# Patient Record
Sex: MALE | Race: BLACK OR AFRICAN AMERICAN | NOT HISPANIC OR LATINO | Employment: FULL TIME | URBAN - METROPOLITAN AREA
[De-identification: names, ages, dates, MRNs, and addresses within clinical notes are randomized per-mention and may not be internally consistent; named-entity substitution may affect disease eponyms.]

---

## 2017-01-16 ENCOUNTER — OFFICE VISIT (OUTPATIENT)
Dept: DIABETES | Facility: CLINIC | Age: 52
End: 2017-01-16
Payer: COMMERCIAL

## 2017-01-16 VITALS
BODY MASS INDEX: 55.39 KG/M2 | SYSTOLIC BLOOD PRESSURE: 154 MMHG | WEIGHT: 312.63 LBS | HEIGHT: 63 IN | DIASTOLIC BLOOD PRESSURE: 88 MMHG

## 2017-01-16 DIAGNOSIS — E11.9 DIABETES MELLITUS WITHOUT COMPLICATION: Primary | ICD-10-CM

## 2017-01-16 DIAGNOSIS — M83.9 OSTEOMALACIA: ICD-10-CM

## 2017-01-16 LAB — GLUCOSE SERPL-MCNC: 83 MG/DL (ref 70–110)

## 2017-01-16 PROCEDURE — 99214 OFFICE O/P EST MOD 30 MIN: CPT | Mod: S$GLB,,, | Performed by: PHYSICIAN ASSISTANT

## 2017-01-16 PROCEDURE — 99999 PR PBB SHADOW E&M-EST. PATIENT-LVL III: CPT | Mod: PBBFAC,,, | Performed by: PHYSICIAN ASSISTANT

## 2017-01-16 PROCEDURE — 1159F MED LIST DOCD IN RCRD: CPT | Mod: S$GLB,,, | Performed by: PHYSICIAN ASSISTANT

## 2017-01-16 PROCEDURE — 82948 REAGENT STRIP/BLOOD GLUCOSE: CPT | Mod: S$GLB,,, | Performed by: PHYSICIAN ASSISTANT

## 2017-01-16 PROCEDURE — 3079F DIAST BP 80-89 MM HG: CPT | Mod: S$GLB,,, | Performed by: PHYSICIAN ASSISTANT

## 2017-01-16 PROCEDURE — 3060F POS MICROALBUMINURIA REV: CPT | Mod: S$GLB,,, | Performed by: PHYSICIAN ASSISTANT

## 2017-01-16 PROCEDURE — 3077F SYST BP >= 140 MM HG: CPT | Mod: S$GLB,,, | Performed by: PHYSICIAN ASSISTANT

## 2017-01-16 PROCEDURE — 3044F HG A1C LEVEL LT 7.0%: CPT | Mod: S$GLB,,, | Performed by: PHYSICIAN ASSISTANT

## 2017-01-16 PROCEDURE — 2022F DILAT RTA XM EVC RTNOPTHY: CPT | Mod: S$GLB,,, | Performed by: PHYSICIAN ASSISTANT

## 2017-01-16 NOTE — PROGRESS NOTES
Subjective:      Patient ID: Juan C Rodriguez is a 51 y.o. male.    PCP: Primary Doctor No      Juan C Rodriguez is a pleasant 51 y.o. male presenting to follow up on diabetes mellitus. He has had diabetes for 4 or more years. His last visit in Diabetes Management was 9/26/2016. Since that time he has had mild improvement in his symptoms. His blood sugar range fasting has been 130 and 2 hour post meal has been 150's, and he has been monitoring 1 times per day as directed. His current concerns are glycemic control.    He denies any hospital admissions, emergency room visits, hypoglycemia, syncope, diaphoresis, chest pain, or dyspnea.    He has lost 3 pounds since last visit. His BMI is 55.38    His blood sugar in the clinic today was:   Lab Results   Component Value Date    POCGLU 83 01/16/2017     We discussed the American diabetes Association recommendations:  hemoglobin A1c below 7.0%; all diabetics should be on statins unless contraindicated; one aspirin daily unless contraindicated; fasting blood sugar between 80 and 130 mg/dL; postprandial blood sugar below 180 mg/dl; prevention of hypoglycemia, may adjust goals to higher levels if persistent; ACE or ARB therapy if not contraindicated; and maintain in an ideal body weight with BMI below 25.    STANDARDS OF CARE:  Eye doctor: Dr. Ruiz, last exam 2015.  Dental exam: Recommend regular exams; denies gums bleeding.  Podiatry doctor:    ACTIVITY LEVEL: He exercises rarely.  MEAL PLANNING: Number of meals per day - 3. Number of snacks per day - 2.  Per dietary recall, patient is not limiting carbohydrates, saturated fats and sodium.   BLOOD GLUCOSE TESTING: Self-monitoring with   SOCIAL HISTORY: single. Lives alone. 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    Lab Results   Component Value Date    WBC 7.72 09/23/2016    HGB 11.9 (L) 09/23/2016    HCT 38.1 (L) 12/08/2016     09/23/2016    CHOL 211 (H)  07/08/2016    TRIG 53 07/08/2016    HDL 51 07/08/2016    ALT 15 12/08/2016    AST 19 12/08/2016     09/26/2016    K 3.7 09/26/2016     09/26/2016    CREATININE 1.3 09/26/2016    ESTGFRAFRICA >60.0 09/26/2016    EGFRNONAA >60.0 09/26/2016    BUN 25 (H) 09/26/2016    CO2 31 (H) 09/26/2016    TSH 2.18 07/08/2016    PSA 0.44 12/08/2016    GLU 96 09/26/2016       Lab Results   Component Value Date    HGBA1C 6.6 (H) 02/18/2016    HGBA1C 6.6 (H) 02/18/2016    HGBA1C 6.2 09/17/2015       Lab Results   Component Value Date    CPEPTIDE 2.49 07/08/2016     Lab Results   Component Value Date    T3FREE 2.7 07/08/2016     Lab Results   Component Value Date    FREET4 0.80 09/17/2015     Lab Results   Component Value Date    TSH 2.18 07/08/2016     Lab Results   Component Value Date    TOTALTESTOST 69 (L) 12/10/2015     Lab Results   Component Value Date    TESTOSTERONE 198 (L) 05/31/2016    TESTOSTERONE 43.6 (L) 05/31/2016     Lab Results   Component Value Date    IRON 43 (L) 09/17/2015    TIBC 296 09/17/2015    FERRITIN 102 01/18/2011     Lab Results   Component Value Date    .0 (H) 09/26/2016    CALCIUM 9.2 09/26/2016    CAION 5.0 07/08/2016    PHOS 2.7 09/17/2015           Review of patient's allergies indicates:   Allergen Reactions    Naproxen Swelling       Past Medical History   Diagnosis Date    Diabetes mellitus, type 2     DM (diabetes mellitus)      2011  02/25/2014    HTN (hypertension)     Hypogonadism, testicular     Obesities, morbid        Review of Systems   Constitutional: Negative.  Negative for activity change, appetite change, chills, diaphoresis, fatigue, fever and unexpected weight change.   HENT: Negative.  Negative for dental problem, facial swelling, hearing loss, nosebleeds, trouble swallowing and voice change.    Eyes: Negative.  Negative for photophobia, pain, discharge, redness, itching and visual disturbance.   Respiratory: Negative.  Negative for cough, choking, chest  "tightness, shortness of breath and wheezing.    Cardiovascular: Negative.  Negative for chest pain, palpitations and leg swelling.   Gastrointestinal: Negative.  Negative for abdominal distention, abdominal pain, blood in stool, constipation, diarrhea, nausea and vomiting.   Endocrine: Negative.  Negative for cold intolerance, heat intolerance, polydipsia, polyphagia and polyuria.   Genitourinary: Negative.  Negative for decreased urine volume, difficulty urinating, dysuria, frequency, scrotal swelling, testicular pain and urgency.   Musculoskeletal: Negative.  Negative for arthralgias, back pain, gait problem, joint swelling, myalgias, neck pain and neck stiffness.   Skin: Negative.  Negative for color change, pallor, rash and wound.   Allergic/Immunologic: Negative.  Negative for environmental allergies, food allergies and immunocompromised state.   Neurological: Negative.  Negative for dizziness, tremors, seizures, syncope, facial asymmetry, speech difficulty, weakness, light-headedness, numbness and headaches.   Hematological: Negative.  Negative for adenopathy. Does not bruise/bleed easily.   Psychiatric/Behavioral: Negative.  Negative for agitation, behavioral problems, confusion, decreased concentration, dysphoric mood, hallucinations, self-injury, sleep disturbance and suicidal ideas. The patient is not nervous/anxious and is not hyperactive.       Objective:     Vitals - 1 value per visit 12/12/2016 12/15/2016 1/16/2017   SYSTOLIC 170 - 154   DIASTOLIC 76 - 88   PULSE 60 - -   Weight (lb) 313.94 313 312.61   HEIGHT 5' 2.5" - 5' 3"   BODY MASS INDEX 56.5 56.34 55.38   VISIT REPORT - - -   Pain Score  - 0 -       Physical Exam   Constitutional: He is oriented to person, place, and time. He appears well-developed and well-nourished. He is cooperative.  Non-toxic appearance. He does not have a sickly appearance. He does not appear ill. No distress. He is not intubated.   HENT:   Head: Normocephalic and " atraumatic. Not macrocephalic and not microcephalic. Head is without raccoon's eyes, without Malone's sign, without abrasion, without contusion, without laceration, without right periorbital erythema and without left periorbital erythema. Hair is normal.   Right Ear: External ear normal. No lacerations. No drainage, swelling or tenderness. No foreign bodies. No mastoid tenderness. Tympanic membrane is not injected, not scarred, not perforated, not erythematous, not retracted and not bulging. Tympanic membrane mobility is normal. No middle ear effusion. No hemotympanum. No decreased hearing is noted.   Left Ear: External ear normal. No lacerations. No drainage, swelling or tenderness. No foreign bodies. No mastoid tenderness. Tympanic membrane is not injected, not scarred, not perforated, not erythematous, not retracted and not bulging. Tympanic membrane mobility is normal.  No middle ear effusion. No hemotympanum. No decreased hearing is noted.   Nose: Nose normal.   Mouth/Throat: Oropharynx is clear and moist.   Eyes: EOM and lids are normal. Pupils are equal, round, and reactive to light. Right eye exhibits no chemosis, no discharge, no exudate and no hordeolum. No foreign body present in the right eye. Left eye exhibits no chemosis, no discharge, no exudate and no hordeolum. No foreign body present in the left eye. Right conjunctiva is not injected. Right conjunctiva has no hemorrhage. Left conjunctiva is not injected. Left conjunctiva has no hemorrhage. No scleral icterus. Right eye exhibits normal extraocular motion and no nystagmus. Left eye exhibits normal extraocular motion and no nystagmus. Right pupil is round and reactive. Left pupil is round and reactive. Pupils are equal.   Fundoscopic exam:       The right eye shows no arteriolar narrowing, no AV nicking, no exudate, no hemorrhage and no papilledema. The right eye shows red reflex and venous pulsations.        The left eye shows no arteriolar  narrowing, no AV nicking, no exudate, no hemorrhage and no papilledema. The left eye shows red reflex and venous pulsations.   Neck: Normal range of motion, full passive range of motion without pain and phonation normal. Neck supple. Normal carotid pulses, no hepatojugular reflux and no JVD present. No tracheal tenderness, no spinous process tenderness and no muscular tenderness present. Carotid bruit is not present. No rigidity. No tracheal deviation, no edema, no erythema and normal range of motion present. No thyroid mass and no thyromegaly present.   Cardiovascular: Normal rate, regular rhythm, normal heart sounds and intact distal pulses.   No extrasystoles are present. PMI is not displaced.  Exam reveals no gallop, no friction rub and no decreased pulses.    No murmur heard.  Pulses:       Carotid pulses are 2+ on the right side, and 2+ on the left side.       Radial pulses are 2+ on the right side, and 2+ on the left side.        Dorsalis pedis pulses are 2+ on the right side, and 2+ on the left side.        Posterior tibial pulses are 2+ on the right side, and 2+ on the left side.   Pulmonary/Chest: Effort normal and breath sounds normal. No accessory muscle usage or stridor. No apnea, no tachypnea and no bradypnea. He is not intubated. No respiratory distress. He has no decreased breath sounds. He has no wheezes. He has no rhonchi. He has no rales. He exhibits no tenderness.   Abdominal: Soft. Bowel sounds are normal. He exhibits no shifting dullness, no distension, no pulsatile liver, no fluid wave, no abdominal bruit, no ascites, no pulsatile midline mass and no mass. There is no hepatosplenomegaly, splenomegaly or hepatomegaly. There is no tenderness. There is no rigidity, no rebound, no guarding, no CVA tenderness and no tenderness at McBurney's point. No hernia. Hernia confirmed negative in the ventral area.   Musculoskeletal: Normal range of motion. He exhibits no edema or tenderness.        Right  foot: There is normal range of motion and no deformity.        Left foot: There is normal range of motion and no deformity.   Feet:   Right Foot:   Protective Sensation: 6 sites tested. 6 sites sensed.   Skin Integrity: Negative for ulcer, blister, skin breakdown, erythema, warmth, callus or dry skin.   Left Foot:   Protective Sensation: 6 sites tested. 6 sites sensed.   Skin Integrity: Negative for ulcer, blister, skin breakdown, erythema, warmth, callus or dry skin.   Lymphadenopathy:        Head (right side): No submental, no submandibular, no tonsillar, no preauricular, no posterior auricular and no occipital adenopathy present.        Head (left side): No submental, no submandibular, no tonsillar, no preauricular, no posterior auricular and no occipital adenopathy present.     He has no cervical adenopathy.        Right cervical: No superficial cervical, no deep cervical and no posterior cervical adenopathy present.       Left cervical: No superficial cervical, no deep cervical and no posterior cervical adenopathy present.   Neurological: He is alert and oriented to person, place, and time. He has normal reflexes. He displays no atrophy and no tremor. No cranial nerve deficit or sensory deficit. He exhibits normal muscle tone. He displays no seizure activity. Coordination and gait normal.   Reflex Scores:       Bicep reflexes are 2+ on the right side and 2+ on the left side.       Brachioradialis reflexes are 2+ on the right side and 2+ on the left side.       Patellar reflexes are 2+ on the right side and 2+ on the left side.  Skin: Skin is warm and dry. No rash noted. No erythema. No pallor.   Psychiatric: He has a normal mood and affect. His mood appears not anxious. His affect is not angry, not blunt, not labile and not inappropriate. His speech is not rapid and/or pressured, not delayed, not tangential and not slurred. He is not agitated, not aggressive, not hyperactive, not slowed, not withdrawn, not  actively hallucinating and not combative. Thought content is not paranoid and not delusional. Cognition and memory are not impaired. He does not express impulsivity or inappropriate judgment. He does not exhibit a depressed mood. He expresses no homicidal and no suicidal ideation. He expresses no suicidal plans and no homicidal plans. He is communicative. He exhibits normal recent memory and normal remote memory. He is attentive.     Assessment:     1. Diabetes mellitus without complication       Plan:     Juan C Rodriguez is seen today for   1. Diabetes mellitus without complication    2. Osteomalacia      We have discussed the etiology and treatment options associated with the diagnosis as well as alternatives. He has elected the following treatments.     Diabetes mellitus without complication  -     POCT glucose  -     Hemoglobin A1c; Future; Expected date: 1/16/17  -     Renal function panel; Future; Expected date: 1/16/17  -     ALT (SGPT); Future; Expected date: 1/16/17  -     AST (SGOT); Future; Expected date: 1/16/17  -     Lipid panel; Future; Expected date: 1/16/17  -     TSH; Future; Expected date: 1/16/17  -     Microalbumin/creatinine urine ratio; Future; Expected date: 1/16/17  -     CBC auto differential; Future; Expected date: 1/16/17  -     Vitamin D; Future; Expected date: 1/16/17  -     PTH, intact; Future; Expected date: 1/16/17    Osteomalacia  -     Renal function panel; Future; Expected date: 1/16/17  -     Vitamin D; Future; Expected date: 1/16/17  -     PTH, intact; Future; Expected date: 1/16/17    1.) Patient was instructed to monitor blood glucose twice daily, fasting, and 2 hour post meal; if on Multiple Daily Injections (MDI) he will need to have pre-meal blood glucose as well. Reminded to bring BG meter or record to each visit for review.  2.) Reviewed pathophysiology of type 2 diabetes, complications related to the disease, importance of annual dilated eye exam and self daily foot  examination.  3.) Continue medications as prescribed Lifestyle changes. [facility] MyChart or Phone review in 1 week with BG records for adjustment of medication.  4.) Reviewed carb counting, portion control, importance of spacing meals throughout the day to prevent post prandial elevations. Recommended low saturated fat, low sodium diet to aid in control of hypertension and cholesterol.  5.) Discussed activity, benefits, methods, and precautions. Recommended patient start/continue some form of exercise and increase as tolerated to 60 minutes per day to facilitate weight loss and aid in control of BGs. Also reminded patient of WHO recommendation of 10,000 steps daily as a goal.   6.) A1C, TSH, Lipid Panel, CMP with eGFR and Micro/Creatinine per ADA protocol.  7.) Return to clinic in 4 months for follow up. Advised patient to call clinic with any questions or concerns.    A total of 30 minutes was spent in face to face time, of which 50 % was spent in counseling patient on disease process, complications, treatment, and side effects of medications.    The patient was explained the above plan and given opportunity to ask questions.  He understands, chooses and consents to this plan and accepts all the risks, which include but are not limited to the risks mentioned above.   He understands the alternative of having no testing, interventions or treatments at this time. He left content and without further questions.

## 2017-01-23 RX ORDER — TESTOSTERONE GEL, 1% 10 MG/G
5 GEL TRANSDERMAL DAILY
Qty: 30 PACKET | Refills: 3 | Status: SHIPPED | OUTPATIENT
Start: 2017-01-23 | End: 2017-06-21 | Stop reason: SDUPTHER

## 2017-01-23 NOTE — TELEPHONE ENCOUNTER
Patient states insurance company no longer covers Testim. However, they will cover Androgel. Please advise.

## 2017-01-23 NOTE — TELEPHONE ENCOUNTER
----- Message from Cassidy Gonzalez sent at 1/23/2017  9:35 AM CST -----  Pt is requesting a call from nurse to f/u on his medications his insurance will cover.              Please call pt back at [phone]

## 2017-03-17 DIAGNOSIS — M83.9 OSTEOMALACIA: ICD-10-CM

## 2017-03-17 RX ORDER — ERGOCALCIFEROL 1.25 MG/1
CAPSULE ORAL
Qty: 4 CAPSULE | Refills: 0 | Status: SHIPPED | OUTPATIENT
Start: 2017-03-17 | End: 2017-05-26 | Stop reason: SDUPTHER

## 2017-03-18 LAB
25(OH)D3 SERPL-MCNC: 33 NG/ML (ref 30–100)
ALBUMIN SERPL-MCNC: 4 G/DL (ref 3.6–5.1)
ALT SERPL-CCNC: 11 U/L (ref 9–46)
AST SERPL-CCNC: 15 U/L (ref 10–35)
BASOPHILS # BLD AUTO: 38 CELLS/UL (ref 0–200)
BASOPHILS NFR BLD AUTO: 0.5 %
BUN SERPL-MCNC: 29 MG/DL (ref 7–25)
BUN/CREAT SERPL: 19 (CALC) (ref 6–22)
CALCIUM SERPL-MCNC: 9.3 MG/DL (ref 8.6–10.3)
CALCIUM SERPL-MCNC: 9.4 MG/DL (ref 8.6–10.3)
CHLORIDE SERPL-SCNC: 100 MMOL/L (ref 98–110)
CHOLEST SERPL-MCNC: 235 MG/DL (ref 125–200)
CHOLEST/HDLC SERPL: 4.3 (CALC)
CO2 SERPL-SCNC: 31 MMOL/L (ref 20–31)
CREAT SERPL-MCNC: 1.53 MG/DL (ref 0.7–1.33)
EOSINOPHIL # BLD AUTO: 122 CELLS/UL (ref 15–500)
EOSINOPHIL NFR BLD AUTO: 1.6 %
ERYTHROCYTE [DISTWIDTH] IN BLOOD BY AUTOMATED COUNT: 17.1 % (ref 11–15)
GFR SERPL CREATININE-BSD FRML MDRD: 51 ML/MIN/1.73M2
GLUCOSE SERPL-MCNC: 93 MG/DL (ref 65–99)
HBA1C MFR BLD: 6.1 % OF TOTAL HGB
HCT VFR BLD AUTO: 37.8 % (ref 38.5–50)
HDLC SERPL-MCNC: 55 MG/DL
HGB BLD-MCNC: 12 G/DL (ref 13.2–17.1)
LDLC SERPL CALC-MCNC: 166 MG/DL (CALC)
LYMPHOCYTES # BLD AUTO: 1801 CELLS/UL (ref 850–3900)
LYMPHOCYTES NFR BLD AUTO: 23.7 %
MCH RBC QN AUTO: 24.1 PG (ref 27–33)
MCHC RBC AUTO-ENTMCNC: 31.9 G/DL (ref 32–36)
MCV RBC AUTO: 75.8 FL (ref 80–100)
MONOCYTES # BLD AUTO: 509 CELLS/UL (ref 200–950)
MONOCYTES NFR BLD AUTO: 6.7 %
NEUTROPHILS # BLD AUTO: 5130 CELLS/UL (ref 1500–7800)
NEUTROPHILS NFR BLD AUTO: 67.5 %
NONHDLC SERPL-MCNC: 180 MG/DL (CALC)
PHOSPHATE SERPL-MCNC: 3.6 MG/DL (ref 2.5–4.5)
PLATELET # BLD AUTO: 269 THOUSAND/UL (ref 140–400)
PMV BLD REES-ECKER: 8.4 FL (ref 7.5–12.5)
POTASSIUM SERPL-SCNC: 3.9 MMOL/L (ref 3.5–5.3)
PTH-INTACT SERPL-MCNC: 137 PG/ML (ref 14–64)
RBC # BLD AUTO: 4.98 MILLION/UL (ref 4.2–5.8)
SODIUM SERPL-SCNC: 140 MMOL/L (ref 135–146)
TRIGL SERPL-MCNC: 71 MG/DL
TSH SERPL-ACNC: 2.72 MIU/L (ref 0.4–4.5)
WBC # BLD AUTO: 7.6 THOUSAND/UL (ref 3.8–10.8)

## 2017-05-22 ENCOUNTER — OFFICE VISIT (OUTPATIENT)
Dept: DIABETES | Facility: CLINIC | Age: 52
End: 2017-05-22
Payer: COMMERCIAL

## 2017-05-22 VITALS
SYSTOLIC BLOOD PRESSURE: 152 MMHG | HEIGHT: 63 IN | DIASTOLIC BLOOD PRESSURE: 96 MMHG | WEIGHT: 311.75 LBS | BODY MASS INDEX: 55.24 KG/M2

## 2017-05-22 DIAGNOSIS — E11.9 DIABETES MELLITUS WITHOUT COMPLICATION: Primary | ICD-10-CM

## 2017-05-22 DIAGNOSIS — E21.3 HYPERPARATHYROIDISM: ICD-10-CM

## 2017-05-22 DIAGNOSIS — M25.572 CHRONIC PAIN OF LEFT ANKLE: ICD-10-CM

## 2017-05-22 DIAGNOSIS — G89.29 CHRONIC PAIN OF LEFT ANKLE: ICD-10-CM

## 2017-05-22 LAB — GLUCOSE SERPL-MCNC: 87 MG/DL (ref 70–110)

## 2017-05-22 PROCEDURE — 3044F HG A1C LEVEL LT 7.0%: CPT | Mod: S$GLB,,, | Performed by: PHYSICIAN ASSISTANT

## 2017-05-22 PROCEDURE — 82948 REAGENT STRIP/BLOOD GLUCOSE: CPT | Mod: S$GLB,,, | Performed by: PHYSICIAN ASSISTANT

## 2017-05-22 PROCEDURE — 99999 PR PBB SHADOW E&M-EST. PATIENT-LVL III: CPT | Mod: PBBFAC,,, | Performed by: PHYSICIAN ASSISTANT

## 2017-05-22 PROCEDURE — 99214 OFFICE O/P EST MOD 30 MIN: CPT | Mod: S$GLB,,, | Performed by: PHYSICIAN ASSISTANT

## 2017-05-22 RX ORDER — KETOROLAC TROMETHAMINE 10 MG/1
10 TABLET, FILM COATED ORAL EVERY 6 HOURS
Qty: 20 TABLET | Refills: 0 | Status: SHIPPED | OUTPATIENT
Start: 2017-05-22 | End: 2017-08-08

## 2017-05-22 RX ORDER — DEXTROMETHORPHAN HYDROBROMIDE, GUAIFENESIN 5; 100 MG/5ML; MG/5ML
650 LIQUID ORAL EVERY 8 HOURS
Refills: 0 | COMMUNITY
Start: 2017-05-22 | End: 2023-01-30

## 2017-05-22 NOTE — PROGRESS NOTES
Subjective:      Patient ID: Juan C Rodriguez is a 51 y.o. male.    PCP: Primary Doctor No      Juan C Rodriguez is a pleasant 51 y.o. male presenting to follow up on diabetes mellitus. He has had diabetes for 4 or more years. His last visit in Diabetes Management was 1/16/2017 Since that time he has had significant improvement in his glycemia. His blood sugar range fasting has been  and 2 hour post meal has been , and he has been monitoring 1-2 times per day as directed. His current concerns are left ankle pain..    He denies any hospital admissions, emergency room visits, hypoglycemia, syncope, diaphoresis, chest pain, or dyspnea.    He has lost 1 pounds since last visit. His BMI is 55.22    His blood sugar in the clinic today was:   Lab Results   Component Value Date    POCGLU 87 05/22/2017       We discussed the American diabetes Association recommendations:  hemoglobin A1c below 7.0%; all diabetics should be on statins unless contraindicated; one aspirin daily unless contraindicated; fasting blood sugar between 80 and 130 mg/dL; postprandial blood sugar below 180 mg/dl; prevention of hypoglycemia, may adjust goals to higher levels if persistent; ACE or ARB therapy if not contraindicated; and maintain in an ideal body weight with BMI below 25.    Juan C is compliant most of the time with DM medications.     Juan C is compliant most of the time with lifestyle modifications to include activity and meal planning.       STANDARDS OF CARE:  Eye doctor: Dr. Ruiz, last exam 2015.  Dental exam: Recommend regular exams; denies gums bleeding.  Podiatry doctor:     ACTIVITY LEVEL: He exercises rarely.  MEAL PLANNING: Number of meals per day - 3. Number of snacks per day - 2.  Per dietary recall, patient is not limiting carbohydrates, saturated fats and sodium.   BLOOD GLUCOSE TESTING: Self-monitoring with   SOCIAL HISTORY: single. Lives alone. 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    Lab Results   Component Value Date    WBC 7.6 03/16/2017    HGB 12.0 (L) 03/16/2017    HCT 37.8 (L) 03/16/2017     03/16/2017    CHOL 235 (H) 03/16/2017    TRIG 71 03/16/2017    HDL 55 03/16/2017    ALT 11 03/16/2017    AST 15 03/16/2017     03/16/2017    K 3.9 03/16/2017     03/16/2017    CREATININE 1.53 (H) 03/16/2017    ESTGFRAFRICA 59 (L) 03/16/2017    EGFRNONAA 51 (L) 03/16/2017    BUN 29 (H) 03/16/2017    CO2 31 03/16/2017    TSH 2.72 03/16/2017    PSA 0.44 12/08/2016    GLU 93 03/16/2017       Lab Results   Component Value Date    HGBA1C 6.6 (H) 02/18/2016    HGBA1C 6.6 (H) 02/18/2016    HGBA1C 6.2 09/17/2015       Lab Results   Component Value Date    CPEPTIDE 2.49 07/08/2016       Lab Results   Component Value Date    T3FREE 2.7 07/08/2016     Lab Results   Component Value Date    FREET4 0.80 09/17/2015     Lab Results   Component Value Date    TSH 2.72 03/16/2017     Lab Results   Component Value Date    TOTALTESTOST 69 (L) 12/10/2015     Lab Results   Component Value Date    TESTOSTERONE 198 (L) 05/31/2016    TESTOSTERONE 43.6 (L) 05/31/2016       Lab Results   Component Value Date    IRON 43 (L) 09/17/2015    TIBC 296 09/17/2015    FERRITIN 102 01/18/2011     Lab Results   Component Value Date     (H) 03/16/2017    CALCIUM 9.4 03/16/2017    CALCIUM 9.3 03/16/2017    CAION 5.0 07/08/2016    PHOS 2.7 09/17/2015           Review of patient's allergies indicates:   Allergen Reactions    Naproxen Swelling       Past Medical History:   Diagnosis Date    Diabetes mellitus, type 2 diagnosed 2013    DM (diabetes mellitus)     2011  02/25/2014    HTN (hypertension)     Hypogonadism, testicular     Obesities, morbid        Review of Systems   Constitutional: Negative.  Negative for activity change, appetite change, chills, diaphoresis, fatigue, fever and unexpected weight change.   HENT: Negative.  Negative for dental problem, facial swelling, hearing  "loss, nosebleeds, trouble swallowing and voice change.    Eyes: Negative.  Negative for photophobia, pain, discharge, redness, itching and visual disturbance.   Respiratory: Negative.  Negative for cough, choking, chest tightness, shortness of breath and wheezing.    Cardiovascular: Negative.  Negative for chest pain, palpitations and leg swelling.   Gastrointestinal: Negative.  Negative for abdominal distention, abdominal pain, blood in stool, constipation, diarrhea, nausea and vomiting.   Endocrine: Negative.  Negative for cold intolerance, heat intolerance, polydipsia, polyphagia and polyuria.   Genitourinary: Negative.  Negative for decreased urine volume, difficulty urinating, dysuria, frequency, scrotal swelling, testicular pain and urgency.   Musculoskeletal: Positive for arthralgias (  Pain and swelling in the left ankle), gait problem (secondary to pain in the left ankle) and joint swelling (eft ankle swelling). Negative for back pain, myalgias, neck pain and neck stiffness.   Skin: Negative.  Negative for color change, pallor, rash and wound.   Allergic/Immunologic: Negative.  Negative for environmental allergies, food allergies and immunocompromised state.   Neurological: Negative for dizziness, tremors, seizures, syncope, facial asymmetry, speech difficulty, weakness, light-headedness, numbness and headaches.   Hematological: Negative.  Negative for adenopathy. Does not bruise/bleed easily.   Psychiatric/Behavioral: Negative.  Negative for agitation, behavioral problems, confusion, decreased concentration, dysphoric mood, hallucinations, self-injury, sleep disturbance and suicidal ideas. The patient is not nervous/anxious and is not hyperactive.       Objective:     Vitals - 1 value per visit 12/12/2016 12/15/2016 1/16/2017   SYSTOLIC 170 - 154   DIASTOLIC 76 - 88   PULSE 60 - -   Weight (lb) 313.94 313 312.61   Weight (kg) 142.4 141.976 141.8   HEIGHT 5' 2.5" - 5' 3"   BODY MASS INDEX 56.5 56.34 55.38 "   VISIT REPORT - - -   Pain Score  - 0 -       Physical Exam   Constitutional: He is oriented to person, place, and time. He appears well-developed and well-nourished. He is cooperative.  Non-toxic appearance. He does not have a sickly appearance. He does not appear ill. No distress. He is not intubated.   HENT:   Head: Normocephalic and atraumatic. Not macrocephalic and not microcephalic. Head is without raccoon's eyes, without Malone's sign, without abrasion, without contusion, without laceration, without right periorbital erythema and without left periorbital erythema. Hair is normal.   Right Ear: External ear normal. No lacerations. No drainage, swelling or tenderness. No foreign bodies. No mastoid tenderness. Tympanic membrane is not injected, not scarred, not perforated, not erythematous, not retracted and not bulging. Tympanic membrane mobility is normal. No middle ear effusion. No hemotympanum. No decreased hearing is noted.   Left Ear: External ear normal. No lacerations. No drainage, swelling or tenderness. No foreign bodies. No mastoid tenderness. Tympanic membrane is not injected, not scarred, not perforated, not erythematous, not retracted and not bulging. Tympanic membrane mobility is normal.  No middle ear effusion. No hemotympanum. No decreased hearing is noted.   Nose: Nose normal.   Mouth/Throat: Oropharynx is clear and moist.   Eyes: EOM and lids are normal. Pupils are equal, round, and reactive to light. Right eye exhibits no chemosis, no discharge, no exudate and no hordeolum. No foreign body present in the right eye. Left eye exhibits no chemosis, no discharge, no exudate and no hordeolum. No foreign body present in the left eye. Right conjunctiva is not injected. Right conjunctiva has no hemorrhage. Left conjunctiva is not injected. Left conjunctiva has no hemorrhage. No scleral icterus. Right eye exhibits normal extraocular motion and no nystagmus. Left eye exhibits normal extraocular motion  and no nystagmus. Right pupil is round and reactive. Left pupil is round and reactive. Pupils are equal.   Fundoscopic exam:       The right eye shows no arteriolar narrowing, no AV nicking, no exudate, no hemorrhage and no papilledema. The right eye shows red reflex and venous pulsations.        The left eye shows no arteriolar narrowing, no AV nicking, no exudate, no hemorrhage and no papilledema. The left eye shows red reflex and venous pulsations.   Neck: Normal range of motion, full passive range of motion without pain and phonation normal. Neck supple. Normal carotid pulses, no hepatojugular reflux and no JVD present. No tracheal tenderness, no spinous process tenderness and no muscular tenderness present. Carotid bruit is not present. No no neck rigidity. No tracheal deviation, no edema, no erythema and normal range of motion present. No thyroid mass and no thyromegaly present.   Cardiovascular: Normal rate, regular rhythm, normal heart sounds and intact distal pulses.   No extrasystoles are present. PMI is not displaced.  Exam reveals no gallop, no friction rub and no decreased pulses.    No murmur heard.  Pulses:       Carotid pulses are 2+ on the right side, and 2+ on the left side.       Radial pulses are 2+ on the right side, and 2+ on the left side.        Dorsalis pedis pulses are 2+ on the right side, and 2+ on the left side.        Posterior tibial pulses are 2+ on the right side, and 2+ on the left side.   Pulmonary/Chest: Effort normal and breath sounds normal. No accessory muscle usage or stridor. No apnea, no tachypnea and no bradypnea. He is not intubated. No respiratory distress. He has no decreased breath sounds. He has no wheezes. He has no rhonchi. He has no rales. He exhibits no tenderness.   Abdominal: Soft. Bowel sounds are normal. He exhibits no shifting dullness, no distension, no pulsatile liver, no fluid wave, no abdominal bruit, no ascites, no pulsatile midline mass and no mass.  There is no hepatosplenomegaly, splenomegaly or hepatomegaly. There is no tenderness. There is no rigidity, no rebound, no guarding, no CVA tenderness and no tenderness at McBurney's point. No hernia. Hernia confirmed negative in the ventral area.   Musculoskeletal: Normal range of motion. He exhibits edema. He exhibits no tenderness.        Right foot: There is normal range of motion and no deformity.        Left foot: There is normal range of motion and no deformity.   With attention to the lower extremities patient has left ankle edema, with full active range of motion 5 out of 5 muscle strength, without any neuro logical deficits, and no obvious deformity.   Feet:   Right Foot:   Protective Sensation: 6 sites tested. 6 sites sensed.   Skin Integrity: Negative for ulcer, blister, skin breakdown, erythema, warmth, callus or dry skin.   Left Foot:   Protective Sensation: 6 sites tested. 6 sites sensed.   Skin Integrity: Negative for ulcer, blister, skin breakdown, erythema, warmth, callus or dry skin.   Lymphadenopathy:        Head (right side): No submental, no submandibular, no tonsillar, no preauricular, no posterior auricular and no occipital adenopathy present.        Head (left side): No submental, no submandibular, no tonsillar, no preauricular, no posterior auricular and no occipital adenopathy present.     He has no cervical adenopathy.        Right cervical: No superficial cervical, no deep cervical and no posterior cervical adenopathy present.       Left cervical: No superficial cervical, no deep cervical and no posterior cervical adenopathy present.   Neurological: He is alert and oriented to person, place, and time. He has normal reflexes. He displays no atrophy and no tremor. No cranial nerve deficit or sensory deficit. He exhibits normal muscle tone. He displays no seizure activity. Coordination and gait normal.   Reflex Scores:       Bicep reflexes are 2+ on the right side and 2+ on the left side.        Brachioradialis reflexes are 2+ on the right side and 2+ on the left side.       Patellar reflexes are 2+ on the right side and 2+ on the left side.  Skin: Skin is warm and dry. No rash noted. No erythema. No pallor.   Psychiatric: He has a normal mood and affect. His mood appears not anxious. His affect is not angry, not blunt, not labile and not inappropriate. His speech is not rapid and/or pressured, not delayed, not tangential and not slurred. He is not agitated, not aggressive, not hyperactive, not slowed, not withdrawn, not actively hallucinating and not combative. Thought content is not paranoid and not delusional. Cognition and memory are not impaired. He does not express impulsivity or inappropriate judgment. He does not exhibit a depressed mood. He expresses no homicidal and no suicidal ideation. He expresses no suicidal plans and no homicidal plans. He is communicative. He exhibits normal recent memory and normal remote memory. He is attentive.     Assessment:     1. Diabetes mellitus without complication    2. Hyperparathyroidism    3. Chronic pain of left ankle      Plan:     Juan C Rodriguez is seen today for   1. Diabetes mellitus without complication    2. Hyperparathyroidism    3. Chronic pain of left ankle      We have discussed the etiology and treatment options associated with the diagnosis as well as alternatives. He has elected the following treatments.     Diabetes mellitus without complication  -     POCT glucose  -     Ambulatory referral to Rheumatology  -     ketorolac (TORADOL) 10 mg tablet; Take 1 tablet (10 mg total) by mouth every 6 (six) hours.  Dispense: 20 tablet; Refill: 0  -     acetaminophen (TYLENOL) 650 MG TbSR; Take 1 tablet (650 mg total) by mouth every 8 (eight) hours.; Refill: 0    Hyperparathyroidism  -     Ambulatory referral to Rheumatology  -     ketorolac (TORADOL) 10 mg tablet; Take 1 tablet (10 mg total) by mouth every 6 (six) hours.  Dispense: 20 tablet; Refill: 0  -      acetaminophen (TYLENOL) 650 MG TbSR; Take 1 tablet (650 mg total) by mouth every 8 (eight) hours.; Refill: 0    Chronic pain of left ankle  -     Ambulatory referral to Rheumatology  -     ketorolac (TORADOL) 10 mg tablet; Take 1 tablet (10 mg total) by mouth every 6 (six) hours.  Dispense: 20 tablet; Refill: 0  -     acetaminophen (TYLENOL) 650 MG TbSR; Take 1 tablet (650 mg total) by mouth every 8 (eight) hours.; Refill: 0    1.) Patient was instructed to monitor blood glucose twice daily, fasting, post meal and ac dinner or at bedtime. Reminded to bring BG records or meter to each visit for review.  2.) Reviewed pathophysiology of type 2 diabetes, complications related to the disease, importance of annual dilated eye exam and self daily foot examination.  3.) Continue medications as prescribed None. [facility] MyChart or Phone review in 1 week with BG records for adjustment of medication.  4.) Reviewed carb counting, portion control, importance of spacing meals throughout the day to prevent post prandial elevations. Recommended low saturated fat, low sodium diet to aid in control of hypertension and cholesterol.  5.) Discussed activity, benefits, methods, and precautions. Recommended patient start/continue some form of exercise and increase as tolerated to 60 minutes per day to facilitate weight loss and aid in control of BGs. Also reminded patient of WHO recommendation of 10,000 steps daily as a goal.   6.) A1C, TSH, Lipid Panel, CMP with eGFR and Micro/Creatinine per ADA protocol.  7.) Return to clinic in 6 months months for follow up. Advised patient to call clinic with any questions or concerns.     A total of 50 minutes was spent in face to face time, of which 50 % was spent in counseling patient on disease process, complications, treatment, and side effects of medications.    The patient was explained the above plan and given opportunity to ask questions. He understands, chooses and consents to this plan  and accepts all the risks, which include but are not limited to the risks mentioned above. He understands the alternative of having no testing, interventions or treatments at this time. He left content and without further questions.

## 2017-05-26 DIAGNOSIS — I10 ESSENTIAL HYPERTENSION: ICD-10-CM

## 2017-05-26 DIAGNOSIS — E29.1 TESTICULAR HYPOFUNCTION: ICD-10-CM

## 2017-05-26 DIAGNOSIS — I10 ESSENTIAL HYPERTENSION, BENIGN: ICD-10-CM

## 2017-05-26 DIAGNOSIS — M83.9 OSTEOMALACIA: ICD-10-CM

## 2017-05-26 DIAGNOSIS — M10.9 GOUT WITHOUT TOPHUS: ICD-10-CM

## 2017-05-26 DIAGNOSIS — R79.89 ABNORMAL CBC MEASUREMENT: ICD-10-CM

## 2017-05-28 RX ORDER — ERGOCALCIFEROL 1.25 MG/1
CAPSULE ORAL
Qty: 4 CAPSULE | Refills: 0 | Status: SHIPPED | OUTPATIENT
Start: 2017-05-28 | End: 2017-08-21 | Stop reason: SDUPTHER

## 2017-05-28 RX ORDER — ALLOPURINOL 300 MG/1
TABLET ORAL
Qty: 30 TABLET | Refills: 0 | Status: SHIPPED | OUTPATIENT
Start: 2017-05-28 | End: 2017-07-09 | Stop reason: SDUPTHER

## 2017-06-13 ENCOUNTER — TELEPHONE (OUTPATIENT)
Dept: UROLOGY | Facility: CLINIC | Age: 52
End: 2017-06-13

## 2017-06-13 NOTE — TELEPHONE ENCOUNTER
----- Message from Jenna Zaragoza sent at 6/13/2017 12:41 PM CDT -----  Contact: PT  States he has lab work on 6/15 and he thought he was going to have his lab work done at [facility]. Please call pt at [phone]. Thank you

## 2017-06-13 NOTE — TELEPHONE ENCOUNTER
Patient would like to have his blood work for Dr. Mahoney done at [facility] on [address]. Faxing orders now.

## 2017-06-14 ENCOUNTER — TELEPHONE (OUTPATIENT)
Dept: UROLOGY | Facility: CLINIC | Age: 52
End: 2017-06-14

## 2017-06-14 NOTE — TELEPHONE ENCOUNTER
----- Message from Elizabeth Hoyt sent at 6/14/2017 11:55 AM CDT -----  Contact: [phone]  Patient would like to consult with nurse to see if orders were sent to [email or web address]. Please call back at [phone].    Thanks,  Elizabeth Hoyt

## 2017-06-21 ENCOUNTER — OFFICE VISIT (OUTPATIENT)
Dept: UROLOGY | Facility: CLINIC | Age: 52
End: 2017-06-21
Payer: COMMERCIAL

## 2017-06-21 VITALS — SYSTOLIC BLOOD PRESSURE: 142 MMHG | BODY MASS INDEX: 55.09 KG/M2 | DIASTOLIC BLOOD PRESSURE: 86 MMHG | WEIGHT: 311 LBS

## 2017-06-21 DIAGNOSIS — E29.1 HYPOGONADISM IN MALE: Primary | ICD-10-CM

## 2017-06-21 DIAGNOSIS — Z12.5 PROSTATE CANCER SCREENING: ICD-10-CM

## 2017-06-21 LAB
BILIRUB SERPL-MCNC: NORMAL MG/DL
BLOOD URINE, POC: NORMAL
COLOR, POC UA: NORMAL
GLUCOSE UR QL STRIP: 50
KETONES UR QL STRIP: NORMAL
LEUKOCYTE ESTERASE URINE, POC: NORMAL
NITRITE, POC UA: NORMAL
PH, POC UA: 6
PROTEIN, POC: NORMAL
SPECIFIC GRAVITY, POC UA: 1.02
UROBILINOGEN, POC UA: NORMAL

## 2017-06-21 PROCEDURE — 99999 PR PBB SHADOW E&M-EST. PATIENT-LVL II: CPT | Mod: PBBFAC,,, | Performed by: UROLOGY

## 2017-06-21 PROCEDURE — 99214 OFFICE O/P EST MOD 30 MIN: CPT | Mod: 25,S$GLB,, | Performed by: UROLOGY

## 2017-06-21 PROCEDURE — 81002 URINALYSIS NONAUTO W/O SCOPE: CPT | Mod: S$GLB,,, | Performed by: UROLOGY

## 2017-06-21 RX ORDER — TESTOSTERONE GEL, 1% 10 MG/G
5 GEL TRANSDERMAL DAILY
Qty: 30 PACKET | Refills: 5 | Status: SHIPPED | OUTPATIENT
Start: 2017-06-21 | End: 2017-09-11

## 2017-06-21 NOTE — PROGRESS NOTES
Chief Complaint: Hypogonadism    HPI:   6/21/17: No problems from testim, reviewed history in detail.  12/15/16: No interval changes doing well.  6/16/16: 49 yo pt of Dr. Olivo treated with Testim.  No abd/pelvic pain and no exac/rel factors.  No hematuria.  No urolithiasis.  No urinary bother.  No other  history. No family prostate cancer history. Was sluggish and weak and now on the T things are better with a good energy level.    Allergies:  Naproxen    Medications:  has a current medication list which includes the following prescription(s): acetaminophen, allopurinol, azilsartan med-chlorthalidone, blood sugar diagnostic, cetirizine, ketorolac, testosterone, and vitamin d2.    Review of Systems:  General: No fever, chills, fatigability, or weight loss.  Skin: No rashes, itching, or changes in color or texture of skin.  Chest: Denies AVILES, cyanosis, wheezing, cough, and sputum production.  Abdomen: Appetite fine. No weight loss. Denies diarrhea, abdominal pain, hematemesis, or blood in stool.  Musculoskeletal: No joint stiffness or swelling. Denies back pain.  : As above.  All other review of systems negative.    PMH:   has a past medical history of Diabetes mellitus, type 2 (diagnosed 2013); DM (diabetes mellitus); HTN (hypertension); Hypogonadism, testicular; and Obesities, morbid.    PSH:   has no past surgical history on file.    FamHx: family history includes Cataracts in his mother; Diabetes in his mother and sister; Hypertension in his mother.    SocHx:  reports that he has never smoked. He does not have any smokeless tobacco history on file. He reports that he drinks alcohol. His drug history is not on file.      Physical Exam:  Vitals:    06/21/17 1117   BP: (!) 142/86     General: A&Ox3, no apparent distress, no deformities  Neck: No masses, normal thyroid  Lungs: normal inspiration, no use of accessory muscles  Heart: normal pulse, no arrhythmias  Abdomen: Soft, NT, ND  Skin: The skin is warm and  dry. No jaundice.  Ext: No c/c/e.  : deferred    Labs/Studies:   Urinalysis performed in clinic, summary: UA normal  PSA    9/15: 1.2    12/16: 0.4    Impression/Plan:   1. Continue current regimen.  Hct/LFT/RTC 6 months.  2. PSA in 6 mo on RTC.

## 2017-07-05 ENCOUNTER — TELEPHONE (OUTPATIENT)
Dept: UROLOGY | Facility: CLINIC | Age: 52
End: 2017-07-05

## 2017-07-05 NOTE — TELEPHONE ENCOUNTER
----- Message from Janiya Zavaleta sent at 7/5/2017  8:35 AM CDT -----  Contact: pt  Pt recd a letter from his insurance that they are not going to be covering the med prescribed an for him to call and discuss the situation further with his provider.  Please call pt to assist/advise at [phone]

## 2017-07-05 NOTE — TELEPHONE ENCOUNTER
Patient states his insurance will no longer cover Androgel, but they will cover testosterone cypionate. Scheduled appointment with Dr. Mahoney to discuss.

## 2017-07-09 DIAGNOSIS — M10.9 GOUT WITHOUT TOPHUS: ICD-10-CM

## 2017-07-09 DIAGNOSIS — E29.1 TESTICULAR HYPOFUNCTION: ICD-10-CM

## 2017-07-09 DIAGNOSIS — I10 ESSENTIAL HYPERTENSION: ICD-10-CM

## 2017-07-09 DIAGNOSIS — R79.89 ABNORMAL CBC MEASUREMENT: ICD-10-CM

## 2017-07-09 DIAGNOSIS — I10 ESSENTIAL HYPERTENSION, BENIGN: ICD-10-CM

## 2017-07-09 RX ORDER — AZILSARTAN KAMEDOXOMIL AND CHLORTHALIDONE 40; 12.5 MG/1; MG/1
TABLET ORAL
Qty: 60 TABLET | Refills: 5 | Status: SHIPPED | OUTPATIENT
Start: 2017-07-09 | End: 2017-09-11 | Stop reason: SDUPTHER

## 2017-07-10 RX ORDER — ALLOPURINOL 300 MG/1
TABLET ORAL
Qty: 30 TABLET | Refills: 0 | Status: SHIPPED | OUTPATIENT
Start: 2017-07-10 | End: 2017-08-21 | Stop reason: SDUPTHER

## 2017-07-26 ENCOUNTER — TELEPHONE (OUTPATIENT)
Dept: UROLOGY | Facility: CLINIC | Age: 52
End: 2017-07-26

## 2017-07-26 NOTE — TELEPHONE ENCOUNTER
----- Message from Divina Jay sent at 7/26/2017 10:29 AM CDT -----  Patient has an appointment tomorrow and he needs to reschedule.  The next available appointment is September 19, but he needs to come in sooner.  He said he needs to get a new medication because he insurance will not be covering the one he is on now.   Call him at [phone].                                                             stevens

## 2017-08-08 ENCOUNTER — OFFICE VISIT (OUTPATIENT)
Dept: UROLOGY | Facility: CLINIC | Age: 52
End: 2017-08-08
Payer: COMMERCIAL

## 2017-08-08 VITALS — BODY MASS INDEX: 55.71 KG/M2 | WEIGHT: 314.5 LBS

## 2017-08-08 DIAGNOSIS — E29.1 HYPOGONADISM IN MALE: Primary | ICD-10-CM

## 2017-08-08 PROCEDURE — 3008F BODY MASS INDEX DOCD: CPT | Mod: S$GLB,,, | Performed by: UROLOGY

## 2017-08-08 PROCEDURE — 99214 OFFICE O/P EST MOD 30 MIN: CPT | Mod: S$GLB,,, | Performed by: UROLOGY

## 2017-08-08 PROCEDURE — 99999 PR PBB SHADOW E&M-EST. PATIENT-LVL II: CPT | Mod: PBBFAC,,, | Performed by: UROLOGY

## 2017-08-08 RX ORDER — TESTOSTERONE CYPIONATE 200 MG/ML
200 INJECTION, SOLUTION INTRAMUSCULAR
Qty: 10 ML | Refills: 1 | Status: SHIPPED | OUTPATIENT
Start: 2017-08-08 | End: 2018-01-03 | Stop reason: SDUPTHER

## 2017-08-08 RX ORDER — TESTOSTERONE CYPIONATE 200 MG/ML
200 INJECTION, SOLUTION INTRAMUSCULAR ONCE
Status: COMPLETED | OUTPATIENT
Start: 2017-08-21 | End: 2017-08-21

## 2017-08-08 NOTE — PROGRESS NOTES
Chief Complaint: Hypogonadism    HPI:   8/8/17: Testim no longer approved by his insurance just injections.  Otherwise doing fine.  Labs in hand Hct 37.5.  Has a week of T gel left.  T is low without therapy but he feels the same.  Will strive to give adequate amount for general health but not so much he is in risk from it.  6/21/17: No problems from testim, reviewed history in detail.  12/15/16: No interval changes doing well.  6/16/16: 51 yo pt of Dr. Olivo treated with Testim.  No abd/pelvic pain and no exac/rel factors.  No hematuria.  No urolithiasis.  No urinary bother.  No other  history. No family prostate cancer history. Was sluggish and weak and now on the T things are better with a good energy level.    Allergies:  Naproxen    Medications:  has a current medication list which includes the following prescription(s): acetaminophen, allopurinol, blood sugar diagnostic, edarbyclor, testosterone, cetirizine, and vitamin d2.    Review of Systems:  General: No fever, chills, fatigability, or weight loss.  Skin: No rashes, itching, or changes in color or texture of skin.  Chest: Denies AVILES, cyanosis, wheezing, cough, and sputum production.  Abdomen: Appetite fine. No weight loss. Denies diarrhea, abdominal pain, hematemesis, or blood in stool.  Musculoskeletal: No joint stiffness or swelling. Denies back pain.  : As above.  All other review of systems negative.    PMH:   has a past medical history of Diabetes mellitus, type 2 (diagnosed 2013); DM (diabetes mellitus); HTN (hypertension); Hypogonadism, testicular; and Obesities, morbid.    PSH:   has no past surgical history on file.    FamHx: family history includes Cataracts in his mother; Diabetes in his mother and sister; Hypertension in his mother.    SocHx:  reports that he has never smoked. He does not have any smokeless tobacco history on file. He reports that he drinks alcohol. His drug history is not on file.      Physical Exam:  There were no vitals  filed for this visit.  General: A&Ox3, no apparent distress, no deformities  Neck: No masses, normal thyroid  Lungs: normal inspiration, no use of accessory muscles  Heart: normal pulse, no arrhythmias  Abdomen: Soft, NT, ND  Skin: The skin is warm and dry. No jaundice.  Ext: No c/c/e.  : deferred    Labs/Studies:   Urinalysis performed in clinic, summary: UA normal  PSA    9/15: 1.2    12/16: 0.4    7/17: 0.6    Impression/Plan:   1. Will start with T injection q3wks and RTC 3 mo to reassess with estradiol.

## 2017-08-21 ENCOUNTER — CLINICAL SUPPORT (OUTPATIENT)
Dept: UROLOGY | Facility: CLINIC | Age: 52
End: 2017-08-21
Payer: COMMERCIAL

## 2017-08-21 VITALS — WEIGHT: 314 LBS | BODY MASS INDEX: 55.62 KG/M2

## 2017-08-21 DIAGNOSIS — R79.89 ABNORMAL CBC MEASUREMENT: ICD-10-CM

## 2017-08-21 DIAGNOSIS — M10.9 GOUT WITHOUT TOPHUS: ICD-10-CM

## 2017-08-21 DIAGNOSIS — I10 ESSENTIAL HYPERTENSION, BENIGN: ICD-10-CM

## 2017-08-21 DIAGNOSIS — E29.1 HYPOGONADISM IN MALE: Primary | ICD-10-CM

## 2017-08-21 DIAGNOSIS — E29.1 TESTICULAR HYPOFUNCTION: ICD-10-CM

## 2017-08-21 DIAGNOSIS — I10 ESSENTIAL HYPERTENSION: ICD-10-CM

## 2017-08-21 DIAGNOSIS — M83.9 OSTEOMALACIA: ICD-10-CM

## 2017-08-21 PROCEDURE — 99999 PR PBB SHADOW E&M-EST. PATIENT-LVL II: CPT | Mod: PBBFAC,,,

## 2017-08-21 PROCEDURE — 96372 THER/PROPH/DIAG INJ SC/IM: CPT | Mod: S$GLB,,, | Performed by: UROLOGY

## 2017-08-21 RX ADMIN — TESTOSTERONE CYPIONATE 200 MG: 200 INJECTION, SOLUTION INTRAMUSCULAR at 10:08

## 2017-08-21 NOTE — PROGRESS NOTES
..Patient in today for Depo injection.  He was taught how to self administer the injection. Hands were washed and supplies were given. Explained to pt supplies needed, including medication, needle, alcohol wipe, bandaid and sharps container.  Pt successfully jack up 200mg of Depo and injected it into his right outer thigh. No bleeding noted; pt waited in room for 20 minutes; no adverse reaction noted.

## 2017-08-22 RX ORDER — ERGOCALCIFEROL 1.25 MG/1
CAPSULE ORAL
Qty: 4 CAPSULE | Refills: 0 | Status: SHIPPED | OUTPATIENT
Start: 2017-08-22 | End: 2017-09-11 | Stop reason: SDUPTHER

## 2017-08-22 RX ORDER — ALLOPURINOL 300 MG/1
TABLET ORAL
Qty: 30 TABLET | Refills: 0 | Status: SHIPPED | OUTPATIENT
Start: 2017-08-22 | End: 2017-09-11 | Stop reason: SDUPTHER

## 2017-09-05 ENCOUNTER — LAB VISIT (OUTPATIENT)
Dept: LAB | Facility: HOSPITAL | Age: 52
End: 2017-09-05
Attending: PEDIATRICS
Payer: COMMERCIAL

## 2017-09-05 DIAGNOSIS — E11.9 DIABETES MELLITUS WITHOUT COMPLICATION: ICD-10-CM

## 2017-09-05 DIAGNOSIS — M25.572 CHRONIC PAIN OF LEFT ANKLE: ICD-10-CM

## 2017-09-05 DIAGNOSIS — E21.3 HYPERPARATHYROIDISM: ICD-10-CM

## 2017-09-05 DIAGNOSIS — G89.29 CHRONIC PAIN OF LEFT ANKLE: ICD-10-CM

## 2017-09-05 LAB
25(OH)D3+25(OH)D2 SERPL-MCNC: 16 NG/ML
ALBUMIN SERPL BCP-MCNC: 3.4 G/DL
ALT SERPL W/O P-5'-P-CCNC: 15 U/L
ANION GAP SERPL CALC-SCNC: 7 MMOL/L
AST SERPL-CCNC: 16 U/L
BASOPHILS # BLD AUTO: 0.04 K/UL
BASOPHILS NFR BLD: 0.6 %
BUN SERPL-MCNC: 15 MG/DL
CALCIUM SERPL-MCNC: 9.4 MG/DL
CHLORIDE SERPL-SCNC: 103 MMOL/L
CHOLEST SERPL-MCNC: 211 MG/DL
CHOLEST/HDLC SERPL: 4.4 {RATIO}
CO2 SERPL-SCNC: 31 MMOL/L
CREAT SERPL-MCNC: 1.4 MG/DL
DIFFERENTIAL METHOD: ABNORMAL
EOSINOPHIL # BLD AUTO: 0.1 K/UL
EOSINOPHIL NFR BLD: 1.4 %
ERYTHROCYTE [DISTWIDTH] IN BLOOD BY AUTOMATED COUNT: 16.3 %
EST. GFR  (AFRICAN AMERICAN): >60 ML/MIN/1.73 M^2
EST. GFR  (NON AFRICAN AMERICAN): 57 ML/MIN/1.73 M^2
ESTIMATED AVG GLUCOSE: 117 MG/DL
GLUCOSE SERPL-MCNC: 98 MG/DL
HBA1C MFR BLD HPLC: 5.7 %
HCT VFR BLD AUTO: 40.5 %
HDLC SERPL-MCNC: 48 MG/DL
HDLC SERPL: 22.7 %
HGB BLD-MCNC: 12.4 G/DL
LDLC SERPL CALC-MCNC: 149.8 MG/DL
LYMPHOCYTES # BLD AUTO: 2 K/UL
LYMPHOCYTES NFR BLD: 31.6 %
MCH RBC QN AUTO: 23.8 PG
MCHC RBC AUTO-ENTMCNC: 30.6 G/DL
MCV RBC AUTO: 78 FL
MONOCYTES # BLD AUTO: 0.5 K/UL
MONOCYTES NFR BLD: 7.5 %
NEUTROPHILS # BLD AUTO: 3.7 K/UL
NEUTROPHILS NFR BLD: 58.4 %
NONHDLC SERPL-MCNC: 163 MG/DL
PHOSPHATE SERPL-MCNC: 2.8 MG/DL
PLATELET # BLD AUTO: 252 K/UL
PMV BLD AUTO: 10 FL
POTASSIUM SERPL-SCNC: 3.9 MMOL/L
PTH-INTACT SERPL-MCNC: 140 PG/ML
RBC # BLD AUTO: 5.22 M/UL
SODIUM SERPL-SCNC: 141 MMOL/L
TRIGL SERPL-MCNC: 66 MG/DL
TSH SERPL DL<=0.005 MIU/L-ACNC: 2.61 UIU/ML
WBC # BLD AUTO: 6.3 K/UL

## 2017-09-05 PROCEDURE — 83970 ASSAY OF PARATHORMONE: CPT

## 2017-09-05 PROCEDURE — 80061 LIPID PANEL: CPT

## 2017-09-05 PROCEDURE — 84443 ASSAY THYROID STIM HORMONE: CPT

## 2017-09-05 PROCEDURE — 85025 COMPLETE CBC W/AUTO DIFF WBC: CPT

## 2017-09-05 PROCEDURE — 82306 VITAMIN D 25 HYDROXY: CPT

## 2017-09-05 PROCEDURE — 83036 HEMOGLOBIN GLYCOSYLATED A1C: CPT

## 2017-09-05 PROCEDURE — 84460 ALANINE AMINO (ALT) (SGPT): CPT

## 2017-09-05 PROCEDURE — 36415 COLL VENOUS BLD VENIPUNCTURE: CPT | Mod: PO

## 2017-09-05 PROCEDURE — 84450 TRANSFERASE (AST) (SGOT): CPT

## 2017-09-05 PROCEDURE — 80069 RENAL FUNCTION PANEL: CPT

## 2017-09-11 ENCOUNTER — OFFICE VISIT (OUTPATIENT)
Dept: DIABETES | Facility: CLINIC | Age: 52
End: 2017-09-11
Payer: COMMERCIAL

## 2017-09-11 VITALS
WEIGHT: 306.44 LBS | DIASTOLIC BLOOD PRESSURE: 90 MMHG | HEIGHT: 63 IN | SYSTOLIC BLOOD PRESSURE: 154 MMHG | BODY MASS INDEX: 54.3 KG/M2

## 2017-09-11 DIAGNOSIS — E21.3 HYPERPARATHYROIDISM: ICD-10-CM

## 2017-09-11 DIAGNOSIS — M10.9 GOUTY ARTHROPATHY: ICD-10-CM

## 2017-09-11 DIAGNOSIS — I10 ESSENTIAL HYPERTENSION: ICD-10-CM

## 2017-09-11 DIAGNOSIS — E11.9 DIABETES MELLITUS WITHOUT COMPLICATION: Primary | ICD-10-CM

## 2017-09-11 DIAGNOSIS — M83.9 OSTEOMALACIA: ICD-10-CM

## 2017-09-11 LAB — GLUCOSE SERPL-MCNC: 70 MG/DL (ref 70–110)

## 2017-09-11 PROCEDURE — 3080F DIAST BP >= 90 MM HG: CPT | Mod: S$GLB,,, | Performed by: PHYSICIAN ASSISTANT

## 2017-09-11 PROCEDURE — 82948 REAGENT STRIP/BLOOD GLUCOSE: CPT | Mod: S$GLB,,, | Performed by: PHYSICIAN ASSISTANT

## 2017-09-11 PROCEDURE — 99999 PR PBB SHADOW E&M-EST. PATIENT-LVL II: CPT | Mod: PBBFAC,,, | Performed by: PHYSICIAN ASSISTANT

## 2017-09-11 PROCEDURE — 99214 OFFICE O/P EST MOD 30 MIN: CPT | Mod: S$GLB,,, | Performed by: PHYSICIAN ASSISTANT

## 2017-09-11 PROCEDURE — 3044F HG A1C LEVEL LT 7.0%: CPT | Mod: S$GLB,,, | Performed by: PHYSICIAN ASSISTANT

## 2017-09-11 PROCEDURE — 3077F SYST BP >= 140 MM HG: CPT | Mod: S$GLB,,, | Performed by: PHYSICIAN ASSISTANT

## 2017-09-11 PROCEDURE — 3008F BODY MASS INDEX DOCD: CPT | Mod: S$GLB,,, | Performed by: PHYSICIAN ASSISTANT

## 2017-09-11 RX ORDER — ALLOPURINOL 300 MG/1
300 TABLET ORAL DAILY
Qty: 30 TABLET | Refills: 0 | Status: SHIPPED | OUTPATIENT
Start: 2017-09-11 | End: 2017-11-26 | Stop reason: SDUPTHER

## 2017-09-11 RX ORDER — ERGOCALCIFEROL 1.25 MG/1
50000 CAPSULE ORAL
Qty: 12 CAPSULE | Refills: 1 | Status: SHIPPED | OUTPATIENT
Start: 2017-09-11 | End: 2018-03-10

## 2017-09-11 NOTE — PROGRESS NOTES
Subjective:      Patient ID: Juan C Rodriguez is a 51 y.o. male.    PCP: Primary Doctor Latonya      Juan C Rodriguez is a pleasant 51 y.o. male presenting to follow up on diabetes mellitus. He has had diabetes for 4 or more years. His last visit in Diabetes Management was 5/22/2017 Since that time he has had significant improvement in his glycemia.  Doing very well with his diabetes control currently his A1c is at 5.7% and his blood sugar has been ranging between 70 and 120.    However, his blood pressure has not been well controlled he continues to have elevation of his blood pressure but he informs me that he is out of his medication and will need to get that refilled.  He has seen cardiology in the past and he is on Endarbyclor 40/25 mg twice daily.  Will have him check his blood pressure away from the clinic at least twice weekly and report back to.    His weight has decreased by 8 pounds since his last visit and he is not on any specific caloric restriction or carbohydrate restriction.  He is morbidly obese with a BMI of 54.28    His parathyroid hormone is now down to 140 from a high of 238, 11 months ago.  He was placed on vitamin D 2 50,000 units once weekly and this did seem to have the response.  However he informs me that he has not been on vitamin D for several months now and his more recent vitamin D level is back down to 16 from a normal level of 33.  Will restart his vitamin D2 at 50,000 units once weekly.    Since starting his allopurinol for his gouty arthropathy he has noted a decrease in his left ankle pain.  However he informs me that he is out of his allopurinol as well.  He has an upcoming appointment in rheumatology for September 29 I advised him to keep his appointment.      He denies any hospital admissions, emergency room visits, hypoglycemia, syncope, diaphoresis, chest pain, or dyspnea.    He has lost 8 pounds since last visit. His BMI is 54.28    His blood sugar in the clinic today  was:   Lab Results   Component Value Date    POCGLU 70 09/11/2017       We discussed the American diabetes Association recommendations:  hemoglobin A1c below 7.0%; all diabetics should be on statins unless contraindicated; one aspirin daily unless contraindicated; fasting blood sugar between 80 and 130 mg/dL; postprandial blood sugar below 180 mg/dl; prevention of hypoglycemia, may adjust goals to higher levels if persistent; ACE or ARB therapy if not contraindicated; and maintain in an ideal body weight with BMI below 25.    Juan C is compliant most of the time with DM medications.     Juan C is compliant most of the time with lifestyle modifications to include activity and meal planning.     STANDARDS OF CARE:  Eye doctor: Dr. Ruiz, last exam 2015.  Dental exam: Recommend regular exams; denies gums bleeding.  Podiatry doctor:     ACTIVITY LEVEL: He exercises rarely.  MEAL PLANNING: Number of meals per day - 3. Number of snacks per day - 2.  Per dietary recall, patient is not limiting carbohydrates, saturated fats and sodium.   BLOOD GLUCOSE TESTING: Self-monitoring with   SOCIAL HISTORY: single. Lives alone. 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    Lab Results   Component Value Date    WBC 6.30 09/05/2017    HGB 12.4 (L) 09/05/2017    HCT 40.5 09/05/2017     09/05/2017    CHOL 211 (H) 09/05/2017    TRIG 66 09/05/2017    HDL 48 09/05/2017    ALT 15 09/05/2017    AST 16 09/05/2017     09/05/2017    K 3.9 09/05/2017     09/05/2017    CREATININE 1.4 09/05/2017    ESTGFRAFRICA >60.0 09/05/2017    EGFRNONAA 57.0 (A) 09/05/2017    BUN 15 09/05/2017    CO2 31 (H) 09/05/2017    TSH 2.609 09/05/2017    PSA 0.44 12/08/2016    GLU 98 09/05/2017       Lab Results   Component Value Date    HGBA1C 5.7 (H) 09/05/2017    HGBA1C 6.6 (H) 02/18/2016    HGBA1C 6.6 (H) 02/18/2016       Lab Results   Component Value Date    CPEPTIDE 2.49 07/08/2016       Lab Results    Component Value Date    T3FREE 2.7 07/08/2016     Lab Results   Component Value Date    FREET4 0.80 09/17/2015     Lab Results   Component Value Date    TSH 2.609 09/05/2017     Lab Results   Component Value Date    TOTALTESTOST 69 (L) 12/10/2015     Lab Results   Component Value Date    TESTOSTERONE 198 (L) 05/31/2016    TESTOSTERONE 43.6 (L) 05/31/2016     Lab Results   Component Value Date    IRON 43 (L) 09/17/2015    TIBC 296 09/17/2015    FERRITIN 102 01/18/2011     Lab Results   Component Value Date    .0 (H) 09/05/2017    CALCIUM 9.4 09/05/2017    CAION 5.0 07/08/2016    PHOS 2.8 09/05/2017       Review of patient's allergies indicates:   Allergen Reactions    Naproxen Swelling       Past Medical History:   Diagnosis Date    Diabetes mellitus, type 2 diagnosed 2013    DM (diabetes mellitus)     2011  02/25/2014    HTN (hypertension)     Hypogonadism, testicular     Obesities, morbid        Review of Systems   Constitutional: Negative.  Negative for activity change, appetite change, chills, diaphoresis, fatigue, fever and unexpected weight change.   HENT: Negative.  Negative for dental problem, facial swelling, hearing loss, nosebleeds, trouble swallowing and voice change.    Eyes: Negative.  Negative for photophobia, pain, discharge, redness, itching and visual disturbance.   Respiratory: Negative.  Negative for cough, choking, chest tightness, shortness of breath and wheezing.    Cardiovascular: Negative.  Negative for chest pain, palpitations and leg swelling.   Gastrointestinal: Negative.  Negative for abdominal distention, abdominal pain, blood in stool, constipation, diarrhea, nausea and vomiting.   Endocrine: Negative.  Negative for cold intolerance, heat intolerance, polydipsia, polyphagia and polyuria.   Genitourinary: Negative.  Negative for decreased urine volume, difficulty urinating, dysuria, frequency, scrotal swelling, testicular pain and urgency.   Musculoskeletal:  "Negative.  Negative for arthralgias, back pain, gait problem, joint swelling, myalgias, neck pain and neck stiffness.   Skin: Negative.  Negative for color change, pallor, rash and wound.   Allergic/Immunologic: Negative.  Negative for environmental allergies, food allergies and immunocompromised state.   Neurological: Negative.  Negative for dizziness, tremors, seizures, syncope, facial asymmetry, speech difficulty, weakness, light-headedness, numbness and headaches.   Hematological: Negative.  Negative for adenopathy. Does not bruise/bleed easily.   Psychiatric/Behavioral: Negative.  Negative for agitation, behavioral problems, confusion, decreased concentration, dysphoric mood, hallucinations, self-injury, sleep disturbance and suicidal ideas. The patient is not nervous/anxious and is not hyperactive.       Objective:     Vitals - 1 value per visit 8/8/2017 8/21/2017 9/11/2017   SYSTOLIC - - 154   DIASTOLIC - - 90   PULSE - - -   TEMPERATURE - - -   RESPIRATIONS - - -   Weight (lb) 314.49 314 306.44   Weight (kg) 142.65 142.429 139   HEIGHT - - 5' 3"   BODY MASS INDEX 55.71 55.62 54.28   VISIT REPORT - - -   Pain Score  8 0 7   Some recent data might be hidden       Physical Exam   Constitutional: He is oriented to person, place, and time. He appears well-developed and well-nourished. He is cooperative.  Non-toxic appearance. He does not have a sickly appearance. He does not appear ill. No distress. He is not intubated.   HENT:   Head: Normocephalic and atraumatic. Not macrocephalic and not microcephalic. Head is without raccoon's eyes, without Malone's sign, without abrasion, without contusion, without laceration, without right periorbital erythema and without left periorbital erythema. Hair is normal.   Right Ear: External ear normal. No lacerations. No drainage, swelling or tenderness. No foreign bodies. No mastoid tenderness. Tympanic membrane is not injected, not scarred, not perforated, not erythematous, " not retracted and not bulging. Tympanic membrane mobility is normal. No middle ear effusion. No hemotympanum. No decreased hearing is noted.   Left Ear: External ear normal. No lacerations. No drainage, swelling or tenderness. No foreign bodies. No mastoid tenderness. Tympanic membrane is not injected, not scarred, not perforated, not erythematous, not retracted and not bulging. Tympanic membrane mobility is normal.  No middle ear effusion. No hemotympanum. No decreased hearing is noted.   Nose: Nose normal.   Mouth/Throat: Oropharynx is clear and moist.   Eyes: EOM and lids are normal. Pupils are equal, round, and reactive to light. Right eye exhibits no chemosis, no discharge, no exudate and no hordeolum. No foreign body present in the right eye. Left eye exhibits no chemosis, no discharge, no exudate and no hordeolum. No foreign body present in the left eye. Right conjunctiva is not injected. Right conjunctiva has no hemorrhage. Left conjunctiva is not injected. Left conjunctiva has no hemorrhage. No scleral icterus. Right eye exhibits normal extraocular motion and no nystagmus. Left eye exhibits normal extraocular motion and no nystagmus. Right pupil is round and reactive. Left pupil is round and reactive. Pupils are equal.   Neck: Normal range of motion, full passive range of motion without pain and phonation normal. Neck supple. Normal carotid pulses, no hepatojugular reflux and no JVD present. No tracheal tenderness, no spinous process tenderness and no muscular tenderness present. Carotid bruit is not present. No neck rigidity. No tracheal deviation, no edema, no erythema and normal range of motion present. No thyroid mass and no thyromegaly present.   Cardiovascular: Normal rate, regular rhythm, normal heart sounds and intact distal pulses.   No extrasystoles are present. PMI is not displaced.  Exam reveals no gallop, no friction rub and no decreased pulses.    No murmur heard.  Pulses:       Carotid pulses  are 2+ on the right side, and 2+ on the left side.       Radial pulses are 2+ on the right side, and 2+ on the left side.        Dorsalis pedis pulses are 2+ on the right side, and 2+ on the left side.        Posterior tibial pulses are 2+ on the right side, and 2+ on the left side.   Pulmonary/Chest: Effort normal and breath sounds normal. No accessory muscle usage or stridor. No apnea, no tachypnea and no bradypnea. He is not intubated. No respiratory distress. He has no decreased breath sounds. He has no wheezes. He has no rhonchi. He has no rales. He exhibits no tenderness.   Abdominal: Soft. Bowel sounds are normal. He exhibits no shifting dullness, no distension, no pulsatile liver, no fluid wave, no abdominal bruit, no ascites, no pulsatile midline mass and no mass. There is no hepatosplenomegaly, splenomegaly or hepatomegaly. There is no tenderness. There is no rigidity, no rebound, no guarding, no CVA tenderness and no tenderness at McBurney's point. No hernia. Hernia confirmed negative in the ventral area.   Musculoskeletal: Normal range of motion. He exhibits no edema or tenderness.        Right foot: There is normal range of motion and no deformity.        Left foot: There is normal range of motion and no deformity.   Feet:   Right Foot:   Protective Sensation: 6 sites tested. 6 sites sensed.   Skin Integrity: Negative for ulcer, blister, skin breakdown, erythema, warmth, callus or dry skin.   Left Foot:   Protective Sensation: 6 sites tested. 6 sites sensed.   Skin Integrity: Negative for ulcer, blister, skin breakdown, erythema, warmth, callus or dry skin.   Lymphadenopathy:        Head (right side): No submental, no submandibular, no tonsillar, no preauricular, no posterior auricular and no occipital adenopathy present.        Head (left side): No submental, no submandibular, no tonsillar, no preauricular, no posterior auricular and no occipital adenopathy present.     He has no cervical adenopathy.         Right cervical: No superficial cervical, no deep cervical and no posterior cervical adenopathy present.       Left cervical: No superficial cervical, no deep cervical and no posterior cervical adenopathy present.   Neurological: He is alert and oriented to person, place, and time. He has normal reflexes. He displays no atrophy and no tremor. No cranial nerve deficit or sensory deficit. He exhibits normal muscle tone. He displays no seizure activity. Coordination and gait normal.   Reflex Scores:       Bicep reflexes are 2+ on the right side and 2+ on the left side.       Brachioradialis reflexes are 2+ on the right side and 2+ on the left side.       Patellar reflexes are 2+ on the right side and 2+ on the left side.  Skin: Skin is warm and dry. No rash noted. No erythema. No pallor.   Psychiatric: He has a normal mood and affect. His mood appears not anxious. His affect is not angry, not blunt, not labile and not inappropriate. His speech is not rapid and/or pressured, not delayed, not tangential and not slurred. He is not agitated, not aggressive, not hyperactive, not slowed, not withdrawn, not actively hallucinating and not combative. Thought content is not paranoid and not delusional. Cognition and memory are not impaired. He does not express impulsivity or inappropriate judgment. He does not exhibit a depressed mood. He expresses no homicidal and no suicidal ideation. He expresses no suicidal plans and no homicidal plans. He is communicative. He exhibits normal recent memory and normal remote memory. He is attentive.     Assessment:     1. Diabetes mellitus without complication       Plan:     Juan C Rodriguez is seen today for   1. Diabetes mellitus without complication    2. Hyperparathyroidism    3. Gouty arthropathy    4. Osteomalacia    5. Essential hypertension    6. Testicular hypofunction    7. Abnormal CBC measurement    8. Gout without tophus    9. Essential hypertension, benign      We have  discussed the etiology and treatment options associated with the diagnosis as well as alternatives. He has elected the following treatments.     Diabetes mellitus without complication  -     POCT glucose  - Stable and well controlled    Hyperparathyroidism  -     ergocalciferol (VITAMIN D2) 50,000 unit Cap; Take 1 capsule (50,000 Units total) by mouth every 7 days.  Dispense: 12 capsule; Refill: 1  - Improving with vitamin D 2.  Will have him add multivitamin to his treatment regimen.    Gouty arthropathy  -     allopurinol (ZYLOPRIM) 300 MG tablet; Take 1 tablet (300 mg total) by mouth once daily.  Dispense: 30 tablet; Refill: 0  - Stable but needs refill of his allopurinol.    Osteomalacia  -     ergocalciferol (VITAMIN D2) 50,000 unit Cap; Take 1 capsule (50,000 Units total) by mouth every 7 days.  Dispense: 12 capsule; Refill: 1  - Parathyroid level is decreasing slightly, will continue on vitamin D and will add multivitamin for its calcium content.       Essential hypertension  -     azilsartan med-chlorthalidone (EDARBYCLOR) 40-12.5 mg Tab; Take 1 tablet by mouth 2 (two) times daily.  Dispense: 60 tablet; Refill: 5  - Has seen cardiology in the past for his hypertension which is still uncontrolled.  However he has not been on his medication will restart that today.      1.) Patient was instructed to monitor blood glucose twice daily, fasting, and 2 hour post meal; if on Multiple Daily Injections (MDI) he will need to have pre-meal blood glucose as well. Reminded to bring BG meter or record to each visit for review.  2.) Reviewed pathophysiology of diabetes, complications related to the disease, importance of annual dilated eye exam and self daily foot examination.  3.) Continue medications as prescribed none. [facility] MyChart or Phone review in 1 week with [facility] records for adjustment of medication.  4.) Advised patient to continue to follow up with Dietician/CDE as directed.  5.) Discussed activity, benefits,  methods, and precautions. Recommended patient start/continue some form of exercise and increase as tolerated to 60 minutes per day to facilitate weight loss and aid in control of BGs. Also reminded patient of WHO recommendation of 10,000 steps daily as a goal.   6.) A1C, TSH, Lipid Panel, CMP/renal panel with eGFR and Micro/Creatinine.  7.) Return to clinic in 6 months for follow up. Advised patient to call clinic with any questions or concerns.    A total of 30 minutes was spent in face to face time, of which 50 % was spent in counseling patient on disease process, complications, treatment, and side effects of medications.    The patient was explained the above plan and given opportunity to ask questions.  He understands, chooses and consents to this plan and accepts all the risks, which include but are not limited to the risks mentioned above.   He understands the alternative of having no testing, interventions or treatments at this time. He left content and without further questions.

## 2017-11-26 DIAGNOSIS — I10 ESSENTIAL HYPERTENSION, BENIGN: ICD-10-CM

## 2017-11-26 DIAGNOSIS — M10.9 GOUT WITHOUT TOPHUS: ICD-10-CM

## 2017-11-26 DIAGNOSIS — E29.1 TESTICULAR HYPOFUNCTION: ICD-10-CM

## 2017-11-26 DIAGNOSIS — I10 ESSENTIAL HYPERTENSION: ICD-10-CM

## 2017-11-26 DIAGNOSIS — R79.89 ABNORMAL CBC MEASUREMENT: ICD-10-CM

## 2017-11-26 RX ORDER — ALLOPURINOL 300 MG/1
TABLET ORAL
Qty: 30 TABLET | Refills: 0 | Status: SHIPPED | OUTPATIENT
Start: 2017-11-26 | End: 2017-12-27 | Stop reason: SDUPTHER

## 2017-12-22 ENCOUNTER — PATIENT OUTREACH (OUTPATIENT)
Dept: ADMINISTRATIVE | Facility: HOSPITAL | Age: 52
End: 2017-12-22

## 2017-12-22 NOTE — PROGRESS NOTES
Contacted pt on scheduling [facility] , pt was state he never saw Dr Michelle as his PCP , Got pt scheduled with Dr Zuniga on 12/27/2017, pt verbalized understanding .Munira Esteban

## 2017-12-27 ENCOUNTER — OFFICE VISIT (OUTPATIENT)
Dept: FAMILY MEDICINE | Facility: CLINIC | Age: 52
End: 2017-12-27
Payer: COMMERCIAL

## 2017-12-27 ENCOUNTER — LAB VISIT (OUTPATIENT)
Dept: LAB | Facility: HOSPITAL | Age: 52
End: 2017-12-27
Attending: FAMILY MEDICINE
Payer: COMMERCIAL

## 2017-12-27 VITALS
OXYGEN SATURATION: 99 % | WEIGHT: 315 LBS | RESPIRATION RATE: 16 BRPM | TEMPERATURE: 97 F | DIASTOLIC BLOOD PRESSURE: 100 MMHG | HEART RATE: 70 BPM | SYSTOLIC BLOOD PRESSURE: 160 MMHG | BODY MASS INDEX: 55.81 KG/M2 | HEIGHT: 63 IN

## 2017-12-27 DIAGNOSIS — I10 ESSENTIAL HYPERTENSION, BENIGN: ICD-10-CM

## 2017-12-27 DIAGNOSIS — M10.9 GOUT WITHOUT TOPHUS: ICD-10-CM

## 2017-12-27 DIAGNOSIS — E55.9 VITAMIN D DEFICIENCY: ICD-10-CM

## 2017-12-27 DIAGNOSIS — I10 ESSENTIAL HYPERTENSION: Primary | ICD-10-CM

## 2017-12-27 DIAGNOSIS — I10 ESSENTIAL HYPERTENSION: ICD-10-CM

## 2017-12-27 LAB
25(OH)D3+25(OH)D2 SERPL-MCNC: 29 NG/ML
ESTIMATED AVG GLUCOSE: 117 MG/DL
HBA1C MFR BLD HPLC: 5.7 %

## 2017-12-27 PROCEDURE — 36415 COLL VENOUS BLD VENIPUNCTURE: CPT | Mod: PO

## 2017-12-27 PROCEDURE — 83036 HEMOGLOBIN GLYCOSYLATED A1C: CPT

## 2017-12-27 PROCEDURE — 99999 PR PBB SHADOW E&M-EST. PATIENT-LVL IV: CPT | Mod: PBBFAC,,, | Performed by: FAMILY MEDICINE

## 2017-12-27 PROCEDURE — 82306 VITAMIN D 25 HYDROXY: CPT

## 2017-12-27 PROCEDURE — 99214 OFFICE O/P EST MOD 30 MIN: CPT | Mod: S$GLB,,, | Performed by: FAMILY MEDICINE

## 2017-12-27 RX ORDER — AMLODIPINE BESYLATE 5 MG/1
5 TABLET ORAL DAILY
Qty: 30 TABLET | Refills: 3 | Status: SHIPPED | OUTPATIENT
Start: 2017-12-27 | End: 2018-05-07 | Stop reason: ALTCHOICE

## 2017-12-27 RX ORDER — ALLOPURINOL 300 MG/1
300 TABLET ORAL DAILY
Qty: 30 TABLET | Refills: 3 | Status: SHIPPED | OUTPATIENT
Start: 2017-12-27 | End: 2018-10-21 | Stop reason: SDUPTHER

## 2017-12-27 NOTE — PROGRESS NOTES
Subjective:       Patient ID: Juan C Rodriguez is a 52 y.o. male.    Chief Complaint: Follow-up      HPI  Mr. Rodriguez presents to clinic today for follow up.   He states he needs refill on his medication.   He does not have a PCP.   He has been seeing [facility].   He states he is compliant with his medications.   He denies any side effects from his medications.   He states his blood pressures at home are in the 170s/ 160s.     He drinks socially.   He does not smoke.   He does not exercise.   He works by doing maintenance work.  He states his diet is not too good and he eats a lot of fried foods.   He has not had a colonoscopy yet and wants to hold off.   He refuses flu shot.     Review of Systems   Constitutional: Negative for fever.   Respiratory: Negative for cough and shortness of breath.    Cardiovascular: Negative for chest pain.   Gastrointestinal: Negative for abdominal pain, blood in stool, constipation, diarrhea and vomiting.   Genitourinary: Negative for dysuria and hematuria.       Medication List with Changes/Refills   New Medications    AMLODIPINE (NORVASC) 5 MG TABLET    Take 1 tablet (5 mg total) by mouth once daily.   Current Medications    ACETAMINOPHEN (TYLENOL) 650 MG TBSR    Take 1 tablet (650 mg total) by mouth every 8 (eight) hours.    BLOOD SUGAR DIAGNOSTIC (CONTOUR TEST STRIPS) STRP    USE TO CHECK BLOOD SUGAR 2 TO 3 TIMES DAILY    CETIRIZINE (ZYRTEC) 10 MG TABLET    Take 1 tablet (10 mg total) by mouth once daily.    ERGOCALCIFEROL (VITAMIN D2) 50,000 UNIT CAP    Take 1 capsule (50,000 Units total) by mouth every 7 days.    TESTOSTERONE CYPIONATE (DEPOTESTOTERONE CYPIONATE) 200 MG/ML INJECTION    Inject 1 mL (200 mg total) into the muscle every 21 days.   Changed and/or Refilled Medications    Modified Medication Previous Medication    ALLOPURINOL (ZYLOPRIM) 300 MG TABLET allopurinol (ZYLOPRIM) 300 MG tablet       Take 1 tablet (300 mg total) by mouth once daily.    TAKE ONE TABLET  BY MOUTH ONCE DAILY    AZILSARTAN MED-CHLORTHALIDONE (EDARBYCLOR) 40-12.5 MG TAB azilsartan med-chlorthalidone (EDARBYCLOR) 40-12.5 mg Tab       Take 1 tablet by mouth 2 (two) times daily.    Take 1 tablet by mouth 2 (two) times daily.       Patient Active Problem List   Diagnosis    Gout    Mixed hyperlipidemia    Essential hypertension, benign    Diabetes mellitus without complication    Sprain of thoracic region    Testicular hypofunction    Abnormal CBC measurement    Hypogonadism male    Essential hypertension    Hyperparathyroidism    Alpha thalassemia silent carrier    Left ventricular diastolic dysfunction with preserved systolic function    Hypertension, essential    Hyperuricemia    Osteomalacia    Morbid obesity due to excess calories    Primary central sleep apnea    Left tibialis posterior tendinitis    Gouty arthropathy         Objective:     Physical Exam   Constitutional: He is oriented to person, place, and time. He appears well-developed and well-nourished. No distress.   HENT:   Head: Normocephalic and atraumatic.   Right Ear: External ear normal.   Left Ear: External ear normal.   Eyes: EOM are normal. Right eye exhibits no discharge. Left eye exhibits no discharge.   Cardiovascular: Normal rate and regular rhythm.    Pulmonary/Chest: Effort normal and breath sounds normal. No respiratory distress. He has no wheezes.   Musculoskeletal: He exhibits no edema.   Neurological: He is alert and oriented to person, place, and time.   Skin: Skin is warm and dry. He is not diaphoretic. No erythema.   Psychiatric: He has a normal mood and affect.   Vitals reviewed.    Vitals:    12/27/17 0959   BP: (!) 160/100   Pulse: 70   Resp: 16   Temp: 97.3 °F (36.3 °C)       Assessment/  PLAN     Essential hypertension  -     azilsartan med-chlorthalidone (EDARBYCLOR) 40-12.5 mg Tab; Take 1 tablet by mouth 2 (two) times daily.  Dispense: 60 tablet; Refill: 2  -     Hemoglobin A1c; Future; Expected  date: 12/27/2017  -     amLODIPine (NORVASC) 5 MG tablet; Take 1 tablet (5 mg total) by mouth once daily.  Dispense: 30 tablet; Refill: 3  - start taking norvasc, continue sartan - chlor   - counseled on diet and exercise   - bmp reviewed from 9/2017     Gout without tophus  -     allopurinol (ZYLOPRIM) 300 MG tablet; Take 1 tablet (300 mg total) by mouth once daily.  Dispense: 30 tablet; Refill: 3    Vitamin D deficiency  -     Vitamin D; Future; Expected date: 12/27/2017    Plan as above   rtc 1 month for fu on htn       Nicci Zuniga MD  [facility]

## 2017-12-27 NOTE — PATIENT INSTRUCTIONS

## 2018-01-03 ENCOUNTER — OFFICE VISIT (OUTPATIENT)
Dept: UROLOGY | Facility: CLINIC | Age: 53
End: 2018-01-03
Payer: COMMERCIAL

## 2018-01-03 VITALS
WEIGHT: 306.25 LBS | DIASTOLIC BLOOD PRESSURE: 91 MMHG | HEIGHT: 63 IN | BODY MASS INDEX: 54.26 KG/M2 | HEART RATE: 72 BPM | SYSTOLIC BLOOD PRESSURE: 148 MMHG

## 2018-01-03 DIAGNOSIS — I10 HYPERTENSION, UNSPECIFIED TYPE: ICD-10-CM

## 2018-01-03 DIAGNOSIS — Z12.5 PROSTATE CANCER SCREENING: ICD-10-CM

## 2018-01-03 DIAGNOSIS — E29.1 HYPOGONADISM IN MALE: Primary | ICD-10-CM

## 2018-01-03 LAB
BILIRUB SERPL-MCNC: NORMAL MG/DL
BLOOD URINE, POC: NORMAL
COLOR, POC UA: YELLOW
GLUCOSE UR QL STRIP: NORMAL
KETONES UR QL STRIP: NORMAL
LEUKOCYTE ESTERASE URINE, POC: NORMAL
NITRITE, POC UA: NORMAL
PH, POC UA: 6
PROTEIN, POC: NORMAL
SPECIFIC GRAVITY, POC UA: 1.01
UROBILINOGEN, POC UA: NORMAL

## 2018-01-03 PROCEDURE — 99214 OFFICE O/P EST MOD 30 MIN: CPT | Mod: 25,S$GLB,, | Performed by: UROLOGY

## 2018-01-03 PROCEDURE — 99999 PR PBB SHADOW E&M-EST. PATIENT-LVL II: CPT | Mod: PBBFAC,,, | Performed by: UROLOGY

## 2018-01-03 PROCEDURE — 81002 URINALYSIS NONAUTO W/O SCOPE: CPT | Mod: S$GLB,,, | Performed by: UROLOGY

## 2018-01-03 RX ORDER — TESTOSTERONE CYPIONATE 200 MG/ML
200 INJECTION, SOLUTION INTRAMUSCULAR
Qty: 10 ML | Refills: 1 | Status: SHIPPED | OUTPATIENT
Start: 2018-01-03 | End: 2018-07-05 | Stop reason: SDUPTHER

## 2018-01-03 NOTE — PROGRESS NOTES
Chief Complaint: Hypogonadism    HPI:   1/3/18:  Labs were fine 9/17. Injections going fine doing it monthly.  Satisfied.  8/8/17: Testim no longer approved by his insurance just injections.  Otherwise doing fine.  Labs in hand Hct 37.5.  Has a week of T gel left.  T is low without therapy but he feels the same.  Will strive to give adequate amount for general health but not so much he is in risk from it.  6/21/17: No problems from testim, reviewed history in detail.  12/15/16: No interval changes doing well.  6/16/16: 51 yo pt of Dr. Olivo treated with Testim.  No abd/pelvic pain and no exac/rel factors.  No hematuria.  No urolithiasis.  No urinary bother.  No other  history. No family prostate cancer history. Was sluggish and weak and now on the T things are better with a good energy level.    Allergies:  Naproxen    Medications:  has a current medication list which includes the following prescription(s): acetaminophen, allopurinol, amlodipine, azilsartan med-chlorthalidone, blood sugar diagnostic, ergocalciferol, cetirizine, and testosterone cypionate.    Review of Systems:  General: No fever, chills, fatigability, or weight loss.  Skin: No rashes, itching, or changes in color or texture of skin.  Chest: Denies AVILES, cyanosis, wheezing, cough, and sputum production.  Abdomen: Appetite fine. No weight loss. Denies diarrhea, abdominal pain, hematemesis, or blood in stool.  Musculoskeletal: No joint stiffness or swelling. Denies back pain.  : As above.  All other review of systems negative.    PMH:   has a past medical history of Diabetes mellitus, type 2 (diagnosed 2013); DM (diabetes mellitus); HTN (hypertension); Hypogonadism, testicular; and Obesities, morbid.    PSH:   has no past surgical history on file.    FamHx: family history includes Cataracts in his mother; Diabetes in his mother and sister; Hypertension in his mother.    SocHx:  reports that he has never smoked. He does not have any smokeless tobacco  history on file. He reports that he drinks alcohol. His drug history is not on file.      Physical Exam:  Vitals:    01/03/18 0951   BP: (!) 148/91   Pulse: 72     General: A&Ox3, no apparent distress, no deformities  Neck: No masses, normal thyroid  Lungs: normal inspiration, no use of accessory muscles  Heart: normal pulse, no arrhythmias  Abdomen: Soft, NT, ND  Skin: The skin is warm and dry. No jaundice.  Ext: No c/c/e.  : deferred    Labs/Studies:   Urinalysis performed in clinic, summary: UA normal  PSA    9/15: 1.2    12/16: 0.4    7/17: 0.6    Impression/Plan:   1. Continue T injections monthly.  Refilled.  RTC with labs 6 mo.  2. PSA for screening next visit.  3. HTN: discussed and referred to PCP for further management.

## 2018-01-23 ENCOUNTER — PATIENT OUTREACH (OUTPATIENT)
Dept: ADMINISTRATIVE | Facility: HOSPITAL | Age: 53
End: 2018-01-23

## 2018-02-06 ENCOUNTER — OFFICE VISIT (OUTPATIENT)
Dept: FAMILY MEDICINE | Facility: CLINIC | Age: 53
End: 2018-02-06
Payer: COMMERCIAL

## 2018-02-06 VITALS
HEART RATE: 86 BPM | RESPIRATION RATE: 18 BRPM | DIASTOLIC BLOOD PRESSURE: 72 MMHG | OXYGEN SATURATION: 97 % | HEIGHT: 63 IN | WEIGHT: 300.5 LBS | TEMPERATURE: 98 F | SYSTOLIC BLOOD PRESSURE: 128 MMHG | BODY MASS INDEX: 53.24 KG/M2

## 2018-02-06 DIAGNOSIS — J20.8 ACUTE BACTERIAL BRONCHITIS: Primary | ICD-10-CM

## 2018-02-06 DIAGNOSIS — B96.89 ACUTE BACTERIAL BRONCHITIS: Primary | ICD-10-CM

## 2018-02-06 DIAGNOSIS — I10 ESSENTIAL HYPERTENSION: ICD-10-CM

## 2018-02-06 PROCEDURE — 99999 PR PBB SHADOW E&M-EST. PATIENT-LVL IV: CPT | Mod: PBBFAC,,, | Performed by: FAMILY MEDICINE

## 2018-02-06 PROCEDURE — 99214 OFFICE O/P EST MOD 30 MIN: CPT | Mod: S$GLB,,, | Performed by: FAMILY MEDICINE

## 2018-02-06 PROCEDURE — 3008F BODY MASS INDEX DOCD: CPT | Mod: S$GLB,,, | Performed by: FAMILY MEDICINE

## 2018-02-06 RX ORDER — AZITHROMYCIN 250 MG/1
TABLET, FILM COATED ORAL
Qty: 6 TABLET | Refills: 0 | Status: SHIPPED | OUTPATIENT
Start: 2018-02-06 | End: 2018-02-11

## 2018-02-06 RX ORDER — ALBUTEROL SULFATE 90 UG/1
2 AEROSOL, METERED RESPIRATORY (INHALATION) EVERY 6 HOURS PRN
Qty: 18 G | Refills: 0 | Status: SHIPPED | OUTPATIENT
Start: 2018-02-06 | End: 2019-03-15 | Stop reason: SDUPTHER

## 2018-02-06 NOTE — PATIENT INSTRUCTIONS
What Is Acute Bronchitis?  Acute bronchitis is when the airways in your lungs (bronchial tubes) become red and swollen (inflamed). It is usually caused by a viral infection. But it can also occur because of a bacteria or allergen. Symptoms include a cough that produces yellow or greenish mucus and can last for days or sometimes weeks.  Inside healthy lungs    Air travels in and out of the lungs through the airways. The linings of these airways produce sticky mucus. This mucus traps particles that enter the lungs. Tiny structures called cilia then sweep the particles out of the airways.     Healthy airway: Airways are normally open. Air moves in and out easily.      Healthy cilia: Tiny, hairlike cilia sweep mucus and particles up and out of the airways.   Lungs with bronchitis  Bronchitis often occurs with a cold or the flu virus. The airways become inflamed (red and swollen). There is a deep hacking cough from the extra mucus. Other symptoms may include:  · Wheezing  · Chest discomfort  · Shortness of breath  · Mild fever  A second infection, this time due to bacteria, may then occur. And airways irritated by allergens or smoke are more likely to get infected.        Inflamed airway: Inflammation and extra mucus narrow the airway, causing shortness of breath.      Impaired cilia: Extra mucus impairs cilia, causing congestion and wheezing. Smoking makes the problem worse.   Making a diagnosis  A physical exam, health history, and certain tests help your healthcare provider make the diagnosis.  Health history  Your healthcare provider will ask you about your symptoms.  The exam  Your provider listens to your chest for signs of congestion. He or she may also check your ears, nose, and throat.  Possible tests  · A sputum test for bacteria. This requires a sample of mucus from your lungs.  · A nasal or throat swab. This tests to see if you have a bacterial infection.  · A chest X-ray. This is done if your healthcare  provider thinks you have pneumonia.  · Tests to check for an underlying condition. Other tests may be done to check for things such as allergies, asthma, or COPD (chronic obstructive pulmonary disease). You may need to see a specialist for more lung function testing.  Treating a cough  The main treatment for bronchitis is easing symptoms. Avoiding smoke, allergens, and other things that trigger coughing can often help. If the infection is bacterial, you may be given antibiotics. During the illness, it's important to get plenty of sleep. To ease symptoms:  · Dont smoke. Also avoid secondhand smoke.  · Use a humidifier. Or try breathing in steam from a hot shower. This may help loosen mucus.  · Drink a lot of water and juice. They can soothe the throat and may help thin mucus.  · Sit up or use extra pillows when in bed. This helps to lessen coughing and congestion.  · Ask your provider about using medicine. Ask about using cough medicine, pain and fever medicine, or a decongestant.  Antibiotics  Most cases of bronchitis are caused by cold or flu viruses. They dont need antibiotics to treat them, even if your mucus is thick and green or yellow. Antibiotics dont treat viral illness and antibiotics have not been shown to have any benefit in cases of acute bronchitis. Taking antibiotics when they are not needed increases your risk of getting an infection later that is antibiotic-resistant. Antibiotics can also cause severe cases of diarrhea that require other antibiotics to treat.  It is important that you accept your healthcare provider's opinion to not use antibiotics. Your provider will prescribe antibiotics if the infection is caused by bacteria. If they are prescribed:  · Take all of the medicine. Take the medicine until it is used up, even if symptoms have improved. If you dont, the bronchitis may come back.  · Take the medicines as directed. For instance, some medicines should be taken with food.  · Ask about  side effects. Ask your provider or pharmacist what side effects are common, and what to do about them.  Follow-up care  You should see your provider again in 2 to 3 weeks. By this time, symptoms should have improved. An infection that lasts longer may mean you have a more serious problem.  Prevention  · Avoid tobacco smoke. If you smoke, quit. Stay away from smoky places. Ask friends and family not to smoke around you, or in your home or car.  · Get checked for allergies.  · Ask your provider about getting a yearly flu shot. Also ask about pneumococcal or pneumonia shots.  · Wash your hands often. This helps reduce the chance of picking up viruses that cause colds and flu.  Call your healthcare provider if:  · Symptoms worsen, or you have new symptoms  · Breathing problems worsen or  become severe  · Symptoms dont get better within a week, or within 3 days of taking antibiotics   Date Last Reviewed: 2/1/2017  © 9077-9100 [employer]. [address] [place] [address], [place] [address]. All rights reserved. This information is not intended as a substitute for professional medical care. Always follow your healthcare professional's instructions.

## 2018-02-06 NOTE — PROGRESS NOTES
Subjective:       Patient ID: Juan C Rodriguez is a 52 y.o. male.    Chief Complaint: Follow-up and URI      HPI  Mr. Rodriguez presents to clinic today for follow up.   He states his blood pressure is better.   He has been keep tracking of his blood pressure and getting 140/90 and less.       Review of Systems   Constitutional: Positive for chills, diaphoresis and fever.   HENT: Positive for congestion, rhinorrhea, sneezing and sore throat. Negative for sinus pressure.    Respiratory: Positive for cough. Negative for shortness of breath.    Cardiovascular: Negative for chest pain.   Gastrointestinal: Positive for nausea. Negative for abdominal pain and vomiting.       Medication List with Changes/Refills   New Medications    ALBUTEROL 90 MCG/ACTUATION INHALER    Inhale 2 puffs into the lungs every 6 (six) hours as needed for Wheezing. Rescue   Current Medications    ACETAMINOPHEN (TYLENOL) 650 MG TBSR    Take 1 tablet (650 mg total) by mouth every 8 (eight) hours.    ALLOPURINOL (ZYLOPRIM) 300 MG TABLET    Take 1 tablet (300 mg total) by mouth once daily.    AMLODIPINE (NORVASC) 5 MG TABLET    Take 1 tablet (5 mg total) by mouth once daily.    AZILSARTAN MED-CHLORTHALIDONE (EDARBYCLOR) 40-12.5 MG TAB    Take 1 tablet by mouth 2 (two) times daily.    BLOOD SUGAR DIAGNOSTIC (CONTOUR TEST STRIPS) STRP    USE TO CHECK BLOOD SUGAR 2 TO 3 TIMES DAILY    CETIRIZINE (ZYRTEC) 10 MG TABLET    Take 1 tablet (10 mg total) by mouth once daily.    ERGOCALCIFEROL (VITAMIN D2) 50,000 UNIT CAP    Take 1 capsule (50,000 Units total) by mouth every 7 days.    TESTOSTERONE CYPIONATE (DEPOTESTOTERONE CYPIONATE) 200 MG/ML INJECTION    Inject 1 mL (200 mg total) into the muscle every 21 days.       Patient Active Problem List   Diagnosis    Gout    Mixed hyperlipidemia    Essential hypertension, benign    Diabetes mellitus without complication    Sprain of thoracic region    Testicular hypofunction    Abnormal CBC  measurement    Hypogonadism male    Essential hypertension    Hyperparathyroidism    Alpha thalassemia silent carrier    Left ventricular diastolic dysfunction with preserved systolic function    Hypertension, essential    Hyperuricemia    Osteomalacia    Morbid obesity due to excess calories    Primary central sleep apnea    Left tibialis posterior tendinitis    Gouty arthropathy         Objective:     Physical Exam   Constitutional: He is oriented to person, place, and time. He appears well-developed and well-nourished. No distress.   HENT:   Head: Normocephalic and atraumatic.   Right Ear: External ear normal.   Left Ear: External ear normal.   Mouth/Throat: No oropharyngeal exudate.   Eyes: EOM are normal. Right eye exhibits no discharge. Left eye exhibits no discharge.   Cardiovascular: Normal rate and regular rhythm.    Pulmonary/Chest: Effort normal. No respiratory distress. He has wheezes.   Coarse breath sounds   Rhonchi    Musculoskeletal: He exhibits no edema.   Neurological: He is alert and oriented to person, place, and time.   Skin: Skin is warm and dry. He is not diaphoretic. No erythema.   Psychiatric: He has a normal mood and affect.   Vitals reviewed.    Vitals:    02/06/18 0846   BP: 128/72   Pulse: 86   Resp: 18   Temp: 98.4 °F (36.9 °C)       Assessment/  PLAN     Acute bacterial bronchitis  -     albuterol 90 mcg/actuation inhaler; Inhale 2 puffs into the lungs every 6 (six) hours as needed for Wheezing. Rescue  Dispense: 18 g; Refill: 0  -     azithromycin (Z-PATTI) 250 MG tablet; Take 2 tablets by mouth on day 1; Take 1 tablet by mouth on days 2-5  Dispense: 6 tablet; Refill: 0    Essential hypertension  - bp improved   - continue current med     Plan as above   rtc prn           Nicci Zuniga MD  [facility]

## 2018-03-09 ENCOUNTER — OFFICE VISIT (OUTPATIENT)
Dept: FAMILY MEDICINE | Facility: CLINIC | Age: 53
End: 2018-03-09
Payer: COMMERCIAL

## 2018-03-09 VITALS
HEIGHT: 63 IN | RESPIRATION RATE: 17 BRPM | DIASTOLIC BLOOD PRESSURE: 69 MMHG | HEART RATE: 64 BPM | BODY MASS INDEX: 53.36 KG/M2 | SYSTOLIC BLOOD PRESSURE: 131 MMHG | OXYGEN SATURATION: 98 % | WEIGHT: 301.13 LBS | TEMPERATURE: 99 F

## 2018-03-09 DIAGNOSIS — R04.0 EPISTAXIS: Primary | ICD-10-CM

## 2018-03-09 PROCEDURE — 3075F SYST BP GE 130 - 139MM HG: CPT | Mod: CPTII,S$GLB,, | Performed by: FAMILY MEDICINE

## 2018-03-09 PROCEDURE — 99213 OFFICE O/P EST LOW 20 MIN: CPT | Mod: S$GLB,,, | Performed by: FAMILY MEDICINE

## 2018-03-09 PROCEDURE — 3078F DIAST BP <80 MM HG: CPT | Mod: CPTII,S$GLB,, | Performed by: FAMILY MEDICINE

## 2018-03-09 PROCEDURE — 99999 PR PBB SHADOW E&M-EST. PATIENT-LVL IV: CPT | Mod: PBBFAC,,, | Performed by: FAMILY MEDICINE

## 2018-03-09 NOTE — PROGRESS NOTES
Subjective:       Patient ID: Juan C Rodriguez is a 52 y.o. male.    Chief Complaint: Hospital Follow Up and Epistaxis      HPI  Mr. Rodriguez presents to clinic today for follow up.   He was seen in at [facility] for epistaxis.   He states he had a nose bleed yesterday and this morning.   He was given Afrin.   He denies any other complaints.     Review of Systems   Constitutional: Negative for fever.   HENT: Positive for nosebleeds.    Respiratory: Negative for cough and shortness of breath.    Cardiovascular: Negative for chest pain.   Skin: Negative for rash.   Neurological: Negative for dizziness and headaches.       Medication List with Changes/Refills   Current Medications    ACETAMINOPHEN (TYLENOL) 650 MG TBSR    Take 1 tablet (650 mg total) by mouth every 8 (eight) hours.    ALBUTEROL 90 MCG/ACTUATION INHALER    Inhale 2 puffs into the lungs every 6 (six) hours as needed for Wheezing. Rescue    ALLOPURINOL (ZYLOPRIM) 300 MG TABLET    Take 1 tablet (300 mg total) by mouth once daily.    AMLODIPINE (NORVASC) 5 MG TABLET    Take 1 tablet (5 mg total) by mouth once daily.    AZILSARTAN MED-CHLORTHALIDONE (EDARBYCLOR) 40-12.5 MG TAB    Take 1 tablet by mouth 2 (two) times daily.    BLOOD SUGAR DIAGNOSTIC (CONTOUR TEST STRIPS) STRP    USE TO CHECK BLOOD SUGAR 2 TO 3 TIMES DAILY    CETIRIZINE (ZYRTEC) 10 MG TABLET    Take 1 tablet (10 mg total) by mouth once daily.    ERGOCALCIFEROL (VITAMIN D2) 50,000 UNIT CAP    Take 1 capsule (50,000 Units total) by mouth every 7 days.    TESTOSTERONE CYPIONATE (DEPOTESTOTERONE CYPIONATE) 200 MG/ML INJECTION    Inject 1 mL (200 mg total) into the muscle every 21 days.       Patient Active Problem List   Diagnosis    Gout    Mixed hyperlipidemia    Essential hypertension, benign    Diabetes mellitus without complication    Sprain of thoracic region    Testicular hypofunction    Abnormal CBC measurement    Hypogonadism male    Essential hypertension     Hyperparathyroidism    Alpha thalassemia silent carrier    Left ventricular diastolic dysfunction with preserved systolic function    Hypertension, essential    Hyperuricemia    Osteomalacia    Morbid obesity due to excess calories    Primary central sleep apnea    Left tibialis posterior tendinitis    Gouty arthropathy         Objective:     Physical Exam   Constitutional: He is oriented to person, place, and time. He appears well-developed and well-nourished. No distress.   HENT:   Head: Normocephalic and atraumatic.   Right Ear: External ear normal.   Left Ear: External ear normal.   Inferior turbinate on right nare with some bleeding, cauterized with silver nitrate    Eyes: EOM are normal. Right eye exhibits no discharge. Left eye exhibits no discharge.   Cardiovascular: Normal rate and regular rhythm.    Pulmonary/Chest: Effort normal and breath sounds normal. No respiratory distress. He has no wheezes.   Musculoskeletal: He exhibits no edema.   Neurological: He is alert and oriented to person, place, and time.   Skin: Skin is warm and dry. He is not diaphoretic. No erythema.   Psychiatric: He has a normal mood and affect.   Vitals reviewed.    Vitals:    03/09/18 1333   BP: 131/69   Pulse: 64   Resp: 17   Temp: 98.8 °F (37.1 °C)       Assessment/  PLAN     Epistaxis  - bleeding has resolved   - advised pt to use humidifier in room, use AFRIN when bleeding occurs, avoid picking nose       Nicci Zuniga MD  [facility]

## 2018-03-09 NOTE — PATIENT INSTRUCTIONS
Nosebleed (Adult)    Bleeding from the nose most commonly occurs because of injury or drying and cracking of the inner lining of the nose. Most nosebleeds are because of dry air or nose-picking. They can occur during a common cold or an allergy attack. They can also occur on a very hot day, or from dry air in the winter.  If the bleeding site is found, it may be cauterized. This means it is treated to cause a blood clot to form. This may be done with a chemical, heat, or electricity. If the bleeding continues after the site is cauterized, or if the site cannot be found, packing may be put in your nose. This is to apply pressure and stop the bleeding. The packing may be made of gauze or sponge. A small balloon catheter is sometimes used. These must be removed by your doctor. Some types of packing dissolve on their own.  Home care  · If packing was put in your nose, unless told otherwise, do not pull on it or try to remove it yourself. You will be given an appointment to have it removed. You may also have been given antibiotics to prevent a sinus infection. If so, finish all of the medicine.  · Do not blow your nose for 12 hours after the bleeding stops. This will allow a strong blood clot to form. Do not pick your nose. This may restart bleeding.  · Avoid drinking alcohol and hot liquids for the next 2 days. Alcohol or hot liquids in your mouth can dilate blood vessels in your nose. This can cause bleeding to start again.  · Do not take ibuprofen, naproxen, or medicines that contain aspirin. These thin the blood and may cause your nose to bleed. You may take acetaminophen for pain, unless another pain medicine was prescribed.  · If the bleeding starts again, sit up and lean forward to prevent swallowing blood. Pinch your nose tightly on both sides, as shown above, for 10 to 15 minutes. Time yourself. Dont release the pressure on your nose until 10 minutes is up. If bleeding does not stop, continue to pinch your  nose and call your healthcare provider or return to this facility.  · If you have a cold, allergies, or dry nasal membranes, lubricate the nasal passages. Apply a small amount of petroleum jelly inside the nose with a cotton swab twice a day (morning and night).  · Avoid overheating your home. This can dry the air and make your condition worse.  · Put a humidifier in the room where you sleep. This will add moisture to the air.  · Use a saline nasal spray to keep nasal passages moist.  · Do not pick your nose. Keep fingernails trimmed to decrease risk of bleeds.  · Do not smoke.  Follow-up care  Follow up with your healthcare provider, or as advised. Nasal packing should be rechecked or removed within 2 to 3 days.  When to seek medical advice  Call your healthcare provider right away if any of these occur.  · You have another nosebleed that you cannot control  · Dizziness, weakness, or fainting  · You become tired or confused  · Fever of 100.4ºF (38ºC) or higher, or as directed by your healthcare provider  · Headache  · Sinus or facial pain  · Shortness of breath or trouble breathing  Date Last Reviewed: 3/22/2015  © 5959-7356 [employer]. [address] [place] [address], [place] [address]. All rights reserved. This information is not intended as a substitute for professional medical care. Always follow your healthcare professional's instructions.

## 2018-04-09 ENCOUNTER — TELEPHONE (OUTPATIENT)
Dept: UROLOGY | Facility: CLINIC | Age: 53
End: 2018-04-09

## 2018-04-09 NOTE — TELEPHONE ENCOUNTER
Patient requesting size of needles used to inject his testosterone medication. Informed patient he will need an 18G 1in needle and a 25G 1.5in needle. Patient states understanding.

## 2018-04-09 NOTE — TELEPHONE ENCOUNTER
----- Message from Cassidy Gonzalez sent at 4/9/2018 10:32 AM CDT -----  Pt is requesting a call from nurse to discuss a prescription for his needles.        Please call pt back at [phone]      .  [address] [address]  Phone: [phone] Fax: [phone]

## 2018-04-20 DIAGNOSIS — M83.9 OSTEOMALACIA: ICD-10-CM

## 2018-04-22 RX ORDER — ERGOCALCIFEROL 1.25 MG/1
CAPSULE ORAL
Qty: 4 CAPSULE | Refills: 1 | Status: SHIPPED | OUTPATIENT
Start: 2018-04-22 | End: 2018-06-09 | Stop reason: SDUPTHER

## 2018-05-07 ENCOUNTER — TELEPHONE (OUTPATIENT)
Dept: FAMILY MEDICINE | Facility: CLINIC | Age: 53
End: 2018-05-07

## 2018-05-07 ENCOUNTER — DOCUMENTATION ONLY (OUTPATIENT)
Dept: ENDOSCOPY | Facility: HOSPITAL | Age: 53
End: 2018-05-07

## 2018-05-07 ENCOUNTER — OFFICE VISIT (OUTPATIENT)
Dept: FAMILY MEDICINE | Facility: CLINIC | Age: 53
End: 2018-05-07
Payer: COMMERCIAL

## 2018-05-07 VITALS
HEIGHT: 63 IN | RESPIRATION RATE: 18 BRPM | HEART RATE: 72 BPM | BODY MASS INDEX: 55.81 KG/M2 | TEMPERATURE: 97 F | DIASTOLIC BLOOD PRESSURE: 76 MMHG | SYSTOLIC BLOOD PRESSURE: 140 MMHG | OXYGEN SATURATION: 96 % | WEIGHT: 315 LBS

## 2018-05-07 DIAGNOSIS — Z00.00 HEALTH CARE MAINTENANCE: ICD-10-CM

## 2018-05-07 DIAGNOSIS — I10 ESSENTIAL HYPERTENSION: Primary | ICD-10-CM

## 2018-05-07 PROCEDURE — 3008F BODY MASS INDEX DOCD: CPT | Mod: CPTII,S$GLB,, | Performed by: FAMILY MEDICINE

## 2018-05-07 PROCEDURE — 3078F DIAST BP <80 MM HG: CPT | Mod: CPTII,S$GLB,, | Performed by: FAMILY MEDICINE

## 2018-05-07 PROCEDURE — 3077F SYST BP >= 140 MM HG: CPT | Mod: CPTII,S$GLB,, | Performed by: FAMILY MEDICINE

## 2018-05-07 PROCEDURE — 99214 OFFICE O/P EST MOD 30 MIN: CPT | Mod: S$GLB,,, | Performed by: FAMILY MEDICINE

## 2018-05-07 PROCEDURE — 99999 PR PBB SHADOW E&M-EST. PATIENT-LVL IV: CPT | Mod: PBBFAC,,, | Performed by: FAMILY MEDICINE

## 2018-05-07 RX ORDER — AMLODIPINE BESYLATE 10 MG/1
10 TABLET ORAL DAILY
Qty: 30 TABLET | Refills: 0 | Status: SHIPPED | OUTPATIENT
Start: 2018-05-07 | End: 2018-06-09 | Stop reason: SDUPTHER

## 2018-05-07 NOTE — PROGRESS NOTES
05/09/18 Pt called back and has been scheduled for 06/29/18. Oral instructions given and mailed. Suprep ordered.  05/07/18 Called pt to schedule for colonoscopy, no answer and unable to leave voice mail on either contact number.

## 2018-05-07 NOTE — PATIENT INSTRUCTIONS
Exercise for a Healthier Heart  You may wonder how you can improve the health of your heart. If youre thinking about exercise, youre on the right track. You dont need to become an athlete, but you do need a certain amount of brisk exercise to help strengthen your heart. If you have been diagnosed with a heart condition, your doctor may recommend exercise to help stabilize your condition. To help make exercise a habit, choose safe, fun activities.     Exercise with a friend. When activity is fun, you're more likely to stick with it.     Be sure to check with your healthcare provider before starting an exercise program.   Why exercise?  Exercising regularly offers many healthy rewards. It can help you do all of the following:  · Improve your blood cholesterol level to help prevent further heart trouble  · Lower your blood pressure to help prevent a stroke or heart attack  · Control diabetes, or reduce your risk of getting this disease  · Improve your heart and lung function  · Reach and maintain a healthy weight  · Make your muscles stronger and more limber so you can stay active  · Prevent falls and fractures by slowing the loss of bone mass (osteoporosis)  · Manage stress better  · Reduce your blood pressure  · Improve your sense of self and your body image  Exercise tips  Ease into your routine. Set small goals. Then build on them.  Exercise on most days. Aim for a total of 150 or more minutes of moderate to  vigorous intensity activity each week. Consider 40 minutes, 3 to 4 times a week. For best results, activity should last for 40 minutes on average. It is OK to work up to the 40 minute period over time. Examples of moderate-intensity activity is walking 1 mile in 15 minutes or 30 to 45 minutes of yard work.  Step up your daily activity level. Along with your exercise program, try being more active throughout the day. Walk instead of drive. Do more household tasks or yard work.  Choose one or more  activities you enjoy. Walking is one of the easiest things you can do. You can also try swimming, riding a bike, dancing, or taking an exercise class.  Stop exercising and call your doctor if you:  · Have chest pain or feel dizzy or lightheaded  · Feel burning, tightness, pressure, or heaviness in your chest, neck, shoulders, back, or arms  · Have unusual shortness of breath  · Have increased joint or muscle pain  · Have palpitations or an irregular heartbeat   Date Last Reviewed: 5/1/2016  © 7274-9263 [employer]. [address] [place] [address]. All rights reserved. This information is not intended as a substitute for professional medical care. Always follow your healthcare professional's instructions.

## 2018-05-07 NOTE — TELEPHONE ENCOUNTER
----- Message from Jenna Zaragoza sent at 5/7/2018  3:40 PM CDT -----  Contact: pt  States he's returning a call. Please call pt at [phone]. Thank you

## 2018-05-07 NOTE — PROGRESS NOTES
Subjective:       Patient ID: Juan C Rodriguez is a 52 y.o. male.    Chief Complaint: Follow-up (3 mo)      HPI   Mr. Rodriguez presents to clinic today for follow up.   He states the nose bleeding stopped after his last visit with me.   He denies any complaints.   He states he ate spam last night and thus his blood pressure may be elevated due to that.   He works as a  so walks a lot, but does not do any exercise outside of that.       Review of Systems   Constitutional: Negative for fever.   Respiratory: Negative for cough and shortness of breath.    Cardiovascular: Negative for chest pain.   Gastrointestinal: Negative for abdominal pain, diarrhea and vomiting.       Medication List with Changes/Refills   Current Medications    ACETAMINOPHEN (TYLENOL) 650 MG TBSR    Take 1 tablet (650 mg total) by mouth every 8 (eight) hours.    ALBUTEROL 90 MCG/ACTUATION INHALER    Inhale 2 puffs into the lungs every 6 (six) hours as needed for Wheezing. Rescue    ALLOPURINOL (ZYLOPRIM) 300 MG TABLET    Take 1 tablet (300 mg total) by mouth once daily.    AMLODIPINE (NORVASC) 5 MG TABLET    Take 1 tablet (5 mg total) by mouth once daily.    AZILSARTAN MED-CHLORTHALIDONE (EDARBYCLOR) 40-12.5 MG TAB    Take 1 tablet by mouth 2 (two) times daily.    BLOOD SUGAR DIAGNOSTIC (CONTOUR TEST STRIPS) STRP    USE TO CHECK BLOOD SUGAR 2 TO 3 TIMES DAILY    CETIRIZINE (ZYRTEC) 10 MG TABLET    Take 1 tablet (10 mg total) by mouth once daily.    TESTOSTERONE CYPIONATE (DEPOTESTOTERONE CYPIONATE) 200 MG/ML INJECTION    Inject 1 mL (200 mg total) into the muscle every 21 days.    VITAMIN D2 50,000 UNIT CAPSULE    TAKE 1 CAPSULE BY MOUTH EVERY 7 DAYS       Patient Active Problem List   Diagnosis    Gout    Mixed hyperlipidemia    Essential hypertension, benign    Diabetes mellitus without complication    Sprain of thoracic region    Testicular hypofunction    Abnormal CBC measurement    Hypogonadism male    Essential  hypertension    Hyperparathyroidism    Alpha thalassemia silent carrier    Left ventricular diastolic dysfunction with preserved systolic function    Hypertension, essential    Hyperuricemia    Osteomalacia    Morbid obesity due to excess calories    Primary central sleep apnea    Left tibialis posterior tendinitis    Gouty arthropathy         Objective:     Physical Exam   Constitutional: He is oriented to person, place, and time. He appears well-developed and well-nourished. No distress.   HENT:   Head: Normocephalic and atraumatic.   Right Ear: External ear normal.   Left Ear: External ear normal.   Eyes: EOM are normal. Right eye exhibits no discharge. Left eye exhibits no discharge.   Cardiovascular: Normal rate and regular rhythm.    Pulmonary/Chest: Effort normal and breath sounds normal. No respiratory distress. He has no wheezes.   Abdominal: Soft. Bowel sounds are normal. He exhibits no distension. There is no tenderness.   Musculoskeletal: He exhibits edema.   +1 edema in lower ext     Neurological: He is alert and oriented to person, place, and time.   Skin: Skin is warm and dry. He is not diaphoretic. No erythema.   Psychiatric: He has a normal mood and affect.   Vitals reviewed.    Vitals:    05/07/18 0908   BP: (!) 140/76   Pulse: 72   Resp: 18   Temp: 97 °F (36.1 °C)       Assessment/  PLAN     Essential hypertension  -     amLODIPine (NORVASC) 10 MG tablet; Take 1 tablet (10 mg total) by mouth once daily.  Dispense: 30 tablet; Refill: 0  - increase dose of norvasc from 5 mg to 10 mg     Health care maintenance  -     Case request GI: COLONOSCOPY    Other orders  -     Cancel: Hepatitis C antibody; Future; Expected date: 04/23/2018    Plan as above  rtc 2 months for follow up on htn and will get labs       Nicci Zuniga MD  [facility]

## 2018-05-09 RX ORDER — SODIUM, POTASSIUM,MAG SULFATES 17.5-3.13G
SOLUTION, RECONSTITUTED, ORAL ORAL
Qty: 354 ML | Refills: 0 | Status: ON HOLD | OUTPATIENT
Start: 2018-05-09 | End: 2018-06-29 | Stop reason: HOSPADM

## 2018-06-09 DIAGNOSIS — I10 ESSENTIAL HYPERTENSION: ICD-10-CM

## 2018-06-09 DIAGNOSIS — M83.9 OSTEOMALACIA: ICD-10-CM

## 2018-06-09 RX ORDER — AMLODIPINE BESYLATE 10 MG/1
TABLET ORAL
Qty: 30 TABLET | Refills: 0 | Status: SHIPPED | OUTPATIENT
Start: 2018-06-09 | End: 2018-07-13 | Stop reason: SDUPTHER

## 2018-06-10 RX ORDER — ERGOCALCIFEROL 1.25 MG/1
CAPSULE ORAL
Qty: 4 CAPSULE | Refills: 1 | Status: SHIPPED | OUTPATIENT
Start: 2018-06-10 | End: 2018-07-09 | Stop reason: SDUPTHER

## 2018-06-14 ENCOUNTER — PATIENT OUTREACH (OUTPATIENT)
Dept: ADMINISTRATIVE | Facility: HOSPITAL | Age: 53
End: 2018-06-14

## 2018-06-29 ENCOUNTER — ANESTHESIA (OUTPATIENT)
Dept: ENDOSCOPY | Facility: HOSPITAL | Age: 53
End: 2018-06-29
Payer: COMMERCIAL

## 2018-06-29 ENCOUNTER — SURGERY (OUTPATIENT)
Age: 53
End: 2018-06-29

## 2018-06-29 ENCOUNTER — ANESTHESIA EVENT (OUTPATIENT)
Dept: ENDOSCOPY | Facility: HOSPITAL | Age: 53
End: 2018-06-29
Payer: COMMERCIAL

## 2018-06-29 ENCOUNTER — HOSPITAL ENCOUNTER (OUTPATIENT)
Facility: HOSPITAL | Age: 53
Discharge: HOME | End: 2018-06-29
Attending: INTERNAL MEDICINE | Admitting: INTERNAL MEDICINE
Payer: COMMERCIAL

## 2018-06-29 VITALS
RESPIRATION RATE: 18 BRPM | TEMPERATURE: 98 F | DIASTOLIC BLOOD PRESSURE: 75 MMHG | HEART RATE: 64 BPM | BODY MASS INDEX: 53.16 KG/M2 | OXYGEN SATURATION: 97 % | WEIGHT: 300 LBS | SYSTOLIC BLOOD PRESSURE: 108 MMHG | HEIGHT: 63 IN

## 2018-06-29 DIAGNOSIS — Z12.11 COLON CANCER SCREENING: ICD-10-CM

## 2018-06-29 LAB — POCT GLUCOSE: 83 MG/DL (ref 70–110)

## 2018-06-29 PROCEDURE — 82962 GLUCOSE BLOOD TEST: CPT | Performed by: INTERNAL MEDICINE

## 2018-06-29 PROCEDURE — 25000003 PHARM REV CODE 250: Performed by: INTERNAL MEDICINE

## 2018-06-29 PROCEDURE — 37000008 HC ANESTHESIA 1ST 15 MINUTES: Performed by: INTERNAL MEDICINE

## 2018-06-29 PROCEDURE — G0121 COLON CA SCRN NOT HI RSK IND: HCPCS | Performed by: INTERNAL MEDICINE

## 2018-06-29 PROCEDURE — 37000009 HC ANESTHESIA EA ADD 15 MINS: Performed by: INTERNAL MEDICINE

## 2018-06-29 PROCEDURE — G0121 COLON CA SCRN NOT HI RSK IND: HCPCS | Mod: ,,, | Performed by: INTERNAL MEDICINE

## 2018-06-29 PROCEDURE — 63600175 PHARM REV CODE 636 W HCPCS: Performed by: NURSE ANESTHETIST, CERTIFIED REGISTERED

## 2018-06-29 RX ORDER — SODIUM CHLORIDE, SODIUM LACTATE, POTASSIUM CHLORIDE, CALCIUM CHLORIDE 600; 310; 30; 20 MG/100ML; MG/100ML; MG/100ML; MG/100ML
INJECTION, SOLUTION INTRAVENOUS CONTINUOUS
Status: DISCONTINUED | OUTPATIENT
Start: 2018-06-29 | End: 2018-06-29 | Stop reason: HOSPADM

## 2018-06-29 RX ORDER — PROPOFOL 10 MG/ML
INJECTION, EMULSION INTRAVENOUS
Status: DISCONTINUED | OUTPATIENT
Start: 2018-06-29 | End: 2018-06-29

## 2018-06-29 RX ORDER — LIDOCAINE HCL/PF 100 MG/5ML
SYRINGE (ML) INTRAVENOUS
Status: DISCONTINUED | OUTPATIENT
Start: 2018-06-29 | End: 2018-06-29

## 2018-06-29 RX ADMIN — PROPOFOL 50 MG: 10 INJECTION, EMULSION INTRAVENOUS at 09:06

## 2018-06-29 RX ADMIN — PROPOFOL 60 MG: 10 INJECTION, EMULSION INTRAVENOUS at 09:06

## 2018-06-29 RX ADMIN — SODIUM CHLORIDE, SODIUM LACTATE, POTASSIUM CHLORIDE, AND CALCIUM CHLORIDE: 600; 310; 30; 20 INJECTION, SOLUTION INTRAVENOUS at 09:06

## 2018-06-29 RX ADMIN — PROPOFOL 140 MG: 10 INJECTION, EMULSION INTRAVENOUS at 09:06

## 2018-06-29 RX ADMIN — LIDOCAINE HYDROCHLORIDE 100 MG: 20 INJECTION, SOLUTION INTRAVENOUS at 09:06

## 2018-06-29 NOTE — ANESTHESIA RELEASE NOTE
"Anesthesia Release from PACU Note    Patient: Juan C Rodriguez    Procedure(s) Performed: Procedure(s) (LRB):  COLONOSCOPY (N/A)    Anesthesia type: MAC    Post pain: Adequate analgesia    Post assessment: no apparent anesthetic complications, tolerated procedure well and no evidence of recall    Last Vitals:   Visit Vitals  /76 (BP Location: Left arm, Patient Position: Lying)   Pulse 77   Temp 36.5 °C (97.7 °F) (Oral)   Resp 18   Ht 5' 3" (1.6 m)   Wt 136.1 kg (300 lb)   SpO2 95%   BMI 53.14 kg/m²       Post vital signs: stable    Level of consciousness: awake and alert     Nausea/Vomiting: no nausea/no vomiting    Complications: none    Airway Patency: patent    Respiratory: unassisted, spontaneous ventilation, room air    Cardiovascular: stable    Hydration: euvolemic  "

## 2018-06-29 NOTE — DISCHARGE INSTRUCTIONS
Colonoscopy     A camera attached to a flexible tube with a viewing lens is used to take video pictures.     Colonoscopy is a test to view the inside of your lower digestive tract (colon and rectum). Sometimes it can show the last part of the small intestine (ileum). During the test, small pieces of tissue may be removed for testing. This is called a biopsy. Small growths, such as polyps, may also be removed.   Why is colonoscopy done?  The test is done to help look for colon cancer. And it can help find the source of abdominal pain, bleeding, and changes in bowel habits. It may be needed once a year, depending on factors such as your:  · Age  · Health history  · Family health history  · Symptoms  · Results from any prior colonoscopy  Risks and possible complications  These include:  · Bleeding               · A puncture or tear in the colon   · Risks of anesthesia  · A cancer lesion not being seen  Getting ready   To prepare for the test:  · Talk with your healthcare provider about the risks of the test (see below). Also ask your healthcare provider about alternatives to the test.  · Tell your healthcare provider about any medicines you take. Also tell him or her about any health conditions you may have.  · Make sure your rectum and colon are empty for the test. Follow the diet and bowel prep instructions exactly. If you dont, the test may need to be rescheduled.  · Plan for a friend or family member to drive you home after the test.     Colonoscopy provides an inside view of the entire colon.     You may discuss the results with your doctor right away or at a future visit.  During the test   The test is usually done in the hospital on an outpatient basis. This means you go home the same day. The procedure takes about 30 minutes. During that time:  · You are given relaxing (sedating) medicine through an IV line. You may be drowsy, or fully asleep.  · The healthcare provider will first give you a physical exam to  check for anal and rectal problems.  · Then the anus is lubricated and the scope inserted.  · If you are awake, you may have a feeling similar to needing to have a bowel movement. You may also feel pressure as air is pumped into the colon. Its OK to pass gas during the procedure.  · Biopsy, polyp removal, or other treatments may be done during the test.  After the test   You may have gas right after the test. It can help to try to pass it to help prevent later bloating. Your healthcare provider may discuss the results with you right away. Or you may need to schedule a follow-up visit to talk about the results. After the test, you can go back to your normal eating and other activities. You may be tired from the sedation and need to rest for a few hours.  Date Last Reviewed: 11/1/2016 © 2000-2017 [employer]. [address] [place] [address], [place] [address]. All rights reserved. This information is not intended as a substitute for professional medical care. Always follow your healthcare professional's instructions.

## 2018-06-29 NOTE — ANESTHESIA PREPROCEDURE EVALUATION
06/29/2018  Juan C Rodriguez is a 52 y.o., male.    Anesthesia Evaluation         Review of Systems  Anesthesia Hx:  No problems with previous Anesthesia   Social:  Smoker, Alcohol Use Cigar smoker     Hematology/Oncology:     Oncology Normal   Hematology Comments: Alpha thalassemia silent carrier   EENT/Dental:EENT/Dental Normal   Cardiovascular:   Exercise tolerance: poor Hypertension, well controlled ECG has been reviewed. CONCLUSIONS     1 - Normal left ventricular systolic function (EF 60-65%).     2 - Left ventricular diastolic dysfunction.     3 - Normal right ventricular systolic function .   Left ventricular diastolic dysfunction with preserved systolic function   Pulmonary:   Shortness of breath Bronchitis  Snore     Renal/:  Renal/ Normal     Hepatic/GI:   Bowel Prep. 0500 last drink of fluid.   Musculoskeletal:   Arthritis     Neurological:  Neurology Normal    Endocrine:   Diabetes, well controlled, type 2    Dermatological:  Skin Normal    Psych:  Psychiatric Normal           Physical Exam  General:  Well nourished, Morbid Obesity    Airway/Jaw/Neck:  Airway Findings: Mallampati: IV                Anesthesia Plan  Type of Anesthesia, risks & benefits discussed:  Anesthesia Type:  MAC  Patient's Preference:   Intra-op Monitoring Plan:   Intra-op Monitoring Plan Comments:   Post Op Pain Control Plan:   Post Op Pain Control Plan Comments:   Induction:   IV  Beta Blocker:  Patient is on a Beta-Blocker and has received one dose within the past 24 hours (No further documentation required).       Informed Consent: Patient understands risks and agrees with Anesthesia plan.  Questions answered. Anesthesia consent signed with patient.  ASA Score: 3     Day of Surgery Review of History & Physical: I have interviewed and examined the patient. I have reviewed the patient's H&P dated:  06/29/18. There are no significant changes.  H&P update referred to the provider.         Ready For Surgery From Anesthesia Perspective.

## 2018-06-29 NOTE — H&P
Short Stay Endoscopy History and Physical    PCP - Nicci Zuniga MD    Procedure - Colonoscopy  ASA - 2  Mallampati - per anesthesia  History of Anesthesia problems - no  Family history Anesthesia problems -  no     HPI:  This is a 52 y.o.male here for evaluation of : Colon Cancer Screen    Reflux - no  Dysphagia - no  Abdominal pain - no  Diarrhea - no  Anemia - no  GI bleeding - no  Nausea and vomiting-no  Early satiety-no  aversion to sight or smell of food-no    ROS:  Constitutional: No fevers, chills, No weight loss  ENT: No allergies  CV: No chest pain  Pulm: No cough, No shortness of breath  Ophtho: No vision changes  GI: see HPI  Derm: No rash  Heme: No lymphadenopathy, No bruising  MSK: No arthritis  : No dysuria, No hematuria  Endo: No hot or cold intolerance  Neuro: No syncope, No seizure  Psych: No anxiety, No depression    Medical History:  Past Medical History:   Diagnosis Date    Diabetes mellitus, type 2 diagnosed 2013    DM (diabetes mellitus)     2011  02/25/2014    HTN (hypertension)     Hypogonadism, testicular     Obesities, morbid        Surgical History:History reviewed. No pertinent surgical history.    Family History:  Family History   Problem Relation Age of Onset    Cataracts Mother     Diabetes Mother     Hypertension Mother     Diabetes Sister        Social History:  Social History     Social History    Marital status: Single     Spouse name: N/A    Number of children: N/A    Years of education: N/A     Occupational History    Not on file.     Social History Main Topics    Smoking status: Never Smoker    Smokeless tobacco: Not on file    Alcohol use Yes    Drug use: Unknown    Sexual activity: Not on file     Other Topics Concern    Not on file     Social History Narrative    No narrative on file       Allergies:   Review of patient's allergies indicates:   Allergen Reactions    Naproxen Swelling       Medications:   No current facility-administered  medications on file prior to encounter.      Current Outpatient Prescriptions on File Prior to Encounter   Medication Sig Dispense Refill    acetaminophen (TYLENOL) 650 MG TbSR Take 1 tablet (650 mg total) by mouth every 8 (eight) hours. (Patient taking differently: Take 650 mg by mouth every 8 (eight) hours as needed. )  0    albuterol 90 mcg/actuation inhaler Inhale 2 puffs into the lungs every 6 (six) hours as needed for Wheezing. Rescue 18 g 0    allopurinol (ZYLOPRIM) 300 MG tablet Take 1 tablet (300 mg total) by mouth once daily. 30 tablet 3    azilsartan med-chlorthalidone (EDARBYCLOR) 40-12.5 mg Tab Take 1 tablet by mouth 2 (two) times daily. 60 tablet 2    blood sugar diagnostic (CONTOUR TEST STRIPS) Strp USE TO CHECK BLOOD SUGAR 2 TO 3 TIMES DAILY 100 each 0    cetirizine (ZYRTEC) 10 MG tablet Take 1 tablet (10 mg total) by mouth once daily. (Patient taking differently: Take 10 mg by mouth as needed. ) 30 tablet 6    testosterone cypionate (DEPOTESTOTERONE CYPIONATE) 200 mg/mL injection Inject 1 mL (200 mg total) into the muscle every 21 days. 10 mL 1       Objective Findings:    Vital Signs:There were no vitals filed for this visit.        Physical Exam:  General Appearance: Well appearing in no acute distress  Eyes:    No scleral icterus  ENT: Neck supple, Lips, mucosa, and tongue normal; teeth and gums normal  Lungs: CTA bilaterally in anterior and posterior fields, no wheezes, no crackles.  Heart:  Regular rate, S1, S2 normal, no murmurs heard.  Abdomen: Soft, non tender, non distended with normal bowel sounds. No hepatosplenomegaly, ascites, or mass.  Extremities: No clubbing, cyanosis or edema  Skin: No rash    Labs:  Reviewed    Plan:Colonoscopy  I have explained the risks and benefits of endoscopy procedures to the patient including but not limited to bleeding, perforation, infection, and death. The patient wishes to proceed.

## 2018-06-29 NOTE — ANESTHESIA POSTPROCEDURE EVALUATION
"Anesthesia Post Evaluation    Patient: Juan C Rodriguez    Procedure(s) Performed: Procedure(s) (LRB):  COLONOSCOPY (N/A)    Final Anesthesia Type: MAC  Patient location during evaluation: PACU  Patient participation: Yes- Able to Participate  Level of consciousness: awake and alert and oriented  Post-procedure vital signs: reviewed and stable  Pain management: adequate  Airway patency: patent  PONV status at discharge: No PONV  Anesthetic complications: no      Cardiovascular status: blood pressure returned to baseline  Respiratory status: unassisted, spontaneous ventilation and room air  Hydration status: euvolemic  Follow-up not needed.        Visit Vitals  /76 (BP Location: Left arm, Patient Position: Lying)   Pulse 77   Temp 36.5 °C (97.7 °F) (Oral)   Resp 18   Ht 5' 3" (1.6 m)   Wt 136.1 kg (300 lb)   SpO2 95%   BMI 53.14 kg/m²       Pain/Bao Score: Pain Assessment Performed: Yes (6/29/2018  9:55 AM)  Presence of Pain: denies (6/29/2018  9:55 AM)  Bao Score: 10 (6/29/2018  9:55 AM)      "

## 2018-06-29 NOTE — TRANSFER OF CARE
"Anesthesia Transfer of Care Note    Patient: Juan C Rodriguez    Procedure(s) Performed: Procedure(s) (LRB):  COLONOSCOPY (N/A)    Patient location: PACU    Anesthesia Type: MAC    Transport from OR: Transported from OR on room air with adequate spontaneous ventilation    Post pain: adequate analgesia    Post assessment: no apparent anesthetic complications    Post vital signs: stable    Level of consciousness: awake    Nausea/Vomiting: no nausea/vomiting    Complications: none    Transfer of care protocol was followed      Last vitals:   Visit Vitals  /76 (BP Location: Left arm, Patient Position: Lying)   Pulse 77   Temp 36.5 °C (97.7 °F) (Oral)   Resp 18   Ht 5' 3" (1.6 m)   Wt 136.1 kg (300 lb)   SpO2 95%   BMI 53.14 kg/m²     "

## 2018-06-29 NOTE — [employer] PATIENT INSTRUCTIONS
Discharge Summary/Instructions after an Endoscopic Procedure  Patient Name: Juan C Rodriguez  Patient MRN: [id number]  Patient YOB: 1965 Friday, June 29, 2018 Jeremiah Anaya III, MD  RESTRICTIONS:  During your procedure today, you received medications for sedation.  These   medications may affect your judgment, balance and coordination.  Therefore,   for 24 hours, you have the following restrictions:   - DO NOT drive a car, operate machinery, make legal/financial decisions,   sign important papers or drink alcohol.    ACTIVITY:  Today: no heavy lifting, straining or running due to procedural   sedation/anesthesia.  The following day: return to full activity including work.  DIET:  Eat and drink normally unless instructed otherwise.     TREATMENT FOR COMMON SIDE EFFECTS:  - Mild abdominal pain, nausea, belching, bloating or excessive gas:  rest,   eat lightly and use a heating pad.  - Sore Throat: treat with throat lozenges and/or gargle with warm salt   water.  - Because air was used during the procedure, expelling large amounts of air   from your rectum or belching is normal.  - If a bowel prep was taken, you may not have a bowel movement for 1-3 days.    This is normal.  SYMPTOMS TO WATCH FOR AND REPORT TO YOUR PHYSICIAN:  1. Abdominal pain or bloating, other than gas cramps.  2. Chest pain.  3. Back pain.  4. Signs of infection such as: chills or fever occurring within 24 hours   after the procedure.  5. Rectal bleeding, which would show as bright red, maroon, or black stools.   (A tablespoon of blood from the rectum is not serious, especially if   hemorrhoids are present.)  6. Vomiting.  7. Weakness or dizziness.  GO DIRECTLY TO THE NEAREST EMERGENCY ROOM IF YOU HAVE ANY OF THE FOLLOWING:      Difficulty breathing              Chills and/or fever over 101 F   Persistent vomiting and/or vomiting blood   Severe abdominal pain   Severe chest pain   Black, tarry stools   Bleeding- more than one  tablespoon   Any other symptom or condition that you feel may need urgent attention  Your doctor recommends these additional instructions:  If any biopsies were taken, your doctors clinic will contact you in 1 to 2   weeks with any results.  - Discharge patient to home (via wheelchair).   - High fiber diet.   - Continue present medications.   - Repeat colonoscopy in 10 years for screening purposes.   - Return to primary care physician as previously scheduled.   - Discharge patient to home (via wheelchair).   - High fiber diet.   - Continue present medications.   - Repeat colonoscopy in 10 years for screening purposes.   - Return to primary care physician as previously scheduled.  For questions, problems or results please call your physician Jeremiah Anaya III, MD at Work:  [phone]  If you have any questions about the above instructions, call the GI   department at [phone] or call the endoscopy unit at [phone]   from 7am until 3 pm.  [facility], EMERGENCY ROOM PHONE NUMBER:   [phone]  IF A COMPLICATION OR EMERGENCY SITUATION ARISES AND YOU ARE UNABLE TO REACH   YOUR PHYSICIAN - GO DIRECTLY TO THE EMERGENCY ROOM.  I have read or have had read to me these discharge instructions for my   procedure and have received a written copy.  I understand these   instructions and will follow-up with my physician if I have any questions.     __________________________________       _____________________________________  Nurse Signature                                          Patient/Designated   Responsible Party Signature  Jeremiah Anaya III, MD  6/29/2018 10:17:52 AM  This report has been verified and signed electronically.  [employer]

## 2018-06-29 NOTE — PLAN OF CARE
Kg RN assessed IV access, did not stick pt. X1 attempt per PHILL Lopez, accessed but blew. Anesthesia notified.

## 2018-07-02 ENCOUNTER — LAB VISIT (OUTPATIENT)
Dept: LAB | Facility: HOSPITAL | Age: 53
End: 2018-07-02
Attending: UROLOGY
Payer: COMMERCIAL

## 2018-07-02 DIAGNOSIS — Z12.5 PROSTATE CANCER SCREENING: ICD-10-CM

## 2018-07-02 DIAGNOSIS — E29.1 HYPOGONADISM IN MALE: ICD-10-CM

## 2018-07-02 LAB
ALBUMIN SERPL BCP-MCNC: 3.5 G/DL
ALP SERPL-CCNC: 92 U/L
ALT SERPL W/O P-5'-P-CCNC: 21 U/L
AST SERPL-CCNC: 19 U/L
BILIRUB DIRECT SERPL-MCNC: 0.1 MG/DL
BILIRUB SERPL-MCNC: 0.3 MG/DL
COMPLEXED PSA SERPL-MCNC: 1.1 NG/ML
HCT VFR BLD AUTO: 39.9 %
PROT SERPL-MCNC: 7.2 G/DL

## 2018-07-02 PROCEDURE — 36415 COLL VENOUS BLD VENIPUNCTURE: CPT

## 2018-07-02 PROCEDURE — 85014 HEMATOCRIT: CPT

## 2018-07-02 PROCEDURE — 84153 ASSAY OF PSA TOTAL: CPT

## 2018-07-02 PROCEDURE — 80076 HEPATIC FUNCTION PANEL: CPT

## 2018-07-05 ENCOUNTER — OFFICE VISIT (OUTPATIENT)
Dept: UROLOGY | Facility: CLINIC | Age: 53
End: 2018-07-05
Payer: COMMERCIAL

## 2018-07-05 VITALS
DIASTOLIC BLOOD PRESSURE: 94 MMHG | BODY MASS INDEX: 53.51 KG/M2 | HEART RATE: 59 BPM | SYSTOLIC BLOOD PRESSURE: 162 MMHG | WEIGHT: 302 LBS | HEIGHT: 63 IN

## 2018-07-05 DIAGNOSIS — Z12.5 PROSTATE CANCER SCREENING: ICD-10-CM

## 2018-07-05 DIAGNOSIS — E29.1 HYPOGONADISM IN MALE: Primary | ICD-10-CM

## 2018-07-05 DIAGNOSIS — I10 HYPERTENSION, UNSPECIFIED TYPE: ICD-10-CM

## 2018-07-05 LAB
BILIRUB SERPL-MCNC: NORMAL MG/DL
BLOOD URINE, POC: NORMAL
COLOR, POC UA: YELLOW
GLUCOSE UR QL STRIP: NORMAL
KETONES UR QL STRIP: NORMAL
LEUKOCYTE ESTERASE URINE, POC: NORMAL
NITRITE, POC UA: NORMAL
PH, POC UA: 8
PROTEIN, POC: NORMAL
SPECIFIC GRAVITY, POC UA: 1
UROBILINOGEN, POC UA: NORMAL

## 2018-07-05 PROCEDURE — 3080F DIAST BP >= 90 MM HG: CPT | Mod: CPTII,S$GLB,, | Performed by: UROLOGY

## 2018-07-05 PROCEDURE — 3077F SYST BP >= 140 MM HG: CPT | Mod: CPTII,S$GLB,, | Performed by: UROLOGY

## 2018-07-05 PROCEDURE — 3008F BODY MASS INDEX DOCD: CPT | Mod: CPTII,S$GLB,, | Performed by: UROLOGY

## 2018-07-05 PROCEDURE — 99999 PR PBB SHADOW E&M-EST. PATIENT-LVL II: CPT | Mod: PBBFAC,,, | Performed by: UROLOGY

## 2018-07-05 PROCEDURE — 99214 OFFICE O/P EST MOD 30 MIN: CPT | Mod: 25,S$GLB,, | Performed by: UROLOGY

## 2018-07-05 PROCEDURE — 81002 URINALYSIS NONAUTO W/O SCOPE: CPT | Mod: S$GLB,,, | Performed by: UROLOGY

## 2018-07-05 RX ORDER — NEEDLES, DISPOSABLE 25GX5/8"
1 NEEDLE, DISPOSABLE MISCELLANEOUS
Qty: 100 EACH | Refills: 99 | Status: SHIPPED | OUTPATIENT
Start: 2018-07-05 | End: 2018-08-10 | Stop reason: SDUPTHER

## 2018-07-05 RX ORDER — TESTOSTERONE CYPIONATE 200 MG/ML
200 INJECTION, SOLUTION INTRAMUSCULAR
Qty: 10 ML | Refills: 1 | Status: SHIPPED | OUTPATIENT
Start: 2018-07-05 | End: 2019-01-09 | Stop reason: SDUPTHER

## 2018-07-05 NOTE — PROGRESS NOTES
Chief Complaint: Hypogonadism    HPI:   7/5/18: T going well, no problems. Needs needles.  Reviewed history in detail.  PSA normal.  1/3/18:  Labs were fine 9/17. Injections going fine doing it monthly.  Satisfied.  8/8/17: Testim no longer approved by his insurance just injections.  Otherwise doing fine.  Labs in hand Hct 37.5.  Has a week of T gel left.  T is low without therapy but he feels the same.  Will strive to give adequate amount for general health but not so much he is in risk from it.  6/21/17: No problems from testim, reviewed history in detail.  12/15/16: No interval changes doing well.  6/16/16: 49 yo pt of Dr. Olivo treated with Testim.  No abd/pelvic pain and no exac/rel factors.  No hematuria.  No urolithiasis.  No urinary bother.  No other  history. No family prostate cancer history. Was sluggish and weak and now on the T things are better with a good energy level.    Allergies:  Naproxen    Medications:  has a current medication list which includes the following prescription(s): acetaminophen, albuterol, allopurinol, amlodipine, azilsartan med-chlorthalidone, blood sugar diagnostic, cetirizine, testosterone cypionate, and vitamin d2.    Review of Systems:  General: No fever, chills, fatigability, or weight loss.  Skin: No rashes, itching, or changes in color or texture of skin.  Chest: Denies AVILES, cyanosis, wheezing, cough, and sputum production.  Abdomen: Appetite fine. No weight loss. Denies diarrhea, abdominal pain, hematemesis, or blood in stool.  Musculoskeletal: No joint stiffness or swelling. Denies back pain.  : As above.  All other review of systems negative.    PMH:   has a past medical history of Diabetes mellitus, type 2 (diagnosed 2013); DM (diabetes mellitus); HTN (hypertension); Hypogonadism, testicular; and Obesities, morbid.    PSH:   has a past surgical history that includes Colonoscopy (N/A, 6/29/2018).    FamHx: family history includes Cataracts in his mother; Diabetes in  his mother and sister; Hypertension in his mother.    SocHx:  reports that he has never smoked. He has never used smokeless tobacco. He reports that he drinks about 1.8 - 2.4 oz of alcohol per week . He reports that he does not use drugs.      Physical Exam:  Vitals:    07/05/18 1437   BP: (!) 162/94   Pulse: (!) 59     General: A&Ox3, no apparent distress, no deformities  Neck: No masses, normal thyroid  Lungs: normal inspiration, no use of accessory muscles  Heart: normal pulse, no arrhythmias  Abdomen: Soft, NT, ND  Skin: The skin is warm and dry. No jaundice.  Ext: No c/c/e.  : deferred    Labs/Studies:   Urinalysis performed in clinic, summary: UA normal  PSA    9/15: 1.2    12/16: 0.4    7/17: 0.6    6/18: 1.1    Impression/Plan:   1. Continue T injections monthly.  Refilled.  RTC with labs 6 mo.  2. Low risk of prostate cancer  3. HTN: discussed and referred to PCP for further management.

## 2018-07-05 NOTE — LETTER
July 5, 2018      [facility]  [address] [place] [id number]  Phone: [phone]  Fax: [phone]       Patient: Juan C Rodriguez   YOB: 1965  Date of Visit: 07/05/2018    To Whom It May Concern:    Pamela Rodriguez  was at [facility] on 07/05/2018. He may return to work on 7/6/18 with no restrictions. If you have any questions or concerns, or if I can be of further assistance, please do not hesitate to contact me.    Sincerely,    Tanja Storm LPN

## 2018-07-09 ENCOUNTER — OFFICE VISIT (OUTPATIENT)
Dept: FAMILY MEDICINE | Facility: CLINIC | Age: 53
End: 2018-07-09
Payer: COMMERCIAL

## 2018-07-09 VITALS
HEART RATE: 71 BPM | WEIGHT: 303.56 LBS | HEIGHT: 63 IN | TEMPERATURE: 97 F | DIASTOLIC BLOOD PRESSURE: 90 MMHG | BODY MASS INDEX: 53.79 KG/M2 | SYSTOLIC BLOOD PRESSURE: 140 MMHG | OXYGEN SATURATION: 99 %

## 2018-07-09 DIAGNOSIS — E11.9 TYPE 2 DIABETES MELLITUS WITHOUT COMPLICATION, WITHOUT LONG-TERM CURRENT USE OF INSULIN: ICD-10-CM

## 2018-07-09 DIAGNOSIS — E55.9 VITAMIN D DEFICIENCY: Primary | ICD-10-CM

## 2018-07-09 DIAGNOSIS — Z00.00 HEALTH CARE MAINTENANCE: ICD-10-CM

## 2018-07-09 DIAGNOSIS — I10 ESSENTIAL HYPERTENSION: ICD-10-CM

## 2018-07-09 PROCEDURE — 99214 OFFICE O/P EST MOD 30 MIN: CPT | Mod: 25,S$GLB,, | Performed by: FAMILY MEDICINE

## 2018-07-09 PROCEDURE — 3077F SYST BP >= 140 MM HG: CPT | Mod: CPTII,S$GLB,, | Performed by: FAMILY MEDICINE

## 2018-07-09 PROCEDURE — 90732 PPSV23 VACC 2 YRS+ SUBQ/IM: CPT | Mod: S$GLB,,, | Performed by: FAMILY MEDICINE

## 2018-07-09 PROCEDURE — 3080F DIAST BP >= 90 MM HG: CPT | Mod: CPTII,S$GLB,, | Performed by: FAMILY MEDICINE

## 2018-07-09 PROCEDURE — 90471 IMMUNIZATION ADMIN: CPT | Mod: S$GLB,,, | Performed by: FAMILY MEDICINE

## 2018-07-09 PROCEDURE — 3044F HG A1C LEVEL LT 7.0%: CPT | Mod: CPTII,S$GLB,, | Performed by: FAMILY MEDICINE

## 2018-07-09 PROCEDURE — 3008F BODY MASS INDEX DOCD: CPT | Mod: CPTII,S$GLB,, | Performed by: FAMILY MEDICINE

## 2018-07-09 PROCEDURE — 99999 PR PBB SHADOW E&M-EST. PATIENT-LVL IV: CPT | Mod: PBBFAC,,, | Performed by: FAMILY MEDICINE

## 2018-07-09 RX ORDER — ERGOCALCIFEROL 1.25 MG/1
50000 CAPSULE ORAL
Qty: 4 CAPSULE | Refills: 3 | Status: SHIPPED | OUTPATIENT
Start: 2018-07-09 | End: 2020-08-13 | Stop reason: SDUPTHER

## 2018-07-09 NOTE — PROGRESS NOTES
"Subjective:       Patient ID: Juan C Rodriguez is a 52 y.o. male.    Chief Complaint: Follow-up (2mo)      HPI  Mr. Rodriguez presents to clinic today for follow up.   He denies any recent complaints.   He states on some days he forgets to take his second dose of his blood pressure medicine.   He denies any fever, cough, shortness of breath or chest pain.     Review of Systems   Constitutional: Negative for fever.   Respiratory: Negative for cough and shortness of breath.    Cardiovascular: Negative for chest pain.   Gastrointestinal: Positive for blood in stool. Negative for abdominal pain, diarrhea and vomiting.   Genitourinary: Negative for difficulty urinating, dysuria and hematuria.   Skin: Negative for rash.   Neurological: Negative for dizziness and headaches.       Medication List with Changes/Refills   Current Medications    ACETAMINOPHEN (TYLENOL) 650 MG TBSR    Take 1 tablet (650 mg total) by mouth every 8 (eight) hours.    ALBUTEROL 90 MCG/ACTUATION INHALER    Inhale 2 puffs into the lungs every 6 (six) hours as needed for Wheezing. Rescue    ALLOPURINOL (ZYLOPRIM) 300 MG TABLET    Take 1 tablet (300 mg total) by mouth once daily.    AMLODIPINE (NORVASC) 10 MG TABLET    TAKE 1 TABLET BY MOUTH ONCE DAILY    AZILSARTAN MED-CHLORTHALIDONE (EDARBYCLOR) 40-12.5 MG TAB    Take 1 tablet by mouth 2 (two) times daily.    BLOOD SUGAR DIAGNOSTIC (CONTOUR TEST STRIPS) STRP    USE TO CHECK BLOOD SUGAR 2 TO 3 TIMES DAILY    BLUNT NEEDLE, DISPOSABLE 18 X 1 1/2 " NDLE    1 Units by Misc.(Non-Drug; Combo Route) route every 21 days.    CETIRIZINE (ZYRTEC) 10 MG TABLET    Take 1 tablet (10 mg total) by mouth once daily.    NEEDLE, DISP, 22 G 22 GAUGE X 1 1/2" NDLE    1 Units by Misc.(Non-Drug; Combo Route) route every 21 days.    TESTOSTERONE CYPIONATE (DEPOTESTOTERONE CYPIONATE) 200 MG/ML INJECTION    Inject 1 mL (200 mg total) into the muscle every 21 days. for 1 dose   Changed and/or Refilled Medications    " Modified Medication Previous Medication    ERGOCALCIFEROL (VITAMIN D2) 50,000 UNIT CAP VITAMIN D2 50,000 unit capsule       Take 1 capsule (50,000 Units total) by mouth every 7 days.    TAKE 1 CAPSULE BY MOUTH EVERY 7 DAYS       Patient Active Problem List   Diagnosis    Gout    Mixed hyperlipidemia    Essential hypertension, benign    Diabetes mellitus without complication    Sprain of thoracic region    Testicular hypofunction    Abnormal CBC measurement    Hypogonadism male    Essential hypertension    Hyperparathyroidism    Alpha thalassemia silent carrier    Left ventricular diastolic dysfunction with preserved systolic function    Hypertension, essential    Hyperuricemia    Osteomalacia    Morbid obesity due to excess calories    Primary central sleep apnea    Left tibialis posterior tendinitis    Gouty arthropathy    Colon cancer screening         Objective:     Physical Exam   Constitutional: He is oriented to person, place, and time. He appears well-developed and well-nourished. No distress.   HENT:   Head: Normocephalic and atraumatic.   Right Ear: External ear normal.   Left Ear: External ear normal.   Eyes: EOM are normal. Right eye exhibits no discharge. Left eye exhibits no discharge.   Cardiovascular: Normal rate and regular rhythm.    Pulmonary/Chest: Effort normal and breath sounds normal. No respiratory distress. He has no wheezes.   Musculoskeletal: He exhibits no edema.   Neurological: He is alert and oriented to person, place, and time.   Skin: Skin is warm and dry. He is not diaphoretic. No erythema.   Psychiatric: He has a normal mood and affect.   Vitals reviewed.    Vitals:    07/09/18 0920   BP: (!) 140/90   Pulse: 71   Temp: 97.3 °F (36.3 °C)       Assessment/  PLAN     Vitamin D deficiency  -     Vitamin D; Future; Expected date: 07/23/2018  -     ergocalciferol (VITAMIN D2) 50,000 unit Cap; Take 1 capsule (50,000 Units total) by mouth every 7 days.  Dispense: 4  capsule; Refill: 3    Type 2 diabetes mellitus without complication, without long-term current use of insulin  -     Hemoglobin A1c; Future; Expected date: 07/23/2018    Health care maintenance  -     Hepatitis C antibody; Future; Expected date: 07/23/2018  -     (In Office Administered) Pneumococcal Polysaccharide Vaccine (23 Valent) (SQ/IM)    Essential hypertension  - continue norvasc, and edarbyclor   - patient refuses to go up on his medicine or add   - stressed compliance with his medicine     Discussed weight loss , exercise     Plan as above        Nicci Zuniga MD  [facility]

## 2018-07-13 ENCOUNTER — TELEPHONE (OUTPATIENT)
Dept: FAMILY MEDICINE | Facility: CLINIC | Age: 53
End: 2018-07-13

## 2018-07-13 DIAGNOSIS — I10 ESSENTIAL HYPERTENSION: ICD-10-CM

## 2018-07-13 RX ORDER — AMLODIPINE BESYLATE 10 MG/1
TABLET ORAL
Qty: 30 TABLET | Refills: 0 | Status: SHIPPED | OUTPATIENT
Start: 2018-07-13 | End: 2018-08-04 | Stop reason: SDUPTHER

## 2018-07-13 NOTE — TELEPHONE ENCOUNTER
----- Message from Nicci Zuniga MD sent at 7/13/2018  9:35 AM CDT -----  I refilled his blood pressure medicine   Please remind him to come next week for his labs and he also needs a 2 month follow up with me for htn     Thanks !!!

## 2018-08-04 DIAGNOSIS — I10 ESSENTIAL HYPERTENSION: ICD-10-CM

## 2018-08-04 RX ORDER — AMLODIPINE BESYLATE 10 MG/1
TABLET ORAL
Qty: 30 TABLET | Refills: 0 | Status: SHIPPED | OUTPATIENT
Start: 2018-08-04 | End: 2018-09-04 | Stop reason: SDUPTHER

## 2018-08-10 NOTE — TELEPHONE ENCOUNTER
----- Message from Rosana Mehtaite sent at 8/10/2018  3:20 PM CDT -----  Contact: Pt   Pt called and stated he needs a prescription for needles and syringes called into his pharmacy.   [address] [address]  Phone: [phone] Fax: [phone]    He can be reached at [phone].  Thanks,  TF

## 2018-08-13 RX ORDER — NEEDLES, DISPOSABLE 25GX5/8"
1 NEEDLE, DISPOSABLE MISCELLANEOUS
Qty: 100 EACH | Refills: 99 | Status: SHIPPED | OUTPATIENT
Start: 2018-08-13 | End: 2019-01-09 | Stop reason: SDUPTHER

## 2018-09-04 DIAGNOSIS — I10 ESSENTIAL HYPERTENSION: ICD-10-CM

## 2018-09-04 RX ORDER — AMLODIPINE BESYLATE 10 MG/1
TABLET ORAL
Qty: 30 TABLET | Refills: 0 | Status: SHIPPED | OUTPATIENT
Start: 2018-09-04 | End: 2018-10-21 | Stop reason: SDUPTHER

## 2018-09-07 DIAGNOSIS — E11.9 TYPE 2 DIABETES MELLITUS WITHOUT COMPLICATION: ICD-10-CM

## 2018-09-25 ENCOUNTER — TELEPHONE (OUTPATIENT)
Dept: FAMILY MEDICINE | Facility: CLINIC | Age: 53
End: 2018-09-25

## 2018-09-25 NOTE — TELEPHONE ENCOUNTER
Phoned pt at contact number [phone]  Not currently on statin at this time.  Discussed DM standards of care and statin use.  Would like to come in for appt with MD to discuss.  Appt booked for annual with Dr. Michelle on 10/1/2018.

## 2018-10-01 ENCOUNTER — OFFICE VISIT (OUTPATIENT)
Dept: FAMILY MEDICINE | Facility: CLINIC | Age: 53
End: 2018-10-01
Payer: COMMERCIAL

## 2018-10-01 VITALS
HEART RATE: 63 BPM | OXYGEN SATURATION: 96 % | SYSTOLIC BLOOD PRESSURE: 138 MMHG | BODY MASS INDEX: 53.3 KG/M2 | WEIGHT: 300.81 LBS | DIASTOLIC BLOOD PRESSURE: 80 MMHG | HEIGHT: 63 IN | RESPIRATION RATE: 16 BRPM | TEMPERATURE: 99 F

## 2018-10-01 DIAGNOSIS — E79.0 HYPERURICEMIA: ICD-10-CM

## 2018-10-01 DIAGNOSIS — M1A.00X0 IDIOPATHIC CHRONIC GOUT WITHOUT TOPHUS, UNSPECIFIED SITE: ICD-10-CM

## 2018-10-01 DIAGNOSIS — E78.2 MIXED HYPERLIPIDEMIA: ICD-10-CM

## 2018-10-01 DIAGNOSIS — E66.01 MORBID OBESITY DUE TO EXCESS CALORIES: ICD-10-CM

## 2018-10-01 DIAGNOSIS — E29.1 HYPOGONADISM MALE: ICD-10-CM

## 2018-10-01 DIAGNOSIS — D56.3 ALPHA THALASSEMIA SILENT CARRIER: ICD-10-CM

## 2018-10-01 DIAGNOSIS — I10 HYPERTENSION, ESSENTIAL: ICD-10-CM

## 2018-10-01 DIAGNOSIS — D64.9 ANEMIA, UNSPECIFIED TYPE: ICD-10-CM

## 2018-10-01 DIAGNOSIS — E11.9 DIABETES MELLITUS WITHOUT COMPLICATION: ICD-10-CM

## 2018-10-01 DIAGNOSIS — I10 ESSENTIAL HYPERTENSION, BENIGN: Primary | ICD-10-CM

## 2018-10-01 PROCEDURE — 3008F BODY MASS INDEX DOCD: CPT | Mod: CPTII,S$GLB,, | Performed by: INTERNAL MEDICINE

## 2018-10-01 PROCEDURE — 99999 PR PBB SHADOW E&M-EST. PATIENT-LVL IV: CPT | Mod: PBBFAC,,, | Performed by: INTERNAL MEDICINE

## 2018-10-01 PROCEDURE — 3075F SYST BP GE 130 - 139MM HG: CPT | Mod: CPTII,S$GLB,, | Performed by: INTERNAL MEDICINE

## 2018-10-01 PROCEDURE — 3079F DIAST BP 80-89 MM HG: CPT | Mod: CPTII,S$GLB,, | Performed by: INTERNAL MEDICINE

## 2018-10-01 PROCEDURE — 99214 OFFICE O/P EST MOD 30 MIN: CPT | Mod: S$GLB,,, | Performed by: INTERNAL MEDICINE

## 2018-10-01 PROCEDURE — 3044F HG A1C LEVEL LT 7.0%: CPT | Mod: CPTII,S$GLB,, | Performed by: INTERNAL MEDICINE

## 2018-10-01 NOTE — LETTER
October 1, 2018                   [facility]  Family Medicine  [address] [address]  Phone: [phone]   October 1, 2018     Patient: Juan C Rodriguez   YOB: 1965   Date of Visit: 10/1/2018       To Whom it May Concern:    Juan C Rodriguez was seen in my clinic on 10/1/2018. He may return to work on 10/2/18.    If you have any questions or concerns, please don't hesitate to call.    Sincerely,         Rosenda Swan LPN

## 2018-10-01 NOTE — PROGRESS NOTES
Subjective:       Patient ID: Juan C Rodriguez is a 52 y.o. male.    Chief Complaint: Establish Care; Hypertension; Hyperlipidemia; Diabetes; Anemia; and Hypogonadism    Hypertension   This is a chronic problem. The problem is controlled. Pertinent negatives include no chest pain, headaches, neck pain, palpitations or shortness of breath.   Hyperlipidemia   Pertinent negatives include no chest pain, myalgias or shortness of breath. Current antihyperlipidemic treatment includes diet change and exercise.   Diabetes   He presents for his follow-up diabetic visit. He has type 2 diabetes mellitus. His disease course has been stable. Pertinent negatives for hypoglycemia include no confusion, dizziness, headaches, nervousness/anxiousness, pallor, seizures, speech difficulty or tremors. Pertinent negatives for diabetes include no chest pain, no fatigue, no polydipsia, no polyphagia, no polyuria and no weakness.   Anemia   There has been no abdominal pain, bruising/bleeding easily, confusion, fever, light-headedness, pallor or palpitations.     Past Medical History:   Diagnosis Date    Diabetes mellitus, type 2 diagnosed 2013    DM (diabetes mellitus)     2011  02/25/2014    HTN (hypertension)     Hypogonadism, testicular     Obesities, morbid      Past Surgical History:   Procedure Laterality Date    COLONOSCOPY N/A 6/29/2018    Procedure: COLONOSCOPY;  Surgeon: Jeremiah Anaya III, MD;  Location: [facility];  Service: Endoscopy;  Laterality: N/A;    COLONOSCOPY N/A 6/29/2018    Performed by Jeremiah Anaya III, MD at [facility]     Family History   Problem Relation Age of Onset    Cataracts Mother     Diabetes Mother     Hypertension Mother     Diabetes Sister      Social History     Socioeconomic History    Marital status: Single     Spouse name: Not on file    Number of children: Not on file    Years of education: Not on file    Highest education level: Not on file   Social Needs     Financial resource strain: Not on file    Food insecurity - worry: Not on file    Food insecurity - inability: Not on file    Transportation needs - medical: Not on file    Transportation needs - non-medical: Not on file   Occupational History    Not on file   Tobacco Use    Smoking status: Never Smoker    Smokeless tobacco: Never Used   Substance and Sexual Activity    Alcohol use: Yes     Alcohol/week: 1.8 - 2.4 oz     Types: 3 - 4 Cans of beer per week     Comment: occasional weekends    Drug use: No    Sexual activity: Not on file   Other Topics Concern    Not on file   Social History Narrative    Not on file     Review of Systems   Constitutional: Negative for activity change, appetite change, chills, diaphoresis, fatigue, fever and unexpected weight change.   HENT: Negative for drooling, ear discharge, ear pain, facial swelling, hearing loss, mouth sores, nosebleeds, postnasal drip, rhinorrhea, sinus pressure, sneezing, sore throat, tinnitus, trouble swallowing and voice change.    Eyes: Negative for photophobia, redness and visual disturbance.   Respiratory: Negative for apnea, cough, choking, chest tightness, shortness of breath and wheezing.    Cardiovascular: Negative for chest pain, palpitations and leg swelling.   Gastrointestinal: Negative for abdominal distention, abdominal pain, anal bleeding, blood in stool, constipation, diarrhea, nausea and vomiting.   Endocrine: Negative for cold intolerance, heat intolerance, polydipsia, polyphagia and polyuria.   Genitourinary: Negative for difficulty urinating, dysuria, enuresis, flank pain, frequency, genital sores, hematuria and urgency.   Musculoskeletal: Negative for arthralgias, back pain, gait problem, joint swelling, myalgias, neck pain and neck stiffness.   Skin: Negative for color change, pallor, rash and wound.   Allergic/Immunologic: Negative for food allergies and immunocompromised state.   Neurological: Negative for dizziness, tremors,  seizures, syncope, facial asymmetry, speech difficulty, weakness, light-headedness, numbness and headaches.   Hematological: Negative for adenopathy. Does not bruise/bleed easily.   Psychiatric/Behavioral: Negative for agitation, behavioral problems, confusion, decreased concentration, dysphoric mood, hallucinations, self-injury, sleep disturbance and suicidal ideas. The patient is not nervous/anxious and is not hyperactive.        Objective:      Physical Exam   Constitutional: He is oriented to person, place, and time. He appears well-developed and well-nourished. No distress.   HENT:   Head: Normocephalic and atraumatic.   Eyes: Pupils are equal, round, and reactive to light. No scleral icterus.   Neck: Normal range of motion. Neck supple. No JVD present. Carotid bruit is not present. No tracheal deviation present. No thyromegaly present.   Cardiovascular: Normal rate, regular rhythm, normal heart sounds and intact distal pulses.   Pulmonary/Chest: Effort normal and breath sounds normal. No respiratory distress. He has no wheezes. He has no rales. He exhibits no tenderness.   Abdominal: Soft. Bowel sounds are normal. He exhibits no distension. There is no tenderness. There is no rebound and no guarding.   Musculoskeletal: Normal range of motion. He exhibits no edema or tenderness.   Lymphadenopathy:     He has no cervical adenopathy.   Neurological: He is alert and oriented to person, place, and time.   Skin: Skin is warm and dry. No rash noted. He is not diaphoretic. No erythema. No pallor.   Psychiatric: He has a normal mood and affect. His behavior is normal. Judgment and thought content normal.   Nursing note and vitals reviewed.      CMP  Sodium   Date Value Ref Range Status   09/05/2017 141 136 - 145 mmol/L Final     Potassium   Date Value Ref Range Status   09/05/2017 3.9 3.5 - 5.1 mmol/L Final     Chloride   Date Value Ref Range Status   09/05/2017 103 95 - 110 mmol/L Final     CO2   Date Value Ref Range  Status   09/05/2017 31 (H) 23 - 29 mmol/L Final     Glucose   Date Value Ref Range Status   09/05/2017 98 70 - 110 mg/dL Final     BUN, Bld   Date Value Ref Range Status   09/05/2017 15 6 - 20 mg/dL Final     Creatinine   Date Value Ref Range Status   09/05/2017 1.4 0.5 - 1.4 mg/dL Final     Calcium   Date Value Ref Range Status   09/05/2017 9.4 8.7 - 10.5 mg/dL Final     Total Protein   Date Value Ref Range Status   07/02/2018 7.2 6.0 - 8.4 g/dL Final     Albumin   Date Value Ref Range Status   07/02/2018 3.5 3.5 - 5.2 g/dL Final   05/31/2016 4.2 3.6 - 5.1 g/dL Final     Comment:     @ Test Performed By:  [facility]  KELLY Qiu M.D.,   [address] [place] [id number]  [facility]  [id number]       Total Bilirubin   Date Value Ref Range Status   07/02/2018 0.3 0.1 - 1.0 mg/dL Final     Comment:     For infants and newborns, interpretation of results should be based  on gestational age, weight and in agreement with clinical  observations.  Premature Infant recommended reference ranges:  Up to 24 hours.............<8.0 mg/dL  Up to 48 hours............<12.0 mg/dL  3-5 days..................<15.0 mg/dL  6-29 days.................<15.0 mg/dL       Alkaline Phosphatase   Date Value Ref Range Status   07/02/2018 92 55 - 135 U/L Final     AST   Date Value Ref Range Status   07/02/2018 19 10 - 40 U/L Final     ALT   Date Value Ref Range Status   07/02/2018 21 10 - 44 U/L Final     Anion Gap   Date Value Ref Range Status   09/05/2017 7 (L) 8 - 16 mmol/L Final     eGFR if    Date Value Ref Range Status   09/05/2017 >60.0 >60 mL/min/1.73 m^2 Final     eGFR if non    Date Value Ref Range Status   09/05/2017 57.0 (A) >60 mL/min/1.73 m^2 Final     Comment:     Calculation used to obtain the estimated glomerular filtration  rate (eGFR) is the CKD-EPI equation. Since race is unknown   in our information system, the eGFR values for    -American and Non--American patients are given   for each creatinine result.       Lab Results   Component Value Date    WBC 6.30 09/05/2017    HGB 12.4 (L) 09/05/2017    HCT 39.9 (L) 07/02/2018    MCV 78 (L) 09/05/2017     09/05/2017     Lab Results   Component Value Date    CHOL 211 (H) 09/05/2017     Lab Results   Component Value Date    HDL 48 09/05/2017     Lab Results   Component Value Date    LDLCALC 149.8 09/05/2017     Lab Results   Component Value Date    TRIG 66 09/05/2017     Lab Results   Component Value Date    CHOLHDL 22.7 09/05/2017     Lab Results   Component Value Date    TSH 2.609 09/05/2017     Lab Results   Component Value Date    LABA1C 6.1 (H) 03/16/2017    HGBA1C 5.7 (H) 12/27/2017     Assessment:       1. Essential hypertension, benign    2. Idiopathic chronic gout without tophus, unspecified site    3. Hypertension, essential    4. Hyperuricemia    5. Hypogonadism male    6. Mixed hyperlipidemia    7. Morbid obesity due to excess calories    8. Anemia, unspecified type    9. Diabetes mellitus without complication    10. Alpha thalassemia silent carrier        Plan:   Essential hypertension, benign  -     Comprehensive metabolic panel; Future; Expected date: 10/01/2018  -     CBC auto differential; Future; Expected date: 10/01/2018    Idiopathic chronic gout without tophus, unspecified site    Hypertension, essential    Hyperuricemia    Hypogonadism male    Mixed hyperlipidemia  -     Lipid panel; Future; Expected date: 10/01/2018    Morbid obesity due to excess calories    Anemia, unspecified type    Diabetes mellitus without complication  -     Hemoglobin A1c; Future; Expected date: 10/01/2018    Alpha thalassemia silent carrier                    F/u 1 month---consider statin.

## 2018-10-08 ENCOUNTER — LAB VISIT (OUTPATIENT)
Dept: LAB | Facility: HOSPITAL | Age: 53
End: 2018-10-08
Attending: INTERNAL MEDICINE
Payer: COMMERCIAL

## 2018-10-08 DIAGNOSIS — E78.2 MIXED HYPERLIPIDEMIA: ICD-10-CM

## 2018-10-08 DIAGNOSIS — I10 ESSENTIAL HYPERTENSION, BENIGN: ICD-10-CM

## 2018-10-08 DIAGNOSIS — E11.9 DIABETES MELLITUS WITHOUT COMPLICATION: ICD-10-CM

## 2018-10-08 LAB
ALBUMIN SERPL BCP-MCNC: 3.8 G/DL
ALP SERPL-CCNC: 86 U/L
ALT SERPL W/O P-5'-P-CCNC: 20 U/L
ANION GAP SERPL CALC-SCNC: 8 MMOL/L
AST SERPL-CCNC: 26 U/L
BASOPHILS # BLD AUTO: 0.05 K/UL
BASOPHILS NFR BLD: 0.9 %
BILIRUB SERPL-MCNC: 0.3 MG/DL
BUN SERPL-MCNC: 28 MG/DL
CALCIUM SERPL-MCNC: 9.6 MG/DL
CHLORIDE SERPL-SCNC: 103 MMOL/L
CHOLEST SERPL-MCNC: 202 MG/DL
CHOLEST/HDLC SERPL: 4 {RATIO}
CO2 SERPL-SCNC: 29 MMOL/L
CREAT SERPL-MCNC: 1.4 MG/DL
DIFFERENTIAL METHOD: ABNORMAL
EOSINOPHIL # BLD AUTO: 0.1 K/UL
EOSINOPHIL NFR BLD: 1.8 %
ERYTHROCYTE [DISTWIDTH] IN BLOOD BY AUTOMATED COUNT: 17.7 %
EST. GFR  (AFRICAN AMERICAN): >60 ML/MIN/1.73 M^2
EST. GFR  (NON AFRICAN AMERICAN): 56.6 ML/MIN/1.73 M^2
ESTIMATED AVG GLUCOSE: 123 MG/DL
GLUCOSE SERPL-MCNC: 82 MG/DL
HBA1C MFR BLD HPLC: 5.9 %
HCT VFR BLD AUTO: 41.8 %
HDLC SERPL-MCNC: 51 MG/DL
HDLC SERPL: 25.2 %
HGB BLD-MCNC: 12.3 G/DL
IMM GRANULOCYTES # BLD AUTO: 0.02 K/UL
IMM GRANULOCYTES NFR BLD AUTO: 0.4 %
LDLC SERPL CALC-MCNC: 140.2 MG/DL
LYMPHOCYTES # BLD AUTO: 2 K/UL
LYMPHOCYTES NFR BLD: 34.9 %
MCH RBC QN AUTO: 24 PG
MCHC RBC AUTO-ENTMCNC: 29.4 G/DL
MCV RBC AUTO: 82 FL
MONOCYTES # BLD AUTO: 0.5 K/UL
MONOCYTES NFR BLD: 8.6 %
NEUTROPHILS # BLD AUTO: 3.1 K/UL
NEUTROPHILS NFR BLD: 53.4 %
NONHDLC SERPL-MCNC: 151 MG/DL
NRBC BLD-RTO: 0 /100 WBC
PLATELET # BLD AUTO: 245 K/UL
PMV BLD AUTO: 10.4 FL
POTASSIUM SERPL-SCNC: 4.2 MMOL/L
PROT SERPL-MCNC: 7.6 G/DL
RBC # BLD AUTO: 5.13 M/UL
SODIUM SERPL-SCNC: 140 MMOL/L
TRIGL SERPL-MCNC: 54 MG/DL
WBC # BLD AUTO: 5.71 K/UL

## 2018-10-08 PROCEDURE — 85025 COMPLETE CBC W/AUTO DIFF WBC: CPT

## 2018-10-08 PROCEDURE — 80053 COMPREHEN METABOLIC PANEL: CPT

## 2018-10-08 PROCEDURE — 83036 HEMOGLOBIN GLYCOSYLATED A1C: CPT

## 2018-10-08 PROCEDURE — 36415 COLL VENOUS BLD VENIPUNCTURE: CPT | Mod: PO

## 2018-10-08 PROCEDURE — 80061 LIPID PANEL: CPT

## 2018-10-09 ENCOUNTER — TELEPHONE (OUTPATIENT)
Dept: FAMILY MEDICINE | Facility: CLINIC | Age: 53
End: 2018-10-09

## 2018-10-09 DIAGNOSIS — E78.5 DYSLIPIDEMIA: Primary | ICD-10-CM

## 2018-10-09 RX ORDER — ATORVASTATIN CALCIUM 20 MG/1
20 TABLET, FILM COATED ORAL DAILY
Qty: 90 TABLET | Refills: 3 | Status: SHIPPED | OUTPATIENT
Start: 2018-10-09 | End: 2019-11-08 | Stop reason: SDUPTHER

## 2018-10-21 DIAGNOSIS — I10 ESSENTIAL HYPERTENSION: ICD-10-CM

## 2018-10-21 DIAGNOSIS — M10.9 GOUT WITHOUT TOPHUS: ICD-10-CM

## 2018-10-21 RX ORDER — AMLODIPINE BESYLATE 10 MG/1
TABLET ORAL
Qty: 30 TABLET | Refills: 0 | Status: SHIPPED | OUTPATIENT
Start: 2018-10-21 | End: 2018-11-06 | Stop reason: SDUPTHER

## 2018-10-21 RX ORDER — ALLOPURINOL 300 MG/1
TABLET ORAL
Qty: 30 TABLET | Refills: 3 | Status: SHIPPED | OUTPATIENT
Start: 2018-10-21 | End: 2019-11-08 | Stop reason: SDUPTHER

## 2018-11-05 ENCOUNTER — OFFICE VISIT (OUTPATIENT)
Dept: FAMILY MEDICINE | Facility: CLINIC | Age: 53
End: 2018-11-05
Payer: COMMERCIAL

## 2018-11-05 ENCOUNTER — LAB VISIT (OUTPATIENT)
Dept: LAB | Facility: HOSPITAL | Age: 53
End: 2018-11-05
Attending: INTERNAL MEDICINE
Payer: COMMERCIAL

## 2018-11-05 VITALS
HEIGHT: 63 IN | WEIGHT: 303.13 LBS | HEART RATE: 71 BPM | TEMPERATURE: 98 F | RESPIRATION RATE: 16 BRPM | BODY MASS INDEX: 53.71 KG/M2 | SYSTOLIC BLOOD PRESSURE: 128 MMHG | OXYGEN SATURATION: 97 % | DIASTOLIC BLOOD PRESSURE: 68 MMHG

## 2018-11-05 DIAGNOSIS — D64.9 ANEMIA, UNSPECIFIED TYPE: ICD-10-CM

## 2018-11-05 DIAGNOSIS — E78.5 DYSLIPIDEMIA: ICD-10-CM

## 2018-11-05 DIAGNOSIS — I10 HYPERTENSION, ESSENTIAL: ICD-10-CM

## 2018-11-05 DIAGNOSIS — E78.2 MIXED HYPERLIPIDEMIA: Primary | ICD-10-CM

## 2018-11-05 DIAGNOSIS — E21.3 HYPERPARATHYROIDISM: ICD-10-CM

## 2018-11-05 DIAGNOSIS — E11.9 DIABETES MELLITUS WITHOUT COMPLICATION: ICD-10-CM

## 2018-11-05 DIAGNOSIS — I10 ESSENTIAL HYPERTENSION, BENIGN: ICD-10-CM

## 2018-11-05 LAB
ALBUMIN SERPL BCP-MCNC: 3.7 G/DL
ALP SERPL-CCNC: 97 U/L
ALT SERPL W/O P-5'-P-CCNC: 19 U/L
ANION GAP SERPL CALC-SCNC: 8 MMOL/L
AST SERPL-CCNC: 24 U/L
BILIRUB SERPL-MCNC: 0.4 MG/DL
BUN SERPL-MCNC: 26 MG/DL
CALCIUM SERPL-MCNC: 9.4 MG/DL
CHLORIDE SERPL-SCNC: 101 MMOL/L
CHOLEST SERPL-MCNC: 148 MG/DL
CHOLEST/HDLC SERPL: 2.8 {RATIO}
CO2 SERPL-SCNC: 30 MMOL/L
CREAT SERPL-MCNC: 1.3 MG/DL
EST. GFR  (AFRICAN AMERICAN): >60 ML/MIN/1.73 M^2
EST. GFR  (NON AFRICAN AMERICAN): >60 ML/MIN/1.73 M^2
GLUCOSE SERPL-MCNC: 69 MG/DL
HDLC SERPL-MCNC: 52 MG/DL
HDLC SERPL: 35.1 %
LDLC SERPL CALC-MCNC: 87.2 MG/DL
NONHDLC SERPL-MCNC: 96 MG/DL
POTASSIUM SERPL-SCNC: 3.8 MMOL/L
PROT SERPL-MCNC: 7.6 G/DL
SODIUM SERPL-SCNC: 139 MMOL/L
TRIGL SERPL-MCNC: 44 MG/DL

## 2018-11-05 PROCEDURE — 80061 LIPID PANEL: CPT

## 2018-11-05 PROCEDURE — 36415 COLL VENOUS BLD VENIPUNCTURE: CPT | Mod: PO

## 2018-11-05 PROCEDURE — 3008F BODY MASS INDEX DOCD: CPT | Mod: CPTII,S$GLB,, | Performed by: INTERNAL MEDICINE

## 2018-11-05 PROCEDURE — 80053 COMPREHEN METABOLIC PANEL: CPT

## 2018-11-05 PROCEDURE — 99214 OFFICE O/P EST MOD 30 MIN: CPT | Mod: S$GLB,,, | Performed by: INTERNAL MEDICINE

## 2018-11-05 PROCEDURE — 3078F DIAST BP <80 MM HG: CPT | Mod: CPTII,S$GLB,, | Performed by: INTERNAL MEDICINE

## 2018-11-05 PROCEDURE — 3074F SYST BP LT 130 MM HG: CPT | Mod: CPTII,S$GLB,, | Performed by: INTERNAL MEDICINE

## 2018-11-05 PROCEDURE — 3044F HG A1C LEVEL LT 7.0%: CPT | Mod: CPTII,S$GLB,, | Performed by: INTERNAL MEDICINE

## 2018-11-05 PROCEDURE — 99999 PR PBB SHADOW E&M-EST. PATIENT-LVL IV: CPT | Mod: PBBFAC,,, | Performed by: INTERNAL MEDICINE

## 2018-11-05 NOTE — PROGRESS NOTES
Subjective:       Patient ID: Juan C Rodriguez is a 52 y.o. male.    Chief Complaint: Follow-up; Hyperlipidemia; Hypertension; and Diabetes    Hyperlipidemia   Associated symptoms include myalgias. Pertinent negatives include no chest pain or shortness of breath. Current antihyperlipidemic treatment includes statins.   Hypertension   This is a chronic problem. The problem is controlled. Pertinent negatives include no chest pain, headaches, neck pain, palpitations or shortness of breath.   Diabetes   He presents for his follow-up diabetic visit. He has type 2 diabetes mellitus. His disease course has been stable. Pertinent negatives for hypoglycemia include no confusion, dizziness, headaches, nervousness/anxiousness, pallor, seizures, speech difficulty or tremors. Pertinent negatives for diabetes include no chest pain, no fatigue, no polydipsia, no polyphagia, no polyuria and no weakness.     Past Medical History:   Diagnosis Date    Diabetes mellitus, type 2 diagnosed 2013    DM (diabetes mellitus)     2011  02/25/2014    HTN (hypertension)     Hypogonadism, testicular     Obesities, morbid      Past Surgical History:   Procedure Laterality Date    COLONOSCOPY N/A 6/29/2018    Procedure: COLONOSCOPY;  Surgeon: Jeremiah Anaya III, MD;  Location: [facility];  Service: Endoscopy;  Laterality: N/A;    COLONOSCOPY N/A 6/29/2018    Performed by Jeremiah Anaya III, MD at [facility]     Family History   Problem Relation Age of Onset    Cataracts Mother     Diabetes Mother     Hypertension Mother     Diabetes Sister      Social History     Socioeconomic History    Marital status: Single     Spouse name: Not on file    Number of children: Not on file    Years of education: Not on file    Highest education level: Not on file   Social Needs    Financial resource strain: Not on file    Food insecurity - worry: Not on file    Food insecurity - inability: Not on file    Transportation needs  - medical: Not on file    Transportation needs - non-medical: Not on file   Occupational History    Not on file   Tobacco Use    Smoking status: Never Smoker    Smokeless tobacco: Never Used   Substance and Sexual Activity    Alcohol use: Yes     Alcohol/week: 1.8 - 2.4 oz     Types: 3 - 4 Cans of beer per week     Comment: occasional weekends    Drug use: No    Sexual activity: Not on file   Other Topics Concern    Not on file   Social History Narrative    Not on file     Review of Systems   Constitutional: Negative for activity change, appetite change, chills, diaphoresis, fatigue, fever and unexpected weight change.   HENT: Negative for drooling, ear discharge, ear pain, facial swelling, hearing loss, mouth sores, nosebleeds, postnasal drip, rhinorrhea, sinus pressure, sneezing, sore throat, tinnitus, trouble swallowing and voice change.    Eyes: Negative for photophobia, redness and visual disturbance.   Respiratory: Negative for apnea, cough, choking, chest tightness, shortness of breath and wheezing.    Cardiovascular: Negative for chest pain, palpitations and leg swelling.   Gastrointestinal: Negative for abdominal distention, abdominal pain, anal bleeding, blood in stool, constipation, diarrhea, nausea and vomiting.   Endocrine: Negative for cold intolerance, heat intolerance, polydipsia, polyphagia and polyuria.   Genitourinary: Negative for difficulty urinating, dysuria, enuresis, flank pain, frequency, genital sores, hematuria and urgency.   Musculoskeletal: Positive for arthralgias and myalgias. Negative for back pain, gait problem, joint swelling, neck pain and neck stiffness.   Skin: Negative for color change, pallor, rash and wound.   Allergic/Immunologic: Negative for food allergies and immunocompromised state.   Neurological: Negative for dizziness, tremors, seizures, syncope, facial asymmetry, speech difficulty, weakness, light-headedness, numbness and headaches.   Hematological: Negative  for adenopathy. Does not bruise/bleed easily.   Psychiatric/Behavioral: Negative for agitation, behavioral problems, confusion, decreased concentration, dysphoric mood, hallucinations, self-injury, sleep disturbance and suicidal ideas. The patient is not nervous/anxious and is not hyperactive.        Objective:      Physical Exam   Constitutional: He is oriented to person, place, and time. He appears well-developed and well-nourished. No distress.   HENT:   Head: Normocephalic and atraumatic.   Eyes: Pupils are equal, round, and reactive to light.   Neck: Normal range of motion. Neck supple. No JVD present. Carotid bruit is not present. No tracheal deviation present. No thyromegaly present.   Cardiovascular: Normal rate, regular rhythm, normal heart sounds and intact distal pulses.   Pulses:       Dorsalis pedis pulses are 2+ on the right side, and 2+ on the left side.        Posterior tibial pulses are 2+ on the right side, and 2+ on the left side.   Pulmonary/Chest: Effort normal and breath sounds normal. No respiratory distress. He has no wheezes. He has no rales. He exhibits no tenderness.   Abdominal: Soft. Bowel sounds are normal. He exhibits no distension. There is no tenderness. There is no rebound and no guarding.   Musculoskeletal: Normal range of motion. He exhibits no edema or tenderness.        Right foot: There is normal range of motion and no deformity.        Left foot: There is normal range of motion and no deformity.   Feet:   Right Foot:   Protective Sensation: 5 sites tested. 5 sites sensed.   Skin Integrity: Negative for ulcer, blister, skin breakdown, erythema, warmth, callus or dry skin.   Left Foot:   Protective Sensation: 5 sites tested. 5 sites sensed.   Skin Integrity: Negative for ulcer, blister, skin breakdown, erythema, warmth, callus or dry skin.   Lymphadenopathy:     He has no cervical adenopathy.   Neurological: He is alert and oriented to person, place, and time. No cranial nerve  deficit. Coordination normal.   Skin: Skin is warm and dry. No rash noted. He is not diaphoretic. No erythema. No pallor.   Psychiatric: He has a normal mood and affect. His behavior is normal. Judgment and thought content normal.   Nursing note and vitals reviewed.      CMP  Sodium   Date Value Ref Range Status   10/08/2018 140 136 - 145 mmol/L Final     Potassium   Date Value Ref Range Status   10/08/2018 4.2 3.5 - 5.1 mmol/L Final     Chloride   Date Value Ref Range Status   10/08/2018 103 95 - 110 mmol/L Final     CO2   Date Value Ref Range Status   10/08/2018 29 23 - 29 mmol/L Final     Glucose   Date Value Ref Range Status   10/08/2018 82 70 - 110 mg/dL Final     BUN, Bld   Date Value Ref Range Status   10/08/2018 28 (H) 6 - 20 mg/dL Final     Creatinine   Date Value Ref Range Status   10/08/2018 1.4 0.5 - 1.4 mg/dL Final     Calcium   Date Value Ref Range Status   10/08/2018 9.6 8.7 - 10.5 mg/dL Final     Total Protein   Date Value Ref Range Status   10/08/2018 7.6 6.0 - 8.4 g/dL Final     Albumin   Date Value Ref Range Status   10/08/2018 3.8 3.5 - 5.2 g/dL Final   05/31/2016 4.2 3.6 - 5.1 g/dL Final     Comment:     @ Test Performed By:  [facility]  CHRISSY Qiu M.D..,   [address] [place] [id number]  [facility]  [id number]       Total Bilirubin   Date Value Ref Range Status   10/08/2018 0.3 0.1 - 1.0 mg/dL Final     Comment:     For infants and newborns, interpretation of results should be based  on gestational age, weight and in agreement with clinical  observations.  Premature Infant recommended reference ranges:  Up to 24 hours.............<8.0 mg/dL  Up to 48 hours............<12.0 mg/dL  3-5 days..................<15.0 mg/dL  6-29 days.................<15.0 mg/dL       Alkaline Phosphatase   Date Value Ref Range Status   10/08/2018 86 55 - 135 U/L Final     AST   Date Value Ref Range Status   10/08/2018 26 10 - 40 U/L Final     ALT   Date  Value Ref Range Status   10/08/2018 20 10 - 44 U/L Final     Anion Gap   Date Value Ref Range Status   10/08/2018 8 8 - 16 mmol/L Final     eGFR if    Date Value Ref Range Status   10/08/2018 >60.0 >60 mL/min/1.73 m^2 Final     eGFR if non    Date Value Ref Range Status   10/08/2018 56.6 (A) >60 mL/min/1.73 m^2 Final     Comment:     Calculation used to obtain the estimated glomerular filtration  rate (eGFR) is the CKD-EPI equation.        Lab Results   Component Value Date    WBC 5.71 10/08/2018    HGB 12.3 (L) 10/08/2018    HCT 41.8 10/08/2018    MCV 82 10/08/2018     10/08/2018     Lab Results   Component Value Date    CHOL 202 (H) 10/08/2018     Lab Results   Component Value Date    HDL 51 10/08/2018     Lab Results   Component Value Date    LDLCALC 140.2 10/08/2018     Lab Results   Component Value Date    TRIG 54 10/08/2018     Lab Results   Component Value Date    CHOLHDL 25.2 10/08/2018     Lab Results   Component Value Date    TSH 2.609 09/05/2017     Lab Results   Component Value Date    LABA1C 6.1 (H) 03/16/2017    HGBA1C 5.9 (H) 10/08/2018     Assessment:       1. Mixed hyperlipidemia    2. Hypertension, essential    3. Hyperparathyroidism    4. Anemia, unspecified type    5. Essential hypertension, benign    6. Diabetes mellitus without complication        Plan:   Mixed hyperlipidemia-stable.    Hypertension, essential-stable.    Hyperparathyroidism    Anemia, unspecified type-stable.    Essential hypertension, benign-stable.    Diabetes mellitus without complication-controlled.  -     Ambulatory referral to Optometry    F/u 4 months.

## 2018-11-06 ENCOUNTER — TELEPHONE (OUTPATIENT)
Dept: FAMILY MEDICINE | Facility: CLINIC | Age: 53
End: 2018-11-06

## 2018-11-06 DIAGNOSIS — I10 ESSENTIAL HYPERTENSION: ICD-10-CM

## 2018-11-06 RX ORDER — AZILSARTAN KAMEDOXOMIL AND CHLORTHALIDONE 40; 12.5 MG/1; MG/1
TABLET ORAL
Qty: 60 TABLET | Refills: 5 | Status: SHIPPED | OUTPATIENT
Start: 2018-11-06 | End: 2019-10-29 | Stop reason: SDUPTHER

## 2018-11-06 RX ORDER — AMLODIPINE BESYLATE 10 MG/1
TABLET ORAL
Qty: 90 TABLET | Refills: 3 | Status: SHIPPED | OUTPATIENT
Start: 2018-11-06 | End: 2018-11-14 | Stop reason: SDUPTHER

## 2018-11-14 DIAGNOSIS — I10 ESSENTIAL HYPERTENSION: ICD-10-CM

## 2018-11-14 RX ORDER — AMLODIPINE BESYLATE 10 MG/1
10 TABLET ORAL DAILY
Qty: 90 TABLET | Refills: 3 | Status: SHIPPED | OUTPATIENT
Start: 2018-11-14 | End: 2019-10-25 | Stop reason: SDUPTHER

## 2018-11-14 NOTE — TELEPHONE ENCOUNTER
----- Message from Sweetie Farr sent at 11/14/2018  8:30 AM CST -----  Contact: self  Pt is requesting blood pressure medication. Please call back at [phone].    1. What is the name of the medication you are requesting? Blood pressure medication   2. What is the dose? n/a  3. How do you take the medication? Orally, topically, etc? orally  4. How often do you take this medication? n/a  5. Do you need a 30 day or 90 day supply? n/a  6. How many refills are you requesting? n/a  7. What is your preferred pharmacy and location of the pharmacy?    Pt uses:    [address] [address]  Phone: [phone] Fax: [phone]      8. Who can we contact with further questions? N/a    Thanks,   Sweetie Farr

## 2019-01-04 ENCOUNTER — LAB VISIT (OUTPATIENT)
Dept: LAB | Facility: HOSPITAL | Age: 54
End: 2019-01-04
Attending: UROLOGY
Payer: COMMERCIAL

## 2019-01-04 DIAGNOSIS — E29.1 HYPOGONADISM IN MALE: ICD-10-CM

## 2019-01-04 DIAGNOSIS — I10 HYPERTENSION, UNSPECIFIED TYPE: ICD-10-CM

## 2019-01-04 DIAGNOSIS — Z12.5 PROSTATE CANCER SCREENING: ICD-10-CM

## 2019-01-04 LAB
ALBUMIN SERPL BCP-MCNC: 3.5 G/DL
ALP SERPL-CCNC: 124 U/L
ALT SERPL W/O P-5'-P-CCNC: 22 U/L
AST SERPL-CCNC: 20 U/L
BILIRUB DIRECT SERPL-MCNC: 0.2 MG/DL
BILIRUB SERPL-MCNC: 0.4 MG/DL
HCT VFR BLD AUTO: 40.6 %
PROT SERPL-MCNC: 7.7 G/DL

## 2019-01-04 PROCEDURE — 80076 HEPATIC FUNCTION PANEL: CPT

## 2019-01-04 PROCEDURE — 85014 HEMATOCRIT: CPT

## 2019-01-04 PROCEDURE — 36415 COLL VENOUS BLD VENIPUNCTURE: CPT

## 2019-01-09 ENCOUNTER — OFFICE VISIT (OUTPATIENT)
Dept: UROLOGY | Facility: CLINIC | Age: 54
End: 2019-01-09
Payer: COMMERCIAL

## 2019-01-09 VITALS — WEIGHT: 303 LBS | BODY MASS INDEX: 53.67 KG/M2

## 2019-01-09 DIAGNOSIS — Z12.5 PROSTATE CANCER SCREENING: ICD-10-CM

## 2019-01-09 DIAGNOSIS — E29.1 HYPOGONADISM IN MALE: Primary | ICD-10-CM

## 2019-01-09 LAB
BILIRUB SERPL-MCNC: NORMAL MG/DL
BLOOD URINE, POC: NORMAL
COLOR, POC UA: NORMAL
GLUCOSE UR QL STRIP: NORMAL
KETONES UR QL STRIP: NORMAL
LEUKOCYTE ESTERASE URINE, POC: NORMAL
NITRITE, POC UA: NORMAL
PH, POC UA: 5
PROTEIN, POC: NORMAL
SPECIFIC GRAVITY, POC UA: 1.01
UROBILINOGEN, POC UA: NORMAL

## 2019-01-09 PROCEDURE — 99214 PR OFFICE/OUTPT VISIT, EST, LEVL IV, 30-39 MIN: ICD-10-PCS | Mod: 25,S$GLB,, | Performed by: UROLOGY

## 2019-01-09 PROCEDURE — 81002 POCT URINE DIPSTICK WITHOUT MICROSCOPE: ICD-10-PCS | Mod: S$GLB,,, | Performed by: UROLOGY

## 2019-01-09 PROCEDURE — 99214 OFFICE O/P EST MOD 30 MIN: CPT | Mod: 25,S$GLB,, | Performed by: UROLOGY

## 2019-01-09 PROCEDURE — 99999 PR PBB SHADOW E&M-EST. PATIENT-LVL II: ICD-10-PCS | Mod: PBBFAC,,, | Performed by: UROLOGY

## 2019-01-09 PROCEDURE — 99999 PR PBB SHADOW E&M-EST. PATIENT-LVL II: CPT | Mod: PBBFAC,,, | Performed by: UROLOGY

## 2019-01-09 PROCEDURE — 3008F PR BODY MASS INDEX (BMI) DOCUMENTED: ICD-10-PCS | Mod: CPTII,S$GLB,, | Performed by: UROLOGY

## 2019-01-09 PROCEDURE — 81002 URINALYSIS NONAUTO W/O SCOPE: CPT | Mod: S$GLB,,, | Performed by: UROLOGY

## 2019-01-09 PROCEDURE — 3008F BODY MASS INDEX DOCD: CPT | Mod: CPTII,S$GLB,, | Performed by: UROLOGY

## 2019-01-09 RX ORDER — TESTOSTERONE CYPIONATE 200 MG/ML
200 INJECTION, SOLUTION INTRAMUSCULAR
Qty: 10 ML | Refills: 1 | Status: SHIPPED | OUTPATIENT
Start: 2019-01-09 | End: 2019-08-13 | Stop reason: SDUPTHER

## 2019-01-09 RX ORDER — NEEDLES, DISPOSABLE 25GX5/8"
1 NEEDLE, DISPOSABLE MISCELLANEOUS
Qty: 100 EACH | Refills: 99 | Status: SHIPPED | OUTPATIENT
Start: 2019-01-09 | End: 2021-09-28

## 2019-01-09 NOTE — PROGRESS NOTES
Chief Complaint: Hypogonadism    HPI:   1/9/19: T going well, no problems.  Reviewed history in detail.    7/5/18: T going well, no problems. Needs needles.  Reviewed history in detail.  PSA normal.  1/3/18:  Labs were fine 9/17. Injections going fine doing it monthly.  Satisfied.  8/8/17: Testim no longer approved by his insurance just injections.  Otherwise doing fine.  Labs in hand Hct 37.5.  Has a week of T gel left.  T is low without therapy but he feels the same.  Will strive to give adequate amount for general health but not so much he is in risk from it.  6/21/17: No problems from testim, reviewed history in detail.  12/15/16: No interval changes doing well.  6/16/16: 49 yo pt of Dr. Olivo treated with Testim.  No abd/pelvic pain and no exac/rel factors.  No hematuria.  No urolithiasis.  No urinary bother.  No other  history. No family prostate cancer history. Was sluggish and weak and now on the T things are better with a good energy level.    Allergies:  Naproxen    Medications:  has a current medication list which includes the following prescription(s): acetaminophen, albuterol, allopurinol, amlodipine, atorvastatin, blood sugar diagnostic, blunt needle, disposable, edarbyclor, ergocalciferol, needle (disp) 22 g, cetirizine, and testosterone cypionate.    Review of Systems:  General: No fever, chills, fatigability, or weight loss.  Skin: No rashes, itching, or changes in color or texture of skin.  Chest: Denies AVILES, cyanosis, wheezing, cough, and sputum production.  Abdomen: Appetite fine. No weight loss. Denies diarrhea, abdominal pain, hematemesis, or blood in stool.  Musculoskeletal: No joint stiffness or swelling. Denies back pain.  : As above.  All other review of systems negative.    PMH:   has a past medical history of Diabetes mellitus, type 2 (diagnosed 2013), DM (diabetes mellitus), HTN (hypertension), Hypogonadism, testicular, and Obesities, morbid.    PSH:   has a past surgical history that  includes Colonoscopy (N/A, 6/29/2018).    FamHx: family history includes Cataracts in his mother; Diabetes in his mother and sister; Hypertension in his mother.    SocHx:  reports that  has never smoked. he has never used smokeless tobacco. He reports that he drinks about 1.8 - 2.4 oz of alcohol per week. He reports that he does not use drugs.      Physical Exam:  There were no vitals filed for this visit.  General: A&Ox3, no apparent distress, no deformities  Neck: No masses, normal thyroid  Lungs: normal inspiration, no use of accessory muscles  Heart: normal pulse, no arrhythmias  Abdomen: Soft, NT, ND  Skin: The skin is warm and dry. No jaundice.  Ext: No c/c/e.  : deferred    Labs/Studies:   Urinalysis performed in clinic, summary: UA normal  PSA    9/15: 1.2    12/16: 0.4    7/17: 0.6    6/18: 1.1    Impression/Plan:   1. Continue T injections monthly.  Refilled.  RTC with labs 6 mo.  2. Low risk of prostate cancer, PSA next time.  3. HTN: discussed and referred to PCP for further management.

## 2019-02-05 ENCOUNTER — OFFICE VISIT (OUTPATIENT)
Dept: FAMILY MEDICINE | Facility: CLINIC | Age: 54
End: 2019-02-05
Payer: COMMERCIAL

## 2019-02-05 ENCOUNTER — HOSPITAL ENCOUNTER (OUTPATIENT)
Dept: RADIOLOGY | Facility: HOSPITAL | Age: 54
Discharge: HOME | End: 2019-02-05
Attending: INTERNAL MEDICINE
Payer: COMMERCIAL

## 2019-02-05 VITALS
OXYGEN SATURATION: 99 % | TEMPERATURE: 99 F | HEART RATE: 71 BPM | SYSTOLIC BLOOD PRESSURE: 140 MMHG | DIASTOLIC BLOOD PRESSURE: 58 MMHG | WEIGHT: 303.69 LBS | BODY MASS INDEX: 53.81 KG/M2 | HEIGHT: 63 IN

## 2019-02-05 DIAGNOSIS — J32.9 SINUSITIS, UNSPECIFIED CHRONICITY, UNSPECIFIED LOCATION: ICD-10-CM

## 2019-02-05 DIAGNOSIS — J32.9 SINUSITIS, UNSPECIFIED CHRONICITY, UNSPECIFIED LOCATION: Primary | ICD-10-CM

## 2019-02-05 PROCEDURE — 71046 X-RAY EXAM CHEST 2 VIEWS: CPT | Mod: 26,,, | Performed by: RADIOLOGY

## 2019-02-05 PROCEDURE — 99999 PR PBB SHADOW E&M-EST. PATIENT-LVL IV: ICD-10-PCS | Mod: PBBFAC,,, | Performed by: INTERNAL MEDICINE

## 2019-02-05 PROCEDURE — 99999 PR PBB SHADOW E&M-EST. PATIENT-LVL IV: CPT | Mod: PBBFAC,,, | Performed by: INTERNAL MEDICINE

## 2019-02-05 PROCEDURE — 71046 XR CHEST PA AND LATERAL: ICD-10-PCS | Mod: 26,,, | Performed by: RADIOLOGY

## 2019-02-05 PROCEDURE — 96372 PR INJECTION,THERAP/PROPH/DIAG2ST, IM OR SUBCUT: ICD-10-PCS | Mod: S$GLB,,, | Performed by: INTERNAL MEDICINE

## 2019-02-05 PROCEDURE — 96372 THER/PROPH/DIAG INJ SC/IM: CPT | Mod: S$GLB,,, | Performed by: INTERNAL MEDICINE

## 2019-02-05 PROCEDURE — 71046 X-RAY EXAM CHEST 2 VIEWS: CPT | Mod: TC,FY,PO

## 2019-02-05 PROCEDURE — 3078F DIAST BP <80 MM HG: CPT | Mod: CPTII,S$GLB,, | Performed by: INTERNAL MEDICINE

## 2019-02-05 PROCEDURE — 99213 PR OFFICE/OUTPT VISIT, EST, LEVL III, 20-29 MIN: ICD-10-PCS | Mod: 25,S$GLB,, | Performed by: INTERNAL MEDICINE

## 2019-02-05 PROCEDURE — 99213 OFFICE O/P EST LOW 20 MIN: CPT | Mod: 25,S$GLB,, | Performed by: INTERNAL MEDICINE

## 2019-02-05 PROCEDURE — 3077F SYST BP >= 140 MM HG: CPT | Mod: CPTII,S$GLB,, | Performed by: INTERNAL MEDICINE

## 2019-02-05 PROCEDURE — 3077F PR MOST RECENT SYSTOLIC BLOOD PRESSURE >= 140 MM HG: ICD-10-PCS | Mod: CPTII,S$GLB,, | Performed by: INTERNAL MEDICINE

## 2019-02-05 PROCEDURE — 3078F PR MOST RECENT DIASTOLIC BLOOD PRESSURE < 80 MM HG: ICD-10-PCS | Mod: CPTII,S$GLB,, | Performed by: INTERNAL MEDICINE

## 2019-02-05 RX ORDER — AZITHROMYCIN 250 MG/1
TABLET, FILM COATED ORAL
Qty: 6 TABLET | Refills: 0 | Status: SHIPPED | OUTPATIENT
Start: 2019-02-05 | End: 2019-03-15

## 2019-02-05 RX ORDER — TRIAMCINOLONE ACETONIDE 40 MG/ML
40 INJECTION, SUSPENSION INTRA-ARTICULAR; INTRAMUSCULAR
Status: COMPLETED | OUTPATIENT
Start: 2019-02-05 | End: 2019-02-05

## 2019-02-05 RX ORDER — PROMETHAZINE HYDROCHLORIDE AND DEXTROMETHORPHAN HYDROBROMIDE 6.25; 15 MG/5ML; MG/5ML
5 SYRUP ORAL 2 TIMES DAILY PRN
Qty: 180 ML | Refills: 0 | Status: SHIPPED | OUTPATIENT
Start: 2019-02-05 | End: 2019-02-15

## 2019-02-05 RX ADMIN — TRIAMCINOLONE ACETONIDE 40 MG: 40 INJECTION, SUSPENSION INTRA-ARTICULAR; INTRAMUSCULAR at 01:02

## 2019-02-05 NOTE — PROGRESS NOTES
Subjective:       Patient ID: Juan C Rodriguez is a 53 y.o. male.    Chief Complaint: Follow-up; Sinus Problem; and Cough    Sinus Problem   This is a new problem. The current episode started in the past 7 days. Associated symptoms include congestion and coughing. Pertinent negatives include no chills, shortness of breath or sore throat.   Cough   Pertinent negatives include no chest pain, chills, fever, sore throat, shortness of breath or wheezing.     Past Medical History:   Diagnosis Date    Diabetes mellitus, type 2 diagnosed 2013    DM (diabetes mellitus)     2011  02/25/2014    HTN (hypertension)     Hypogonadism, testicular     Obesities, morbid      Past Surgical History:   Procedure Laterality Date    COLONOSCOPY N/A 6/29/2018    Performed by Jeremiah Anaya III, MD at [facility] ENDO     Family History   Problem Relation Age of Onset    Cataracts Mother     Diabetes Mother     Hypertension Mother     Diabetes Sister      Social History     Socioeconomic History    Marital status: Single     Spouse name: Not on file    Number of children: Not on file    Years of education: Not on file    Highest education level: Not on file   Social Needs    Financial resource strain: Not on file    Food insecurity - worry: Not on file    Food insecurity - inability: Not on file    Transportation needs - medical: Not on file    Transportation needs - non-medical: Not on file   Occupational History    Not on file   Tobacco Use    Smoking status: Never Smoker    Smokeless tobacco: Never Used   Substance and Sexual Activity    Alcohol use: Yes     Alcohol/week: 1.8 - 2.4 oz     Types: 3 - 4 Cans of beer per week     Comment: occasional weekends    Drug use: No    Sexual activity: Not on file   Other Topics Concern    Not on file   Social History Narrative    Not on file     Review of Systems   Constitutional: Negative for chills and fever.   HENT: Positive for congestion. Negative for  sore throat.    Respiratory: Positive for cough. Negative for apnea, choking, chest tightness, shortness of breath, wheezing and stridor.    Cardiovascular: Negative for chest pain and palpitations.   Gastrointestinal: Negative for abdominal pain, nausea and vomiting.   Genitourinary: Negative.    Neurological: Negative.    Psychiatric/Behavioral: Negative for behavioral problems and confusion.       Objective:      Physical Exam   Constitutional: He is oriented to person, place, and time. He appears well-developed and well-nourished. No distress.   HENT:   Head: Normocephalic and atraumatic.   Eyes: Pupils are equal, round, and reactive to light.   Neck: Normal range of motion. Neck supple. No JVD present. Carotid bruit is not present. No tracheal deviation present. No thyromegaly present.   Cardiovascular: Normal rate, regular rhythm, normal heart sounds and intact distal pulses.   Pulmonary/Chest: Effort normal and breath sounds normal. No respiratory distress. He has no wheezes. He has no rales. He exhibits no tenderness.   Abdominal: Soft. Bowel sounds are normal. He exhibits no distension. There is no tenderness. There is no rebound and no guarding.   Musculoskeletal: Normal range of motion. He exhibits no edema or tenderness.   Lymphadenopathy:     He has no cervical adenopathy.   Neurological: He is alert and oriented to person, place, and time.   Skin: Skin is warm and dry. No rash noted. He is not diaphoretic. No erythema. No pallor.   Psychiatric: He has a normal mood and affect. His behavior is normal. Judgment and thought content normal.   Nursing note and vitals reviewed.      CMP  Sodium   Date Value Ref Range Status   11/05/2018 139 136 - 145 mmol/L Final     Potassium   Date Value Ref Range Status   11/05/2018 3.8 3.5 - 5.1 mmol/L Final     Chloride   Date Value Ref Range Status   11/05/2018 101 95 - 110 mmol/L Final     CO2   Date Value Ref Range Status   11/05/2018 30 (H) 23 - 29 mmol/L Final      Glucose   Date Value Ref Range Status   11/05/2018 69 (L) 70 - 110 mg/dL Final     BUN, Bld   Date Value Ref Range Status   11/05/2018 26 (H) 6 - 20 mg/dL Final     Creatinine   Date Value Ref Range Status   11/05/2018 1.3 0.5 - 1.4 mg/dL Final     Calcium   Date Value Ref Range Status   11/05/2018 9.4 8.7 - 10.5 mg/dL Final     Total Protein   Date Value Ref Range Status   01/04/2019 7.7 6.0 - 8.4 g/dL Final     Albumin   Date Value Ref Range Status   01/04/2019 3.5 3.5 - 5.2 g/dL Final   05/31/2016 4.2 3.6 - 5.1 g/dL Final     Comment:     @ Test Performed By:  [facility] M.D., CHRISSY.,   [address] [place] [id number]  [facility]  [id number]       Total Bilirubin   Date Value Ref Range Status   01/04/2019 0.4 0.1 - 1.0 mg/dL Final     Comment:     For infants and newborns, interpretation of results should be based  on gestational age, weight and in agreement with clinical  observations.  Premature Infant recommended reference ranges:  Up to 24 hours.............<8.0 mg/dL  Up to 48 hours............<12.0 mg/dL  3-5 days..................<15.0 mg/dL  6-29 days.................<15.0 mg/dL       Alkaline Phosphatase   Date Value Ref Range Status   01/04/2019 124 55 - 135 U/L Final     AST   Date Value Ref Range Status   01/04/2019 20 10 - 40 U/L Final     ALT   Date Value Ref Range Status   01/04/2019 22 10 - 44 U/L Final     Anion Gap   Date Value Ref Range Status   11/05/2018 8 8 - 16 mmol/L Final     eGFR if    Date Value Ref Range Status   11/05/2018 >60.0 >60 mL/min/1.73 m^2 Final     eGFR if non    Date Value Ref Range Status   11/05/2018 >60.0 >60 mL/min/1.73 m^2 Final     Comment:     Calculation used to obtain the estimated glomerular filtration  rate (eGFR) is the CKD-EPI equation.        Lab Results   Component Value Date    WBC 5.71 10/08/2018    HGB 12.3 (L) 10/08/2018    HCT 40.6 01/04/2019    MCV 82  10/08/2018     10/08/2018     Lab Results   Component Value Date    CHOL 148 11/05/2018     Lab Results   Component Value Date    HDL 52 11/05/2018     Lab Results   Component Value Date    LDLCALC 87.2 11/05/2018     Lab Results   Component Value Date    TRIG 44 11/05/2018     Lab Results   Component Value Date    CHOLHDL 35.1 11/05/2018     Lab Results   Component Value Date    TSH 2.609 09/05/2017     Lab Results   Component Value Date    LABA1C 6.1 (H) 03/16/2017    HGBA1C 5.9 (H) 10/08/2018     Assessment:       1. Sinusitis, unspecified chronicity, unspecified location        Plan:   Sinusitis, unspecified chronicity, unspecified location  -     X-Ray Chest PA And Lateral; Future; Expected date: 02/05/2019    Other orders  -     azithromycin (Z-PATTI) 250 MG tablet; Take 2 tabs today then one tab daily for 4 days  Dispense: 6 tablet; Refill: 0  -     promethazine-dextromethorphan (PROMETHAZINE-DM) 6.25-15 mg/5 mL Syrp; Take 5 mLs by mouth 2 (two) times daily as needed.  Dispense: 180 mL; Refill: 0  -     triamcinolone acetonide injection 40 mg    call if persists.

## 2019-03-15 ENCOUNTER — HOSPITAL ENCOUNTER (OUTPATIENT)
Dept: RADIOLOGY | Facility: HOSPITAL | Age: 54
Discharge: HOME | End: 2019-03-15
Attending: INTERNAL MEDICINE
Payer: COMMERCIAL

## 2019-03-15 ENCOUNTER — OFFICE VISIT (OUTPATIENT)
Dept: FAMILY MEDICINE | Facility: CLINIC | Age: 54
End: 2019-03-15
Payer: COMMERCIAL

## 2019-03-15 ENCOUNTER — TELEPHONE (OUTPATIENT)
Dept: FAMILY MEDICINE | Facility: CLINIC | Age: 54
End: 2019-03-15

## 2019-03-15 VITALS
WEIGHT: 312.19 LBS | BODY MASS INDEX: 55.32 KG/M2 | HEART RATE: 71 BPM | SYSTOLIC BLOOD PRESSURE: 138 MMHG | TEMPERATURE: 98 F | HEIGHT: 63 IN | DIASTOLIC BLOOD PRESSURE: 71 MMHG | OXYGEN SATURATION: 96 % | RESPIRATION RATE: 20 BRPM

## 2019-03-15 DIAGNOSIS — D56.3 ALPHA THALASSEMIA SILENT CARRIER: ICD-10-CM

## 2019-03-15 DIAGNOSIS — R05.9 COUGH: ICD-10-CM

## 2019-03-15 DIAGNOSIS — I10 ESSENTIAL HYPERTENSION: ICD-10-CM

## 2019-03-15 DIAGNOSIS — G47.31 PRIMARY CENTRAL SLEEP APNEA: ICD-10-CM

## 2019-03-15 DIAGNOSIS — E29.1 HYPOGONADISM MALE: ICD-10-CM

## 2019-03-15 DIAGNOSIS — E11.9 DIABETES MELLITUS WITHOUT COMPLICATION: ICD-10-CM

## 2019-03-15 DIAGNOSIS — E78.2 MIXED HYPERLIPIDEMIA: ICD-10-CM

## 2019-03-15 DIAGNOSIS — D64.9 ANEMIA, UNSPECIFIED TYPE: Primary | ICD-10-CM

## 2019-03-15 DIAGNOSIS — E66.01 MORBID OBESITY DUE TO EXCESS CALORIES: ICD-10-CM

## 2019-03-15 PROCEDURE — 71046 X-RAY EXAM CHEST 2 VIEWS: CPT | Mod: 26,,, | Performed by: RADIOLOGY

## 2019-03-15 PROCEDURE — 71046 XR CHEST PA AND LATERAL: ICD-10-PCS | Mod: 26,,, | Performed by: RADIOLOGY

## 2019-03-15 PROCEDURE — 70210 X-RAY EXAM OF SINUSES: CPT | Mod: 26,,, | Performed by: RADIOLOGY

## 2019-03-15 PROCEDURE — 70210 X-RAY EXAM OF SINUSES: CPT | Mod: TC,FY,PO

## 2019-03-15 PROCEDURE — 99214 OFFICE O/P EST MOD 30 MIN: CPT | Mod: S$GLB,,, | Performed by: INTERNAL MEDICINE

## 2019-03-15 PROCEDURE — 99999 PR PBB SHADOW E&M-EST. PATIENT-LVL IV: CPT | Mod: PBBFAC,,, | Performed by: INTERNAL MEDICINE

## 2019-03-15 PROCEDURE — 71046 X-RAY EXAM CHEST 2 VIEWS: CPT | Mod: TC,FY,PO

## 2019-03-15 PROCEDURE — 3008F BODY MASS INDEX DOCD: CPT | Mod: CPTII,S$GLB,, | Performed by: INTERNAL MEDICINE

## 2019-03-15 PROCEDURE — 3078F DIAST BP <80 MM HG: CPT | Mod: CPTII,S$GLB,, | Performed by: INTERNAL MEDICINE

## 2019-03-15 PROCEDURE — 3008F PR BODY MASS INDEX (BMI) DOCUMENTED: ICD-10-PCS | Mod: CPTII,S$GLB,, | Performed by: INTERNAL MEDICINE

## 2019-03-15 PROCEDURE — 3075F SYST BP GE 130 - 139MM HG: CPT | Mod: CPTII,S$GLB,, | Performed by: INTERNAL MEDICINE

## 2019-03-15 PROCEDURE — 99999 PR PBB SHADOW E&M-EST. PATIENT-LVL IV: ICD-10-PCS | Mod: PBBFAC,,, | Performed by: INTERNAL MEDICINE

## 2019-03-15 PROCEDURE — 99214 PR OFFICE/OUTPT VISIT, EST, LEVL IV, 30-39 MIN: ICD-10-PCS | Mod: S$GLB,,, | Performed by: INTERNAL MEDICINE

## 2019-03-15 PROCEDURE — 3078F PR MOST RECENT DIASTOLIC BLOOD PRESSURE < 80 MM HG: ICD-10-PCS | Mod: CPTII,S$GLB,, | Performed by: INTERNAL MEDICINE

## 2019-03-15 PROCEDURE — 70210 XR SINUSES LTD LESS THAN 3 VIEWS: ICD-10-PCS | Mod: 26,,, | Performed by: RADIOLOGY

## 2019-03-15 PROCEDURE — 3075F PR MOST RECENT SYSTOLIC BLOOD PRESS GE 130-139MM HG: ICD-10-PCS | Mod: CPTII,S$GLB,, | Performed by: INTERNAL MEDICINE

## 2019-03-15 PROCEDURE — 3044F HG A1C LEVEL LT 7.0%: CPT | Mod: CPTII,S$GLB,, | Performed by: INTERNAL MEDICINE

## 2019-03-15 PROCEDURE — 3044F PR MOST RECENT HEMOGLOBIN A1C LEVEL <7.0%: ICD-10-PCS | Mod: CPTII,S$GLB,, | Performed by: INTERNAL MEDICINE

## 2019-03-15 RX ORDER — AMOXICILLIN 875 MG/1
875 TABLET, FILM COATED ORAL EVERY 12 HOURS
Qty: 20 TABLET | Refills: 0 | Status: SHIPPED | OUTPATIENT
Start: 2019-03-15 | End: 2019-03-25

## 2019-03-15 RX ORDER — HYDROCODONE BITARTRATE AND HOMATROPINE METHYLBROMIDE ORAL SOLUTION 5; 1.5 MG/5ML; MG/5ML
5 LIQUID ORAL 3 TIMES DAILY PRN
Qty: 120 ML | Refills: 0 | Status: SHIPPED | OUTPATIENT
Start: 2019-03-15 | End: 2021-02-12 | Stop reason: ALTCHOICE

## 2019-03-15 RX ORDER — ALBUTEROL SULFATE 90 UG/1
2 AEROSOL, METERED RESPIRATORY (INHALATION) EVERY 6 HOURS PRN
Qty: 18 G | Refills: 0 | Status: SHIPPED | OUTPATIENT
Start: 2019-03-15 | End: 2020-03-14

## 2019-03-15 NOTE — PROGRESS NOTES
Subjective:       Patient ID: Juan C Rodriguez is a 53 y.o. male.    Chief Complaint: Nasal Congestion; Chest Congestion; Hypertension; Hyperlipidemia; Diabetes; Sleep Apnea; and Anemia    Hypertension   Pertinent negatives include no chest pain, headaches, neck pain, palpitations or shortness of breath.   Hyperlipidemia   Associated symptoms include myalgias. Pertinent negatives include no chest pain or shortness of breath.   Diabetes   Pertinent negatives for hypoglycemia include no confusion, dizziness, headaches, nervousness/anxiousness, pallor, seizures, speech difficulty or tremors. Pertinent negatives for diabetes include no chest pain, no fatigue, no polydipsia, no polyphagia, no polyuria and no weakness.   Anemia   There has been no abdominal pain, bruising/bleeding easily, confusion, fever, light-headedness, pallor or palpitations.     Past Medical History:   Diagnosis Date    Diabetes mellitus, type 2 diagnosed 2013    DM (diabetes mellitus)     2011  02/25/2014    HTN (hypertension)     Hypogonadism, testicular     Obesities, morbid      Past Surgical History:   Procedure Laterality Date    COLONOSCOPY N/A 6/29/2018    Performed by Jeremiah Anaya III, MD at [facility] ENDO     Family History   Problem Relation Age of Onset    Cataracts Mother     Diabetes Mother     Hypertension Mother     Diabetes Sister      Social History     Socioeconomic History    Marital status: Single     Spouse name: Not on file    Number of children: Not on file    Years of education: Not on file    Highest education level: Not on file   Social Needs    Financial resource strain: Not on file    Food insecurity - worry: Not on file    Food insecurity - inability: Not on file    Transportation needs - medical: Not on file    Transportation needs - non-medical: Not on file   Occupational History    Not on file   Tobacco Use    Smoking status: Never Smoker    Smokeless tobacco: Never Used    Substance and Sexual Activity    Alcohol use: Yes     Alcohol/week: 1.8 - 2.4 oz     Types: 3 - 4 Cans of beer per week     Comment: occasional weekends    Drug use: No    Sexual activity: Not on file   Other Topics Concern    Not on file   Social History Narrative    Not on file     Review of Systems   Constitutional: Negative for activity change, appetite change, chills, diaphoresis, fatigue, fever and unexpected weight change.   HENT: Positive for congestion, postnasal drip and rhinorrhea. Negative for drooling, ear discharge, ear pain, facial swelling, hearing loss, mouth sores, nosebleeds, sinus pressure, sneezing, sore throat, tinnitus, trouble swallowing and voice change.    Eyes: Negative for photophobia, redness and visual disturbance.   Respiratory: Positive for cough. Negative for apnea, choking, chest tightness, shortness of breath and wheezing.    Cardiovascular: Negative for chest pain, palpitations and leg swelling.   Gastrointestinal: Negative for abdominal distention, abdominal pain, anal bleeding, blood in stool, constipation, diarrhea, nausea and vomiting.   Endocrine: Negative for cold intolerance, heat intolerance, polydipsia, polyphagia and polyuria.   Genitourinary: Negative for difficulty urinating, dysuria, enuresis, flank pain, frequency, genital sores, hematuria and urgency.   Musculoskeletal: Positive for myalgias. Negative for arthralgias, back pain, gait problem, joint swelling, neck pain and neck stiffness.   Skin: Negative for color change, pallor, rash and wound.   Allergic/Immunologic: Negative for food allergies and immunocompromised state.   Neurological: Negative for dizziness, tremors, seizures, syncope, facial asymmetry, speech difficulty, weakness, light-headedness, numbness and headaches.   Hematological: Negative for adenopathy. Does not bruise/bleed easily.   Psychiatric/Behavioral: Negative for agitation, behavioral problems, confusion, decreased concentration,  dysphoric mood, hallucinations, self-injury, sleep disturbance and suicidal ideas. The patient is not nervous/anxious and is not hyperactive.        Objective:      Physical Exam   Constitutional: He is oriented to person, place, and time. He appears well-developed and well-nourished. No distress.   HENT:   Head: Normocephalic and atraumatic.   Mouth/Throat: No oropharyngeal exudate.   Eyes: Pupils are equal, round, and reactive to light.   Neck: Normal range of motion. Neck supple. No JVD present. Carotid bruit is not present. No tracheal deviation present. No thyromegaly present.   Cardiovascular: Normal rate, regular rhythm, normal heart sounds and intact distal pulses.   Pulmonary/Chest: Effort normal and breath sounds normal. No respiratory distress. He has no wheezes. He has no rales. He exhibits no tenderness.   Abdominal: Soft. Bowel sounds are normal. He exhibits no distension. There is no tenderness. There is no rebound and no guarding.   Musculoskeletal: Normal range of motion. He exhibits no edema or tenderness.   Lymphadenopathy:     He has no cervical adenopathy.   Neurological: He is alert and oriented to person, place, and time.   Skin: Skin is warm and dry. No rash noted. He is not diaphoretic. No erythema. No pallor.   Psychiatric: He has a normal mood and affect. His behavior is normal. Judgment and thought content normal.   Nursing note and vitals reviewed.      CMP  Sodium   Date Value Ref Range Status   11/05/2018 139 136 - 145 mmol/L Final     Potassium   Date Value Ref Range Status   11/05/2018 3.8 3.5 - 5.1 mmol/L Final     Chloride   Date Value Ref Range Status   11/05/2018 101 95 - 110 mmol/L Final     CO2   Date Value Ref Range Status   11/05/2018 30 (H) 23 - 29 mmol/L Final     Glucose   Date Value Ref Range Status   11/05/2018 69 (L) 70 - 110 mg/dL Final     BUN, Bld   Date Value Ref Range Status   11/05/2018 26 (H) 6 - 20 mg/dL Final     Creatinine   Date Value Ref Range Status    11/05/2018 1.3 0.5 - 1.4 mg/dL Final     Calcium   Date Value Ref Range Status   11/05/2018 9.4 8.7 - 10.5 mg/dL Final     Total Protein   Date Value Ref Range Status   01/04/2019 7.7 6.0 - 8.4 g/dL Final     Albumin   Date Value Ref Range Status   01/04/2019 3.5 3.5 - 5.2 g/dL Final   05/31/2016 4.2 3.6 - 5.1 g/dL Final     Comment:     @ Test Performed By:  [employer] [facility]  Ángel Diaz M.D., CHRISSY.,   [id number] [place] [id number]  [facility]  [id number]       Total Bilirubin   Date Value Ref Range Status   01/04/2019 0.4 0.1 - 1.0 mg/dL Final     Comment:     For infants and newborns, interpretation of results should be based  on gestational age, weight and in agreement with clinical  observations.  Premature Infant recommended reference ranges:  Up to 24 hours.............<8.0 mg/dL  Up to 48 hours............<12.0 mg/dL  3-5 days..................<15.0 mg/dL  6-29 days.................<15.0 mg/dL       Alkaline Phosphatase   Date Value Ref Range Status   01/04/2019 124 55 - 135 U/L Final     AST   Date Value Ref Range Status   01/04/2019 20 10 - 40 U/L Final     ALT   Date Value Ref Range Status   01/04/2019 22 10 - 44 U/L Final     Anion Gap   Date Value Ref Range Status   11/05/2018 8 8 - 16 mmol/L Final     eGFR if    Date Value Ref Range Status   11/05/2018 >60.0 >60 mL/min/1.73 m^2 Final     eGFR if non    Date Value Ref Range Status   11/05/2018 >60.0 >60 mL/min/1.73 m^2 Final     Comment:     Calculation used to obtain the estimated glomerular filtration  rate (eGFR) is the CKD-EPI equation.        Lab Results   Component Value Date    WBC 5.71 10/08/2018    HGB 12.3 (L) 10/08/2018    HCT 40.6 01/04/2019    MCV 82 10/08/2018     10/08/2018     Lab Results   Component Value Date    CHOL 148 11/05/2018     Lab Results   Component Value Date    HDL 52 11/05/2018     Lab Results   Component Value Date    LDLCALC 87.2  11/05/2018     Lab Results   Component Value Date    TRIG 44 11/05/2018     Lab Results   Component Value Date    CHOLHDL 35.1 11/05/2018     Lab Results   Component Value Date    TSH 2.609 09/05/2017     Lab Results   Component Value Date    LABA1C 6.1 (H) 03/16/2017    HGBA1C 5.9 (H) 10/08/2018     Assessment:       1. Anemia, unspecified type    2. Alpha thalassemia silent carrier    3. Diabetes mellitus without complication    4. Essential hypertension    5. Hypogonadism male    6. Mixed hyperlipidemia    7. Morbid obesity due to excess calories    8. Primary central sleep apnea    9. Cough        Plan:   Anemia, unspecified type-------------stable.    Alpha thalassemia silent carrier    Diabetes mellitus without complication-stable.---------consider metformin.  -     Hemoglobin A1c; Future; Expected date: 03/15/2019    Essential hypertension------controlled.  -     Comprehensive metabolic panel; Future; Expected date: 03/15/2019    Hypogonadism male    Mixed hyperlipidemia-statin    Morbid obesity due to excess calories    Primary central sleep apnea    Cough-call if persists.  -     X-Ray Chest PA And Lateral; Future; Expected date: 03/15/2019  -     X-Ray Sinuses Ltd Less Than 3 Views; Future; Expected date: 03/15/2019  -     albuterol (PROVENTIL/VENTOLIN HFA) 90 mcg/actuation inhaler; Inhale 2 puffs into the lungs every 6 (six) hours as needed for Wheezing. Rescue  Dispense: 18 g; Refill: 0  -     hydrocodone-homatropine 5-1.5 mg/5 ml (HYCODAN) 5-1.5 mg/5 mL Syrp; Take 5 mLs by mouth 3 (three) times daily as needed.  Dispense: 120 mL; Refill: 0    F/u 4 months

## 2019-03-15 NOTE — TELEPHONE ENCOUNTER
Pt states his insurance will only cover [employer] and want to know if you can send the prescription they have recived to the [facility].

## 2019-03-15 NOTE — TELEPHONE ENCOUNTER
----- Message from Arianna Francis sent at 3/15/2019  9:17 AM CDT -----  Contact: [facility]  Type:  Needs Medical Advice    Who Called:  Anahi  Symptoms (please be specific): n/a  How long has patient had these symptoms: n/a  Pharmacy name and phone #:   [facility] [place] [id number] [address]  Phone: [phone] Fax: [phone]    Would the patient rather a call back or a response via [facility]? Call back  Best Call Back Number: [phone]  Additional Information: The insurance only will cover Proair - so she wants to know if they can change the prescription they received to the [facility].

## 2019-07-12 ENCOUNTER — LAB VISIT (OUTPATIENT)
Dept: LAB | Facility: HOSPITAL | Age: 54
End: 2019-07-12
Attending: UROLOGY
Payer: COMMERCIAL

## 2019-07-12 DIAGNOSIS — E29.1 HYPOGONADISM IN MALE: ICD-10-CM

## 2019-07-12 DIAGNOSIS — Z12.5 PROSTATE CANCER SCREENING: ICD-10-CM

## 2019-07-12 LAB
ALBUMIN SERPL BCP-MCNC: 3.3 G/DL (ref 3.5–5.2)
ALP SERPL-CCNC: 116 U/L (ref 55–135)
ALT SERPL W/O P-5'-P-CCNC: 18 U/L (ref 10–44)
AST SERPL-CCNC: 21 U/L (ref 10–40)
BILIRUB DIRECT SERPL-MCNC: 0.2 MG/DL (ref 0.1–0.3)
BILIRUB SERPL-MCNC: 0.4 MG/DL (ref 0.1–1)
COMPLEXED PSA SERPL-MCNC: 0.72 NG/ML (ref 0–4)
HCT VFR BLD AUTO: 40.7 % (ref 40–54)
PROT SERPL-MCNC: 7.2 G/DL (ref 6–8.4)

## 2019-07-12 PROCEDURE — 36415 COLL VENOUS BLD VENIPUNCTURE: CPT

## 2019-07-12 PROCEDURE — 84153 ASSAY OF PSA TOTAL: CPT

## 2019-07-12 PROCEDURE — 80076 HEPATIC FUNCTION PANEL: CPT

## 2019-07-12 PROCEDURE — 85014 HEMATOCRIT: CPT

## 2019-07-19 ENCOUNTER — TELEPHONE (OUTPATIENT)
Dept: FAMILY MEDICINE | Facility: CLINIC | Age: 54
End: 2019-07-19

## 2019-07-19 ENCOUNTER — OFFICE VISIT (OUTPATIENT)
Dept: FAMILY MEDICINE | Facility: CLINIC | Age: 54
End: 2019-07-19
Payer: COMMERCIAL

## 2019-07-19 ENCOUNTER — LAB VISIT (OUTPATIENT)
Dept: LAB | Facility: HOSPITAL | Age: 54
End: 2019-07-19
Attending: INTERNAL MEDICINE
Payer: COMMERCIAL

## 2019-07-19 VITALS
SYSTOLIC BLOOD PRESSURE: 138 MMHG | TEMPERATURE: 99 F | DIASTOLIC BLOOD PRESSURE: 70 MMHG | BODY MASS INDEX: 56.04 KG/M2 | WEIGHT: 315 LBS | HEART RATE: 73 BPM | OXYGEN SATURATION: 98 %

## 2019-07-19 DIAGNOSIS — I10 ESSENTIAL HYPERTENSION, BENIGN: ICD-10-CM

## 2019-07-19 DIAGNOSIS — I10 ESSENTIAL HYPERTENSION: ICD-10-CM

## 2019-07-19 DIAGNOSIS — E66.01 MORBID OBESITY DUE TO EXCESS CALORIES: ICD-10-CM

## 2019-07-19 DIAGNOSIS — E11.9 DIABETES MELLITUS WITHOUT COMPLICATION: ICD-10-CM

## 2019-07-19 DIAGNOSIS — E79.0 HYPERURICEMIA: ICD-10-CM

## 2019-07-19 DIAGNOSIS — E21.3 HYPERPARATHYROIDISM: ICD-10-CM

## 2019-07-19 DIAGNOSIS — I10 HYPERTENSION, ESSENTIAL: Primary | ICD-10-CM

## 2019-07-19 DIAGNOSIS — E78.2 MIXED HYPERLIPIDEMIA: ICD-10-CM

## 2019-07-19 LAB
ESTIMATED AVG GLUCOSE: 134 MG/DL (ref 68–131)
HBA1C MFR BLD HPLC: 6.3 % (ref 4–5.6)

## 2019-07-19 PROCEDURE — 3075F SYST BP GE 130 - 139MM HG: CPT | Mod: CPTII,S$GLB,, | Performed by: INTERNAL MEDICINE

## 2019-07-19 PROCEDURE — 83036 HEMOGLOBIN GLYCOSYLATED A1C: CPT

## 2019-07-19 PROCEDURE — 3078F DIAST BP <80 MM HG: CPT | Mod: CPTII,S$GLB,, | Performed by: INTERNAL MEDICINE

## 2019-07-19 PROCEDURE — 99215 PR OFFICE/OUTPT VISIT, EST, LEVL V, 40-54 MIN: ICD-10-PCS | Mod: S$GLB,,, | Performed by: INTERNAL MEDICINE

## 2019-07-19 PROCEDURE — 3044F PR MOST RECENT HEMOGLOBIN A1C LEVEL <7.0%: ICD-10-PCS | Mod: CPTII,S$GLB,, | Performed by: INTERNAL MEDICINE

## 2019-07-19 PROCEDURE — 99215 OFFICE O/P EST HI 40 MIN: CPT | Mod: S$GLB,,, | Performed by: INTERNAL MEDICINE

## 2019-07-19 PROCEDURE — 3044F HG A1C LEVEL LT 7.0%: CPT | Mod: CPTII,S$GLB,, | Performed by: INTERNAL MEDICINE

## 2019-07-19 PROCEDURE — 86803 HEPATITIS C AB TEST: CPT

## 2019-07-19 PROCEDURE — 99999 PR PBB SHADOW E&M-EST. PATIENT-LVL IV: CPT | Mod: PBBFAC,,, | Performed by: INTERNAL MEDICINE

## 2019-07-19 PROCEDURE — 3008F BODY MASS INDEX DOCD: CPT | Mod: CPTII,S$GLB,, | Performed by: INTERNAL MEDICINE

## 2019-07-19 PROCEDURE — 3075F PR MOST RECENT SYSTOLIC BLOOD PRESS GE 130-139MM HG: ICD-10-PCS | Mod: CPTII,S$GLB,, | Performed by: INTERNAL MEDICINE

## 2019-07-19 PROCEDURE — 99999 PR PBB SHADOW E&M-EST. PATIENT-LVL IV: ICD-10-PCS | Mod: PBBFAC,,, | Performed by: INTERNAL MEDICINE

## 2019-07-19 PROCEDURE — 3008F PR BODY MASS INDEX (BMI) DOCUMENTED: ICD-10-PCS | Mod: CPTII,S$GLB,, | Performed by: INTERNAL MEDICINE

## 2019-07-19 PROCEDURE — 3078F PR MOST RECENT DIASTOLIC BLOOD PRESSURE < 80 MM HG: ICD-10-PCS | Mod: CPTII,S$GLB,, | Performed by: INTERNAL MEDICINE

## 2019-07-19 PROCEDURE — 36415 COLL VENOUS BLD VENIPUNCTURE: CPT | Mod: PO

## 2019-07-19 RX ORDER — CETIRIZINE HYDROCHLORIDE 10 MG/1
10 TABLET ORAL
Qty: 30 TABLET | Refills: 3 | Status: SHIPPED | OUTPATIENT
Start: 2019-07-19 | End: 2021-12-01

## 2019-07-19 NOTE — TELEPHONE ENCOUNTER
----- Message from Tonja Espino sent at 7/19/2019  8:11 AM CDT -----  Contact: self [phone]  Type:  Same Day Appointment Request    Caller is requesting a same day appointment.  Caller declined first available appointment listed below.    Name of Caller:Juan C Rodriguez  When is the first available appointment?07/23/19  Symptoms:4 mo fu  Best Call Back Number:[phone]  Additional Information: Pt missed his appt this morning and would like to be worked in for an appt later this morning.

## 2019-07-19 NOTE — PROGRESS NOTES
Subjective:       Patient ID: Juan C Rodriguez is a 53 y.o. male.    Chief Complaint: Follow-up; Hypertension; Hyperlipidemia; and Diabetes    Hypertension   Pertinent negatives include no chest pain, headaches, neck pain, palpitations or shortness of breath.   Hyperlipidemia   Pertinent negatives include no chest pain, myalgias or shortness of breath.   Diabetes   Pertinent negatives for hypoglycemia include no confusion, dizziness, headaches, nervousness/anxiousness, pallor, seizures, speech difficulty or tremors. Pertinent negatives for diabetes include no chest pain, no fatigue, no polydipsia, no polyphagia, no polyuria and no weakness.     Past Medical History:   Diagnosis Date    Diabetes mellitus, type 2 diagnosed 2013    DM (diabetes mellitus)     2011  02/25/2014    HTN (hypertension)     Hypogonadism, testicular     Obesities, morbid      Past Surgical History:   Procedure Laterality Date    COLONOSCOPY N/A 6/29/2018    Performed by Jeremiah Anaya III, MD at [facility] ENDO     Family History   Problem Relation Age of Onset    Cataracts Mother     Diabetes Mother     Hypertension Mother     Diabetes Sister      Social History     Socioeconomic History    Marital status: Single     Spouse name: Not on file    Number of children: Not on file    Years of education: Not on file    Highest education level: Not on file   Occupational History    Not on file   Social Needs    Financial resource strain: Not on file    Food insecurity:     Worry: Not on file     Inability: Not on file    Transportation needs:     Medical: Not on file     Non-medical: Not on file   Tobacco Use    Smoking status: Never Smoker    Smokeless tobacco: Never Used   Substance and Sexual Activity    Alcohol use: Yes     Alcohol/week: 1.8 - 2.4 oz     Types: 3 - 4 Cans of beer per week     Comment: occasional weekends    Drug use: No    Sexual activity: Not on file   Lifestyle    Physical activity:      Days per week: Not on file     Minutes per session: Not on file    Stress: Not on file   Relationships    Social connections:     Talks on phone: Not on file     Gets together: Not on file     Attends Sikh service: Not on file     Active member of club or organization: Not on file     Attends meetings of clubs or organizations: Not on file     Relationship status: Not on file   Other Topics Concern    Not on file   Social History Narrative    Not on file     Review of Systems   Constitutional: Negative for activity change, appetite change, chills, diaphoresis, fatigue, fever and unexpected weight change.   HENT: Positive for congestion. Negative for drooling, ear discharge, ear pain, facial swelling, hearing loss, mouth sores, nosebleeds, postnasal drip, rhinorrhea, sinus pressure, sneezing, sore throat, tinnitus, trouble swallowing and voice change.    Eyes: Negative for photophobia, redness and visual disturbance.   Respiratory: Negative for apnea, cough, choking, chest tightness, shortness of breath and wheezing.    Cardiovascular: Negative for chest pain, palpitations and leg swelling.   Gastrointestinal: Negative for abdominal distention, abdominal pain, anal bleeding, blood in stool, constipation, diarrhea, nausea, rectal pain and vomiting.   Endocrine: Negative for cold intolerance, heat intolerance, polydipsia, polyphagia and polyuria.   Genitourinary: Negative for difficulty urinating, dysuria, enuresis, flank pain, frequency, genital sores, hematuria and urgency.   Musculoskeletal: Negative for arthralgias, back pain, gait problem, joint swelling, myalgias, neck pain and neck stiffness.   Skin: Negative for color change, pallor, rash and wound.   Allergic/Immunologic: Negative for food allergies and immunocompromised state.   Neurological: Negative for dizziness, tremors, seizures, syncope, facial asymmetry, speech difficulty, weakness, light-headedness, numbness and headaches.   Hematological:  Negative for adenopathy. Does not bruise/bleed easily.   Psychiatric/Behavioral: Negative for agitation, behavioral problems, confusion, decreased concentration, dysphoric mood, hallucinations, self-injury, sleep disturbance and suicidal ideas. The patient is not nervous/anxious and is not hyperactive.        Objective:      Physical Exam   Constitutional: He is oriented to person, place, and time. He appears well-developed and well-nourished. No distress.   HENT:   Head: Normocephalic and atraumatic.   Eyes: Pupils are equal, round, and reactive to light.   Neck: Normal range of motion. Neck supple. No JVD present. Carotid bruit is not present. No tracheal deviation present. No thyromegaly present.   Cardiovascular: Normal rate, regular rhythm, normal heart sounds and intact distal pulses.   Pulmonary/Chest: Effort normal and breath sounds normal. No respiratory distress. He has no wheezes. He has no rales. He exhibits no tenderness.   Abdominal: Soft. Bowel sounds are normal. He exhibits no distension. There is no tenderness. There is no rebound and no guarding.   Musculoskeletal: Normal range of motion. He exhibits no edema or tenderness.   Lymphadenopathy:     He has no cervical adenopathy.   Neurological: He is alert and oriented to person, place, and time.   Skin: Skin is warm and dry. No rash noted. He is not diaphoretic. No erythema. No pallor.   Psychiatric: He has a normal mood and affect. His behavior is normal. Judgment and thought content normal.   Nursing note and vitals reviewed.      CMP  Sodium   Date Value Ref Range Status   03/15/2019 144 136 - 145 mmol/L Final     Potassium   Date Value Ref Range Status   03/15/2019 4.0 3.5 - 5.1 mmol/L Final     Chloride   Date Value Ref Range Status   03/15/2019 102 95 - 110 mmol/L Final     CO2   Date Value Ref Range Status   03/15/2019 31 (H) 23 - 29 mmol/L Final     Glucose   Date Value Ref Range Status   03/15/2019 94 70 - 110 mg/dL Final     BUN, Bld    Date Value Ref Range Status   03/15/2019 23 (H) 6 - 20 mg/dL Final     Creatinine   Date Value Ref Range Status   03/15/2019 1.3 0.5 - 1.4 mg/dL Final     Calcium   Date Value Ref Range Status   03/15/2019 9.7 8.7 - 10.5 mg/dL Final     Total Protein   Date Value Ref Range Status   07/12/2019 7.2 6.0 - 8.4 g/dL Final     Albumin   Date Value Ref Range Status   07/12/2019 3.3 (L) 3.5 - 5.2 g/dL Final   05/31/2016 4.2 3.6 - 5.1 g/dL Final     Comment:     @ Test Performed By:  [facility] Gabe Diaz M.D., CHRISSY.,   [address] [place] [id number]  [facility]  [id number]       Total Bilirubin   Date Value Ref Range Status   07/12/2019 0.4 0.1 - 1.0 mg/dL Final     Comment:     For infants and newborns, interpretation of results should be based  on gestational age, weight and in agreement with clinical  observations.  Premature Infant recommended reference ranges:  Up to 24 hours.............<8.0 mg/dL  Up to 48 hours............<12.0 mg/dL  3-5 days..................<15.0 mg/dL  6-29 days.................<15.0 mg/dL       Alkaline Phosphatase   Date Value Ref Range Status   07/12/2019 116 55 - 135 U/L Final     AST   Date Value Ref Range Status   07/12/2019 21 10 - 40 U/L Final     ALT   Date Value Ref Range Status   07/12/2019 18 10 - 44 U/L Final     Anion Gap   Date Value Ref Range Status   03/15/2019 11 8 - 16 mmol/L Final     eGFR if    Date Value Ref Range Status   03/15/2019 >60.0 >60 mL/min/1.73 m^2 Final     eGFR if non    Date Value Ref Range Status   03/15/2019 >60.0 >60 mL/min/1.73 m^2 Final     Comment:     Calculation used to obtain the estimated glomerular filtration  rate (eGFR) is the CKD-EPI equation.        Lab Results   Component Value Date    WBC 5.71 10/08/2018    HGB 12.3 (L) 10/08/2018    HCT 40.7 07/12/2019    MCV 82 10/08/2018     10/08/2018     Lab Results   Component Value Date    CHOL 148 11/05/2018      Lab Results   Component Value Date    HDL 52 11/05/2018     Lab Results   Component Value Date    LDLCALC 87.2 11/05/2018     Lab Results   Component Value Date    TRIG 44 11/05/2018     Lab Results   Component Value Date    CHOLHDL 35.1 11/05/2018     Lab Results   Component Value Date    TSH 2.609 09/05/2017     Lab Results   Component Value Date    LABA1C 6.1 (H) 03/16/2017    HGBA1C 6.1 (H) 03/15/2019     Assessment:       1. Hypertension, essential    2. Mixed hyperlipidemia    3. Morbid obesity due to excess calories    4. Diabetes mellitus without complication    5. Essential hypertension    6. Essential hypertension, benign    7. Hyperparathyroidism    8. Hyperuricemia        Plan:   Hypertension, essential  -     cetirizine (ZYRTEC) 10 MG tablet; Take 1 tablet (10 mg total) by mouth as needed.  Dispense: 30 tablet; Refill: 3    Mixed hyperlipidemia  -     cetirizine (ZYRTEC) 10 MG tablet; Take 1 tablet (10 mg total) by mouth as needed.  Dispense: 30 tablet; Refill: 3    Morbid obesity due to excess calories-----------------------------exercise----------weight loss-    Diabetes mellitus without complication  -     Hemoglobin A1c; Future; Expected date: 07/19/2019  -     Ambulatory referral to Optometry  -     cetirizine (ZYRTEC) 10 MG tablet; Take 1 tablet (10 mg total) by mouth as needed.  Dispense: 30 tablet; Refill: 3    Essential hypertension    Essential hypertension, benign  -     Hepatitis C antibody; Future; Expected date: 07/19/2019    Hyperparathyroidism  -     cetirizine (ZYRTEC) 10 MG tablet; Take 1 tablet (10 mg total) by mouth as needed.  Dispense: 30 tablet; Refill: 3    Hyperuricemia  -     cetirizine (ZYRTEC) 10 MG tablet; Take 1 tablet (10 mg total) by mouth as needed.  Dispense: 30 tablet; Refill: 3    Stable------------continue current meds.                F/u 4 months.

## 2019-07-22 LAB — HCV AB SERPL QL IA: NEGATIVE

## 2019-08-09 ENCOUNTER — OFFICE VISIT (OUTPATIENT)
Dept: OPHTHALMOLOGY | Facility: CLINIC | Age: 54
End: 2019-08-09
Payer: COMMERCIAL

## 2019-08-09 DIAGNOSIS — H25.013 CORTICAL AGE-RELATED CATARACT OF BOTH EYES: ICD-10-CM

## 2019-08-09 DIAGNOSIS — H52.4 HYPEROPIA WITH ASTIGMATISM AND PRESBYOPIA, BILATERAL: ICD-10-CM

## 2019-08-09 DIAGNOSIS — H25.13 NUCLEAR SCLEROSIS, BILATERAL: ICD-10-CM

## 2019-08-09 DIAGNOSIS — E11.9 TYPE 2 DIABETES MELLITUS WITHOUT RETINOPATHY: Primary | ICD-10-CM

## 2019-08-09 DIAGNOSIS — H52.03 HYPEROPIA WITH ASTIGMATISM AND PRESBYOPIA, BILATERAL: ICD-10-CM

## 2019-08-09 DIAGNOSIS — H52.203 HYPEROPIA WITH ASTIGMATISM AND PRESBYOPIA, BILATERAL: ICD-10-CM

## 2019-08-09 LAB
LEFT EYE DM RETINOPATHY: NEGATIVE
RIGHT EYE DM RETINOPATHY: NEGATIVE

## 2019-08-09 PROCEDURE — 92015 PR REFRACTION: ICD-10-PCS | Mod: S$GLB,,, | Performed by: OPTOMETRIST

## 2019-08-09 PROCEDURE — 92004 COMPRE OPH EXAM NEW PT 1/>: CPT | Mod: S$GLB,,, | Performed by: OPTOMETRIST

## 2019-08-09 PROCEDURE — 92004 PR EYE EXAM, NEW PATIENT,COMPREHESV: ICD-10-PCS | Mod: S$GLB,,, | Performed by: OPTOMETRIST

## 2019-08-09 PROCEDURE — 99999 PR PBB SHADOW E&M-EST. PATIENT-LVL I: CPT | Mod: PBBFAC,,, | Performed by: OPTOMETRIST

## 2019-08-09 PROCEDURE — 99999 PR PBB SHADOW E&M-EST. PATIENT-LVL I: ICD-10-PCS | Mod: PBBFAC,,, | Performed by: OPTOMETRIST

## 2019-08-09 PROCEDURE — 92015 DETERMINE REFRACTIVE STATE: CPT | Mod: S$GLB,,, | Performed by: OPTOMETRIST

## 2019-08-09 NOTE — LETTER
August 9, 2019      Reza Michelle MD  [address] [place] [address]           [facility]  [address] [address]  [place] [address]  Phone: [phone]  Fax: [phone]          Patient: Juan C Rodriguez   MR Number: [id number]   YOB: 1965   Date of Visit: 8/9/2019       Dear Dr. Reza Michelle:    Thank you for referring Juan C Rodriguez to me for evaluation. Attached you will find relevant portions of my assessment and plan of care.    If you have questions, please do not hesitate to call me. I look forward to following Juan C Rodriguez along with you.    Sincerely,    Yandel Ruiz, OD    Enclosure  CC:  No Recipients    If you would like to receive this communication electronically, please contact [email or web address] or [phone] to request more information on [facility] access.    For providers and/or their staff who would like to refer a patient to [facility], please contact us through our one-stop-shop provider referral line, [facility] , at [phone].    If you feel you have received this communication in error or would no longer like to receive these types of communications, please e-mail [email or web address]

## 2019-08-09 NOTE — PROGRESS NOTES
HPI     Diabetic Eye Exam      Additional comments: Yearly              Comments     Last visit 03/02/2015 with DNL.  Diabetic eye exam  Diagnosed with diabetes over 10 years ago.  Recent vision fluctuations No  Blood sugar: 6.3  HPI    Any vision changes since last exam: Yes, trouble with distance & near   vision.  Eye pain: None  Other ocular symptoms: Watery eyes    Do you wear currently wear glasses or contacts? OTC Readers    Interested in contacts today? No    Do you plan on getting new glasses today? Yes                Last edited by Meghan Robertson on 8/9/2019  2:14 PM. (History)            Assessment /Plan     For exam results, see Encounter Report.    Type 2 diabetes mellitus without retinopathy  No diabetic retinopathy OD, OS  Continue close care with PCP  Monitor 12 months    Nuclear sclerosis, bilateral  Cortical age-related cataract of both eyes  Surgery is not indicated at this time. Monitor 12 months.    Hyperopia with astigmatism and presbyopia, bilateral  Eyeglass Final Rx     Eyeglass Final Rx       Sphere Cylinder Axis Add    Right +3.75 +1.25 070 +2.00    Left +3.75 +1.00 100 +2.00    Expiration Date:  8/9/2020              RTC 1 yr for dilated eye exam or PRN if any problems.   Discussed above and answered questions.

## 2019-08-13 RX ORDER — TESTOSTERONE CYPIONATE 200 MG/ML
INJECTION, SOLUTION INTRAMUSCULAR
Qty: 1 ML | Refills: 1 | Status: SHIPPED | OUTPATIENT
Start: 2019-08-13 | End: 2019-08-19 | Stop reason: SDUPTHER

## 2019-08-19 ENCOUNTER — OFFICE VISIT (OUTPATIENT)
Dept: UROLOGY | Facility: CLINIC | Age: 54
End: 2019-08-19
Payer: COMMERCIAL

## 2019-08-19 VITALS — BODY MASS INDEX: 55.98 KG/M2 | WEIGHT: 315 LBS

## 2019-08-19 DIAGNOSIS — K92.1 BLOODY STOOL: ICD-10-CM

## 2019-08-19 DIAGNOSIS — E29.1 HYPOGONADISM IN MALE: Primary | ICD-10-CM

## 2019-08-19 LAB
BILIRUB SERPL-MCNC: NORMAL MG/DL
BLOOD URINE, POC: NORMAL
COLOR, POC UA: YELLOW
GLUCOSE UR QL STRIP: NORMAL
KETONES UR QL STRIP: NORMAL
LEUKOCYTE ESTERASE URINE, POC: NORMAL
NITRITE, POC UA: NORMAL
PH, POC UA: 5
PROTEIN, POC: NORMAL
SPECIFIC GRAVITY, POC UA: 1.01
UROBILINOGEN, POC UA: NORMAL

## 2019-08-19 PROCEDURE — 99999 PR PBB SHADOW E&M-EST. PATIENT-LVL III: ICD-10-PCS | Mod: PBBFAC,,, | Performed by: UROLOGY

## 2019-08-19 PROCEDURE — 81002 POCT URINE DIPSTICK WITHOUT MICROSCOPE: ICD-10-PCS | Mod: S$GLB,,, | Performed by: UROLOGY

## 2019-08-19 PROCEDURE — 81002 URINALYSIS NONAUTO W/O SCOPE: CPT | Mod: S$GLB,,, | Performed by: UROLOGY

## 2019-08-19 PROCEDURE — 99999 PR PBB SHADOW E&M-EST. PATIENT-LVL III: CPT | Mod: PBBFAC,,, | Performed by: UROLOGY

## 2019-08-19 PROCEDURE — 99214 PR OFFICE/OUTPT VISIT, EST, LEVL IV, 30-39 MIN: ICD-10-PCS | Mod: 25,S$GLB,, | Performed by: UROLOGY

## 2019-08-19 PROCEDURE — 3008F PR BODY MASS INDEX (BMI) DOCUMENTED: ICD-10-PCS | Mod: CPTII,S$GLB,, | Performed by: UROLOGY

## 2019-08-19 PROCEDURE — 3008F BODY MASS INDEX DOCD: CPT | Mod: CPTII,S$GLB,, | Performed by: UROLOGY

## 2019-08-19 PROCEDURE — 99214 OFFICE O/P EST MOD 30 MIN: CPT | Mod: 25,S$GLB,, | Performed by: UROLOGY

## 2019-08-19 RX ORDER — TESTOSTERONE CYPIONATE 200 MG/ML
INJECTION, SOLUTION INTRAMUSCULAR
Qty: 10 ML | Refills: 1 | Status: SHIPPED | OUTPATIENT
Start: 2019-08-19 | End: 2020-02-19 | Stop reason: SDUPTHER

## 2019-08-19 NOTE — PROGRESS NOTES
Chief Complaint: Hypogonadism    HPI:   8/19/19: Seeing blood when he wipes.  T going well.  Labs ok.  Reviewed history in detail.   1/9/19: T going well, no problems.  Reviewed history in detail.    7/5/18: T going well, no problems. Needs needles.  Reviewed history in detail.  PSA normal.  1/3/18:  Labs were fine 9/17. Injections going fine doing it monthly.  Satisfied.  8/8/17: Testim no longer approved by his insurance just injections.  Otherwise doing fine.  Labs in hand Hct 37.5.  Has a week of T gel left.  T is low without therapy but he feels the same.  Will strive to give adequate amount for general health but not so much he is in risk from it.  6/21/17: No problems from testim, reviewed history in detail.  12/15/16: No interval changes doing well.  6/16/16: 49 yo pt of Dr. Olivo treated with Testim.  No abd/pelvic pain and no exac/rel factors.  No hematuria.  No urolithiasis.  No urinary bother.  No other  history. No family prostate cancer history. Was sluggish and weak and now on the T things are better with a good energy level.    Allergies:  Naproxen    Medications:  has a current medication list which includes the following prescription(s): acetaminophen, albuterol, allopurinol, amlodipine, atorvastatin, blood sugar diagnostic, blunt needle, disposable, cetirizine, edarbyclor, ergocalciferol, hydrocodone-homatropine 5-1.5 mg/5 ml, needle (disp) 22 g, and testosterone cypionate.    Review of Systems:  General: No fever, chills, fatigability, or weight loss.  Skin: No rashes, itching, or changes in color or texture of skin.  Chest: Denies AVILES, cyanosis, wheezing, cough, and sputum production.  Abdomen: Appetite fine. No weight loss. Denies diarrhea, abdominal pain, hematemesis, or blood in stool.  Musculoskeletal: No joint stiffness or swelling. Denies back pain.  : As above.  All other review of systems negative.    PMH:   has a past medical history of Diabetes mellitus, type 2 (diagnosed 2013), DM  (diabetes mellitus), HTN (hypertension), Hypogonadism, testicular, and Obesities, morbid.    PSH:   has a past surgical history that includes Colonoscopy (N/A, 6/29/2018).    FamHx: family history includes Cataracts in his mother; Diabetes in his mother and sister; Hypertension in his mother.    SocHx:  reports that he has never smoked. He has never used smokeless tobacco. He reports that he drinks about 1.8 - 2.4 oz of alcohol per week. He reports that he does not use drugs.      Physical Exam:  There were no vitals filed for this visit.  General: A&Ox3, no apparent distress, no deformities  Neck: No masses, normal thyroid  Lungs: normal inspiration, no use of accessory muscles  Heart: normal pulse, no arrhythmias  Abdomen: Soft, NT, ND  Skin: The skin is warm and dry. No jaundice.  Ext: No c/c/e.  : deferred    Labs/Studies:   Urinalysis performed in clinic, summary: UA normal  PSA    9/15: 1.2    12/16: 0.4    7/17: 0.6    6/18: 1.1    1/19: 0.72    Impression/Plan:   1. Continue T injections monthly.  Refilled.  RTC with labs 6 mo.  2. Gi referral possible hemorrhoid.

## 2019-09-15 DIAGNOSIS — I10 ESSENTIAL HYPERTENSION: ICD-10-CM

## 2019-09-15 RX ORDER — AZILSARTAN KAMEDOXOMIL AND CHLORTHALIDONE 40; 12.5 MG/1; MG/1
TABLET ORAL
Qty: 60 TABLET | Refills: 5 | OUTPATIENT
Start: 2019-09-15

## 2019-09-22 DIAGNOSIS — I10 ESSENTIAL HYPERTENSION: ICD-10-CM

## 2019-09-22 RX ORDER — AZILSARTAN KAMEDOXOMIL AND CHLORTHALIDONE 40; 12.5 MG/1; MG/1
TABLET ORAL
Qty: 60 TABLET | Refills: 5 | OUTPATIENT
Start: 2019-09-22

## 2019-10-25 DIAGNOSIS — I10 ESSENTIAL HYPERTENSION: ICD-10-CM

## 2019-10-25 RX ORDER — AMLODIPINE BESYLATE 10 MG/1
10 TABLET ORAL DAILY
Qty: 90 TABLET | Refills: 3 | Status: SHIPPED | OUTPATIENT
Start: 2019-10-25 | End: 2021-01-25

## 2019-10-25 NOTE — TELEPHONE ENCOUNTER
----- Message from Malka Lucero sent at 10/25/2019  9:03 AM CDT -----  Contact: pt   Type:  RX Refill Request    Who Called: GREY SHIN  Refill or New Rx: refill  RX Name and Strength: pt BP med   How is the patient currently taking it? (ex. 1XDay): twice  Daily   Is this a 30 day or 90 day RX: 30 day   Preferred Pharmacy with phone number:     [address] [address]  Phone: [phone] Fax: [phone]    Local or Mail Order: local  Ordering Provider: clay  Would the patient rather a call back or a response via [facility]? Call   Best Call Back Number: [phone] (home)   Additional Information: pt did not have his med info on him at the time of the call

## 2019-10-29 DIAGNOSIS — I10 ESSENTIAL HYPERTENSION: ICD-10-CM

## 2019-10-29 NOTE — TELEPHONE ENCOUNTER
----- Message from Ayana Fraire sent at 10/29/2019  1:46 PM CDT -----  Contact: self  Requesting call back regarding blood pressure medication. Please call back at [phone]    Thanks,  Aayna Fraire

## 2019-11-08 DIAGNOSIS — M10.9 GOUT WITHOUT TOPHUS: ICD-10-CM

## 2019-11-08 RX ORDER — ALLOPURINOL 300 MG/1
TABLET ORAL
Qty: 90 TABLET | Refills: 3 | Status: SHIPPED | OUTPATIENT
Start: 2019-11-08 | End: 2021-06-17 | Stop reason: SDUPTHER

## 2019-11-08 RX ORDER — ATORVASTATIN CALCIUM 20 MG/1
TABLET, FILM COATED ORAL
Qty: 90 TABLET | Refills: 3 | Status: SHIPPED | OUTPATIENT
Start: 2019-11-08 | End: 2021-10-08 | Stop reason: SDUPTHER

## 2019-11-19 ENCOUNTER — LAB VISIT (OUTPATIENT)
Dept: LAB | Facility: HOSPITAL | Age: 54
End: 2019-11-19
Payer: COMMERCIAL

## 2019-11-19 ENCOUNTER — OFFICE VISIT (OUTPATIENT)
Dept: FAMILY MEDICINE | Facility: CLINIC | Age: 54
End: 2019-11-19
Payer: COMMERCIAL

## 2019-11-19 VITALS
HEART RATE: 76 BPM | OXYGEN SATURATION: 95 % | TEMPERATURE: 98 F | BODY MASS INDEX: 55.81 KG/M2 | WEIGHT: 315 LBS | RESPIRATION RATE: 18 BRPM | SYSTOLIC BLOOD PRESSURE: 138 MMHG | DIASTOLIC BLOOD PRESSURE: 74 MMHG | HEIGHT: 63 IN

## 2019-11-19 DIAGNOSIS — E66.01 MORBID OBESITY DUE TO EXCESS CALORIES: ICD-10-CM

## 2019-11-19 DIAGNOSIS — E11.9 DIABETES MELLITUS WITHOUT COMPLICATION: ICD-10-CM

## 2019-11-19 DIAGNOSIS — M10.9 GOUTY ARTHROPATHY: ICD-10-CM

## 2019-11-19 DIAGNOSIS — E78.2 MIXED HYPERLIPIDEMIA: ICD-10-CM

## 2019-11-19 DIAGNOSIS — I10 HYPERTENSION, ESSENTIAL: ICD-10-CM

## 2019-11-19 DIAGNOSIS — I10 ESSENTIAL HYPERTENSION: Primary | ICD-10-CM

## 2019-11-19 DIAGNOSIS — E79.0 HYPERURICEMIA: ICD-10-CM

## 2019-11-19 DIAGNOSIS — I10 ESSENTIAL HYPERTENSION: ICD-10-CM

## 2019-11-19 LAB
ALBUMIN SERPL BCP-MCNC: 3.7 G/DL (ref 3.5–5.2)
ALP SERPL-CCNC: 120 U/L (ref 55–135)
ALT SERPL W/O P-5'-P-CCNC: 55 U/L (ref 10–44)
ANION GAP SERPL CALC-SCNC: 11 MMOL/L (ref 8–16)
AST SERPL-CCNC: 46 U/L (ref 10–40)
BASOPHILS # BLD AUTO: 0.07 K/UL (ref 0–0.2)
BASOPHILS NFR BLD: 0.8 % (ref 0–1.9)
BILIRUB SERPL-MCNC: 0.6 MG/DL (ref 0.1–1)
BUN SERPL-MCNC: 17 MG/DL (ref 6–20)
CALCIUM SERPL-MCNC: 9.4 MG/DL (ref 8.7–10.5)
CHLORIDE SERPL-SCNC: 101 MMOL/L (ref 95–110)
CHOLEST SERPL-MCNC: 174 MG/DL (ref 120–199)
CHOLEST/HDLC SERPL: 2.9 {RATIO} (ref 2–5)
CO2 SERPL-SCNC: 30 MMOL/L (ref 23–29)
CREAT SERPL-MCNC: 1.3 MG/DL (ref 0.5–1.4)
DIFFERENTIAL METHOD: ABNORMAL
EOSINOPHIL # BLD AUTO: 0.2 K/UL (ref 0–0.5)
EOSINOPHIL NFR BLD: 1.8 % (ref 0–8)
ERYTHROCYTE [DISTWIDTH] IN BLOOD BY AUTOMATED COUNT: 16.9 % (ref 11.5–14.5)
EST. GFR  (AFRICAN AMERICAN): >60 ML/MIN/1.73 M^2
EST. GFR  (NON AFRICAN AMERICAN): >60 ML/MIN/1.73 M^2
GLUCOSE SERPL-MCNC: 99 MG/DL (ref 70–110)
HCT VFR BLD AUTO: 44.4 % (ref 40–54)
HDLC SERPL-MCNC: 61 MG/DL (ref 40–75)
HDLC SERPL: 35.1 % (ref 20–50)
HGB BLD-MCNC: 13.1 G/DL (ref 14–18)
IMM GRANULOCYTES # BLD AUTO: 0.06 K/UL (ref 0–0.04)
IMM GRANULOCYTES NFR BLD AUTO: 0.7 % (ref 0–0.5)
LDLC SERPL CALC-MCNC: 103 MG/DL (ref 63–159)
LYMPHOCYTES # BLD AUTO: 1.9 K/UL (ref 1–4.8)
LYMPHOCYTES NFR BLD: 20.9 % (ref 18–48)
MCH RBC QN AUTO: 24 PG (ref 27–31)
MCHC RBC AUTO-ENTMCNC: 29.5 G/DL (ref 32–36)
MCV RBC AUTO: 82 FL (ref 82–98)
MONOCYTES # BLD AUTO: 0.7 K/UL (ref 0.3–1)
MONOCYTES NFR BLD: 7.5 % (ref 4–15)
NEUTROPHILS # BLD AUTO: 6.1 K/UL (ref 1.8–7.7)
NEUTROPHILS NFR BLD: 68.3 % (ref 38–73)
NONHDLC SERPL-MCNC: 113 MG/DL
NRBC BLD-RTO: 0 /100 WBC
PLATELET # BLD AUTO: 268 K/UL (ref 150–350)
PMV BLD AUTO: 9.9 FL (ref 9.2–12.9)
POTASSIUM SERPL-SCNC: 3.8 MMOL/L (ref 3.5–5.1)
PROT SERPL-MCNC: 7.8 G/DL (ref 6–8.4)
RBC # BLD AUTO: 5.45 M/UL (ref 4.6–6.2)
SODIUM SERPL-SCNC: 142 MMOL/L (ref 136–145)
TRIGL SERPL-MCNC: 50 MG/DL (ref 30–150)
WBC # BLD AUTO: 8.96 K/UL (ref 3.9–12.7)

## 2019-11-19 PROCEDURE — 3044F HG A1C LEVEL LT 7.0%: CPT | Mod: CPTII,S$GLB,, | Performed by: INTERNAL MEDICINE

## 2019-11-19 PROCEDURE — 3008F PR BODY MASS INDEX (BMI) DOCUMENTED: ICD-10-PCS | Mod: CPTII,S$GLB,, | Performed by: INTERNAL MEDICINE

## 2019-11-19 PROCEDURE — 83036 HEMOGLOBIN GLYCOSYLATED A1C: CPT

## 2019-11-19 PROCEDURE — 99214 PR OFFICE/OUTPT VISIT, EST, LEVL IV, 30-39 MIN: ICD-10-PCS | Mod: S$GLB,,, | Performed by: INTERNAL MEDICINE

## 2019-11-19 PROCEDURE — 99214 OFFICE O/P EST MOD 30 MIN: CPT | Mod: S$GLB,,, | Performed by: INTERNAL MEDICINE

## 2019-11-19 PROCEDURE — 3008F BODY MASS INDEX DOCD: CPT | Mod: CPTII,S$GLB,, | Performed by: INTERNAL MEDICINE

## 2019-11-19 PROCEDURE — 3075F SYST BP GE 130 - 139MM HG: CPT | Mod: CPTII,S$GLB,, | Performed by: INTERNAL MEDICINE

## 2019-11-19 PROCEDURE — 36415 COLL VENOUS BLD VENIPUNCTURE: CPT | Mod: PO

## 2019-11-19 PROCEDURE — 99999 PR PBB SHADOW E&M-EST. PATIENT-LVL IV: CPT | Mod: PBBFAC,,, | Performed by: INTERNAL MEDICINE

## 2019-11-19 PROCEDURE — 3075F PR MOST RECENT SYSTOLIC BLOOD PRESS GE 130-139MM HG: ICD-10-PCS | Mod: CPTII,S$GLB,, | Performed by: INTERNAL MEDICINE

## 2019-11-19 PROCEDURE — 80061 LIPID PANEL: CPT

## 2019-11-19 PROCEDURE — 3078F DIAST BP <80 MM HG: CPT | Mod: CPTII,S$GLB,, | Performed by: INTERNAL MEDICINE

## 2019-11-19 PROCEDURE — 85025 COMPLETE CBC W/AUTO DIFF WBC: CPT

## 2019-11-19 PROCEDURE — 99999 PR PBB SHADOW E&M-EST. PATIENT-LVL IV: ICD-10-PCS | Mod: PBBFAC,,, | Performed by: INTERNAL MEDICINE

## 2019-11-19 PROCEDURE — 80053 COMPREHEN METABOLIC PANEL: CPT

## 2019-11-19 PROCEDURE — 3078F PR MOST RECENT DIASTOLIC BLOOD PRESSURE < 80 MM HG: ICD-10-PCS | Mod: CPTII,S$GLB,, | Performed by: INTERNAL MEDICINE

## 2019-11-19 PROCEDURE — 3044F PR MOST RECENT HEMOGLOBIN A1C LEVEL <7.0%: ICD-10-PCS | Mod: CPTII,S$GLB,, | Performed by: INTERNAL MEDICINE

## 2019-11-19 RX ORDER — COLCHICINE 0.6 MG/1
0.6 TABLET ORAL 2 TIMES DAILY
Qty: 20 TABLET | Refills: 0 | Status: SHIPPED | OUTPATIENT
Start: 2019-11-19 | End: 2020-04-06

## 2019-11-19 NOTE — PROGRESS NOTES
Subjective:       Patient ID: Juan C Rodriguez is a 53 y.o. male.    Chief Complaint: Follow-up; Foot Pain; Hypertension; Hyperlipidemia; and Diabetes    Follow-up   Pertinent negatives include no abdominal pain, arthralgias, chest pain, chills, coughing, diaphoresis, fatigue, fever, headaches, joint swelling, myalgias, nausea, neck pain, numbness, rash, sore throat, vomiting or weakness.   Foot Pain   Pertinent negatives include no abdominal pain, arthralgias, chest pain, chills, coughing, diaphoresis, fatigue, fever, headaches, joint swelling, myalgias, nausea, neck pain, numbness, rash, sore throat, vomiting or weakness.   Hypertension   Pertinent negatives include no chest pain, headaches, neck pain, palpitations or shortness of breath.   Hyperlipidemia   Pertinent negatives include no chest pain, myalgias or shortness of breath.   Diabetes   Pertinent negatives for hypoglycemia include no confusion, dizziness, headaches, nervousness/anxiousness, pallor, seizures, speech difficulty or tremors. Pertinent negatives for diabetes include no chest pain, no fatigue, no polydipsia, no polyphagia, no polyuria and no weakness.     Past Medical History:   Diagnosis Date    Diabetes mellitus, type 2 diagnosed 2013    DM (diabetes mellitus)     2011  02/25/2014    HTN (hypertension)     Hypogonadism, testicular     Obesities, morbid      Past Surgical History:   Procedure Laterality Date    COLONOSCOPY N/A 6/29/2018    Procedure: COLONOSCOPY;  Surgeon: Jeremiah Anaya III, MD;  Location: [facility];  Service: Endoscopy;  Laterality: N/A;     Family History   Problem Relation Age of Onset    Cataracts Mother     Diabetes Mother     Hypertension Mother     Diabetes Sister      Social History     Socioeconomic History    Marital status: Single     Spouse name: Not on file    Number of children: Not on file    Years of education: Not on file    Highest education level: Not on file    Occupational History    Not on file   Social Needs    Financial resource strain: Not on file    Food insecurity:     Worry: Not on file     Inability: Not on file    Transportation needs:     Medical: Not on file     Non-medical: Not on file   Tobacco Use    Smoking status: Never Smoker    Smokeless tobacco: Never Used   Substance and Sexual Activity    Alcohol use: Yes     Alcohol/week: 3.0 - 4.0 standard drinks     Types: 3 - 4 Cans of beer per week     Comment: occasional weekends    Drug use: No    Sexual activity: Not on file   Lifestyle    Physical activity:     Days per week: Not on file     Minutes per session: Not on file    Stress: Not on file   Relationships    Social connections:     Talks on phone: Not on file     Gets together: Not on file     Attends Gnosticism service: Not on file     Active member of club or organization: Not on file     Attends meetings of clubs or organizations: Not on file     Relationship status: Not on file   Other Topics Concern    Not on file   Social History Narrative    Not on file     Review of Systems   Constitutional: Negative for activity change, appetite change, chills, diaphoresis, fatigue, fever and unexpected weight change.   HENT: Negative for drooling, ear discharge, ear pain, facial swelling, hearing loss, mouth sores, nosebleeds, postnasal drip, rhinorrhea, sinus pressure, sneezing, sore throat, tinnitus, trouble swallowing and voice change.    Eyes: Negative for photophobia, redness and visual disturbance.   Respiratory: Negative for apnea, cough, choking, chest tightness, shortness of breath and wheezing.    Cardiovascular: Negative for chest pain, palpitations and leg swelling.   Gastrointestinal: Negative for abdominal distention, abdominal pain, anal bleeding, blood in stool, constipation, diarrhea, nausea and vomiting.   Endocrine: Negative for cold intolerance, heat intolerance, polydipsia, polyphagia and polyuria.   Genitourinary: Negative  for difficulty urinating, dysuria, flank pain, frequency and hematuria.   Musculoskeletal: Negative for arthralgias, back pain, gait problem, joint swelling, myalgias, neck pain and neck stiffness.   Skin: Negative for color change, pallor, rash and wound.   Allergic/Immunologic: Negative for food allergies and immunocompromised state.   Neurological: Negative for dizziness, tremors, seizures, syncope, facial asymmetry, speech difficulty, weakness, light-headedness, numbness and headaches.   Hematological: Negative for adenopathy. Does not bruise/bleed easily.   Psychiatric/Behavioral: Negative for agitation, behavioral problems, confusion, decreased concentration, dysphoric mood, hallucinations, self-injury, sleep disturbance and suicidal ideas. The patient is not nervous/anxious and is not hyperactive.        Objective:      Physical Exam   Constitutional: He is oriented to person, place, and time. He appears well-developed and well-nourished. No distress.   HENT:   Head: Normocephalic and atraumatic.   Eyes: Pupils are equal, round, and reactive to light.   Neck: Normal range of motion. Neck supple. No JVD present. Carotid bruit is not present. No tracheal deviation present. No thyromegaly present.   Cardiovascular: Normal rate, regular rhythm, normal heart sounds and intact distal pulses.   Pulmonary/Chest: Effort normal and breath sounds normal. No respiratory distress. He has no wheezes. He has no rales. He exhibits no tenderness.   Abdominal: Soft. Bowel sounds are normal. He exhibits no distension. There is no tenderness. There is no rebound and no guarding.   Musculoskeletal: Normal range of motion. He exhibits no edema or tenderness.   Lymphadenopathy:     He has no cervical adenopathy.   Neurological: He is alert and oriented to person, place, and time.   Skin: Skin is warm and dry. No rash noted. He is not diaphoretic. No erythema. No pallor.   Psychiatric: He has a normal mood and affect. His behavior  is normal. Judgment and thought content normal.   Nursing note and vitals reviewed.      CMP  Sodium   Date Value Ref Range Status   03/15/2019 144 136 - 145 mmol/L Final     Potassium   Date Value Ref Range Status   03/15/2019 4.0 3.5 - 5.1 mmol/L Final     Chloride   Date Value Ref Range Status   03/15/2019 102 95 - 110 mmol/L Final     CO2   Date Value Ref Range Status   03/15/2019 31 (H) 23 - 29 mmol/L Final     Glucose   Date Value Ref Range Status   03/15/2019 94 70 - 110 mg/dL Final     BUN, Bld   Date Value Ref Range Status   03/15/2019 23 (H) 6 - 20 mg/dL Final     Creatinine   Date Value Ref Range Status   03/15/2019 1.3 0.5 - 1.4 mg/dL Final     Calcium   Date Value Ref Range Status   03/15/2019 9.7 8.7 - 10.5 mg/dL Final     Total Protein   Date Value Ref Range Status   07/12/2019 7.2 6.0 - 8.4 g/dL Final     Albumin   Date Value Ref Range Status   07/12/2019 3.3 (L) 3.5 - 5.2 g/dL Final   05/31/2016 4.2 3.6 - 5.1 g/dL Final     Comment:     @ Test Performed By:  Epiphany Marquez Gabe Diaz M.D., KELLY,   [address] [place] [id number]  [facility]  [id number]       Total Bilirubin   Date Value Ref Range Status   07/12/2019 0.4 0.1 - 1.0 mg/dL Final     Comment:     For infants and newborns, interpretation of results should be based  on gestational age, weight and in agreement with clinical  observations.  Premature Infant recommended reference ranges:  Up to 24 hours.............<8.0 mg/dL  Up to 48 hours............<12.0 mg/dL  3-5 days..................<15.0 mg/dL  6-29 days.................<15.0 mg/dL       Alkaline Phosphatase   Date Value Ref Range Status   07/12/2019 116 55 - 135 U/L Final     AST   Date Value Ref Range Status   07/12/2019 21 10 - 40 U/L Final     ALT   Date Value Ref Range Status   07/12/2019 18 10 - 44 U/L Final     Anion Gap   Date Value Ref Range Status   03/15/2019 11 8 - 16 mmol/L Final     eGFR if    Date Value  Ref Range Status   03/15/2019 >60.0 >60 mL/min/1.73 m^2 Final     eGFR if non    Date Value Ref Range Status   03/15/2019 >60.0 >60 mL/min/1.73 m^2 Final     Comment:     Calculation used to obtain the estimated glomerular filtration  rate (eGFR) is the CKD-EPI equation.        Lab Results   Component Value Date    WBC 5.71 10/08/2018    HGB 12.3 (L) 10/08/2018    HCT 40.7 07/12/2019    MCV 82 10/08/2018     10/08/2018     Lab Results   Component Value Date    CHOL 148 11/05/2018     Lab Results   Component Value Date    HDL 52 11/05/2018     Lab Results   Component Value Date    LDLCALC 87.2 11/05/2018     Lab Results   Component Value Date    TRIG 44 11/05/2018     Lab Results   Component Value Date    CHOLHDL 35.1 11/05/2018     Lab Results   Component Value Date    TSH 2.609 09/05/2017     Lab Results   Component Value Date    LABA1C 6.1 (H) 03/16/2017    HGBA1C 6.3 (H) 07/19/2019     Assessment:       1. Essential hypertension    2. Diabetes mellitus without complication    3. Hypertension, essential    4. Hyperuricemia    5. Mixed hyperlipidemia    6. Morbid obesity due to excess calories    7. Gouty arthropathy        Plan:   Essential hypertension  -     Comprehensive metabolic panel; Future; Expected date: 11/19/2019  -     CBC auto differential; Future; Expected date: 11/19/2019    Diabetes mellitus without complication  -     Hemoglobin A1c; Future; Expected date: 11/19/2019    Hypertension, essential    Hyperuricemia    Mixed hyperlipidemia  -     Lipid panel; Future; Expected date: 11/19/2019    Morbid obesity due to excess calories    Gouty arthropathy--------left great toe-----call if persists-------  -     colchicine (COLCRYS) 0.6 mg tablet; Take 1 tablet (0.6 mg total) by mouth 2 (two) times daily. for 10 days  Dispense: 20 tablet; Refill: 0    Other orders  -     NURSING COMMUNICATION: Create [facility]  -     Hypertension [place] [facility] (HDMP) Enrollment Order  -      Hypertension Digital Medicine (HDMP): Assign Onboarding Questionnaires    Stable-----------------continue current meds--------------------------f/u 4 months------------------------

## 2019-11-19 NOTE — LETTER
November 19, 2019      [facility]  [address] [place] [address]  Phone: [phone]  Fax: [phone]       Patient: Juan C Rodriguez   YOB: 1965  Date of Visit: 11/19/2019    To Whom It May Concern:    Pamela Rodriguez  was at [facility] on 11/19/2019. He may return to work/school on 11/25/2019 with no restrictions. If you have any questions or concerns, or if I can be of further assistance, please do not hesitate to contact me.    Sincerely,    Lola Johnson LPN

## 2019-11-20 ENCOUNTER — TELEPHONE (OUTPATIENT)
Dept: FAMILY MEDICINE | Facility: CLINIC | Age: 54
End: 2019-11-20

## 2019-11-20 DIAGNOSIS — R79.89 LFT ELEVATION: Primary | ICD-10-CM

## 2019-11-20 LAB
ESTIMATED AVG GLUCOSE: 148 MG/DL (ref 68–131)
HBA1C MFR BLD HPLC: 6.8 % (ref 4–5.6)

## 2019-12-09 ENCOUNTER — TELEPHONE (OUTPATIENT)
Dept: FAMILY MEDICINE | Facility: CLINIC | Age: 54
End: 2019-12-09

## 2019-12-09 NOTE — TELEPHONE ENCOUNTER
----- Message from Anabella Ramirez sent at 12/9/2019 10:57 AM CST -----  Contact: pt   He's calling in regards to speak with nurse ,      pls call pt back at [phone]

## 2019-12-09 NOTE — TELEPHONE ENCOUNTER
----- Message from Deborah Bates sent at 12/9/2019 11:29 AM CST -----  Contact: pt     Type:  Patient Returning Call    Who Called: Juan C Rodriguez   Who Left Message for Patient: nurse   Does the patient know what this is regarding?:  Would the patient rather a call back or a response via [facility]? Call back   Best Call Back Number: [phone] (home) [phone] (work)  Additional Information:

## 2019-12-09 NOTE — TELEPHONE ENCOUNTER
----- Message from Anabella Ramirez sent at 12/9/2019 10:57 AM CST -----  Contact: pt   He's calling in regards to speak with nurse ,      pls call pt back at [phone]

## 2019-12-11 ENCOUNTER — LAB VISIT (OUTPATIENT)
Dept: LAB | Facility: HOSPITAL | Age: 54
End: 2019-12-11
Attending: INTERNAL MEDICINE
Payer: COMMERCIAL

## 2019-12-11 ENCOUNTER — OFFICE VISIT (OUTPATIENT)
Dept: FAMILY MEDICINE | Facility: CLINIC | Age: 54
End: 2019-12-11
Payer: COMMERCIAL

## 2019-12-11 VITALS
DIASTOLIC BLOOD PRESSURE: 61 MMHG | SYSTOLIC BLOOD PRESSURE: 134 MMHG | TEMPERATURE: 98 F | WEIGHT: 310.94 LBS | OXYGEN SATURATION: 98 % | HEART RATE: 63 BPM | BODY MASS INDEX: 55.08 KG/M2

## 2019-12-11 DIAGNOSIS — R79.89 LFT ELEVATION: ICD-10-CM

## 2019-12-11 DIAGNOSIS — M54.50 ACUTE RIGHT-SIDED LOW BACK PAIN WITHOUT SCIATICA: ICD-10-CM

## 2019-12-11 DIAGNOSIS — K62.5 BRBPR (BRIGHT RED BLOOD PER RECTUM): Primary | ICD-10-CM

## 2019-12-11 PROCEDURE — 99214 PR OFFICE/OUTPT VISIT, EST, LEVL IV, 30-39 MIN: ICD-10-PCS | Mod: S$GLB,,, | Performed by: INTERNAL MEDICINE

## 2019-12-11 PROCEDURE — 36415 COLL VENOUS BLD VENIPUNCTURE: CPT | Mod: PO

## 2019-12-11 PROCEDURE — 3075F SYST BP GE 130 - 139MM HG: CPT | Mod: CPTII,S$GLB,, | Performed by: INTERNAL MEDICINE

## 2019-12-11 PROCEDURE — 3008F BODY MASS INDEX DOCD: CPT | Mod: CPTII,S$GLB,, | Performed by: INTERNAL MEDICINE

## 2019-12-11 PROCEDURE — 3075F PR MOST RECENT SYSTOLIC BLOOD PRESS GE 130-139MM HG: ICD-10-PCS | Mod: CPTII,S$GLB,, | Performed by: INTERNAL MEDICINE

## 2019-12-11 PROCEDURE — 3008F PR BODY MASS INDEX (BMI) DOCUMENTED: ICD-10-PCS | Mod: CPTII,S$GLB,, | Performed by: INTERNAL MEDICINE

## 2019-12-11 PROCEDURE — 99999 PR PBB SHADOW E&M-EST. PATIENT-LVL IV: ICD-10-PCS | Mod: PBBFAC,,, | Performed by: INTERNAL MEDICINE

## 2019-12-11 PROCEDURE — 99999 PR PBB SHADOW E&M-EST. PATIENT-LVL IV: CPT | Mod: PBBFAC,,, | Performed by: INTERNAL MEDICINE

## 2019-12-11 PROCEDURE — 84450 TRANSFERASE (AST) (SGOT): CPT

## 2019-12-11 PROCEDURE — 3078F PR MOST RECENT DIASTOLIC BLOOD PRESSURE < 80 MM HG: ICD-10-PCS | Mod: CPTII,S$GLB,, | Performed by: INTERNAL MEDICINE

## 2019-12-11 PROCEDURE — 3078F DIAST BP <80 MM HG: CPT | Mod: CPTII,S$GLB,, | Performed by: INTERNAL MEDICINE

## 2019-12-11 PROCEDURE — 84460 ALANINE AMINO (ALT) (SGPT): CPT

## 2019-12-11 PROCEDURE — 99214 OFFICE O/P EST MOD 30 MIN: CPT | Mod: S$GLB,,, | Performed by: INTERNAL MEDICINE

## 2019-12-11 RX ORDER — BACLOFEN 10 MG/1
10 TABLET ORAL NIGHTLY PRN
Qty: 30 TABLET | Refills: 0 | Status: SHIPPED | OUTPATIENT
Start: 2019-12-11 | End: 2021-03-11

## 2019-12-11 NOTE — PROGRESS NOTES
Subjective:       Patient ID: Juan C Rodriguez is a 54 y.o. male.    Chief Complaint: Rectal Bleeding and Flank Pain    3 weeks--intermittent bright red blood on tissue when wiping-----------none otherwise--------last occurred on Monday--------------no pain.           And 3 weeks right lower back pain------------positional-------------------comes and goes---------    Rectal Bleeding   Pertinent negatives include no abdominal pain, chest pain, coughing, fever, nausea, vomiting or weakness.   Flank Pain   Pertinent negatives include no abdominal pain, chest pain, dysuria, fever or weakness.     Past Medical History:   Diagnosis Date    Diabetes mellitus, type 2 diagnosed 2013    DM (diabetes mellitus)     2011  02/25/2014    HTN (hypertension)     Hypogonadism, testicular     Obesities, morbid      Past Surgical History:   Procedure Laterality Date    COLONOSCOPY N/A 6/29/2018    Procedure: COLONOSCOPY;  Surgeon: Jeremiah Anaya III, MD;  Location: [facility];  Service: Endoscopy;  Laterality: N/A;     Family History   Problem Relation Age of Onset    Cataracts Mother     Diabetes Mother     Hypertension Mother     Diabetes Sister      Social History     Socioeconomic History    Marital status: Single     Spouse name: Not on file    Number of children: Not on file    Years of education: Not on file    Highest education level: Not on file   Occupational History    Not on file   Social Needs    Financial resource strain: Not on file    Food insecurity:     Worry: Not on file     Inability: Not on file    Transportation needs:     Medical: Not on file     Non-medical: Not on file   Tobacco Use    Smoking status: Never Smoker    Smokeless tobacco: Never Used   Substance and Sexual Activity    Alcohol use: Yes     Alcohol/week: 3.0 - 4.0 standard drinks     Types: 3 - 4 Cans of beer per week     Comment: occasional weekends    Drug use: No    Sexual activity: Not on file    Lifestyle    Physical activity:     Days per week: Not on file     Minutes per session: Not on file    Stress: Not on file   Relationships    Social connections:     Talks on phone: Not on file     Gets together: Not on file     Attends Mormon service: Not on file     Active member of club or organization: Not on file     Attends meetings of clubs or organizations: Not on file     Relationship status: Not on file   Other Topics Concern    Not on file   Social History Narrative    Not on file     Review of Systems   Constitutional: Negative for appetite change and fever.   HENT: Negative.    Respiratory: Negative for apnea, cough, choking, chest tightness, shortness of breath, wheezing and stridor.    Cardiovascular: Negative for chest pain and palpitations.   Gastrointestinal: Positive for blood in stool and hematochezia. Negative for abdominal pain, nausea and vomiting.   Genitourinary: Positive for flank pain. Negative for dysuria, hematuria and urgency.   Musculoskeletal: Positive for back pain.   Neurological: Negative for dizziness, weakness and light-headedness.   Psychiatric/Behavioral: Negative for agitation, behavioral problems and confusion.       Objective:      Physical Exam   Constitutional: He is oriented to person, place, and time. He appears well-developed and well-nourished.   Cardiovascular: Normal rate, regular rhythm, normal heart sounds and intact distal pulses.   Pulmonary/Chest: Effort normal and breath sounds normal.   Abdominal: Soft. Bowel sounds are normal.   Genitourinary: Rectum normal. Rectal exam shows guaiac negative stool.   Musculoskeletal: Normal range of motion.   Neurological: He is alert and oriented to person, place, and time.   Nursing note and vitals reviewed.      CMP  Sodium   Date Value Ref Range Status   11/19/2019 142 136 - 145 mmol/L Final     Potassium   Date Value Ref Range Status   11/19/2019 3.8 3.5 - 5.1 mmol/L Final     Chloride   Date Value Ref Range  Status   11/19/2019 101 95 - 110 mmol/L Final     CO2   Date Value Ref Range Status   11/19/2019 30 (H) 23 - 29 mmol/L Final     Glucose   Date Value Ref Range Status   11/19/2019 99 70 - 110 mg/dL Final     BUN, Bld   Date Value Ref Range Status   11/19/2019 17 6 - 20 mg/dL Final     Creatinine   Date Value Ref Range Status   11/19/2019 1.3 0.5 - 1.4 mg/dL Final     Calcium   Date Value Ref Range Status   11/19/2019 9.4 8.7 - 10.5 mg/dL Final     Total Protein   Date Value Ref Range Status   11/19/2019 7.8 6.0 - 8.4 g/dL Final     Albumin   Date Value Ref Range Status   11/19/2019 3.7 3.5 - 5.2 g/dL Final   05/31/2016 4.2 3.6 - 5.1 g/dL Final     Comment:     @ Test Performed By:  [email or web address] KELLY Chinchilla M.D.,   [address] [place] [id number]  [facility]  [id number]       Total Bilirubin   Date Value Ref Range Status   11/19/2019 0.6 0.1 - 1.0 mg/dL Final     Comment:     For infants and newborns, interpretation of results should be based  on gestational age, weight and in agreement with clinical  observations.  Premature Infant recommended reference ranges:  Up to 24 hours.............<8.0 mg/dL  Up to 48 hours............<12.0 mg/dL  3-5 days..................<15.0 mg/dL  6-29 days.................<15.0 mg/dL       Alkaline Phosphatase   Date Value Ref Range Status   11/19/2019 120 55 - 135 U/L Final     AST   Date Value Ref Range Status   11/19/2019 46 (H) 10 - 40 U/L Final     ALT   Date Value Ref Range Status   11/19/2019 55 (H) 10 - 44 U/L Final     Anion Gap   Date Value Ref Range Status   11/19/2019 11 8 - 16 mmol/L Final     eGFR if    Date Value Ref Range Status   11/19/2019 >60.0 >60 mL/min/1.73 m^2 Final     eGFR if non    Date Value Ref Range Status   11/19/2019 >60.0 >60 mL/min/1.73 m^2 Final     Comment:     Calculation used to obtain the estimated glomerular filtration  rate (eGFR) is the CKD-EPI equation.         Lab Results   Component Value Date    WBC 8.96 11/19/2019    HGB 13.1 (L) 11/19/2019    HCT 44.4 11/19/2019    MCV 82 11/19/2019     11/19/2019     Lab Results   Component Value Date    CHOL 174 11/19/2019     Lab Results   Component Value Date    HDL 61 11/19/2019     Lab Results   Component Value Date    LDLCALC 103.0 11/19/2019     Lab Results   Component Value Date    TRIG 50 11/19/2019     Lab Results   Component Value Date    CHOLHDL 35.1 11/19/2019     Lab Results   Component Value Date    TSH 2.609 09/05/2017     Lab Results   Component Value Date    LABA1C 6.1 (H) 03/16/2017    HGBA1C 6.8 (H) 11/19/2019     Assessment:       1. BRBPR (bright red blood per rectum)    2. Acute right-sided low back pain without sciatica        Plan:   BRBPR (bright red blood per rectum)-----------normal colon 2018----------follow-------------------call if persists-------------  -     CBC auto differential; Future; Expected date: 12/11/2019    Acute right-sided low back pain without sciatica-------------tylenol with baclofen-----call if persists---------  -     Urinalysis; Future; Expected date: 12/11/2019  -     baclofen (LIORESAL) 10 MG tablet; Take 1 tablet (10 mg total) by mouth nightly as needed.  Dispense: 30 tablet; Refill: 0    F/u 1 month---------

## 2019-12-12 ENCOUNTER — TELEPHONE (OUTPATIENT)
Dept: FAMILY MEDICINE | Facility: CLINIC | Age: 54
End: 2019-12-12

## 2019-12-12 DIAGNOSIS — N39.0 URINARY TRACT INFECTION WITHOUT HEMATURIA, SITE UNSPECIFIED: Primary | ICD-10-CM

## 2019-12-12 LAB
ALT SERPL W/O P-5'-P-CCNC: 40 U/L (ref 10–44)
AST SERPL-CCNC: 28 U/L (ref 10–40)

## 2019-12-12 RX ORDER — CIPROFLOXACIN 500 MG/1
500 TABLET ORAL 2 TIMES DAILY
Qty: 20 TABLET | Refills: 0 | Status: SHIPPED | OUTPATIENT
Start: 2019-12-12 | End: 2019-12-22

## 2019-12-12 NOTE — TELEPHONE ENCOUNTER
Called and explained results to pt   appt for labs made for 12/23 repeat u/a      Cancel appt for 12/17??? For follow up??

## 2019-12-23 ENCOUNTER — LAB VISIT (OUTPATIENT)
Dept: LAB | Facility: HOSPITAL | Age: 54
End: 2019-12-23
Attending: INTERNAL MEDICINE
Payer: COMMERCIAL

## 2019-12-23 DIAGNOSIS — N39.0 URINARY TRACT INFECTION WITHOUT HEMATURIA, SITE UNSPECIFIED: ICD-10-CM

## 2019-12-23 LAB
AMORPH CRY UR QL COMP ASSIST: NORMAL
BACTERIA #/AREA URNS AUTO: NORMAL /HPF
BILIRUB UR QL STRIP: NEGATIVE
CLARITY UR REFRACT.AUTO: ABNORMAL
COLOR UR AUTO: YELLOW
GLUCOSE UR QL STRIP: NEGATIVE
HGB UR QL STRIP: NEGATIVE
KETONES UR QL STRIP: NEGATIVE
LEUKOCYTE ESTERASE UR QL STRIP: NEGATIVE
MICROSCOPIC COMMENT: NORMAL
NITRITE UR QL STRIP: NEGATIVE
PH UR STRIP: 6 [PH] (ref 5–8)
PROT UR QL STRIP: NEGATIVE
RBC #/AREA URNS AUTO: 1 /HPF (ref 0–4)
SP GR UR STRIP: 1.01 (ref 1–1.03)
SQUAMOUS #/AREA URNS AUTO: 1 /HPF
URN SPEC COLLECT METH UR: ABNORMAL
WBC #/AREA URNS AUTO: 1 /HPF (ref 0–5)

## 2019-12-23 PROCEDURE — 87086 URINE CULTURE/COLONY COUNT: CPT

## 2019-12-23 PROCEDURE — 81001 URINALYSIS AUTO W/SCOPE: CPT

## 2019-12-25 LAB
BACTERIA UR CULT: NORMAL
BACTERIA UR CULT: NORMAL

## 2020-01-06 ENCOUNTER — TELEPHONE (OUTPATIENT)
Dept: FAMILY MEDICINE | Facility: CLINIC | Age: 55
End: 2020-01-06

## 2020-01-06 NOTE — TELEPHONE ENCOUNTER
----- Message from Anita Garcia sent at 1/6/2020  1:21 PM CST -----  Contact: Pt   Type:  Needs Medical Advice    Who Called: pt   Symptoms (please be specific): cold, congestion/ fever/ sinus   How long has patient had these symptoms:  01/05  Pharmacy name and phone #:  [facility] [address]   Would the patient rather a call back or a response via [facility]? Phone   Best Call Back Number:  [phone]  Additional Information:

## 2020-02-13 ENCOUNTER — LAB VISIT (OUTPATIENT)
Dept: LAB | Facility: HOSPITAL | Age: 55
End: 2020-02-13
Attending: UROLOGY
Payer: COMMERCIAL

## 2020-02-13 DIAGNOSIS — K92.1 BLOODY STOOL: ICD-10-CM

## 2020-02-13 DIAGNOSIS — E29.1 HYPOGONADISM IN MALE: ICD-10-CM

## 2020-02-13 LAB
ALBUMIN SERPL BCP-MCNC: 3.5 G/DL (ref 3.5–5.2)
ALP SERPL-CCNC: 110 U/L (ref 55–135)
ALT SERPL W/O P-5'-P-CCNC: 26 U/L (ref 10–44)
AST SERPL-CCNC: 25 U/L (ref 10–40)
BILIRUB DIRECT SERPL-MCNC: 0.2 MG/DL (ref 0.1–0.3)
BILIRUB SERPL-MCNC: 0.3 MG/DL (ref 0.1–1)
HCT VFR BLD AUTO: 39 % (ref 40–54)
PROT SERPL-MCNC: 7.4 G/DL (ref 6–8.4)

## 2020-02-13 PROCEDURE — 36415 COLL VENOUS BLD VENIPUNCTURE: CPT

## 2020-02-13 PROCEDURE — 85014 HEMATOCRIT: CPT

## 2020-02-13 PROCEDURE — 80076 HEPATIC FUNCTION PANEL: CPT

## 2020-02-19 ENCOUNTER — OFFICE VISIT (OUTPATIENT)
Dept: UROLOGY | Facility: CLINIC | Age: 55
End: 2020-02-19
Payer: COMMERCIAL

## 2020-02-19 VITALS — BODY MASS INDEX: 55.81 KG/M2 | WEIGHT: 315 LBS | HEIGHT: 63 IN

## 2020-02-19 DIAGNOSIS — E29.1 HYPOGONADISM IN MALE: Primary | ICD-10-CM

## 2020-02-19 LAB
BILIRUB SERPL-MCNC: NORMAL MG/DL
BLOOD URINE, POC: NORMAL
COLOR, POC UA: YELLOW
GLUCOSE UR QL STRIP: NORMAL
KETONES UR QL STRIP: NORMAL
LEUKOCYTE ESTERASE URINE, POC: NORMAL
NITRITE, POC UA: NORMAL
PH, POC UA: 6
PROTEIN, POC: NORMAL
SPECIFIC GRAVITY, POC UA: 1
UROBILINOGEN, POC UA: NORMAL

## 2020-02-19 PROCEDURE — 99213 PR OFFICE/OUTPT VISIT, EST, LEVL III, 20-29 MIN: ICD-10-PCS | Mod: 25,S$GLB,, | Performed by: UROLOGY

## 2020-02-19 PROCEDURE — 99999 PR PBB SHADOW E&M-EST. PATIENT-LVL III: CPT | Mod: PBBFAC,,, | Performed by: UROLOGY

## 2020-02-19 PROCEDURE — 81002 URINALYSIS NONAUTO W/O SCOPE: CPT | Mod: S$GLB,,, | Performed by: UROLOGY

## 2020-02-19 PROCEDURE — 99213 OFFICE O/P EST LOW 20 MIN: CPT | Mod: 25,S$GLB,, | Performed by: UROLOGY

## 2020-02-19 PROCEDURE — 3008F BODY MASS INDEX DOCD: CPT | Mod: CPTII,S$GLB,, | Performed by: UROLOGY

## 2020-02-19 PROCEDURE — 81002 POCT URINE DIPSTICK WITHOUT MICROSCOPE: ICD-10-PCS | Mod: S$GLB,,, | Performed by: UROLOGY

## 2020-02-19 PROCEDURE — 3008F PR BODY MASS INDEX (BMI) DOCUMENTED: ICD-10-PCS | Mod: CPTII,S$GLB,, | Performed by: UROLOGY

## 2020-02-19 PROCEDURE — 99999 PR PBB SHADOW E&M-EST. PATIENT-LVL III: ICD-10-PCS | Mod: PBBFAC,,, | Performed by: UROLOGY

## 2020-02-19 RX ORDER — TESTOSTERONE CYPIONATE 200 MG/ML
INJECTION, SOLUTION INTRAMUSCULAR
Qty: 10 ML | Refills: 1 | Status: SHIPPED | OUTPATIENT
Start: 2020-02-19 | End: 2020-08-19 | Stop reason: SDUPTHER

## 2020-02-19 NOTE — PROGRESS NOTES
Chief Complaint: Hypogonadism    HPI:   2/19/20: Didn't get the hemorrhoid looked at, but that seems better for him.  Labs ok.  T going well.  Reviewed history in detail.   8/19/19: Seeing blood when he wipes.  T going well.  Labs ok.  Reviewed history in detail.   1/9/19: T going well, no problems.  Reviewed history in detail.    7/5/18: T going well, no problems. Needs needles.  Reviewed history in detail.  PSA normal.  1/3/18:  Labs were fine 9/17. Injections going fine doing it monthly.  Satisfied.  8/8/17: Testim no longer approved by his insurance just injections.  Otherwise doing fine.  Labs in hand Hct 37.5.  Has a week of T gel left.  T is low without therapy but he feels the same.  Will strive to give adequate amount for general health but not so much he is in risk from it.  6/21/17: No problems from testim, reviewed history in detail.  12/15/16: No interval changes doing well.  6/16/16: 49 yo pt of Dr. Olivo treated with Testim.  No abd/pelvic pain and no exac/rel factors.  No hematuria.  No urolithiasis.  No urinary bother.  No other  history. No family prostate cancer history. Was sluggish and weak and now on the T things are better with a good energy level.    Allergies:  Naproxen    Medications:  has a current medication list which includes the following prescription(s): acetaminophen, albuterol, allopurinol, amlodipine, atorvastatin, azilsartan med-chlorthalidone, baclofen, blood sugar diagnostic, blunt needle, disposable, cetirizine, ergocalciferol, testosterone cypionate, colchicine, hydrocodone-homatropine 5-1.5 mg/5 ml, and needle (disp) 22 g.    Review of Systems:  General: No fever, chills, fatigability, or weight loss.  Skin: No rashes, itching, or changes in color or texture of skin.  Chest: Denies AVILES, cyanosis, wheezing, cough, and sputum production.  Abdomen: Appetite fine. No weight loss. Denies diarrhea, abdominal pain, hematemesis, or blood in stool.  Musculoskeletal: No joint  stiffness or swelling. Denies back pain.  : As above.  All other review of systems negative.    PMH:   has a past medical history of Diabetes mellitus, type 2 (diagnosed 2013), DM (diabetes mellitus), HTN (hypertension), Hypogonadism, testicular, and Obesities, morbid.    PSH:   has a past surgical history that includes Colonoscopy (N/A, 6/29/2018).    FamHx: family history includes Cataracts in his mother; Diabetes in his mother and sister; Hypertension in his mother.    SocHx:  reports that he has never smoked. He has never used smokeless tobacco. He reports that he drinks about 3.0 - 4.0 standard drinks of alcohol per week. He reports that he does not use drugs.      Physical Exam:  There were no vitals filed for this visit.  General: A&Ox3, no apparent distress, no deformities  Neck: No masses, normal thyroid  Lungs: normal inspiration, no use of accessory muscles  Heart: normal pulse, no arrhythmias  Abdomen: Soft, NT, ND  Skin: The skin is warm and dry. No jaundice.  Ext: No c/c/e.  : deferred    Labs/Studies:   Urinalysis performed in clinic, summary: UA normal  PSA    9/15: 1.2    12/16: 0.4    7/17: 0.6    6/18: 1.1    7/19: 0.72    Impression/Plan:   1. Continue T injections monthly.  Refilled.  RTC with labs 6 mo.  2. Gi referral possible hemorrhoid.

## 2020-03-17 ENCOUNTER — OFFICE VISIT (OUTPATIENT)
Dept: FAMILY MEDICINE | Facility: CLINIC | Age: 55
End: 2020-03-17
Payer: COMMERCIAL

## 2020-03-17 ENCOUNTER — LAB VISIT (OUTPATIENT)
Dept: LAB | Facility: HOSPITAL | Age: 55
End: 2020-03-17
Payer: COMMERCIAL

## 2020-03-17 VITALS
TEMPERATURE: 98 F | RESPIRATION RATE: 16 BRPM | SYSTOLIC BLOOD PRESSURE: 122 MMHG | BODY MASS INDEX: 54.85 KG/M2 | OXYGEN SATURATION: 98 % | DIASTOLIC BLOOD PRESSURE: 68 MMHG | HEART RATE: 85 BPM | WEIGHT: 309.63 LBS

## 2020-03-17 DIAGNOSIS — E78.2 MIXED HYPERLIPIDEMIA: Primary | ICD-10-CM

## 2020-03-17 DIAGNOSIS — L03.113 CELLULITIS OF HAND, RIGHT: ICD-10-CM

## 2020-03-17 DIAGNOSIS — E11.9 DIABETES MELLITUS WITHOUT COMPLICATION: ICD-10-CM

## 2020-03-17 DIAGNOSIS — I10 ESSENTIAL HYPERTENSION, BENIGN: ICD-10-CM

## 2020-03-17 DIAGNOSIS — M10.9 GOUTY ARTHROPATHY: ICD-10-CM

## 2020-03-17 DIAGNOSIS — E78.2 MIXED HYPERLIPIDEMIA: ICD-10-CM

## 2020-03-17 LAB
BASOPHILS # BLD AUTO: 0.06 K/UL (ref 0–0.2)
BASOPHILS NFR BLD: 0.9 % (ref 0–1.9)
DIFFERENTIAL METHOD: ABNORMAL
EOSINOPHIL # BLD AUTO: 0.1 K/UL (ref 0–0.5)
EOSINOPHIL NFR BLD: 1.2 % (ref 0–8)
ERYTHROCYTE [DISTWIDTH] IN BLOOD BY AUTOMATED COUNT: 18.3 % (ref 11.5–14.5)
HCT VFR BLD AUTO: 44.6 % (ref 40–54)
HGB BLD-MCNC: 12.6 G/DL (ref 14–18)
IMM GRANULOCYTES # BLD AUTO: 0.01 K/UL (ref 0–0.04)
IMM GRANULOCYTES NFR BLD AUTO: 0.2 % (ref 0–0.5)
LYMPHOCYTES # BLD AUTO: 2 K/UL (ref 1–4.8)
LYMPHOCYTES NFR BLD: 30 % (ref 18–48)
MCH RBC QN AUTO: 23.6 PG (ref 27–31)
MCHC RBC AUTO-ENTMCNC: 28.3 G/DL (ref 32–36)
MCV RBC AUTO: 84 FL (ref 82–98)
MONOCYTES # BLD AUTO: 0.4 K/UL (ref 0.3–1)
MONOCYTES NFR BLD: 6.2 % (ref 4–15)
NEUTROPHILS # BLD AUTO: 4 K/UL (ref 1.8–7.7)
NEUTROPHILS NFR BLD: 61.5 % (ref 38–73)
NRBC BLD-RTO: 0 /100 WBC
PLATELET # BLD AUTO: 293 K/UL (ref 150–350)
PMV BLD AUTO: 10.3 FL (ref 9.2–12.9)
RBC # BLD AUTO: 5.34 M/UL (ref 4.6–6.2)
TSH SERPL DL<=0.005 MIU/L-ACNC: 2.13 UIU/ML (ref 0.4–4)
WBC # BLD AUTO: 6.49 K/UL (ref 3.9–12.7)

## 2020-03-17 PROCEDURE — 99214 OFFICE O/P EST MOD 30 MIN: CPT | Mod: S$GLB,,, | Performed by: INTERNAL MEDICINE

## 2020-03-17 PROCEDURE — 3044F HG A1C LEVEL LT 7.0%: CPT | Mod: CPTII,S$GLB,, | Performed by: INTERNAL MEDICINE

## 2020-03-17 PROCEDURE — 86703 HIV-1/HIV-2 1 RESULT ANTBDY: CPT

## 2020-03-17 PROCEDURE — 3008F PR BODY MASS INDEX (BMI) DOCUMENTED: ICD-10-PCS | Mod: CPTII,S$GLB,, | Performed by: INTERNAL MEDICINE

## 2020-03-17 PROCEDURE — 3074F SYST BP LT 130 MM HG: CPT | Mod: CPTII,S$GLB,, | Performed by: INTERNAL MEDICINE

## 2020-03-17 PROCEDURE — 3078F PR MOST RECENT DIASTOLIC BLOOD PRESSURE < 80 MM HG: ICD-10-PCS | Mod: CPTII,S$GLB,, | Performed by: INTERNAL MEDICINE

## 2020-03-17 PROCEDURE — 3008F BODY MASS INDEX DOCD: CPT | Mod: CPTII,S$GLB,, | Performed by: INTERNAL MEDICINE

## 2020-03-17 PROCEDURE — 84443 ASSAY THYROID STIM HORMONE: CPT

## 2020-03-17 PROCEDURE — 85025 COMPLETE CBC W/AUTO DIFF WBC: CPT

## 2020-03-17 PROCEDURE — 83036 HEMOGLOBIN GLYCOSYLATED A1C: CPT

## 2020-03-17 PROCEDURE — 36415 COLL VENOUS BLD VENIPUNCTURE: CPT | Mod: PO

## 2020-03-17 PROCEDURE — 3044F PR MOST RECENT HEMOGLOBIN A1C LEVEL <7.0%: ICD-10-PCS | Mod: CPTII,S$GLB,, | Performed by: INTERNAL MEDICINE

## 2020-03-17 PROCEDURE — 99214 PR OFFICE/OUTPT VISIT, EST, LEVL IV, 30-39 MIN: ICD-10-PCS | Mod: S$GLB,,, | Performed by: INTERNAL MEDICINE

## 2020-03-17 PROCEDURE — 99999 PR PBB SHADOW E&M-EST. PATIENT-LVL IV: ICD-10-PCS | Mod: PBBFAC,,, | Performed by: INTERNAL MEDICINE

## 2020-03-17 PROCEDURE — 3078F DIAST BP <80 MM HG: CPT | Mod: CPTII,S$GLB,, | Performed by: INTERNAL MEDICINE

## 2020-03-17 PROCEDURE — 3074F PR MOST RECENT SYSTOLIC BLOOD PRESSURE < 130 MM HG: ICD-10-PCS | Mod: CPTII,S$GLB,, | Performed by: INTERNAL MEDICINE

## 2020-03-17 PROCEDURE — 99999 PR PBB SHADOW E&M-EST. PATIENT-LVL IV: CPT | Mod: PBBFAC,,, | Performed by: INTERNAL MEDICINE

## 2020-03-17 RX ORDER — MUPIROCIN 20 MG/G
OINTMENT TOPICAL 2 TIMES DAILY
Qty: 15 G | Refills: 0 | Status: SHIPPED | OUTPATIENT
Start: 2020-03-17 | End: 2021-03-11

## 2020-03-17 RX ORDER — SULFAMETHOXAZOLE AND TRIMETHOPRIM 800; 160 MG/1; MG/1
1 TABLET ORAL 2 TIMES DAILY
Qty: 20 TABLET | Refills: 0 | Status: SHIPPED | OUTPATIENT
Start: 2020-03-17 | End: 2020-03-27

## 2020-03-17 NOTE — PROGRESS NOTES
Subjective:       Patient ID: Juan C Rodriguez is a 54 y.o. male.    Chief Complaint: Follow-up; Hypertension; Hyperlipidemia; and Diabetes    Follow-up   Associated symptoms include arthralgias and myalgias. Pertinent negatives include no abdominal pain, chest pain, chills, coughing, diaphoresis, fatigue, fever, headaches, joint swelling, nausea, neck pain, numbness, rash, sore throat, vomiting or weakness.   Hypertension   Pertinent negatives include no chest pain, headaches, neck pain, palpitations or shortness of breath.   Hyperlipidemia   Associated symptoms include myalgias. Pertinent negatives include no chest pain or shortness of breath.   Diabetes   Pertinent negatives for hypoglycemia include no confusion, dizziness, headaches, nervousness/anxiousness, pallor, seizures, speech difficulty or tremors. Pertinent negatives for diabetes include no chest pain, no fatigue, no polydipsia, no polyphagia, no polyuria and no weakness.     Past Medical History:   Diagnosis Date    Diabetes mellitus, type 2 diagnosed 2013    DM (diabetes mellitus)     2011  02/25/2014    HTN (hypertension)     Hypogonadism, testicular     Obesities, morbid      Past Surgical History:   Procedure Laterality Date    COLONOSCOPY N/A 6/29/2018    Procedure: COLONOSCOPY;  Surgeon: Jeremiah Anaya III, MD;  Location: [facility];  Service: Endoscopy;  Laterality: N/A;     Family History   Problem Relation Age of Onset    Cataracts Mother     Diabetes Mother     Hypertension Mother     Diabetes Sister      Social History     Socioeconomic History    Marital status: Single     Spouse name: Not on file    Number of children: Not on file    Years of education: Not on file    Highest education level: Not on file   Occupational History    Not on file   Social Needs    Financial resource strain: Not on file    Food insecurity:     Worry: Not on file     Inability: Not on file    Transportation needs:      Medical: Not on file     Non-medical: Not on file   Tobacco Use    Smoking status: Never Smoker    Smokeless tobacco: Never Used   Substance and Sexual Activity    Alcohol use: Yes     Alcohol/week: 3.0 - 4.0 standard drinks     Types: 3 - 4 Cans of beer per week     Comment: occasional weekends    Drug use: No    Sexual activity: Not on file   Lifestyle    Physical activity:     Days per week: Not on file     Minutes per session: Not on file    Stress: Not on file   Relationships    Social connections:     Talks on phone: Not on file     Gets together: Not on file     Attends [employer] service: Not on file     Active member of club or organization: Not on file     Attends meetings of clubs or organizations: Not on file     Relationship status: Not on file   Other Topics Concern    Not on file   Social History Narrative    Not on file     Review of Systems   Constitutional: Negative for activity change, appetite change, chills, diaphoresis, fatigue, fever and unexpected weight change.   HENT: Negative for drooling, ear discharge, ear pain, facial swelling, hearing loss, mouth sores, nosebleeds, postnasal drip, rhinorrhea, sinus pressure, sneezing, sore throat, tinnitus, trouble swallowing and voice change.    Eyes: Negative for photophobia, redness and visual disturbance.   Respiratory: Negative for apnea, cough, choking, chest tightness, shortness of breath and wheezing.    Cardiovascular: Negative for chest pain and palpitations.   Gastrointestinal: Negative for abdominal distention, abdominal pain, anal bleeding, blood in stool, constipation, diarrhea, nausea and vomiting.   Endocrine: Negative for cold intolerance, heat intolerance, polydipsia, polyphagia and polyuria.   Genitourinary: Negative for difficulty urinating, dysuria, enuresis, flank pain, frequency, genital sores, hematuria and urgency.   Musculoskeletal: Positive for arthralgias and myalgias. Negative for back pain, gait problem, joint  swelling, neck pain and neck stiffness.        Mild right dorsal hand swelling for 1-2 weeks-   Skin: Negative for color change, pallor, rash and wound.   Allergic/Immunologic: Negative for food allergies and immunocompromised state.   Neurological: Negative for dizziness, tremors, seizures, syncope, facial asymmetry, speech difficulty, weakness, light-headedness, numbness and headaches.   Hematological: Negative for adenopathy. Does not bruise/bleed easily.   Psychiatric/Behavioral: Negative for agitation, behavioral problems, confusion, decreased concentration, dysphoric mood, hallucinations, self-injury, sleep disturbance and suicidal ideas. The patient is not nervous/anxious and is not hyperactive.        Objective:      Physical Exam   Constitutional: He is oriented to person, place, and time. He appears well-developed and well-nourished. No distress.   HENT:   Head: Normocephalic and atraumatic.   Eyes: Pupils are equal, round, and reactive to light. No scleral icterus.   Neck: Normal range of motion. Neck supple. No JVD present. Carotid bruit is not present. No tracheal deviation present. No thyromegaly present.   Cardiovascular: Normal rate, regular rhythm, normal heart sounds and intact distal pulses.   Pulmonary/Chest: Effort normal and breath sounds normal. No respiratory distress. He has no wheezes. He has no rales. He exhibits no tenderness.   Abdominal: Soft. Bowel sounds are normal. He exhibits no distension. There is no tenderness. There is no rebound and no guarding.   Musculoskeletal: Normal range of motion. He exhibits no edema or tenderness.   Lymphadenopathy:     He has no cervical adenopathy.   Neurological: He is alert and oriented to person, place, and time.   Skin: Skin is warm and dry. No rash noted. He is not diaphoretic. No erythema. No pallor.   Mild swelling, erythema dorsal right hand-   Psychiatric: He has a normal mood and affect. His behavior is normal. Judgment and thought content  normal.   Nursing note and vitals reviewed.      CMP  Sodium   Date Value Ref Range Status   11/19/2019 142 136 - 145 mmol/L Final     Potassium   Date Value Ref Range Status   11/19/2019 3.8 3.5 - 5.1 mmol/L Final     Chloride   Date Value Ref Range Status   11/19/2019 101 95 - 110 mmol/L Final     CO2   Date Value Ref Range Status   11/19/2019 30 (H) 23 - 29 mmol/L Final     Glucose   Date Value Ref Range Status   11/19/2019 99 70 - 110 mg/dL Final     BUN, Bld   Date Value Ref Range Status   11/19/2019 17 6 - 20 mg/dL Final     Creatinine   Date Value Ref Range Status   11/19/2019 1.3 0.5 - 1.4 mg/dL Final     Calcium   Date Value Ref Range Status   11/19/2019 9.4 8.7 - 10.5 mg/dL Final     Total Protein   Date Value Ref Range Status   02/13/2020 7.4 6.0 - 8.4 g/dL Final     Albumin   Date Value Ref Range Status   02/13/2020 3.5 3.5 - 5.2 g/dL Final   05/31/2016 4.2 3.6 - 5.1 g/dL Final     Comment:     @ Test Performed By:  [employer] [facility] KELLY Chinchilla M.D.,   [address] [place] [id number]  [facility]  [id number]       Total Bilirubin   Date Value Ref Range Status   02/13/2020 0.3 0.1 - 1.0 mg/dL Final     Comment:     For infants and newborns, interpretation of results should be based  on gestational age, weight and in agreement with clinical  observations.  Premature Infant recommended reference ranges:  Up to 24 hours.............<8.0 mg/dL  Up to 48 hours............<12.0 mg/dL  3-5 days..................<15.0 mg/dL  6-29 days.................<15.0 mg/dL       Alkaline Phosphatase   Date Value Ref Range Status   02/13/2020 110 55 - 135 U/L Final     AST   Date Value Ref Range Status   02/13/2020 25 10 - 40 U/L Final     ALT   Date Value Ref Range Status   02/13/2020 26 10 - 44 U/L Final     Anion Gap   Date Value Ref Range Status   11/19/2019 11 8 - 16 mmol/L Final     eGFR if    Date Value Ref Range Status   11/19/2019 >60.0 >60  mL/min/1.73 m^2 Final     eGFR if non    Date Value Ref Range Status   11/19/2019 >60.0 >60 mL/min/1.73 m^2 Final     Comment:     Calculation used to obtain the estimated glomerular filtration  rate (eGFR) is the CKD-EPI equation.        Lab Results   Component Value Date    WBC 6.41 12/11/2019    HGB 13.0 (L) 12/11/2019    HCT 39.0 (L) 02/13/2020    MCV 80 (L) 12/11/2019     12/11/2019     Lab Results   Component Value Date    CHOL 174 11/19/2019     Lab Results   Component Value Date    HDL 61 11/19/2019     Lab Results   Component Value Date    LDLCALC 103.0 11/19/2019     Lab Results   Component Value Date    TRIG 50 11/19/2019     Lab Results   Component Value Date    CHOLHDL 35.1 11/19/2019     Lab Results   Component Value Date    TSH 2.609 09/05/2017     Lab Results   Component Value Date    LABA1C 6.1 (H) 03/16/2017    HGBA1C 6.8 (H) 11/19/2019     Assessment:       1. Mixed hyperlipidemia    2. Essential hypertension, benign    3. Diabetes mellitus without complication    4. Gouty arthropathy    5. Cellulitis of hand, right        Plan:   Mixed hyperlipidemia  -     HIV 1/2 Ag/Ab (4th Gen); Future; Expected date: 03/17/2020    Essential hypertension, benign  -     CBC auto differential; Future; Expected date: 03/17/2020  -     TSH; Future; Expected date: 03/17/2020    Diabetes mellitus without complication  -     Hemoglobin A1c; Future; Expected date: 03/17/2020    Gouty arthropathy    Cellulitis of hand, right------bactrim, bactroban-----call if persists---    Other orders  -     sulfamethoxazole-trimethoprim 800-160mg (BACTRIM DS) 800-160 mg Tab; Take 1 tablet by mouth 2 (two) times daily. for 10 days  Dispense: 20 tablet; Refill: 0  -     mupirocin (BACTROBAN) 2 % ointment; Apply topically 2 (two) times daily.  Dispense: 15 g; Refill: 0  Continue current meds-----------F/u 2 weeks------

## 2020-03-18 LAB
ESTIMATED AVG GLUCOSE: 131 MG/DL (ref 68–131)
HBA1C MFR BLD HPLC: 6.2 % (ref 4–5.6)
HIV 1+2 AB+HIV1 P24 AG SERPL QL IA: NEGATIVE

## 2020-03-25 ENCOUNTER — PATIENT OUTREACH (OUTPATIENT)
Dept: ADMINISTRATIVE | Facility: HOSPITAL | Age: 55
End: 2020-03-25

## 2020-03-27 ENCOUNTER — PATIENT OUTREACH (OUTPATIENT)
Dept: ADMINISTRATIVE | Facility: HOSPITAL | Age: 55
End: 2020-03-27

## 2020-04-06 ENCOUNTER — OFFICE VISIT (OUTPATIENT)
Dept: FAMILY MEDICINE | Facility: CLINIC | Age: 55
End: 2020-04-06
Payer: COMMERCIAL

## 2020-04-06 VITALS
HEART RATE: 81 BPM | OXYGEN SATURATION: 96 % | DIASTOLIC BLOOD PRESSURE: 69 MMHG | WEIGHT: 313.38 LBS | TEMPERATURE: 98 F | BODY MASS INDEX: 55.51 KG/M2 | SYSTOLIC BLOOD PRESSURE: 132 MMHG

## 2020-04-06 DIAGNOSIS — M1A.00X0 IDIOPATHIC CHRONIC GOUT WITHOUT TOPHUS, UNSPECIFIED SITE: Primary | ICD-10-CM

## 2020-04-06 PROCEDURE — 99999 PR PBB SHADOW E&M-EST. PATIENT-LVL IV: CPT | Mod: PBBFAC,,, | Performed by: INTERNAL MEDICINE

## 2020-04-06 PROCEDURE — 3078F DIAST BP <80 MM HG: CPT | Mod: CPTII,S$GLB,, | Performed by: INTERNAL MEDICINE

## 2020-04-06 PROCEDURE — 3075F PR MOST RECENT SYSTOLIC BLOOD PRESS GE 130-139MM HG: ICD-10-PCS | Mod: CPTII,S$GLB,, | Performed by: INTERNAL MEDICINE

## 2020-04-06 PROCEDURE — 3008F BODY MASS INDEX DOCD: CPT | Mod: CPTII,S$GLB,, | Performed by: INTERNAL MEDICINE

## 2020-04-06 PROCEDURE — 3075F SYST BP GE 130 - 139MM HG: CPT | Mod: CPTII,S$GLB,, | Performed by: INTERNAL MEDICINE

## 2020-04-06 PROCEDURE — 3008F PR BODY MASS INDEX (BMI) DOCUMENTED: ICD-10-PCS | Mod: CPTII,S$GLB,, | Performed by: INTERNAL MEDICINE

## 2020-04-06 PROCEDURE — 99999 PR PBB SHADOW E&M-EST. PATIENT-LVL IV: ICD-10-PCS | Mod: PBBFAC,,, | Performed by: INTERNAL MEDICINE

## 2020-04-06 PROCEDURE — 99213 PR OFFICE/OUTPT VISIT, EST, LEVL III, 20-29 MIN: ICD-10-PCS | Mod: S$GLB,,, | Performed by: INTERNAL MEDICINE

## 2020-04-06 PROCEDURE — 3078F PR MOST RECENT DIASTOLIC BLOOD PRESSURE < 80 MM HG: ICD-10-PCS | Mod: CPTII,S$GLB,, | Performed by: INTERNAL MEDICINE

## 2020-04-06 PROCEDURE — 99213 OFFICE O/P EST LOW 20 MIN: CPT | Mod: S$GLB,,, | Performed by: INTERNAL MEDICINE

## 2020-04-06 RX ORDER — COLCHICINE 0.6 MG/1
0.6 TABLET ORAL 2 TIMES DAILY
Qty: 20 TABLET | Refills: 0 | Status: SHIPPED | OUTPATIENT
Start: 2020-04-06 | End: 2020-05-11

## 2020-04-06 NOTE — PROGRESS NOTES
Subjective:       Patient ID: Juan C Rodriguez is a 54 y.o. male.    Chief Complaint: Pain    Continues with right hand/wrist pain---------no fever or chills-    Pain   Pertinent negatives include no abdominal pain, chest pain, chills, coughing, fever, nausea, vomiting or weakness.     Past Medical History:   Diagnosis Date    Diabetes mellitus, type 2 diagnosed 2013    DM (diabetes mellitus)     2011  02/25/2014    HTN (hypertension)     Hypogonadism, testicular     Obesities, morbid      Past Surgical History:   Procedure Laterality Date    COLONOSCOPY N/A 6/29/2018    Procedure: COLONOSCOPY;  Surgeon: Jeremiah Anaya III, MD;  Location: [facility];  Service: Endoscopy;  Laterality: N/A;     Family History   Problem Relation Age of Onset    Cataracts Mother     Diabetes Mother     Hypertension Mother     Diabetes Sister      Social History     Socioeconomic History    Marital status: Single     Spouse name: Not on file    Number of children: Not on file    Years of education: Not on file    Highest education level: Not on file   Occupational History    Not on file   Social Needs    Financial resource strain: Not on file    Food insecurity:     Worry: Not on file     Inability: Not on file    Transportation needs:     Medical: Not on file     Non-medical: Not on file   Tobacco Use    Smoking status: Never Smoker    Smokeless tobacco: Never Used   Substance and Sexual Activity    Alcohol use: Yes     Alcohol/week: 3.0 - 4.0 standard drinks     Types: 3 - 4 Cans of beer per week     Comment: occasional weekends    Drug use: No    Sexual activity: Not on file   Lifestyle    Physical activity:     Days per week: Not on file     Minutes per session: Not on file    Stress: Not on file   Relationships    Social connections:     Talks on phone: Not on file     Gets together: Not on file     Attends Scientologist service: Not on file     Active member of club or organization: Not on  file     Attends meetings of clubs or organizations: Not on file     Relationship status: Not on file   Other Topics Concern    Not on file   Social History Narrative    Not on file     Review of Systems   Constitutional: Negative for chills and fever.   HENT: Negative.    Respiratory: Negative for apnea, cough, choking, chest tightness, shortness of breath, wheezing and stridor.    Cardiovascular: Negative for chest pain and palpitations.   Gastrointestinal: Negative for abdominal pain, nausea and vomiting.   Genitourinary: Negative for dysuria.   Neurological: Negative for dizziness, weakness and light-headedness.   Psychiatric/Behavioral: Negative for agitation, behavioral problems and confusion.       Objective:      Physical Exam   Constitutional: He is oriented to person, place, and time.   Cardiovascular: Normal rate, regular rhythm, normal heart sounds and intact distal pulses.   Pulmonary/Chest: Effort normal and breath sounds normal.   Abdominal: Soft. Bowel sounds are normal.   Musculoskeletal:   Tender right dorsal hand and wrist------   Neurological: He is alert and oriented to person, place, and time.   Nursing note and vitals reviewed.      CMP  Sodium   Date Value Ref Range Status   11/19/2019 142 136 - 145 mmol/L Final     Potassium   Date Value Ref Range Status   11/19/2019 3.8 3.5 - 5.1 mmol/L Final     Chloride   Date Value Ref Range Status   11/19/2019 101 95 - 110 mmol/L Final     CO2   Date Value Ref Range Status   11/19/2019 30 (H) 23 - 29 mmol/L Final     Glucose   Date Value Ref Range Status   11/19/2019 99 70 - 110 mg/dL Final     BUN, Bld   Date Value Ref Range Status   11/19/2019 17 6 - 20 mg/dL Final     Creatinine   Date Value Ref Range Status   11/19/2019 1.3 0.5 - 1.4 mg/dL Final     Calcium   Date Value Ref Range Status   11/19/2019 9.4 8.7 - 10.5 mg/dL Final     Total Protein   Date Value Ref Range Status   02/13/2020 7.4 6.0 - 8.4 g/dL Final     Albumin   Date Value Ref Range  Status   02/13/2020 3.5 3.5 - 5.2 g/dL Final   05/31/2016 4.2 3.6 - 5.1 g/dL Final     Comment:     @ Test Performed By:  [employer] KELLY Chinchilla M.D.,   [address] [place] [address]  [place]  [id number]       Total Bilirubin   Date Value Ref Range Status   02/13/2020 0.3 0.1 - 1.0 mg/dL Final     Comment:     For infants and newborns, interpretation of results should be based  on gestational age, weight and in agreement with clinical  observations.  Premature Infant recommended reference ranges:  Up to 24 hours.............<8.0 mg/dL  Up to 48 hours............<12.0 mg/dL  3-5 days..................<15.0 mg/dL  6-29 days.................<15.0 mg/dL       Alkaline Phosphatase   Date Value Ref Range Status   02/13/2020 110 55 - 135 U/L Final     AST   Date Value Ref Range Status   02/13/2020 25 10 - 40 U/L Final     ALT   Date Value Ref Range Status   02/13/2020 26 10 - 44 U/L Final     Anion Gap   Date Value Ref Range Status   11/19/2019 11 8 - 16 mmol/L Final     eGFR if    Date Value Ref Range Status   11/19/2019 >60.0 >60 mL/min/1.73 m^2 Final     eGFR if non    Date Value Ref Range Status   11/19/2019 >60.0 >60 mL/min/1.73 m^2 Final     Comment:     Calculation used to obtain the estimated glomerular filtration  rate (eGFR) is the CKD-EPI equation.        Lab Results   Component Value Date    WBC 6.49 03/17/2020    HGB 12.6 (L) 03/17/2020    HCT 44.6 03/17/2020    MCV 84 03/17/2020     03/17/2020     Lab Results   Component Value Date    CHOL 174 11/19/2019     Lab Results   Component Value Date    HDL 61 11/19/2019     Lab Results   Component Value Date    LDLCALC 103.0 11/19/2019     Lab Results   Component Value Date    TRIG 50 11/19/2019     Lab Results   Component Value Date    CHOLHDL 35.1 11/19/2019     Lab Results   Component Value Date    TSH 2.126 03/17/2020     Lab Results   Component Value Date     LABA1C 6.1 (H) 03/16/2017    HGBA1C 6.2 (H) 03/17/2020     Assessment:       1. Idiopathic chronic gout without tophus, unspecified site        Plan:   Idiopathic chronic gout without tophus, unspecified site  -     X-Ray Hand 2 View Right; Future; Expected date: 04/06/2020  -     X-Ray Wrist 2 View Right; Future; Expected date: 04/06/2020  -     colchicine (COLCRYS) 0.6 mg tablet; Take 1 tablet (0.6 mg total) by mouth 2 (two) times daily. for 10 days  Dispense: 20 tablet; Refill: 0    call if persists----------

## 2020-05-11 ENCOUNTER — OFFICE VISIT (OUTPATIENT)
Dept: FAMILY MEDICINE | Facility: CLINIC | Age: 55
End: 2020-05-11
Payer: COMMERCIAL

## 2020-05-11 ENCOUNTER — HOSPITAL ENCOUNTER (OUTPATIENT)
Dept: RADIOLOGY | Facility: HOSPITAL | Age: 55
Discharge: HOME | End: 2020-05-11
Attending: INTERNAL MEDICINE
Payer: COMMERCIAL

## 2020-05-11 VITALS
OXYGEN SATURATION: 97 % | HEART RATE: 79 BPM | DIASTOLIC BLOOD PRESSURE: 69 MMHG | WEIGHT: 315 LBS | BODY MASS INDEX: 57.76 KG/M2 | TEMPERATURE: 98 F | SYSTOLIC BLOOD PRESSURE: 139 MMHG | RESPIRATION RATE: 16 BRPM

## 2020-05-11 DIAGNOSIS — M1A.00X0 IDIOPATHIC CHRONIC GOUT WITHOUT TOPHUS, UNSPECIFIED SITE: ICD-10-CM

## 2020-05-11 DIAGNOSIS — M1A.00X0 IDIOPATHIC CHRONIC GOUT WITHOUT TOPHUS, UNSPECIFIED SITE: Primary | ICD-10-CM

## 2020-05-11 PROCEDURE — 73100 XR WRIST 2 VIEW RIGHT: ICD-10-PCS | Mod: 26,RT,, | Performed by: RADIOLOGY

## 2020-05-11 PROCEDURE — 3008F BODY MASS INDEX DOCD: CPT | Mod: CPTII,S$GLB,, | Performed by: INTERNAL MEDICINE

## 2020-05-11 PROCEDURE — 73120 X-RAY EXAM OF HAND: CPT | Mod: 26,RT,, | Performed by: RADIOLOGY

## 2020-05-11 PROCEDURE — 99213 PR OFFICE/OUTPT VISIT, EST, LEVL III, 20-29 MIN: ICD-10-PCS | Mod: S$GLB,,, | Performed by: INTERNAL MEDICINE

## 2020-05-11 PROCEDURE — 3075F PR MOST RECENT SYSTOLIC BLOOD PRESS GE 130-139MM HG: ICD-10-PCS | Mod: CPTII,S$GLB,, | Performed by: INTERNAL MEDICINE

## 2020-05-11 PROCEDURE — 99213 OFFICE O/P EST LOW 20 MIN: CPT | Mod: S$GLB,,, | Performed by: INTERNAL MEDICINE

## 2020-05-11 PROCEDURE — 3078F DIAST BP <80 MM HG: CPT | Mod: CPTII,S$GLB,, | Performed by: INTERNAL MEDICINE

## 2020-05-11 PROCEDURE — 3008F PR BODY MASS INDEX (BMI) DOCUMENTED: ICD-10-PCS | Mod: CPTII,S$GLB,, | Performed by: INTERNAL MEDICINE

## 2020-05-11 PROCEDURE — 73120 XR HAND 2 VIEW RIGHT: ICD-10-PCS | Mod: 26,RT,, | Performed by: RADIOLOGY

## 2020-05-11 PROCEDURE — 73100 X-RAY EXAM OF WRIST: CPT | Mod: 26,RT,, | Performed by: RADIOLOGY

## 2020-05-11 PROCEDURE — 3078F PR MOST RECENT DIASTOLIC BLOOD PRESSURE < 80 MM HG: ICD-10-PCS | Mod: CPTII,S$GLB,, | Performed by: INTERNAL MEDICINE

## 2020-05-11 PROCEDURE — 99999 PR PBB SHADOW E&M-EST. PATIENT-LVL V: CPT | Mod: PBBFAC,,, | Performed by: INTERNAL MEDICINE

## 2020-05-11 PROCEDURE — 73120 X-RAY EXAM OF HAND: CPT | Mod: TC,FY,PO,RT

## 2020-05-11 PROCEDURE — 73100 X-RAY EXAM OF WRIST: CPT | Mod: TC,FY,PO,RT

## 2020-05-11 PROCEDURE — 3075F SYST BP GE 130 - 139MM HG: CPT | Mod: CPTII,S$GLB,, | Performed by: INTERNAL MEDICINE

## 2020-05-11 PROCEDURE — 99999 PR PBB SHADOW E&M-EST. PATIENT-LVL V: ICD-10-PCS | Mod: PBBFAC,,, | Performed by: INTERNAL MEDICINE

## 2020-05-11 RX ORDER — COLCHICINE 0.6 MG/1
0.6 TABLET ORAL 2 TIMES DAILY
Qty: 20 TABLET | Refills: 0 | Status: SHIPPED | OUTPATIENT
Start: 2020-05-11 | End: 2020-05-14 | Stop reason: SDUPTHER

## 2020-05-11 NOTE — PROGRESS NOTES
Subjective:       Patient ID: Juan C Rodriguez is a 54 y.o. male.    Chief Complaint: Wrist Pain    Recurrence of right wrist pain---------    Wrist Pain    Pertinent negatives include no fever or numbness.     Past Medical History:   Diagnosis Date    Diabetes mellitus, type 2 diagnosed 2013    DM (diabetes mellitus)     2011  02/25/2014    HTN (hypertension)     Hypogonadism, testicular     Obesities, morbid      Past Surgical History:   Procedure Laterality Date    COLONOSCOPY N/A 6/29/2018    Procedure: COLONOSCOPY;  Surgeon: Jeremiah Anaya III, MD;  Location: [facility];  Service: Endoscopy;  Laterality: N/A;     Family History   Problem Relation Age of Onset    Cataracts Mother     Diabetes Mother     Hypertension Mother     Diabetes Sister      Social History     Socioeconomic History    Marital status: Single     Spouse name: Not on file    Number of children: Not on file    Years of education: Not on file    Highest education level: Not on file   Occupational History    Not on file   Social Needs    Financial resource strain: Not on file    Food insecurity:     Worry: Not on file     Inability: Not on file    Transportation needs:     Medical: Not on file     Non-medical: Not on file   Tobacco Use    Smoking status: Never Smoker    Smokeless tobacco: Never Used   Substance and Sexual Activity    Alcohol use: Yes     Alcohol/week: 3.0 - 4.0 standard drinks     Types: 3 - 4 Cans of beer per week     Comment: occasional weekends    Drug use: No    Sexual activity: Not on file   Lifestyle    Physical activity:     Days per week: Not on file     Minutes per session: Not on file    Stress: Not on file   Relationships    Social connections:     Talks on phone: Not on file     Gets together: Not on file     Attends Rastafarian service: Not on file     Active member of club or organization: Not on file     Attends meetings of clubs or organizations: Not on file      Relationship status: Not on file   Other Topics Concern    Not on file   Social History Narrative    Not on file     Review of Systems   Constitutional: Negative for activity change, appetite change, chills, diaphoresis, fatigue, fever and unexpected weight change.   HENT: Negative for drooling, ear discharge, ear pain, facial swelling, hearing loss, mouth sores, nosebleeds, postnasal drip, rhinorrhea, sinus pressure, sneezing, sore throat, tinnitus, trouble swallowing and voice change.    Eyes: Negative for photophobia, redness and visual disturbance.   Respiratory: Negative for apnea, cough, choking, chest tightness, shortness of breath and wheezing.    Cardiovascular: Negative for chest pain and palpitations.   Gastrointestinal: Negative for abdominal distention, abdominal pain, anal bleeding, blood in stool, constipation, diarrhea, nausea and vomiting.   Endocrine: Negative for cold intolerance, heat intolerance, polydipsia, polyphagia and polyuria.   Genitourinary: Negative for difficulty urinating, dysuria, enuresis, flank pain, frequency, genital sores, hematuria and urgency.   Musculoskeletal: Positive for arthralgias and myalgias. Negative for back pain, gait problem, joint swelling, neck pain and neck stiffness.   Skin: Negative for color change, pallor, rash and wound.   Allergic/Immunologic: Negative for food allergies and immunocompromised state.   Neurological: Negative for dizziness, tremors, seizures, syncope, facial asymmetry, speech difficulty, weakness, light-headedness, numbness and headaches.   Hematological: Negative for adenopathy. Does not bruise/bleed easily.   Psychiatric/Behavioral: Negative for agitation, behavioral problems, confusion, decreased concentration, dysphoric mood, hallucinations, self-injury, sleep disturbance and suicidal ideas. The patient is not nervous/anxious and is not hyperactive.        Objective:      Physical Exam   Constitutional: He appears well-developed and  well-nourished.   Musculoskeletal:   Tenderness right wrist--------   Nursing note and vitals reviewed.      CMP  Sodium   Date Value Ref Range Status   11/19/2019 142 136 - 145 mmol/L Final     Potassium   Date Value Ref Range Status   11/19/2019 3.8 3.5 - 5.1 mmol/L Final     Chloride   Date Value Ref Range Status   11/19/2019 101 95 - 110 mmol/L Final     CO2   Date Value Ref Range Status   11/19/2019 30 (H) 23 - 29 mmol/L Final     Glucose   Date Value Ref Range Status   11/19/2019 99 70 - 110 mg/dL Final     BUN, Bld   Date Value Ref Range Status   11/19/2019 17 6 - 20 mg/dL Final     Creatinine   Date Value Ref Range Status   11/19/2019 1.3 0.5 - 1.4 mg/dL Final     Calcium   Date Value Ref Range Status   11/19/2019 9.4 8.7 - 10.5 mg/dL Final     Total Protein   Date Value Ref Range Status   02/13/2020 7.4 6.0 - 8.4 g/dL Final     Albumin   Date Value Ref Range Status   02/13/2020 3.5 3.5 - 5.2 g/dL Final   05/31/2016 4.2 3.6 - 5.1 g/dL Final     Comment:     @ Test Performed By:  Carwow Marquez KELLY Chinchilla M.D.,   [address] [place] [id number]  [facility]  [id number]       Total Bilirubin   Date Value Ref Range Status   02/13/2020 0.3 0.1 - 1.0 mg/dL Final     Comment:     For infants and newborns, interpretation of results should be based  on gestational age, weight and in agreement with clinical  observations.  Premature Infant recommended reference ranges:  Up to 24 hours.............<8.0 mg/dL  Up to 48 hours............<12.0 mg/dL  3-5 days..................<15.0 mg/dL  6-29 days.................<15.0 mg/dL       Alkaline Phosphatase   Date Value Ref Range Status   02/13/2020 110 55 - 135 U/L Final     AST   Date Value Ref Range Status   02/13/2020 25 10 - 40 U/L Final     ALT   Date Value Ref Range Status   02/13/2020 26 10 - 44 U/L Final     Anion Gap   Date Value Ref Range Status   11/19/2019 11 8 - 16 mmol/L Final     eGFR if     Date Value Ref Range Status   11/19/2019 >60.0 >60 mL/min/1.73 m^2 Final     eGFR if non    Date Value Ref Range Status   11/19/2019 >60.0 >60 mL/min/1.73 m^2 Final     Comment:     Calculation used to obtain the estimated glomerular filtration  rate (eGFR) is the CKD-EPI equation.        Lab Results   Component Value Date    WBC 6.49 03/17/2020    HGB 12.6 (L) 03/17/2020    HCT 44.6 03/17/2020    MCV 84 03/17/2020     03/17/2020     Lab Results   Component Value Date    CHOL 174 11/19/2019     Lab Results   Component Value Date    HDL 61 11/19/2019     Lab Results   Component Value Date    LDLCALC 103.0 11/19/2019     Lab Results   Component Value Date    TRIG 50 11/19/2019     Lab Results   Component Value Date    CHOLHDL 35.1 11/19/2019     Lab Results   Component Value Date    TSH 2.126 03/17/2020     Lab Results   Component Value Date    LABA1C 6.1 (H) 03/16/2017    HGBA1C 6.2 (H) 03/17/2020     Assessment:       1. Idiopathic chronic gout without tophus, unspecified site        Plan:   Idiopathic chronic gout without tophus, unspecified site  -     X-Ray Wrist 2 View Right; Future; Expected date: 05/11/2020  -     X-Ray Hand 2 View Right; Future; Expected date: 05/11/2020  -     Uric acid; Future; Expected date: 05/11/2020  -     Ambulatory referral/consult to Rheumatology; Future; Expected date: 05/18/2020    Other orders  -     colchicine (COLCRYS) 0.6 mg tablet; Take 1 tablet (0.6 mg total) by mouth 2 (two) times daily. for 10 days  Dispense: 20 tablet; Refill: 0    Call if persists----------------

## 2020-05-11 NOTE — LETTER
May 11, 2020      [facility]  [address] [place] [address]  Phone: [phone]  Fax: [phone]       Patient: Juan C Rodriguez   YOB: 1965  Date of Visit: 05/11/2020    To Whom It May Concern:    Pamela Rodriguez  was at [facility] on 05/11/2020. He may return to work/school on 5/15/2020 with no restrictions. If you have any questions or concerns, or if I can be of further assistance, please do not hesitate to contact me.    Sincerely,    Lola Johnson LPN

## 2020-05-13 ENCOUNTER — TELEPHONE (OUTPATIENT)
Dept: RHEUMATOLOGY | Facility: CLINIC | Age: 55
End: 2020-05-13

## 2020-05-14 ENCOUNTER — OFFICE VISIT (OUTPATIENT)
Dept: RHEUMATOLOGY | Facility: CLINIC | Age: 55
End: 2020-05-14
Payer: COMMERCIAL

## 2020-05-14 DIAGNOSIS — M1A.00X0 IDIOPATHIC CHRONIC GOUT WITHOUT TOPHUS, UNSPECIFIED SITE: ICD-10-CM

## 2020-05-14 DIAGNOSIS — M10.431 OTHER SECONDARY ACUTE GOUT OF RIGHT WRIST: Primary | ICD-10-CM

## 2020-05-14 PROCEDURE — 99999 PR PBB SHADOW E&M-EST. PATIENT-LVL IV: CPT | Mod: PBBFAC,,, | Performed by: INTERNAL MEDICINE

## 2020-05-14 PROCEDURE — 99999 PR PBB SHADOW E&M-EST. PATIENT-LVL IV: ICD-10-PCS | Mod: PBBFAC,,, | Performed by: INTERNAL MEDICINE

## 2020-05-14 PROCEDURE — 99244 PR OFFICE CONSULTATION,LEVEL IV: ICD-10-PCS | Mod: 25,S$GLB,, | Performed by: INTERNAL MEDICINE

## 2020-05-14 PROCEDURE — 20605 DRAIN/INJ JOINT/BURSA W/O US: CPT | Mod: RT,S$GLB,, | Performed by: INTERNAL MEDICINE

## 2020-05-14 PROCEDURE — 99244 OFF/OP CNSLTJ NEW/EST MOD 40: CPT | Mod: 25,S$GLB,, | Performed by: INTERNAL MEDICINE

## 2020-05-14 PROCEDURE — 20605 PR DRAIN/INJECT INTERMEDIATE JOINT/BURSA: ICD-10-PCS | Mod: RT,S$GLB,, | Performed by: INTERNAL MEDICINE

## 2020-05-14 RX ORDER — ALBUTEROL SULFATE 90 UG/1
2 AEROSOL, METERED RESPIRATORY (INHALATION) EVERY 6 HOURS PRN
COMMUNITY
Start: 2020-01-09 | End: 2021-12-01

## 2020-05-14 RX ORDER — COLCHICINE 0.6 MG/1
0.6 TABLET ORAL DAILY
Qty: 30 TABLET | Refills: 3 | Status: SHIPPED | OUTPATIENT
Start: 2020-05-14 | End: 2020-08-13

## 2020-05-14 NOTE — PATIENT INSTRUCTIONS
Eating to Prevent Gout  Gout is a painful form of arthritis caused by an excess of uric acid. This is a waste product made by the body. It builds up in the body and forms crystals that collect in the joints, bringing on a gout attack. Alcohol and certain foods can trigger a gout attack. Below are some guidelines for changing your diet to help you manage gout. Your healthcare provider can work with you to determine the best eating plan for you. Know that diet is only one part of managing gout. Take your medicines as prescribed and follow the other guidelines your healthcare provider has given you.  Foods to limit  Eating too many foods containing purines may increase the levels of uric acid in your body and increase your risk for a gout attack. It may be best to limit these high-purine foods:  · Alcohol (beer, red wine). You may be told to avoid alcohol completely.  · Certain fish (anchovies, sardines, fish roes, herring, tuna, mussels, codfish, scallops, trout, and zachary)  · Certain meats (red meat, processed meat, acosta, turkey, wild game, and goose)  · Sauces and gravies made with meat  · Organ meats (such as liver, kidneys, sweetbreads, and tripe)  · Legumes (such as dried beans, peas)  · Mushrooms, spinach, asparagus, and cauliflower  · Yeast and yeast extract supplements  Foods to try  Some foods may be helpful for people with gout. You may want to try adding some of the following foods to your diet:  · Dark berries: These include blueberries, blackberries, and cherries. These berries contain chemicals that may lower uric acid.  · Tofu: Tofu, which is made from soy, is a good source of protein. Studies have shown that it may be a better choice than meat for people with gout.  · Omega fatty acids: These acids are found in fatty fish (such as salmon), certain oils (such as flax, olive, or nut oils), or nuts. They may help prevent inflammation due to gout.  The following guidelines are recommended by the  American Medical Association for people with gout. Your diet should be:  · High in fiber, whole grains, fruits, and vegetables.  · Low in protein (15% of calories should come from protein. Choose lean sources such as soy, lean meats, and poultry).  · Low in fat (no more than 30% of calories should come from fat, with only 10% coming from animal fat).   Date Last Reviewed: 6/17/2015  © 0265-7946 [employer]. [address] [place] [address], [place] [address]. All rights reserved. This information is not intended as a substitute for professional medical care. Always follow your healthcare professional's instructions.

## 2020-05-14 NOTE — LETTER
May 14, 2020    Juan C Rodriguez  [address]   [place] [address]             [facility]  Rheumatology  [address]  [place] [address]  Phone: [phone]  Fax: [phone]   May 14, 2020     Patient: Juan C Rodriguez   YOB: 1965   Date of Visit: 5/14/2020       To Whom it May Concern:    Juan C Rodriguez was seen in my clinic on 5/14/2020. He may return to work on 5/18/2020. If you have any questions or concerns, please don't hesitate to call.    Sincerely,         Leila Lora MD

## 2020-05-14 NOTE — PROGRESS NOTES
CC:  Chief Complaint   Patient presents with    Right wrist pain & swelling     Referred by : Dr Isabel       History of Present Illness:  Juan C Schilling Gainesis a 54 y.o.yo male presents for further evaluation of acute flare of gout in the right wrist.   Duration 1 month.  He has used Colcrys 0.6 mg p.o. b.i.d. for few days initially and then again he has been on it since Monday  He feels a little better with decreased pain and swelling on Colcrys  But still not normal  He can not relate his episodes of gout to noncompliance with diet, whenever he eats red meats he notices these flares  He has chronic gout with prior episodes involving his feet and wrist  He has also been on allopurinol 300 mg daily  Last uric acid normal  Last x-ray wrist suggestive of gouty erosions likely    Nonsmoker  + alcohol, drinks beer regularly  No family history of  autoimmune disease      Review of Systems:  Constitutional: Denies fever, chills. No recent weight changes.   Fatigue: no  Muscle weakness: no  Headaches: no new headaches  Eyes: No redness .  No recent visual changes.  ENT:  No oral or nasal ulcers.  Card: No chest pain.  Resp: No cough or sob.   Gastro: No nausea or vomiting.  No heartburn.  Constipation: no  Diarrhea: no  Genito:  No dysuria.  No genital ulcers.  Skin: No rash.  Raynauds:no  Neuro: No numbness / tingling.   Psych: No depression, anxiety  Endo:  no excess thirst.  Heme: no abnormal bleeding or bruising  Clots:none     Past Medical History:   Diagnosis Date    Diabetes mellitus, type 2 diagnosed 2013    DM (diabetes mellitus)     2011  02/25/2014    HTN (hypertension)     Hypogonadism, testicular     Obesities, morbid        Past Surgical History:   Procedure Laterality Date    COLONOSCOPY N/A 6/29/2018    Procedure: COLONOSCOPY;  Surgeon: Jeremiah Anaya III, MD;  [facility]: [facility];  Service: Endoscopy;  Laterality: N/A;         Social History     Tobacco Use    Smoking status:  Never Smoker    Smokeless tobacco: Never Used   Substance Use Topics    Alcohol use: Yes     Alcohol/week: 3.0 - 4.0 standard drinks     Types: 3 - 4 Cans of beer per week     Comment: occasional weekends    Drug use: No       Family History   Problem Relation Age of Onset    Cataracts Mother     Diabetes Mother     Hypertension Mother     Diabetes Sister        Review of patient's allergies indicates:   Allergen Reactions    Naproxen Swelling       Statin : yes     OBJECTIVE:     Vital Signs   Blood pressure 131/77  LA: 71    Physical Exam:  General Appearance:  NAD.   Gait: not favoring.  HEENT: PERRL.  Eyes not dry or injected.  No nasal ulcers.  Mouth not dry, no oral lesions.  Lymph: cervical, supraclavicular or axillary nodes: none abnormal   Cardio: no irregularity of S1 or S2.  No gallops or rubs.   Resp: Normal respiratory motion. Clear to auscultation bilaterally.   No abnormal chest conformation.  Abd: Soft, non-tender, nondistended.  No masses.   Skin: Head and neck,  and extremities examined. No rashes.   Neuro: Ox3.   Cranial nerves II-XII grossly intact.   Sensation intact  in both distal LE and upper extremities to light touch.  Musculoskeletal Exam:    Right Side Rheumatological Exam     Rt wrist S+ T + pain On ROM     Left Side Rheumatological Exam     Examination finds the shoulder, elbow, wrist, knee, 1st PIP, 1st MCP, 2nd PIP, 2nd MCP, 3rd PIP, 3rd MCP, 4th PIP, 4th MCP, 5th PIP and 5th MCP no synovitis     Others :  Hip:Normal ROM    Ankle :No synovitis   Foot:No synovitis       Laboratory: I have reviewed all of the patient's relevant lab work available in the medical record and have utilized this in my evaluation and management recommendations today    Imaging : I have reviewed all of the patient's diagnostic/imaging results available in the medical record and have utilized this in my evaluation and management recommendations today.  X-ray wrist right    FINDINGS:  No acute fracture.   Soft tissue edema about the wrist and dorsal hand present with scattered carpal bone erosions.  OA findings present about the wrist as well.  MCP and interphalangeal joints demonstrate no concerning erosions.  Notes reviewed  Other procedures:    ASSESSMENT/PLAN:     Other secondary acute gout of right wrist  -     Comprehensive metabolic panel; Standing  -     Rheumatoid factor; Future; Expected date: 05/14/2020  -     Cyclic Citrullinated Peptide Antibody, IgG; Future; Expected date: 05/14/2020  -     Uric acid; Standing  -     MRI Wrist Joint W WO Contrast Right; Future; Expected date: 05/14/2020  -     colchicine (COLCRYS) 0.6 mg tablet; Take 1 tablet (0.6 mg total) by mouth once daily.  Dispense: 30 tablet; Refill: 3    Idiopathic chronic gout without tophus, unspecified site  -     Ambulatory referral/consult to Rheumatology      Inject right wrist with Kenalog  No aspirate could be obtained  Continue with allopurinol 300 mg a day  Stay on Colcrys 0.6 mg daily  Gout diet     Check labs as above in a week  X-ray wrist suggestive of erosions, check MRI right wrist    Exercise, weight loss      Procedure Note   I  have explained the risks, benefits, and alternatives of joint or bursa injection, including infection and bleeding. The patient voices understanding and  questions have been answered.  The patient agrees to proceed as planned.  The right wrist was prepped with antiseptic and alcohol.  The area was numbed with topical refrigerant.  A 25 g needle was introduced into the joint space and and 1ml  40 mg of triamcinolone and 1 ml lidocaine 2% was injected into the space after a negative aspiration.  The patient tolerated the procedure well, and was instructed to rest for 24 hours after the procedure.      Risks vs Benefits and potential side effects of medication prescribed today were discussed with patient. Medication literature given to patient up discharge  Went over uptodate information /literature on  the meds prescribed today    Patient advised to hold DMARD and/or biologic therapy for signs of infection or for surgery. If you are unsure what to do please call our office for instruction. [facility] [phone]    Return as scheduled, call back if any worsening noted in the next 2 days    Thank you for the referral    Disclaimer: This note was prepared using voice recognition system and is likely to have sound alike errors and is not proof read.  Please call me with any questions.

## 2020-05-14 NOTE — LETTER
May 15, 2020      Reza Michelle MD  [address] [place] [address]           [facility]  [address]  [place] [address]  Phone: [phone]  Fax: [phone]          Patient: Juan C Rodriguez   MR Number: [id number]   YOB: 1965   Date of Visit: 5/14/2020       Dear Dr. Reza Michelle:    Thank you for referring Juan C Rodriguez to me for evaluation. Attached you will find relevant portions of my assessment and plan of care.    If you have questions, please do not hesitate to call me. I look forward to following Juan C Rodriguez along with you.    Sincerely,    Leila Lora MD    Enclosure  CC:  No Recipients    If you would like to receive this communication electronically, please contact [email or web address] or [phone] to request more information on [facility] access.    For providers and/or their staff who would like to refer a patient to [facility], please contact us through our one-stop-shop provider referral line, [facility], at [phone].    If you feel you have received this communication in error or would no longer like to receive these types of communications, please e-mail [email or web address]

## 2020-05-15 RX ORDER — TRIAMCINOLONE ACETONIDE 40 MG/ML
20 INJECTION, SUSPENSION INTRA-ARTICULAR; INTRAMUSCULAR ONCE
Status: COMPLETED | OUTPATIENT
Start: 2020-05-15 | End: 2020-05-15

## 2020-05-15 RX ORDER — LIDOCAINE HYDROCHLORIDE 20 MG/ML
1 INJECTION, SOLUTION INFILTRATION; PERINEURAL ONCE
Status: COMPLETED | OUTPATIENT
Start: 2020-05-15 | End: 2020-05-15

## 2020-05-15 RX ADMIN — LIDOCAINE HYDROCHLORIDE 1 ML: 20 INJECTION, SOLUTION INFILTRATION; PERINEURAL at 10:05

## 2020-05-15 RX ADMIN — TRIAMCINOLONE ACETONIDE 20 MG: 40 INJECTION, SUSPENSION INTRA-ARTICULAR; INTRAMUSCULAR at 10:05

## 2020-05-21 ENCOUNTER — LAB VISIT (OUTPATIENT)
Dept: LAB | Facility: HOSPITAL | Age: 55
End: 2020-05-21
Attending: INTERNAL MEDICINE
Payer: COMMERCIAL

## 2020-05-21 DIAGNOSIS — M10.431 OTHER SECONDARY ACUTE GOUT OF RIGHT WRIST: ICD-10-CM

## 2020-05-21 LAB
ALBUMIN SERPL BCP-MCNC: 3.9 G/DL (ref 3.5–5.2)
ALP SERPL-CCNC: 115 U/L (ref 55–135)
ALT SERPL W/O P-5'-P-CCNC: 32 U/L (ref 10–44)
ANION GAP SERPL CALC-SCNC: 10 MMOL/L (ref 8–16)
AST SERPL-CCNC: 23 U/L (ref 10–40)
BILIRUB SERPL-MCNC: 0.3 MG/DL (ref 0.1–1)
BUN SERPL-MCNC: 32 MG/DL (ref 6–20)
CALCIUM SERPL-MCNC: 9.4 MG/DL (ref 8.7–10.5)
CHLORIDE SERPL-SCNC: 102 MMOL/L (ref 95–110)
CO2 SERPL-SCNC: 29 MMOL/L (ref 23–29)
CREAT SERPL-MCNC: 1.4 MG/DL (ref 0.5–1.4)
EST. GFR  (AFRICAN AMERICAN): >60 ML/MIN/1.73 M^2
EST. GFR  (NON AFRICAN AMERICAN): 56 ML/MIN/1.73 M^2
GLUCOSE SERPL-MCNC: 130 MG/DL (ref 70–110)
POTASSIUM SERPL-SCNC: 4.1 MMOL/L (ref 3.5–5.1)
PROT SERPL-MCNC: 7.7 G/DL (ref 6–8.4)
SODIUM SERPL-SCNC: 141 MMOL/L (ref 136–145)
URATE SERPL-MCNC: 8.4 MG/DL (ref 3.4–7)

## 2020-05-21 PROCEDURE — 86200 CCP ANTIBODY: CPT

## 2020-05-21 PROCEDURE — 80053 COMPREHEN METABOLIC PANEL: CPT

## 2020-05-21 PROCEDURE — 84550 ASSAY OF BLOOD/URIC ACID: CPT

## 2020-05-21 PROCEDURE — 36415 COLL VENOUS BLD VENIPUNCTURE: CPT

## 2020-05-21 PROCEDURE — 86431 RHEUMATOID FACTOR QUANT: CPT

## 2020-05-22 LAB
CCP AB SER IA-ACNC: 0.8 U/ML
RHEUMATOID FACT SERPL-ACNC: <10 IU/ML (ref 0–15)

## 2020-05-25 ENCOUNTER — TELEPHONE (OUTPATIENT)
Dept: RADIOLOGY | Facility: HOSPITAL | Age: 55
End: 2020-05-25

## 2020-05-26 ENCOUNTER — HOSPITAL ENCOUNTER (OUTPATIENT)
Dept: RADIOLOGY | Facility: HOSPITAL | Age: 55
Discharge: HOME | End: 2020-05-26
Attending: INTERNAL MEDICINE
Payer: COMMERCIAL

## 2020-05-26 DIAGNOSIS — M10.431 OTHER SECONDARY ACUTE GOUT OF RIGHT WRIST: ICD-10-CM

## 2020-05-26 PROCEDURE — 73223 MRI JOINT UPR EXTR W/O&W/DYE: CPT | Mod: TC,RT

## 2020-05-26 PROCEDURE — A9585 GADOBUTROL INJECTION: HCPCS | Performed by: INTERNAL MEDICINE

## 2020-05-26 PROCEDURE — 25500020 PHARM REV CODE 255: Performed by: INTERNAL MEDICINE

## 2020-05-26 PROCEDURE — 73223 MRI JOINT UPR EXTR W/O&W/DYE: CPT | Mod: 26,RT,, | Performed by: RADIOLOGY

## 2020-05-26 PROCEDURE — 73223 MRI WRIST W WO CONTRAST RIGHT: ICD-10-PCS | Mod: 26,RT,, | Performed by: RADIOLOGY

## 2020-05-26 RX ORDER — GADOBUTROL 604.72 MG/ML
10 INJECTION INTRAVENOUS
Status: COMPLETED | OUTPATIENT
Start: 2020-05-26 | End: 2020-05-26

## 2020-05-26 RX ADMIN — GADOBUTROL 10 ML: 604.72 INJECTION INTRAVENOUS at 02:05

## 2020-06-08 ENCOUNTER — TELEPHONE (OUTPATIENT)
Dept: RHEUMATOLOGY | Facility: CLINIC | Age: 55
End: 2020-06-08

## 2020-06-08 NOTE — TELEPHONE ENCOUNTER
----- Message from Leila Lora MD sent at 5/28/2020  3:24 PM CDT -----  Please let him know I have reviewed his MRI  It shows lot of inflammation  I would like to see him back again in the next 1 week  Please schedule follow-up

## 2020-07-01 ENCOUNTER — LAB VISIT (OUTPATIENT)
Dept: LAB | Facility: HOSPITAL | Age: 55
End: 2020-07-01
Attending: INTERNAL MEDICINE
Payer: COMMERCIAL

## 2020-07-01 ENCOUNTER — HOSPITAL ENCOUNTER (OUTPATIENT)
Dept: RADIOLOGY | Facility: HOSPITAL | Age: 55
Discharge: HOME | End: 2020-07-01
Attending: INTERNAL MEDICINE
Payer: COMMERCIAL

## 2020-07-01 ENCOUNTER — OFFICE VISIT (OUTPATIENT)
Dept: RHEUMATOLOGY | Facility: CLINIC | Age: 55
End: 2020-07-01
Payer: COMMERCIAL

## 2020-07-01 VITALS
DIASTOLIC BLOOD PRESSURE: 83 MMHG | WEIGHT: 315 LBS | BODY MASS INDEX: 57.06 KG/M2 | HEART RATE: 64 BPM | SYSTOLIC BLOOD PRESSURE: 145 MMHG

## 2020-07-01 DIAGNOSIS — M06.031 RHEUMATOID ARTHRITIS INVOLVING RIGHT WRIST WITH NEGATIVE RHEUMATOID FACTOR: ICD-10-CM

## 2020-07-01 DIAGNOSIS — E21.3 HYPERPARATHYROIDISM: ICD-10-CM

## 2020-07-01 DIAGNOSIS — M1A.00X0 IDIOPATHIC CHRONIC GOUT WITHOUT TOPHUS, UNSPECIFIED SITE: ICD-10-CM

## 2020-07-01 DIAGNOSIS — D84.9 IMMUNOSUPPRESSED STATUS: ICD-10-CM

## 2020-07-01 DIAGNOSIS — M06.031 RHEUMATOID ARTHRITIS INVOLVING RIGHT WRIST WITH NEGATIVE RHEUMATOID FACTOR: Primary | ICD-10-CM

## 2020-07-01 LAB
CRP SERPL-MCNC: 13.4 MG/L (ref 0–8.2)
ERYTHROCYTE [SEDIMENTATION RATE] IN BLOOD BY WESTERGREN METHOD: 37 MM/HR (ref 0–23)
URATE SERPL-MCNC: 7.3 MG/DL (ref 3.4–7)

## 2020-07-01 PROCEDURE — 82306 VITAMIN D 25 HYDROXY: CPT

## 2020-07-01 PROCEDURE — 86235 NUCLEAR ANTIGEN ANTIBODY: CPT | Mod: 59

## 2020-07-01 PROCEDURE — 3008F PR BODY MASS INDEX (BMI) DOCUMENTED: ICD-10-PCS | Mod: CPTII,S$GLB,, | Performed by: INTERNAL MEDICINE

## 2020-07-01 PROCEDURE — 73130 X-RAY EXAM OF HAND: CPT | Mod: 26,LT,, | Performed by: RADIOLOGY

## 2020-07-01 PROCEDURE — 99999 PR PBB SHADOW E&M-EST. PATIENT-LVL V: CPT | Mod: PBBFAC,,, | Performed by: INTERNAL MEDICINE

## 2020-07-01 PROCEDURE — 99214 OFFICE O/P EST MOD 30 MIN: CPT | Mod: 25,S$GLB,, | Performed by: INTERNAL MEDICINE

## 2020-07-01 PROCEDURE — 73562 X-RAY EXAM OF KNEE 3: CPT | Mod: 26,,, | Performed by: RADIOLOGY

## 2020-07-01 PROCEDURE — 3079F DIAST BP 80-89 MM HG: CPT | Mod: CPTII,S$GLB,, | Performed by: INTERNAL MEDICINE

## 2020-07-01 PROCEDURE — 73130 XR HAND COMPLETE 3 VIEW LEFT: ICD-10-PCS | Mod: 26,LT,, | Performed by: RADIOLOGY

## 2020-07-01 PROCEDURE — 36415 COLL VENOUS BLD VENIPUNCTURE: CPT

## 2020-07-01 PROCEDURE — 99214 PR OFFICE/OUTPT VISIT, EST, LEVL IV, 30-39 MIN: ICD-10-PCS | Mod: 25,S$GLB,, | Performed by: INTERNAL MEDICINE

## 2020-07-01 PROCEDURE — 85652 RBC SED RATE AUTOMATED: CPT

## 2020-07-01 PROCEDURE — 73630 X-RAY EXAM OF FOOT: CPT | Mod: 26,,, | Performed by: RADIOLOGY

## 2020-07-01 PROCEDURE — 90471 IMMUNIZATION ADMIN: CPT | Mod: S$GLB,,, | Performed by: INTERNAL MEDICINE

## 2020-07-01 PROCEDURE — 99999 PR PBB SHADOW E&M-EST. PATIENT-LVL V: ICD-10-PCS | Mod: PBBFAC,,, | Performed by: INTERNAL MEDICINE

## 2020-07-01 PROCEDURE — 80074 ACUTE HEPATITIS PANEL: CPT

## 2020-07-01 PROCEDURE — 86140 C-REACTIVE PROTEIN: CPT

## 2020-07-01 PROCEDURE — 90670 PCV13 VACCINE IM: CPT | Mod: S$GLB,,, | Performed by: INTERNAL MEDICINE

## 2020-07-01 PROCEDURE — 3079F PR MOST RECENT DIASTOLIC BLOOD PRESSURE 80-89 MM HG: ICD-10-PCS | Mod: CPTII,S$GLB,, | Performed by: INTERNAL MEDICINE

## 2020-07-01 PROCEDURE — 73110 XR WRIST COMPLETE 3 VIEWS LEFT: ICD-10-PCS | Mod: 26,LT,, | Performed by: RADIOLOGY

## 2020-07-01 PROCEDURE — 73562 XR KNEE ORTHO BILAT: ICD-10-PCS | Mod: 26,,, | Performed by: RADIOLOGY

## 2020-07-01 PROCEDURE — 73630 XR FOOT COMPLETE 3 VIEW BILATERAL: ICD-10-PCS | Mod: 26,,, | Performed by: RADIOLOGY

## 2020-07-01 PROCEDURE — 3077F SYST BP >= 140 MM HG: CPT | Mod: CPTII,S$GLB,, | Performed by: INTERNAL MEDICINE

## 2020-07-01 PROCEDURE — 90471 PNEUMOCOCCAL CONJUGATE VACCINE 13-VALENT LESS THAN 5YO & GREATER THAN: ICD-10-PCS | Mod: S$GLB,,, | Performed by: INTERNAL MEDICINE

## 2020-07-01 PROCEDURE — 86038 ANTINUCLEAR ANTIBODIES: CPT

## 2020-07-01 PROCEDURE — 86480 TB TEST CELL IMMUN MEASURE: CPT

## 2020-07-01 PROCEDURE — 86039 ANTINUCLEAR ANTIBODIES (ANA): CPT

## 2020-07-01 PROCEDURE — 84550 ASSAY OF BLOOD/URIC ACID: CPT

## 2020-07-01 PROCEDURE — 73130 X-RAY EXAM OF HAND: CPT | Mod: TC,LT

## 2020-07-01 PROCEDURE — 3008F BODY MASS INDEX DOCD: CPT | Mod: CPTII,S$GLB,, | Performed by: INTERNAL MEDICINE

## 2020-07-01 PROCEDURE — 83970 ASSAY OF PARATHORMONE: CPT

## 2020-07-01 PROCEDURE — 73110 X-RAY EXAM OF WRIST: CPT | Mod: 26,LT,, | Performed by: RADIOLOGY

## 2020-07-01 PROCEDURE — 73630 X-RAY EXAM OF FOOT: CPT | Mod: TC,50

## 2020-07-01 PROCEDURE — 3077F PR MOST RECENT SYSTOLIC BLOOD PRESSURE >= 140 MM HG: ICD-10-PCS | Mod: CPTII,S$GLB,, | Performed by: INTERNAL MEDICINE

## 2020-07-01 PROCEDURE — 90670 PNEUMOCOCCAL CONJUGATE VACCINE 13-VALENT LESS THAN 5YO & GREATER THAN: ICD-10-PCS | Mod: S$GLB,,, | Performed by: INTERNAL MEDICINE

## 2020-07-01 PROCEDURE — 73110 X-RAY EXAM OF WRIST: CPT | Mod: TC,LT

## 2020-07-01 PROCEDURE — 73562 X-RAY EXAM OF KNEE 3: CPT | Mod: TC,50

## 2020-07-01 NOTE — PROGRESS NOTES
Administered 0.5cc of Pneumococcal Polysaccharide Vaccination (23) to left Deltoid. Patient tolerated well. No acute reactions noted to site. Patient instructed on S/S to report. Patient verbalized understanding. Patient verbalized understanding. Patient advised to wait 15 minutes post administration.    Lot: [id number]  Exp: 05/2021

## 2020-07-01 NOTE — PROGRESS NOTES
CC:  Chief Complaint   Patient presents with    Gout     f/u- right wrist- Improved     Referred by : Dr Isabel       History of Present Illness:  Juan C Schilling Gainesis a 54 y.o.yo male presents for follow-up of right wrist arthritis  Last visit he was seen for an acute flare of right wrist arthritis x 1 month duration  He had history of gout and so we suspected gout flare and he was given an injection in the right wrist, no aspirate obtained  He is also on Colcrys  He is back on allopurinol 300 mg a day  Today he denies any further pain or swelling in the right wrist  X-ray right wrist reviewed was suggestive of erosions, so we did a follow-up MRI  This was suggestive of diffuse synovitis throughout the wrist with some associated enhancing erosive changes seen throughout the carpal bones and distal radius.  Reactive marrow edema noted throughout the carpal bones and distal radius.    He had erosions noted in x-ray wrist in 2012 as well on review  He does mention of history of some flares in the right wrist  Left wrist does not bother him as much, has not noted similar flares in the left wrist  He denies any specific a.m. stiffness but he does notice periodically spontaneous onset of stiffness involving both hands      He can  relate his episodes of gout to noncompliance with diet, whenever he eats red meats he notices these flares  He has chronic gout with prior episodes involving his feet and wrist          Nonsmoker  + alcohol, drinks beer regularly  No family history of  autoimmune disease      Review of Systems:  Constitutional: Denies fever, chills. No recent weight changes.   Fatigue: no  Muscle weakness: no  Headaches: no new headaches  Eyes: No redness .  No recent visual changes.  ENT:  No oral or nasal ulcers.  Card: No chest pain.  Resp: No cough or sob.   Gastro: No nausea or vomiting.  No heartburn.  Constipation: no  Diarrhea: no  Genito:  No dysuria.  No genital ulcers.  Skin: No  rash.  Raynauds:no  Neuro: No numbness / tingling.   Psych: No depression, anxiety  Endo:  no excess thirst.  Heme: no abnormal bleeding or bruising  Clots:none     Past Medical History:   Diagnosis Date    Diabetes mellitus, type 2 diagnosed 2013    DM (diabetes mellitus)     2011  02/25/2014    HTN (hypertension)     Hypogonadism, testicular     Obesities, morbid        Past Surgical History:   Procedure Laterality Date    COLONOSCOPY N/A 6/29/2018    Procedure: COLONOSCOPY;  Surgeon: Jeremiah Anaya III, MD;  Location: [facility] ENDO;  Service: Endoscopy;  Laterality: N/A;         Social History     Tobacco Use    Smoking status: Never Smoker    Smokeless tobacco: Never Used   Substance Use Topics    Alcohol use: Yes     Alcohol/week: 3.0 - 4.0 standard drinks     Types: 3 - 4 Cans of beer per week     Comment: occasional weekends    Drug use: No       Family History   Problem Relation Age of Onset    Cataracts Mother     Diabetes Mother     Hypertension Mother     Diabetes Sister        Review of patient's allergies indicates:   Allergen Reactions    Naproxen Swelling       Statin : yes     OBJECTIVE:     Vital Signs   Blood pressure 131/77  KS: 71    Physical Exam:  General Appearance:  NAD.   Gait: not favoring.  HEENT: PERRL.  Eyes not dry or injected.  No nasal ulcers.  Mouth not dry, no oral lesions.  Lymph: cervical, supraclavicular or axillary nodes: none abnormal   Cardio: no irregularity of S1 or S2.  No gallops or rubs.   Resp: Normal respiratory motion. Clear to auscultation bilaterally.   No abnormal chest conformation.  Abd: Soft, non-tender, nondistended.  No masses.   Skin: Head and neck,  and extremities examined. No rashes.   Neuro: Ox3.   Cranial nerves II-XII grossly intact.   Sensation intact  in both distal LE and upper extremities to light touch.  Musculoskeletal Exam:    Right Side Rheumatological Exam     No swelling or tenderness  Slightly restricted range of  motion    Left Side Rheumatological Exam     Examination finds the shoulder, elbow, wrist, knee, 1st PIP, 1st MCP, 2nd PIP, 2nd MCP, 3rd PIP, 3rd MCP, 4th PIP, 4th MCP, 5th PIP and 5th MCP no synovitis     Others :  Hip:Normal ROM    Ankle :No synovitis   Foot:No synovitis       Laboratory: I have reviewed all of the patient's relevant lab work available in the medical record and have utilized this in my evaluation and management recommendations today    Imaging : I have reviewed all of the patient's diagnostic/imaging results available in the medical record and have utilized this in my evaluation and management recommendations today.  X-ray wrist right    FINDINGS:  No acute fracture.  Soft tissue edema about the wrist and dorsal hand present with scattered carpal bone erosions.  OA findings present about the wrist as well.  MCP and interphalangeal joints demonstrate no concerning erosions.    MRI right wrist:    Impression:     1. Findings in keeping with diffuse synovitis throughout the wrist with some associated enhancing erosive changes seen throughout the carpal bones and distal radius.  2. Reactive marrow edema noted throughout the carpal bones and distal radius.     Notes reviewed  Other procedures:    ASSESSMENT/PLAN:     Rheumatoid arthritis involving right wrist with negative rheumatoid factor  -     X-Ray Hand Complete Left; Future; Expected date: 07/01/2020  -     X-Ray Wrist Complete Left; Future; Expected date: 07/01/2020  -     X-ray Knee Ortho Bilateral; Future; Expected date: 07/01/2020  -     Cancel: X-Ray Foot AP Bilateral; Future; Expected date: 07/01/2020  -     CHAD Screen w/Reflex; Standing  -     Sedimentation rate; Standing  -     C-Reactive Protein; Standing  -     Quantiferon Gold TB; Future; Expected date: 07/01/2020  -     Hepatitis Panel, Acute; Future    Idiopathic chronic gout without tophus, unspecified site  -     Uric acid; Standing    Hyperparathyroidism  -     Vitamin D;  Standing  -     PTH, intact; Future; Expected date: 07/01/2020    Immunosuppressed status  -     (In Office Administered) Pneumococcal Conjugate Vaccine (13 Valent) (IM)      Seronegative RA:  + MRI finding suggestive of synovitis and erosions  Erosive involving right wrist  Check hep panel, TB gold  We have discussed starting methotrexate 10 mg a week with folic acid 1 mg daily after the above results    Chronic gout:  Continue allopurinol 300 mg daily  Okay to stop Colcrys, due to cost issues  Recheck uric acid    Right wrist flare:  Likely flare of inflammatory arthritis, possible gout flare  Resolved with intra-articular Kenalog injection    History of hyperparathyroidism:  Also noted was history of vitamin-D deficiency  Will recheck PTH and vitamin-D levels     Exercise, weight loss    Immunizations  Pneumonia 13 today      2 month return      Risks vs Benefits and potential side effects of medication prescribed today were discussed with patient. Medication literature given to patient up discharge  Went over uptodate information /literature on the meds prescribed today    Methotrexate- infection/malignancy risk potential discussed, increased risk of lymphoma  Counseled on the need to stay up-to-date on vaccinations and health maintenance screens per PCP  Patient advised to hold DMARD and/or biologic therapy for signs of infection or for surgery. If you are unsure what to do please call our office for instruction. [facility] [phone]      Thank you for the referral    Disclaimer: This note was prepared using voice recognition system and is likely to have sound alike errors and is not proof read.  Please call me with any questions.

## 2020-07-02 LAB
25(OH)D3+25(OH)D2 SERPL-MCNC: 20 NG/ML (ref 30–96)
ANA PATTERN 1: NORMAL
ANA SER QL IF: POSITIVE
ANA TITR SER IF: NORMAL {TITER}
HAV IGM SERPL QL IA: NEGATIVE
HBV CORE IGM SERPL QL IA: NEGATIVE
HBV SURFACE AG SERPL QL IA: NEGATIVE
HCV AB SERPL QL IA: NEGATIVE
PTH-INTACT SERPL-MCNC: 114 PG/ML (ref 9–77)

## 2020-07-04 LAB
GAMMA INTERFERON BACKGROUND BLD IA-ACNC: 0.01 IU/ML
M TB IFN-G CD4+ BCKGRND COR BLD-ACNC: 0 IU/ML
MITOGEN IGNF BCKGRD COR BLD-ACNC: 6.08 IU/ML
TB GOLD PLUS: NEGATIVE
TB2 - NIL: 0 IU/ML

## 2020-07-08 LAB
ANTI SM ANTIBODY: 0.08 RATIO (ref 0–0.99)
ANTI SM/RNP ANTIBODY: 0.08 RATIO (ref 0–0.99)
ANTI-SM INTERPRETATION: NEGATIVE
ANTI-SM/RNP INTERPRETATION: NEGATIVE
ANTI-SSA ANTIBODY: 0.07 RATIO (ref 0–0.99)
ANTI-SSA INTERPRETATION: NEGATIVE
ANTI-SSB ANTIBODY: 0.06 RATIO (ref 0–0.99)
ANTI-SSB INTERPRETATION: NEGATIVE
DSDNA AB SER-ACNC: NORMAL [IU]/ML

## 2020-07-09 DIAGNOSIS — R76.8 ANA POSITIVE: ICD-10-CM

## 2020-07-09 DIAGNOSIS — R76.8 ANA POSITIVE: Primary | ICD-10-CM

## 2020-07-09 DIAGNOSIS — M1A.00X0 IDIOPATHIC CHRONIC GOUT WITHOUT TOPHUS, UNSPECIFIED SITE: Primary | ICD-10-CM

## 2020-07-09 RX ORDER — ALLOPURINOL 100 MG/1
100 TABLET ORAL DAILY
Qty: 30 TABLET | Refills: 6 | Status: SHIPPED | OUTPATIENT
Start: 2020-07-09 | End: 2021-06-17 | Stop reason: DRUGHIGH

## 2020-07-09 RX ORDER — PREDNISONE 5 MG/1
5 TABLET ORAL DAILY PRN
Qty: 30 TABLET | Refills: 1 | Status: SHIPPED | OUTPATIENT
Start: 2020-07-09 | End: 2021-02-12

## 2020-07-09 RX ORDER — HYDROXYCHLOROQUINE SULFATE 200 MG/1
200 TABLET, FILM COATED ORAL 2 TIMES DAILY
Qty: 60 TABLET | Refills: 6 | Status: SHIPPED | OUTPATIENT
Start: 2020-07-09 | End: 2020-11-06 | Stop reason: SDUPTHER

## 2020-07-09 NOTE — PROGRESS NOTES
MRI wrist results discussed with radiology  These findings could be related to gout with uric acid arthropathy  Patient symptoms improved with steroid injection in the wrist  Uric acid still high at 7 increase allopurinol to 400 mg a day  Add prednisone 5 mg daily as needed    RF/CCP negative, CHAD positive  At this point will continue to monitor to see if this is possibly any seronegative RA vs UCTD   Will start Plaquenil 200 mg p.o. b.i.d.  Patient notified about the need for annual eye checkups he will follow-up with his ophthalmologist    Side effects discussed skin pigmentation, ocular toxicity need for annual eye checkups, EKG monitoring and rare myositis    Will repeat ESR, CRP and uric acid in 4 weeks  Scheduled to see us back in 2 months, will schedule EKG at 2 month follow-up    If worsening noted will consider adding methotrexate or sulfasalazine

## 2020-07-10 ENCOUNTER — TELEPHONE (OUTPATIENT)
Dept: RHEUMATOLOGY | Facility: CLINIC | Age: 55
End: 2020-07-10

## 2020-07-10 NOTE — TELEPHONE ENCOUNTER
Called pt to advise that Dr. Lora has ordered additional labs in 4wks. Pt verbalized understanding & scheduled for 8/7/20 @11:20

## 2020-08-07 ENCOUNTER — LAB VISIT (OUTPATIENT)
Dept: LAB | Facility: HOSPITAL | Age: 55
End: 2020-08-07
Attending: INTERNAL MEDICINE
Payer: COMMERCIAL

## 2020-08-07 DIAGNOSIS — R76.8 ANA POSITIVE: ICD-10-CM

## 2020-08-07 DIAGNOSIS — M1A.00X0 IDIOPATHIC CHRONIC GOUT WITHOUT TOPHUS, UNSPECIFIED SITE: ICD-10-CM

## 2020-08-07 LAB
BILIRUB UR QL STRIP: NEGATIVE
C3 SERPL-MCNC: 148 MG/DL (ref 50–180)
C4 SERPL-MCNC: 47 MG/DL (ref 11–44)
CLARITY UR: CLEAR
COLOR UR: YELLOW
CREAT UR-MCNC: 81 MG/DL (ref 23–375)
CRP SERPL-MCNC: 44.7 MG/L (ref 0–8.2)
ERYTHROCYTE [SEDIMENTATION RATE] IN BLOOD BY WESTERGREN METHOD: 66 MM/HR (ref 0–23)
GLUCOSE UR QL STRIP: NEGATIVE
HGB UR QL STRIP: NEGATIVE
KETONES UR QL STRIP: NEGATIVE
LEUKOCYTE ESTERASE UR QL STRIP: NEGATIVE
NITRITE UR QL STRIP: NEGATIVE
PH UR STRIP: 7 [PH] (ref 5–8)
PROT UR QL STRIP: NEGATIVE
PROT UR-MCNC: <7 MG/DL (ref 0–15)
PROT/CREAT UR: NORMAL MG/G{CREAT} (ref 0–0.2)
SP GR UR STRIP: 1.01 (ref 1–1.03)
URATE SERPL-MCNC: 4.9 MG/DL (ref 3.4–7)
URN SPEC COLLECT METH UR: NORMAL

## 2020-08-07 PROCEDURE — 86147 CARDIOLIPIN ANTIBODY EA IG: CPT | Mod: 59

## 2020-08-07 PROCEDURE — 84156 ASSAY OF PROTEIN URINE: CPT

## 2020-08-07 PROCEDURE — 36415 COLL VENOUS BLD VENIPUNCTURE: CPT

## 2020-08-07 PROCEDURE — 85613 RUSSELL VIPER VENOM DILUTED: CPT

## 2020-08-07 PROCEDURE — 86140 C-REACTIVE PROTEIN: CPT

## 2020-08-07 PROCEDURE — 86160 COMPLEMENT ANTIGEN: CPT | Mod: 59

## 2020-08-07 PROCEDURE — 86146 BETA-2 GLYCOPROTEIN ANTIBODY: CPT

## 2020-08-07 PROCEDURE — 86160 COMPLEMENT ANTIGEN: CPT

## 2020-08-07 PROCEDURE — 84550 ASSAY OF BLOOD/URIC ACID: CPT

## 2020-08-07 PROCEDURE — 81003 URINALYSIS AUTO W/O SCOPE: CPT

## 2020-08-07 PROCEDURE — 85652 RBC SED RATE AUTOMATED: CPT

## 2020-08-10 LAB
CARDIOLIPIN IGG SER IA-ACNC: <9.4 GPL (ref 0–14.99)
CARDIOLIPIN IGM SER IA-ACNC: 10.5 MPL (ref 0–12.49)

## 2020-08-11 LAB
B2 GLYCOPROT1 IGA SER QL: <9 SAU
B2 GLYCOPROT1 IGG SER QL: <9 SGU
B2 GLYCOPROT1 IGM SER QL: 23 SMU
LA PPP-IMP: NEGATIVE

## 2020-08-12 ENCOUNTER — TELEPHONE (OUTPATIENT)
Dept: RHEUMATOLOGY | Facility: CLINIC | Age: 55
End: 2020-08-12

## 2020-08-12 NOTE — TELEPHONE ENCOUNTER
Spoke with Mr. Rodriguez regarding Dr. Lora needing to discuss lab results with him. Mr. Amaroes stated that he will be here at the [facility] tomorrow and would like to see Dr. Lora at that time. Appointment scheduled for tomorrow 8/13/2020 @ 9:00am.

## 2020-08-12 NOTE — TELEPHONE ENCOUNTER
----- Message from Leila Lora MD sent at 8/12/2020  1:49 PM CDT -----  abnormal labs noted , can you please set him for a televisit to discuss meds      I have called him but no response earlier

## 2020-08-12 NOTE — TELEPHONE ENCOUNTER
Spoke with Mrs. Rodriguez she stated that Mr. Rodriguez will call back to clinic to speak with Dr. Lora

## 2020-08-13 ENCOUNTER — LAB VISIT (OUTPATIENT)
Dept: LAB | Facility: HOSPITAL | Age: 55
End: 2020-08-13
Attending: UROLOGY
Payer: COMMERCIAL

## 2020-08-13 ENCOUNTER — OFFICE VISIT (OUTPATIENT)
Dept: RHEUMATOLOGY | Facility: CLINIC | Age: 55
End: 2020-08-13
Payer: COMMERCIAL

## 2020-08-13 ENCOUNTER — TELEPHONE (OUTPATIENT)
Dept: RHEUMATOLOGY | Facility: CLINIC | Age: 55
End: 2020-08-13

## 2020-08-13 VITALS
HEIGHT: 63 IN | DIASTOLIC BLOOD PRESSURE: 69 MMHG | HEART RATE: 69 BPM | BODY MASS INDEX: 55.81 KG/M2 | SYSTOLIC BLOOD PRESSURE: 144 MMHG | WEIGHT: 315 LBS

## 2020-08-13 DIAGNOSIS — R76.8 ANA POSITIVE: ICD-10-CM

## 2020-08-13 DIAGNOSIS — E29.1 HYPOGONADISM IN MALE: ICD-10-CM

## 2020-08-13 DIAGNOSIS — M1A.00X0 IDIOPATHIC CHRONIC GOUT WITHOUT TOPHUS, UNSPECIFIED SITE: ICD-10-CM

## 2020-08-13 DIAGNOSIS — D84.9 IMMUNOSUPPRESSED STATUS: Primary | ICD-10-CM

## 2020-08-13 DIAGNOSIS — E21.3 HYPERPARATHYROIDISM: ICD-10-CM

## 2020-08-13 DIAGNOSIS — E55.9 VITAMIN D DEFICIENCY: ICD-10-CM

## 2020-08-13 DIAGNOSIS — M19.90 INFLAMMATORY ARTHRITIS: Primary | ICD-10-CM

## 2020-08-13 LAB
ALBUMIN SERPL BCP-MCNC: 3.6 G/DL (ref 3.5–5.2)
ALP SERPL-CCNC: 101 U/L (ref 55–135)
ALT SERPL W/O P-5'-P-CCNC: 28 U/L (ref 10–44)
AST SERPL-CCNC: 22 U/L (ref 10–40)
BILIRUB DIRECT SERPL-MCNC: 0.2 MG/DL (ref 0.1–0.3)
BILIRUB SERPL-MCNC: 0.3 MG/DL (ref 0.1–1)
HCT VFR BLD AUTO: 41.8 % (ref 40–54)
PROT SERPL-MCNC: 7.4 G/DL (ref 6–8.4)

## 2020-08-13 PROCEDURE — 3077F SYST BP >= 140 MM HG: CPT | Mod: CPTII,S$GLB,, | Performed by: INTERNAL MEDICINE

## 2020-08-13 PROCEDURE — 99999 PR PBB SHADOW E&M-EST. PATIENT-LVL IV: CPT | Mod: PBBFAC,,, | Performed by: INTERNAL MEDICINE

## 2020-08-13 PROCEDURE — 85014 HEMATOCRIT: CPT

## 2020-08-13 PROCEDURE — 3077F PR MOST RECENT SYSTOLIC BLOOD PRESSURE >= 140 MM HG: ICD-10-PCS | Mod: CPTII,S$GLB,, | Performed by: INTERNAL MEDICINE

## 2020-08-13 PROCEDURE — 3008F PR BODY MASS INDEX (BMI) DOCUMENTED: ICD-10-PCS | Mod: CPTII,S$GLB,, | Performed by: INTERNAL MEDICINE

## 2020-08-13 PROCEDURE — 3008F BODY MASS INDEX DOCD: CPT | Mod: CPTII,S$GLB,, | Performed by: INTERNAL MEDICINE

## 2020-08-13 PROCEDURE — 99999 PR PBB SHADOW E&M-EST. PATIENT-LVL IV: ICD-10-PCS | Mod: PBBFAC,,, | Performed by: INTERNAL MEDICINE

## 2020-08-13 PROCEDURE — 80076 HEPATIC FUNCTION PANEL: CPT

## 2020-08-13 PROCEDURE — 99214 OFFICE O/P EST MOD 30 MIN: CPT | Mod: S$GLB,,, | Performed by: INTERNAL MEDICINE

## 2020-08-13 PROCEDURE — 99214 PR OFFICE/OUTPT VISIT, EST, LEVL IV, 30-39 MIN: ICD-10-PCS | Mod: S$GLB,,, | Performed by: INTERNAL MEDICINE

## 2020-08-13 PROCEDURE — 36415 COLL VENOUS BLD VENIPUNCTURE: CPT

## 2020-08-13 PROCEDURE — 3078F DIAST BP <80 MM HG: CPT | Mod: CPTII,S$GLB,, | Performed by: INTERNAL MEDICINE

## 2020-08-13 PROCEDURE — 3078F PR MOST RECENT DIASTOLIC BLOOD PRESSURE < 80 MM HG: ICD-10-PCS | Mod: CPTII,S$GLB,, | Performed by: INTERNAL MEDICINE

## 2020-08-13 RX ORDER — ERGOCALCIFEROL 1.25 MG/1
50000 CAPSULE ORAL
Qty: 4 CAPSULE | Refills: 3 | Status: SHIPPED | OUTPATIENT
Start: 2020-08-13 | End: 2021-02-04

## 2020-08-13 RX ORDER — FOLIC ACID 1 MG/1
1 TABLET ORAL DAILY
Qty: 90 TABLET | Refills: 3 | Status: SHIPPED | OUTPATIENT
Start: 2020-08-13 | End: 2020-11-06 | Stop reason: SDUPTHER

## 2020-08-13 RX ORDER — METHOTREXATE 2.5 MG/1
15 TABLET ORAL
Qty: 30 TABLET | Refills: 3 | Status: SHIPPED | OUTPATIENT
Start: 2020-08-13 | End: 2020-08-13 | Stop reason: DRUGHIGH

## 2020-08-13 RX ORDER — METHOTREXATE 2.5 MG/1
12.5 TABLET ORAL
Qty: 30 TABLET | Refills: 3 | Status: SHIPPED | OUTPATIENT
Start: 2020-08-13 | End: 2020-11-06 | Stop reason: SDUPTHER

## 2020-08-13 NOTE — TELEPHONE ENCOUNTER
Dr. Lora,     How many mg of MTX should Mr. Rodriguez take weekly?     The current Rx says 12.5mg a week but your note says 10mg for a week then increase to 12.5mg. The pharmacy received 12.5mg and a 15mg Rx.     Please advise.

## 2020-08-13 NOTE — PATIENT INSTRUCTIONS
Methotrexate tablets  What is this medicine?  METHOTREXATE (METH oh TREX ate) is a chemotherapy drug used to treat cancer including breast cancer, leukemia, and lymphoma. This medicine can also be used to treat psoriasis and certain kinds of arthritis.  How should I use this medicine?  Take this medicine by mouth with a glass of water. Follow the directions on the prescription label. Take your medicine at regular intervals. Do not take it more often than directed. Do not stop taking except on your doctor's advice.  Make sure you know why you are taking this medicine and how often you should take it. If this medicine is used for a condition that is not cancer, like arthritis or psoriasis, it should be taken weekly, NOT daily. Taking this medicine more often than directed can cause serious side effects, even death.  Talk to your healthcare provider about safe handling and disposal of this medicine. You may need to take special precautions.  Talk to your pediatrician regarding the use of this medicine in children. While this drug may be prescribed for selected conditions, precautions do apply.  What side effects may I notice from receiving this medicine?  Side effects that you should report to your doctor or health care professional as soon as possible:  · allergic reactions like skin rash, itching or hives, swelling of the face, lips, or tongue  · breathing problems or shortness of breath  · diarrhea  · dry, nonproductive cough  · low blood counts - this medicine may decrease the number of white blood cells, red blood cells and platelets. You may be at increased risk for infections and bleeding.  · mouth sores  · redness, blistering, peeling or loosening of the skin, including inside the mouth  · signs of infection - fever or chills, cough, sore throat, pain or trouble passing urine  · signs and symptoms of bleeding such as bloody or black, tarry stools; red or dark-brown urine; spitting up blood or brown material  that looks like coffee grounds; red spots on the skin; unusual bruising or bleeding from the eye, gums, or nose  · signs and symptoms of kidney injury like trouble passing urine or change in the amount of urine  · signs and symptoms of liver injury like dark yellow or brown urine; general ill feeling or flu-like symptoms; light-colored stools; loss of appetite; nausea; right upper belly pain; unusually weak or tired; yellowing of the eyes or skin  Side effects that usually do not require medical attention (report to your doctor or health care professional if they continue or are bothersome):  · dizziness  · hair loss  · tiredness  · upset stomach  · vomiting  What may interact with this medicine?  This medicine may interact with the following medication:  · acitretin  · aspirin and aspirin-like medicines including salicylates  · azathioprine  · certain antibiotics like penicillins, tetracycline, and chloramphenicol  · cyclosporine  · gold  · hydroxychloroquine  · live virus vaccines  · NSAIDs, medicines for pain and inflammation, like ibuprofen or naproxen  · other cytotoxic agents  · penicillamine  · phenylbutazone  · phenytoin  · probenecid  · retinoids such as isotretinoin and tretinoin  · steroid medicines like prednisone or cortisone  · sulfonamides like sulfasalazine and trimethoprim/sulfamethoxazole  · theophylline  What if I miss a dose?  If you miss a dose, talk with your doctor or health care professional. Do not take double or extra doses.  Where should I keep my medicine?  Keep out of the reach of children.  Store at room temperature between 20 and 25 degrees C (68 and 77 degrees F). Protect from light. Throw away any unused medicine after the expiration date.  What should I tell my health care provider before I take this medicine?  They need to know if you have any of these conditions:  · fluid in the stomach area or lungs  · if you often drink alcohol  · infection or immune system problems  · kidney  disease or on hemodialysis  · liver disease  · low blood counts, like low white cell, platelet, or red cell counts  · lung disease  · radiation therapy  · stomach ulcers  · ulcerative colitis  · an unusual or allergic reaction to methotrexate, other medicines, foods, dyes, or preservatives  · pregnant or trying to get pregnant  · breast-feeding  What should I watch for while using this medicine?  Avoid alcoholic drinks.  This medicine can make you more sensitive to the sun. Keep out of the sun. If you cannot avoid being in the sun, wear protective clothing and use sunscreen. Do not use sun lamps or tanning beds/booths.  You may need blood work done while you are taking this medicine.  Call your doctor or health care professional for advice if you get a fever, chills or sore throat, or other symptoms of a cold or flu. Do not treat yourself. This drug decreases your body's ability to fight infections. Try to avoid being around people who are sick.  This medicine may increase your risk to bruise or bleed. Call your doctor or health care professional if you notice any unusual bleeding.  Check with your doctor or health care professional if you get an attack of severe diarrhea, nausea and vomiting, or if you sweat a lot. The loss of too much body fluid can make it dangerous for you to take this medicine.  Talk to your doctor about your risk of cancer. You may be more at risk for certain types of cancers if you take this medicine.  Both men and women must use effective birth control with this medicine. Do not become pregnant while taking this medicine or until at least 1 normal menstrual cycle has occurred after stopping it. Women should inform their doctor if they wish to become pregnant or think they might be pregnant. Men should not father a child while taking this medicine and for 3 months after stopping it. There is a potential for serious side effects to an unborn child. Talk to your health care professional or  pharmacist for more information. Do not breast-feed an infant while taking this medicine.  NOTE:This sheet is a summary. It may not cover all possible information. If you have questions about this medicine, talk to your doctor, pharmacist, or health care provider. Copyright© 2017 [employer]        Methotrexate tablets  What is this medicine?  METHOTREXATE (METH oh TREX ate) is a chemotherapy drug used to treat cancer including breast cancer, leukemia, and lymphoma. This medicine can also be used to treat psoriasis and certain kinds of arthritis.  How should I use this medicine?  Take this medicine by mouth with a glass of water. Follow the directions on the prescription label. Take your medicine at regular intervals. Do not take it more often than directed. Do not stop taking except on your doctor's advice.  Make sure you know why you are taking this medicine and how often you should take it. If this medicine is used for a condition that is not cancer, like arthritis or psoriasis, it should be taken weekly, NOT daily. Taking this medicine more often than directed can cause serious side effects, even death.  Talk to your healthcare provider about safe handling and disposal of this medicine. You may need to take special precautions.  Talk to your pediatrician regarding the use of this medicine in children. While this drug may be prescribed for selected conditions, precautions do apply.  What side effects may I notice from receiving this medicine?  Side effects that you should report to your doctor or health care professional as soon as possible:  · allergic reactions like skin rash, itching or hives, swelling of the face, lips, or tongue  · breathing problems or shortness of breath  · diarrhea  · dry, nonproductive cough  · low blood counts - this medicine may decrease the number of white blood cells, red blood cells and platelets. You may be at increased risk for infections and bleeding.  · mouth sores  · redness,  blistering, peeling or loosening of the skin, including inside the mouth  · signs of infection - fever or chills, cough, sore throat, pain or trouble passing urine  · signs and symptoms of bleeding such as bloody or black, tarry stools; red or dark-brown urine; spitting up blood or brown material that looks like coffee grounds; red spots on the skin; unusual bruising or bleeding from the eye, gums, or nose  · signs and symptoms of kidney injury like trouble passing urine or change in the amount of urine  · signs and symptoms of liver injury like dark yellow or brown urine; general ill feeling or flu-like symptoms; light-colored stools; loss of appetite; nausea; right upper belly pain; unusually weak or tired; yellowing of the eyes or skin  Side effects that usually do not require medical attention (report to your doctor or health care professional if they continue or are bothersome):  · dizziness  · hair loss  · tiredness  · upset stomach  · vomiting  What may interact with this medicine?  This medicine may interact with the following medication:  · acitretin  · aspirin and aspirin-like medicines including salicylates  · azathioprine  · certain antibiotics like penicillins, tetracycline, and chloramphenicol  · cyclosporine  · gold  · hydroxychloroquine  · live virus vaccines  · NSAIDs, medicines for pain and inflammation, like ibuprofen or naproxen  · other cytotoxic agents  · penicillamine  · phenylbutazone  · phenytoin  · probenecid  · retinoids such as isotretinoin and tretinoin  · steroid medicines like prednisone or cortisone  · sulfonamides like sulfasalazine and trimethoprim/sulfamethoxazole  · theophylline  What if I miss a dose?  If you miss a dose, talk with your doctor or health care professional. Do not take double or extra doses.  Where should I keep my medicine?  Keep out of the reach of children.  Store at room temperature between 20 and 25 degrees C (68 and 77 degrees F). Protect from light. Throw  away any unused medicine after the expiration date.  What should I tell my health care provider before I take this medicine?  They need to know if you have any of these conditions:  · fluid in the stomach area or lungs  · if you often drink alcohol  · infection or immune system problems  · kidney disease or on hemodialysis  · liver disease  · low blood counts, like low white cell, platelet, or red cell counts  · lung disease  · radiation therapy  · stomach ulcers  · ulcerative colitis  · an unusual or allergic reaction to methotrexate, other medicines, foods, dyes, or preservatives  · pregnant or trying to get pregnant  · breast-feeding  What should I watch for while using this medicine?  Avoid alcoholic drinks.  This medicine can make you more sensitive to the sun. Keep out of the sun. If you cannot avoid being in the sun, wear protective clothing and use sunscreen. Do not use sun lamps or tanning beds/booths.  You may need blood work done while you are taking this medicine.  Call your doctor or health care professional for advice if you get a fever, chills or sore throat, or other symptoms of a cold or flu. Do not treat yourself. This drug decreases your body's ability to fight infections. Try to avoid being around people who are sick.  This medicine may increase your risk to bruise or bleed. Call your doctor or health care professional if you notice any unusual bleeding.  Check with your doctor or health care professional if you get an attack of severe diarrhea, nausea and vomiting, or if you sweat a lot. The loss of too much body fluid can make it dangerous for you to take this medicine.  Talk to your doctor about your risk of cancer. You may be more at risk for certain types of cancers if you take this medicine.  Both men and women must use effective birth control with this medicine. Do not become pregnant while taking this medicine or until at least 1 normal menstrual cycle has occurred after stopping it.  Women should inform their doctor if they wish to become pregnant or think they might be pregnant. Men should not father a child while taking this medicine and for 3 months after stopping it. There is a potential for serious side effects to an unborn child. Talk to your health care professional or pharmacist for more information. Do not breast-feed an infant while taking this medicine.  NOTE:This sheet is a summary. It may not cover all possible information. If you have questions about this medicine, talk to your doctor, pharmacist, or health care provider. Copyright© 2017 [employer]

## 2020-08-13 NOTE — LETTER
August 13, 2020      [facility]  [address]  [place] [address]  Phone: [phone]  Fax: [phone]       Patient: Juan C Rodriguez   YOB: 1965  Date of Visit: 08/13/2020    To Whom It May Concern:    Pamela Rodriguez  was at [facility] on 08/13/2020. He may return to work/school on 08/14/2020. If you have any questions or concerns, or if I can be of further assistance, please do not hesitate to contact me.    Sincerely,    Zonia Garcia LPN/ Leila Lora MD

## 2020-08-13 NOTE — PROGRESS NOTES
CC:  Chief Complaint   Patient presents with    Gout     Referred by : Dr Isabel       History of Present Illness:  Juan C Schilling Gainesis a 54 y.o.yo male presents for follow-up of right wrist arthritis  Last visit he was seen for an acute flare of right wrist arthritis   He had history of gout and so we suspected gout flare and he was given an injection in the right wrist, no aspirate obtained  He responded well initially  Currently he is on allopurinol 400 mg a day, stop Colcrys, last uric acid 4.9    X-ray right wrist reviewed was suggestive of erosions, so we did a follow-up MRI  When we did MRI of the right wrist it was suggestive of synovitis  This was suggestive of diffuse synovitis throughout the wrist with some associated enhancing erosive changes seen throughout the carpal bones and distal radius.  Reactive marrow edema noted throughout the carpal bones and distal radius.  After initial response however he started to notice pain again in the right wrist,   He had erosions noted in x-ray wrist in 2012 as well on review  He does mention of history of some flares in the right wrist  Left wrist does not bother him as much, has not noted similar flares in the left wrist  He denies any specific a.m. stiffness but he does notice periodically spontaneous onset of stiffness involving both hands  + family history of rheumatoid arthritis- he recollects now    He can  relate his episodes of gout to noncompliance with diet, whenever he eats red meats he notices these flares  He has chronic gout with prior episodes involving his feet and wrist    Nonsmoker  + alcohol, drinks beer regularly        Review of Systems:  Constitutional: Denies fever, chills. No recent weight changes.   Fatigue: no  Muscle weakness: no  Headaches: no new headaches  Eyes: No redness .  No recent visual changes.  ENT:  No oral or nasal ulcers.  Card: No chest pain.  Resp: No cough or sob.   Gastro: No nausea or vomiting.  No  heartburn.  Constipation: no  Diarrhea: no  Genito:  No dysuria.  No genital ulcers.  Skin: No rash.  Raynauds:no  Neuro: No numbness / tingling.   Psych: No depression, anxiety  Endo:  no excess thirst.  Heme: no abnormal bleeding or bruising  Clots:none     Past Medical History:   Diagnosis Date    Diabetes mellitus, type 2 diagnosed 2013    DM (diabetes mellitus)     2011  02/25/2014    HTN (hypertension)     Hypogonadism, testicular     Obesities, morbid        Past Surgical History:   Procedure Laterality Date    COLONOSCOPY N/A 6/29/2018    Procedure: COLONOSCOPY;  Surgeon: Jeremiah Anaya III, MD;  Location: [facility];  Service: Endoscopy;  Laterality: N/A;         Social History     Tobacco Use    Smoking status: Never Smoker    Smokeless tobacco: Never Used   Substance Use Topics    Alcohol use: Yes     Alcohol/week: 3.0 - 4.0 standard drinks     Types: 3 - 4 Cans of beer per week     Comment: occasional weekends    Drug use: No       Family History   Problem Relation Age of Onset    Cataracts Mother     Diabetes Mother     Hypertension Mother     Diabetes Sister        Review of patient's allergies indicates:   Allergen Reactions    Naproxen Swelling       Statin : yes     OBJECTIVE:     Vitals:    08/13/20 0832   BP: (!) 144/69   Pulse: 69         Physical Exam:  General Appearance:  NAD.   Gait: not favoring.  HEENT: PERRL.  Eyes not dry or injected.  No nasal ulcers.  Mouth not dry, no oral lesions.  Lymph: cervical, supraclavicular or axillary nodes: none abnormal   Cardio: no irregularity of S1 or S2.  No gallops or rubs.   Resp: Normal respiratory motion. Clear to auscultation bilaterally.   No abnormal chest conformation.  Abd: Soft, non-tender, nondistended.  No masses.   Skin: Head and neck,  and extremities examined. No rashes.   Neuro: Ox3.   Cranial nerves II-XII grossly intact.   Sensation intact  in both distal LE and upper extremities to light touch.  Musculoskeletal  Exam:    Right Side Rheumatological Exam     + mild swelling with tenderness,     Left Side Rheumatological Exam     Examination finds the shoulder, elbow, wrist, knee, 1st PIP, 1st MCP, 2nd PIP, 2nd MCP, 3rd PIP, 3rd MCP, 4th PIP, 4th MCP, 5th PIP and 5th MCP no synovitis     Others :  Hip:Normal ROM    Ankle :No synovitis   Foot:No synovitis       Laboratory: I have reviewed all of the patient's relevant lab work available in the medical record and have utilized this in my evaluation and management recommendations today    Lab Results   Component Value Date    CRP 44.7 (H) 08/07/2020     Lab Results   Component Value Date    SEDRATE 66 (H) 08/07/2020     Lab Results   Component Value Date    WBC 6.49 03/17/2020    HGB 12.6 (L) 03/17/2020    HCT 44.6 03/17/2020    MCV 84 03/17/2020     03/17/2020       CMP  Sodium   Date Value Ref Range Status   05/21/2020 141 136 - 145 mmol/L Final     Potassium   Date Value Ref Range Status   05/21/2020 4.1 3.5 - 5.1 mmol/L Final     Chloride   Date Value Ref Range Status   05/21/2020 102 95 - 110 mmol/L Final     CO2   Date Value Ref Range Status   05/21/2020 29 23 - 29 mmol/L Final     Glucose   Date Value Ref Range Status   05/21/2020 130 (H) 70 - 110 mg/dL Final     BUN, Bld   Date Value Ref Range Status   05/21/2020 32 (H) 6 - 20 mg/dL Final     Creatinine   Date Value Ref Range Status   05/21/2020 1.4 0.5 - 1.4 mg/dL Final     Calcium   Date Value Ref Range Status   05/21/2020 9.4 8.7 - 10.5 mg/dL Final     Total Protein   Date Value Ref Range Status   05/21/2020 7.7 6.0 - 8.4 g/dL Final     Albumin   Date Value Ref Range Status   05/21/2020 3.9 3.5 - 5.2 g/dL Final   05/31/2016 4.2 3.6 - 5.1 g/dL Final     Comment:     @ Test Performed By:  [employer] Marquezeugenia Diaz M.D., FCAP.,   [address] [place] [id number]  CLIA  [id number]       Total Bilirubin   Date Value Ref Range Status   05/21/2020 0.3 0.1 - 1.0  mg/dL Final     Comment:     For infants and newborns, interpretation of results should be based  on gestational age, weight and in agreement with clinical  observations.  Premature Infant recommended reference ranges:  Up to 24 hours.............<8.0 mg/dL  Up to 48 hours............<12.0 mg/dL  3-5 days..................<15.0 mg/dL  6-29 days.................<15.0 mg/dL       Alkaline Phosphatase   Date Value Ref Range Status   05/21/2020 115 55 - 135 U/L Final     AST   Date Value Ref Range Status   05/21/2020 23 10 - 40 U/L Final     ALT   Date Value Ref Range Status   05/21/2020 32 10 - 44 U/L Final     Anion Gap   Date Value Ref Range Status   05/21/2020 10 8 - 16 mmol/L Final     eGFR if    Date Value Ref Range Status   05/21/2020 >60 >60 mL/min/1.73 m^2 Final     eGFR if non    Date Value Ref Range Status   05/21/2020 56 (A) >60 mL/min/1.73 m^2 Final     Comment:     Calculation used to obtain the estimated glomerular filtration  rate (eGFR) is the CKD-EPI equation.        Lab Results   Component Value Date    URICACID 4.9 08/07/2020         Imaging : I have reviewed all of the patient's diagnostic/imaging results available in the medical record and have utilized this in my evaluation and management recommendations today.  X-ray wrist right    FINDINGS:  No acute fracture.  Soft tissue edema about the wrist and dorsal hand present with scattered carpal bone erosions.  OA findings present about the wrist as well.  MCP and interphalangeal joints demonstrate no concerning erosions.    MRI right wrist:    Impression:     1. Findings in keeping with diffuse synovitis throughout the wrist with some associated enhancing erosive changes seen throughout the carpal bones and distal radius.  2. Reactive marrow edema noted throughout the carpal bones and distal radius.     Notes reviewed  Other procedures:    ASSESSMENT/PLAN:     Inflammatory arthritis  -     Discontinue: methotrexate 2.5  MG Tab; Take 6 tablets (15 mg total) by mouth every 7 days.  Dispense: 30 tablet; Refill: 3  -     folic acid (FOLVITE) 1 MG tablet; Take 1 tablet (1 mg total) by mouth once daily.  Dispense: 90 tablet; Refill: 3  -     methotrexate 2.5 MG Tab; Take 5 tablets (12.5 mg total) by mouth every 7 days.  Dispense: 30 tablet; Refill: 3    Idiopathic chronic gout without tophus, unspecified site    Vitamin D deficiency  -     ergocalciferol (VITAMIN D2) 50,000 unit Cap; Take 1 capsule (50,000 Units total) by mouth every 7 days.  Dispense: 4 capsule; Refill: 3    CHAD positive    Hyperparathyroidism      Seronegative RA:  Involving right wrist  + MRI finding suggestive of synovitis and erosions  Erosive involving right wrist  Check hep panel, TB gold  Start methotrexate 10 mg a week , x  Weeks then increase to 12.5 mg a week,  with folic acid 1 mg daily   Cautious uptitration due to mild CKD    Chronic gout:  Non tophaceous  Continue allopurinol 300 mg daily  Colcrys stopped   due to cost issues  Uric acid normal    Beta 2GP ,IgM low titer + ve , ACL , DRVVT - ve   Will repeat in 3M    History of hyperparathyroidism:  Also noted was history of vitamin-D deficiency  Restart vitamin-D supplements 48532 units a week    Exercise, weight loss    Immunizations  Pneumonia 13 given     Compromised immune system secondary to autoimmune disease and use of immunosuppressive drugs. Monitor carefully for infections. Advised to get immediate medical care if any infection. Also advised strict adherence to age appropriate vaccinations and cancer screenings with PCP      Labs 6 weeks after starting methotrexate  3 month return      Risks vs Benefits and potential side effects of medication prescribed today were discussed with patient. Medication literature given to patient up discharge  Went over uptodate information /literature on the meds prescribed today    Methotrexate- infection/malignancy risk potential discussed, increased risk of  lymphoma  Counseled on the need to stay up-to-date on vaccinations and health maintenance screens per PCP  Patient advised to hold DMARD and/or biologic therapy for signs of infection or for surgery. If you are unsure what to do please call our office for instruction. [facility] [phone]      Thank you for allowing us to participate in the care of this patient    Disclaimer: This note was prepared using voice recognition system and is likely to have sound alike errors and is not proof read.  Please call me with any questions.

## 2020-08-13 NOTE — TELEPHONE ENCOUNTER
----- Message from Adalberto Toledo sent at 8/13/2020  9:23 AM CDT -----  Regarding: Rx  Contact: Pharmacy [phone]  Pharmacy is calling to clarify an RX.  RX name:   methotrexate 2.5 MG Tab   What do they need to clarify:  Rx was sent with two instructions, is needing clarity.  Comments:     Please call an advise  Thank you

## 2020-08-14 NOTE — TELEPHONE ENCOUNTER
----- Message from Lacy Gonzalez sent at 8/14/2020 11:47 AM CDT -----  Regarding: Surajrt  .Type:  Pharmacy Calling to Clarify an RX    Name of Caller:Lexie   Pharmacy Name:[facility]   Prescription Name:Methotrexate 2.5mg   What do they need to clarify?:directions   Best Call Back Number:[phone]  Additional Information:       Thank You,   Lacy Gonzalez

## 2020-08-19 ENCOUNTER — OFFICE VISIT (OUTPATIENT)
Dept: UROLOGY | Facility: CLINIC | Age: 55
End: 2020-08-19
Payer: COMMERCIAL

## 2020-08-19 ENCOUNTER — TELEPHONE (OUTPATIENT)
Dept: RHEUMATOLOGY | Facility: CLINIC | Age: 55
End: 2020-08-19

## 2020-08-19 VITALS
TEMPERATURE: 98 F | DIASTOLIC BLOOD PRESSURE: 88 MMHG | SYSTOLIC BLOOD PRESSURE: 144 MMHG | BODY MASS INDEX: 56.72 KG/M2 | WEIGHT: 315 LBS

## 2020-08-19 DIAGNOSIS — E29.1 HYPOGONADISM IN MALE: Primary | ICD-10-CM

## 2020-08-19 DIAGNOSIS — E66.01 MORBID OBESITY: ICD-10-CM

## 2020-08-19 DIAGNOSIS — I10 HYPERTENSION, UNSPECIFIED TYPE: ICD-10-CM

## 2020-08-19 LAB
BILIRUB SERPL-MCNC: NORMAL MG/DL
BLOOD URINE, POC: NORMAL
CLARITY, POC UA: CLEAR
COLOR, POC UA: YELLOW
GLUCOSE UR QL STRIP: NORMAL
KETONES UR QL STRIP: NORMAL
LEUKOCYTE ESTERASE URINE, POC: NORMAL
NITRITE, POC UA: NORMAL
PH, POC UA: 5
PROTEIN, POC: NORMAL
SPECIFIC GRAVITY, POC UA: 1.01
UROBILINOGEN, POC UA: NORMAL

## 2020-08-19 PROCEDURE — 3077F SYST BP >= 140 MM HG: CPT | Mod: CPTII,S$GLB,, | Performed by: UROLOGY

## 2020-08-19 PROCEDURE — 99999 PR PBB SHADOW E&M-EST. PATIENT-LVL III: CPT | Mod: PBBFAC,,, | Performed by: UROLOGY

## 2020-08-19 PROCEDURE — 99999 PR PBB SHADOW E&M-EST. PATIENT-LVL III: ICD-10-PCS | Mod: PBBFAC,,, | Performed by: UROLOGY

## 2020-08-19 PROCEDURE — 3008F PR BODY MASS INDEX (BMI) DOCUMENTED: ICD-10-PCS | Mod: CPTII,S$GLB,, | Performed by: UROLOGY

## 2020-08-19 PROCEDURE — 3079F PR MOST RECENT DIASTOLIC BLOOD PRESSURE 80-89 MM HG: ICD-10-PCS | Mod: CPTII,S$GLB,, | Performed by: UROLOGY

## 2020-08-19 PROCEDURE — 3077F PR MOST RECENT SYSTOLIC BLOOD PRESSURE >= 140 MM HG: ICD-10-PCS | Mod: CPTII,S$GLB,, | Performed by: UROLOGY

## 2020-08-19 PROCEDURE — 99214 PR OFFICE/OUTPT VISIT, EST, LEVL IV, 30-39 MIN: ICD-10-PCS | Mod: 25,S$GLB,, | Performed by: UROLOGY

## 2020-08-19 PROCEDURE — 3079F DIAST BP 80-89 MM HG: CPT | Mod: CPTII,S$GLB,, | Performed by: UROLOGY

## 2020-08-19 PROCEDURE — 81002 POCT URINE DIPSTICK WITHOUT MICROSCOPE: ICD-10-PCS | Mod: S$GLB,,, | Performed by: UROLOGY

## 2020-08-19 PROCEDURE — 3008F BODY MASS INDEX DOCD: CPT | Mod: CPTII,S$GLB,, | Performed by: UROLOGY

## 2020-08-19 PROCEDURE — 81002 URINALYSIS NONAUTO W/O SCOPE: CPT | Mod: S$GLB,,, | Performed by: UROLOGY

## 2020-08-19 PROCEDURE — 99214 OFFICE O/P EST MOD 30 MIN: CPT | Mod: 25,S$GLB,, | Performed by: UROLOGY

## 2020-08-19 RX ORDER — TESTOSTERONE CYPIONATE 200 MG/ML
INJECTION, SOLUTION INTRAMUSCULAR
Qty: 10 ML | Refills: 1 | Status: SHIPPED | OUTPATIENT
Start: 2020-08-19 | End: 2021-03-11 | Stop reason: SDUPTHER

## 2020-08-19 NOTE — TELEPHONE ENCOUNTER
----- Message from Drea Bergman sent at 8/19/2020  8:45 AM CDT -----  Regarding: loreta  Contact: [facility]  Clarify last Thursday's prescription.  2 prescriptions for the same prescription with different directions.   Please call [phone]

## 2020-08-19 NOTE — PROGRESS NOTES
Chief Complaint: Hypogonadism    HPI:   8/19/20: labs approp, T going well.  Reviewed history in detail.  Discussed BP/weight.  2/19/20: Didn't get the hemorrhoid looked at, but that seems better for him.  Labs ok.  T going well.  Reviewed history in detail.   8/19/19: Seeing blood when he wipes.  T going well.  Labs ok.  Reviewed history in detail.   1/9/19: T going well, no problems.  Reviewed history in detail.    7/5/18: T going well, no problems. Needs needles.  Reviewed history in detail.  PSA normal.  1/3/18:  Labs were fine 9/17. Injections going fine doing it monthly.  Satisfied.  8/8/17: Testim no longer approved by his insurance just injections.  Otherwise doing fine.  Labs in hand Hct 37.5.  Has a week of T gel left.  T is low without therapy but he feels the same.  Will strive to give adequate amount for general health but not so much he is in risk from it.  6/21/17: No problems from testim, reviewed history in detail.  12/15/16: No interval changes doing well.  6/16/16: 49 yo pt of Dr. Olivo treated with Testim.  No abd/pelvic pain and no exac/rel factors.  No hematuria.  No urolithiasis.  No urinary bother.  No other  history. No family prostate cancer history. Was sluggish and weak and now on the T things are better with a good energy level.    Allergies:  Naproxen    Medications:  has a current medication list which includes the following prescription(s): acetaminophen, albuterol, allopurinol, allopurinol, amlodipine, atorvastatin, azilsartan med-chlorthalidone, baclofen, blood sugar diagnostic, blunt needle, disposable, ergocalciferol, folic acid, hydrocodone-homatropine 5-1.5 mg/5 ml, hydroxychloroquine, methotrexate, mupirocin, needle (disp) 22 g, prednisone, testosterone cypionate, and cetirizine.    Review of Systems:  General: No fever, chills, fatigability, or weight loss.  Skin: No rashes, itching, or changes in color or texture of skin.  Chest: Denies AVILES, cyanosis, wheezing, cough, and  sputum production.  Abdomen: Appetite fine. No weight loss. Denies diarrhea, abdominal pain, hematemesis, or blood in stool.  Musculoskeletal: No joint stiffness or swelling. Denies back pain.  : As above.  All other review of systems negative.    PMH:   has a past medical history of Diabetes mellitus, type 2 (diagnosed 2013), DM (diabetes mellitus), HTN (hypertension), Hypogonadism, testicular, and Obesities, morbid.    PSH:   has a past surgical history that includes Colonoscopy (N/A, 6/29/2018).    FamHx: family history includes Cataracts in his mother; Diabetes in his mother and sister; Hypertension in his mother.    SocHx:  reports that he has never smoked. He has never used smokeless tobacco. He reports current alcohol use of about 3.0 - 4.0 standard drinks of alcohol per week. He reports that he does not use drugs.      Physical Exam:  Vitals:    08/19/20 1006   Temp: 97.5 °F (36.4 °C)     General: A&Ox3, no apparent distress, no deformities  Neck: No masses, normal thyroid  Lungs: normal inspiration, no use of accessory muscles  Heart: normal pulse, no arrhythmias  Abdomen: Soft, NT, ND  Skin: The skin is warm and dry. No jaundice.  Ext: No c/c/e.  : deferred    Labs/Studies:   Urinalysis performed in clinic, summary: UA normal  PSA    9/15: 1.2    12/16: 0.4    7/17: 0.6    6/18: 1.1    7/19: 0.72    Impression/Plan:   1. Continue T injections monthly.  Refilled.  RTC with labs 6 mo.  2. HTN: elev today (144/88), takes his meds, discussed  3. Obesity: discussed portion control,surgery

## 2020-08-24 ENCOUNTER — TELEPHONE (OUTPATIENT)
Dept: RHEUMATOLOGY | Facility: CLINIC | Age: 55
End: 2020-08-24

## 2020-08-24 NOTE — TELEPHONE ENCOUNTER
Leila Lora MD  You 13 hours ago (8:10 PM)     MTX is 12.5 mg / week only       Thanks     Message text

## 2020-08-24 NOTE — TELEPHONE ENCOUNTER
Called [facility] back to clarify dosage as noted in Dr. Lora's note below. Spoke to Lexie & she verbalized understanding.

## 2020-10-08 ENCOUNTER — LAB VISIT (OUTPATIENT)
Dept: LAB | Facility: HOSPITAL | Age: 55
End: 2020-10-08
Attending: INTERNAL MEDICINE
Payer: COMMERCIAL

## 2020-10-08 DIAGNOSIS — M10.431 OTHER SECONDARY ACUTE GOUT OF RIGHT WRIST: ICD-10-CM

## 2020-10-08 DIAGNOSIS — D84.9 IMMUNOSUPPRESSED STATUS: ICD-10-CM

## 2020-10-08 DIAGNOSIS — M1A.00X0 IDIOPATHIC CHRONIC GOUT WITHOUT TOPHUS, UNSPECIFIED SITE: ICD-10-CM

## 2020-10-08 LAB
BASOPHILS # BLD AUTO: 0.05 K/UL (ref 0–0.2)
BASOPHILS NFR BLD: 0.6 % (ref 0–1.9)
DIFFERENTIAL METHOD: ABNORMAL
EOSINOPHIL # BLD AUTO: 0.1 K/UL (ref 0–0.5)
EOSINOPHIL NFR BLD: 0.7 % (ref 0–8)
ERYTHROCYTE [DISTWIDTH] IN BLOOD BY AUTOMATED COUNT: 17.6 % (ref 11.5–14.5)
ERYTHROCYTE [SEDIMENTATION RATE] IN BLOOD BY WESTERGREN METHOD: 26 MM/HR (ref 0–10)
HCT VFR BLD AUTO: 40 % (ref 40–54)
HGB BLD-MCNC: 11.4 G/DL (ref 14–18)
IMM GRANULOCYTES # BLD AUTO: 0.08 K/UL (ref 0–0.04)
IMM GRANULOCYTES NFR BLD AUTO: 0.9 % (ref 0–0.5)
LYMPHOCYTES # BLD AUTO: 1.3 K/UL (ref 1–4.8)
LYMPHOCYTES NFR BLD: 15.3 % (ref 18–48)
MCH RBC QN AUTO: 24.5 PG (ref 27–31)
MCHC RBC AUTO-ENTMCNC: 28.5 G/DL (ref 32–36)
MCV RBC AUTO: 86 FL (ref 82–98)
MONOCYTES # BLD AUTO: 0.5 K/UL (ref 0.3–1)
MONOCYTES NFR BLD: 6.2 % (ref 4–15)
NEUTROPHILS # BLD AUTO: 6.6 K/UL (ref 1.8–7.7)
NEUTROPHILS NFR BLD: 76.3 % (ref 38–73)
NRBC BLD-RTO: 0 /100 WBC
PLATELET # BLD AUTO: 231 K/UL (ref 150–350)
PMV BLD AUTO: 9.9 FL (ref 9.2–12.9)
RBC # BLD AUTO: 4.66 M/UL (ref 4.6–6.2)
WBC # BLD AUTO: 8.58 K/UL (ref 3.9–12.7)

## 2020-10-08 PROCEDURE — 86140 C-REACTIVE PROTEIN: CPT

## 2020-10-08 PROCEDURE — 85651 RBC SED RATE NONAUTOMATED: CPT

## 2020-10-08 PROCEDURE — 80053 COMPREHEN METABOLIC PANEL: CPT

## 2020-10-08 PROCEDURE — 36415 COLL VENOUS BLD VENIPUNCTURE: CPT | Mod: PO

## 2020-10-08 PROCEDURE — 85025 COMPLETE CBC W/AUTO DIFF WBC: CPT

## 2020-10-09 LAB
ALBUMIN SERPL BCP-MCNC: 3.8 G/DL (ref 3.5–5.2)
ALP SERPL-CCNC: 104 U/L (ref 55–135)
ALT SERPL W/O P-5'-P-CCNC: 24 U/L (ref 10–44)
ANION GAP SERPL CALC-SCNC: 9 MMOL/L (ref 8–16)
AST SERPL-CCNC: 25 U/L (ref 10–40)
BILIRUB SERPL-MCNC: 0.3 MG/DL (ref 0.1–1)
BUN SERPL-MCNC: 26 MG/DL (ref 6–20)
CALCIUM SERPL-MCNC: 9.3 MG/DL (ref 8.7–10.5)
CHLORIDE SERPL-SCNC: 103 MMOL/L (ref 95–110)
CO2 SERPL-SCNC: 31 MMOL/L (ref 23–29)
CREAT SERPL-MCNC: 1.2 MG/DL (ref 0.5–1.4)
CRP SERPL-MCNC: 25.9 MG/L (ref 0–8.2)
EST. GFR  (AFRICAN AMERICAN): >60 ML/MIN/1.73 M^2
EST. GFR  (NON AFRICAN AMERICAN): >60 ML/MIN/1.73 M^2
GLUCOSE SERPL-MCNC: 105 MG/DL (ref 70–110)
POTASSIUM SERPL-SCNC: 4.2 MMOL/L (ref 3.5–5.1)
PROT SERPL-MCNC: 7.4 G/DL (ref 6–8.4)
SODIUM SERPL-SCNC: 143 MMOL/L (ref 136–145)

## 2020-11-05 ENCOUNTER — TELEPHONE (OUTPATIENT)
Dept: RHEUMATOLOGY | Facility: CLINIC | Age: 55
End: 2020-11-05

## 2020-11-06 ENCOUNTER — LAB VISIT (OUTPATIENT)
Dept: LAB | Facility: HOSPITAL | Age: 55
End: 2020-11-06
Attending: INTERNAL MEDICINE
Payer: COMMERCIAL

## 2020-11-06 ENCOUNTER — TELEPHONE (OUTPATIENT)
Dept: RHEUMATOLOGY | Facility: CLINIC | Age: 55
End: 2020-11-06

## 2020-11-06 ENCOUNTER — OFFICE VISIT (OUTPATIENT)
Dept: RHEUMATOLOGY | Facility: CLINIC | Age: 55
End: 2020-11-06
Payer: COMMERCIAL

## 2020-11-06 VITALS
WEIGHT: 315 LBS | BODY MASS INDEX: 57.17 KG/M2 | HEART RATE: 63 BPM | SYSTOLIC BLOOD PRESSURE: 145 MMHG | DIASTOLIC BLOOD PRESSURE: 78 MMHG

## 2020-11-06 DIAGNOSIS — M10.431 OTHER SECONDARY ACUTE GOUT OF RIGHT WRIST: ICD-10-CM

## 2020-11-06 DIAGNOSIS — D84.9 IMMUNOSUPPRESSED STATUS: ICD-10-CM

## 2020-11-06 DIAGNOSIS — M06.031 RHEUMATOID ARTHRITIS INVOLVING RIGHT WRIST WITH NEGATIVE RHEUMATOID FACTOR: ICD-10-CM

## 2020-11-06 DIAGNOSIS — M19.90 INFLAMMATORY ARTHRITIS: ICD-10-CM

## 2020-11-06 DIAGNOSIS — R76.8 ANA POSITIVE: ICD-10-CM

## 2020-11-06 DIAGNOSIS — Z79.899 LONG-TERM USE OF PLAQUENIL: Primary | ICD-10-CM

## 2020-11-06 LAB
ALBUMIN SERPL BCP-MCNC: 3.6 G/DL (ref 3.5–5.2)
ALP SERPL-CCNC: 95 U/L (ref 55–135)
ALT SERPL W/O P-5'-P-CCNC: 24 U/L (ref 10–44)
ANION GAP SERPL CALC-SCNC: 10 MMOL/L (ref 8–16)
AST SERPL-CCNC: 18 U/L (ref 10–40)
BASOPHILS # BLD AUTO: 0.07 K/UL (ref 0–0.2)
BASOPHILS NFR BLD: 1 % (ref 0–1.9)
BILIRUB SERPL-MCNC: 0.3 MG/DL (ref 0.1–1)
BUN SERPL-MCNC: 30 MG/DL (ref 6–20)
CALCIUM SERPL-MCNC: 9.3 MG/DL (ref 8.7–10.5)
CHLORIDE SERPL-SCNC: 103 MMOL/L (ref 95–110)
CO2 SERPL-SCNC: 31 MMOL/L (ref 23–29)
CREAT SERPL-MCNC: 1.3 MG/DL (ref 0.5–1.4)
CRP SERPL-MCNC: 21.2 MG/L (ref 0–8.2)
DIFFERENTIAL METHOD: ABNORMAL
EOSINOPHIL # BLD AUTO: 0.1 K/UL (ref 0–0.5)
EOSINOPHIL NFR BLD: 1 % (ref 0–8)
ERYTHROCYTE [DISTWIDTH] IN BLOOD BY AUTOMATED COUNT: 17 % (ref 11.5–14.5)
ERYTHROCYTE [SEDIMENTATION RATE] IN BLOOD BY WESTERGREN METHOD: 41 MM/HR (ref 0–23)
EST. GFR  (AFRICAN AMERICAN): >60 ML/MIN/1.73 M^2
EST. GFR  (NON AFRICAN AMERICAN): >60 ML/MIN/1.73 M^2
GLUCOSE SERPL-MCNC: 91 MG/DL (ref 70–110)
HCT VFR BLD AUTO: 40.7 % (ref 40–54)
HGB BLD-MCNC: 12.1 G/DL (ref 14–18)
IMM GRANULOCYTES # BLD AUTO: 0.05 K/UL (ref 0–0.04)
IMM GRANULOCYTES NFR BLD AUTO: 0.7 % (ref 0–0.5)
LYMPHOCYTES # BLD AUTO: 1.9 K/UL (ref 1–4.8)
LYMPHOCYTES NFR BLD: 27.7 % (ref 18–48)
MCH RBC QN AUTO: 24.6 PG (ref 27–31)
MCHC RBC AUTO-ENTMCNC: 29.7 G/DL (ref 32–36)
MCV RBC AUTO: 83 FL (ref 82–98)
MONOCYTES # BLD AUTO: 0.6 K/UL (ref 0.3–1)
MONOCYTES NFR BLD: 9.2 % (ref 4–15)
NEUTROPHILS # BLD AUTO: 4.1 K/UL (ref 1.8–7.7)
NEUTROPHILS NFR BLD: 60.4 % (ref 38–73)
NRBC BLD-RTO: 0 /100 WBC
PLATELET # BLD AUTO: 209 K/UL (ref 150–350)
PMV BLD AUTO: 9.2 FL (ref 9.2–12.9)
POTASSIUM SERPL-SCNC: 3.9 MMOL/L (ref 3.5–5.1)
PROT SERPL-MCNC: 7.4 G/DL (ref 6–8.4)
RBC # BLD AUTO: 4.92 M/UL (ref 4.6–6.2)
SODIUM SERPL-SCNC: 144 MMOL/L (ref 136–145)
URATE SERPL-MCNC: 4.5 MG/DL (ref 3.4–7)
WBC # BLD AUTO: 6.82 K/UL (ref 3.9–12.7)

## 2020-11-06 PROCEDURE — 99214 PR OFFICE/OUTPT VISIT, EST, LEVL IV, 30-39 MIN: ICD-10-PCS | Mod: S$GLB,,, | Performed by: INTERNAL MEDICINE

## 2020-11-06 PROCEDURE — 3078F PR MOST RECENT DIASTOLIC BLOOD PRESSURE < 80 MM HG: ICD-10-PCS | Mod: CPTII,S$GLB,, | Performed by: INTERNAL MEDICINE

## 2020-11-06 PROCEDURE — 3008F PR BODY MASS INDEX (BMI) DOCUMENTED: ICD-10-PCS | Mod: CPTII,S$GLB,, | Performed by: INTERNAL MEDICINE

## 2020-11-06 PROCEDURE — 3077F PR MOST RECENT SYSTOLIC BLOOD PRESSURE >= 140 MM HG: ICD-10-PCS | Mod: CPTII,S$GLB,, | Performed by: INTERNAL MEDICINE

## 2020-11-06 PROCEDURE — 99999 PR PBB SHADOW E&M-EST. PATIENT-LVL IV: CPT | Mod: PBBFAC,,, | Performed by: INTERNAL MEDICINE

## 2020-11-06 PROCEDURE — 36415 COLL VENOUS BLD VENIPUNCTURE: CPT

## 2020-11-06 PROCEDURE — 80053 COMPREHEN METABOLIC PANEL: CPT

## 2020-11-06 PROCEDURE — 99999 PR PBB SHADOW E&M-EST. PATIENT-LVL IV: ICD-10-PCS | Mod: PBBFAC,,, | Performed by: INTERNAL MEDICINE

## 2020-11-06 PROCEDURE — 86140 C-REACTIVE PROTEIN: CPT

## 2020-11-06 PROCEDURE — 3008F BODY MASS INDEX DOCD: CPT | Mod: CPTII,S$GLB,, | Performed by: INTERNAL MEDICINE

## 2020-11-06 PROCEDURE — 84550 ASSAY OF BLOOD/URIC ACID: CPT

## 2020-11-06 PROCEDURE — 85025 COMPLETE CBC W/AUTO DIFF WBC: CPT

## 2020-11-06 PROCEDURE — 3077F SYST BP >= 140 MM HG: CPT | Mod: CPTII,S$GLB,, | Performed by: INTERNAL MEDICINE

## 2020-11-06 PROCEDURE — 99214 OFFICE O/P EST MOD 30 MIN: CPT | Mod: S$GLB,,, | Performed by: INTERNAL MEDICINE

## 2020-11-06 PROCEDURE — 3078F DIAST BP <80 MM HG: CPT | Mod: CPTII,S$GLB,, | Performed by: INTERNAL MEDICINE

## 2020-11-06 PROCEDURE — 85652 RBC SED RATE AUTOMATED: CPT

## 2020-11-06 PROCEDURE — 86146 BETA-2 GLYCOPROTEIN ANTIBODY: CPT

## 2020-11-06 RX ORDER — HYDROXYCHLOROQUINE SULFATE 200 MG/1
200 TABLET, FILM COATED ORAL 2 TIMES DAILY
Qty: 60 TABLET | Refills: 6 | Status: SHIPPED | OUTPATIENT
Start: 2020-11-06 | End: 2021-09-28

## 2020-11-06 RX ORDER — METHOTREXATE 2.5 MG/1
15 TABLET ORAL
Qty: 90 TABLET | Refills: 1 | Status: SHIPPED | OUTPATIENT
Start: 2020-11-06 | End: 2021-06-17 | Stop reason: SDUPTHER

## 2020-11-06 RX ORDER — FOLIC ACID 1 MG/1
1 TABLET ORAL DAILY
Qty: 90 TABLET | Refills: 3 | Status: SHIPPED | OUTPATIENT
Start: 2020-11-06 | End: 2022-07-15 | Stop reason: SDUPTHER

## 2020-11-06 NOTE — PROGRESS NOTES
CC:  Chief Complaint   Patient presents with    Follow-up     12 wk f/u- pain in right wrist     Referred by : Dr Isabel       History of Present Illness:  Juan C Schilling Gainesis a 54 y.o.yo male presents for follow-up of  Inflammatory arthritis of rt wrist  / gout   RF/ CCP -ve   CHAD + ve 1:80 . Speckled , - ve profile     He is currently on MTX    He had history of gout and so we suspected gout flare and he was given an injection in the right wrist, no aspirate obtained  He responded well initially , but then had persistant flares with normal uric acid   X-ray right wrist reviewed was suggestive of erosions, so we did a follow-up MRI  When we did MRI of the right wrist it was suggestive of synovitis  This was suggestive of diffuse synovitis throughout the wrist with some associated enhancing erosive changes seen throughout the carpal bones and distal radius.  Reactive marrow edema noted throughout the carpal bones and distal radius.  After initial response however he started to notice pain again in the right wrist,   He had erosions noted in x-ray wrist in 2012 as well on review  He does mention of history of some flares in the right wrist  Left wrist does not bother him as much, has not noted similar flares in the left wrist  He denies any specific a.m. stiffness but he does notice periodically spontaneous onset of stiffness involving both hands  + family history of rheumatoid arthritis- he recollects now    He can  relate his episodes of gout to noncompliance with diet, whenever he eats red meats he notices these flares  He has chronic gout with prior episodes involving his feet and wrist    Nonsmoker  + alcohol, drinks beer regularly        Review of Systems:  Constitutional: Denies fever, chills. No recent weight changes.   Fatigue: no  Muscle weakness: no  Headaches: no new headaches  Eyes: No redness .  No recent visual changes.  ENT:  No oral or nasal ulcers.  Card: No chest pain.  Resp: No  cough or sob.   Gastro: No nausea or vomiting.  No heartburn.  Constipation: no  Diarrhea: no  Genito:  No dysuria.  No genital ulcers.  Skin: No rash.  Raynauds:no  Neuro: No numbness / tingling.   Psych: No depression, anxiety  Endo:  no excess thirst.  Heme: no abnormal bleeding or bruising  Clots:none     Past Medical History:   Diagnosis Date    Diabetes mellitus, type 2 diagnosed 2013    DM (diabetes mellitus)     2011  02/25/2014    HTN (hypertension)     Hypogonadism, testicular     Obesities, morbid        Past Surgical History:   Procedure Laterality Date    COLONOSCOPY N/A 6/29/2018    Procedure: COLONOSCOPY;  Surgeon: Jeremiah Anaya III, MD;  Location: [facility];  Service: Endoscopy;  Laterality: N/A;         Social History     Tobacco Use    Smoking status: Never Smoker    Smokeless tobacco: Never Used   Substance Use Topics    Alcohol use: Yes     Alcohol/week: 3.0 - 4.0 standard drinks     Types: 3 - 4 Cans of beer per week     Comment: occasional weekends    Drug use: No       Family History   Problem Relation Age of Onset    Cataracts Mother     Diabetes Mother     Hypertension Mother     Diabetes Sister        Review of patient's allergies indicates:   Allergen Reactions    Naproxen Swelling       Statin : yes     OBJECTIVE:     Vitals:    11/06/20 1030   BP: (!) 145/78   Pulse: 63         Physical Exam:  General Appearance:  NAD.   Gait: not favoring.  HEENT: PERRL.  Eyes not dry or injected.  No nasal ulcers.  Mouth not dry, no oral lesions.  Lymph: cervical, supraclavicular or axillary nodes: none abnormal   Cardio: no irregularity of S1 or S2.  No gallops or rubs.   Resp: Normal respiratory motion. Clear to auscultation bilaterally.   No abnormal chest conformation.  Abd: Soft, non-tender, nondistended.  No masses.   Skin: Head and neck,  and extremities examined. No rashes.   Neuro: Ox3.   Cranial nerves II-XII grossly intact.   Sensation intact  in both distal LE and  upper extremities to light touch.  Musculoskeletal Exam:    Right Side Rheumatological Exam     + mild swelling with tenderness, along the lateral aspect of the wrist      Left Side Rheumatological Exam     Examination finds the shoulder, elbow, wrist, knee, 1st PIP, 1st MCP, 2nd PIP, 2nd MCP, 3rd PIP, 3rd MCP, 4th PIP, 4th MCP, 5th PIP and 5th MCP no synovitis     Others :  Hip:Normal ROM    Ankle :No synovitis   Foot:No synovitis       Laboratory: I have reviewed all of the patient's relevant lab work available in the medical record and have utilized this in my evaluation and management recommendations today    Lab Results   Component Value Date    CRP 21.2 (H) 11/06/2020     Lab Results   Component Value Date    SEDRATE 41 (H) 11/06/2020     Lab Results   Component Value Date    WBC 6.82 11/06/2020    HGB 12.1 (L) 11/06/2020    HCT 40.7 11/06/2020    MCV 83 11/06/2020     11/06/2020       CMP  Sodium   Date Value Ref Range Status   11/06/2020 144 136 - 145 mmol/L Final     Potassium   Date Value Ref Range Status   11/06/2020 3.9 3.5 - 5.1 mmol/L Final     Chloride   Date Value Ref Range Status   11/06/2020 103 95 - 110 mmol/L Final     CO2   Date Value Ref Range Status   11/06/2020 31 (H) 23 - 29 mmol/L Final     Glucose   Date Value Ref Range Status   11/06/2020 91 70 - 110 mg/dL Final     BUN   Date Value Ref Range Status   11/06/2020 30 (H) 6 - 20 mg/dL Final     Creatinine   Date Value Ref Range Status   11/06/2020 1.3 0.5 - 1.4 mg/dL Final     Calcium   Date Value Ref Range Status   11/06/2020 9.3 8.7 - 10.5 mg/dL Final     Total Protein   Date Value Ref Range Status   11/06/2020 7.4 6.0 - 8.4 g/dL Final     Albumin   Date Value Ref Range Status   11/06/2020 3.6 3.5 - 5.2 g/dL Final   05/31/2016 4.2 3.6 - 5.1 g/dL Final     Comment:     @ Test Performed By:  Astro Marquezeugenia Diaz M.D., [place],   [id number] [place] [id number]  [facility]   [id number]       Total Bilirubin   Date Value Ref Range Status   11/06/2020 0.3 0.1 - 1.0 mg/dL Final     Comment:     For infants and newborns, interpretation of results should be based  on gestational age, weight and in agreement with clinical  observations.  Premature Infant recommended reference ranges:  Up to 24 hours.............<8.0 mg/dL  Up to 48 hours............<12.0 mg/dL  3-5 days..................<15.0 mg/dL  6-29 days.................<15.0 mg/dL       Alkaline Phosphatase   Date Value Ref Range Status   11/06/2020 95 55 - 135 U/L Final     AST   Date Value Ref Range Status   11/06/2020 18 10 - 40 U/L Final     ALT   Date Value Ref Range Status   11/06/2020 24 10 - 44 U/L Final     Anion Gap   Date Value Ref Range Status   11/06/2020 10 8 - 16 mmol/L Final     eGFR if    Date Value Ref Range Status   11/06/2020 >60 >60 mL/min/1.73 m^2 Final     eGFR if non    Date Value Ref Range Status   11/06/2020 >60 >60 mL/min/1.73 m^2 Final     Comment:     Calculation used to obtain the estimated glomerular filtration  rate (eGFR) is the CKD-EPI equation.        Lab Results   Component Value Date    URICACID 4.5 11/06/2020         Imaging : I have reviewed all of the patient's diagnostic/imaging results available in the medical record and have utilized this in my evaluation and management recommendations today.  X-ray wrist right    FINDINGS:  No acute fracture.  Soft tissue edema about the wrist and dorsal hand present with scattered carpal bone erosions.  OA findings present about the wrist as well.  MCP and interphalangeal joints demonstrate no concerning erosions.    MRI right wrist:    Impression:     1. Findings in keeping with diffuse synovitis throughout the wrist with some associated enhancing erosive changes seen throughout the carpal bones and distal radius.  2. Reactive marrow edema noted throughout the carpal bones and distal radius.     Notes reviewed  Other  procedures:    ASSESSMENT/PLAN:     Long-term use of Plaquenil  -     Ambulatory referral/consult to Ophthalmology; Future; Expected date: 11/13/2020    Inflammatory arthritis  -     methotrexate 2.5 MG Tab; Take 6 tablets (15 mg total) by mouth every 7 days.  Dispense: 90 tablet; Refill: 1  -     Ambulatory referral/consult to Ophthalmology; Future; Expected date: 11/13/2020  -     folic acid (FOLVITE) 1 MG tablet; Take 1 tablet (1 mg total) by mouth once daily.  Dispense: 90 tablet; Refill: 3  -     CBC Auto Differential; Standing  -     Comprehensive Metabolic Panel; Standing  -     C-Reactive Protein; Standing  -     Sedimentation rate; Standing  -     Anti-DNA Ab, Double-Stranded; Standing; Expected date: 11/06/2020  -     C4 Complement; Standing; Expected date: 11/06/2020  -     C3 Complement; Standing; Expected date: 11/06/2020  -     Urinalysis; Standing  -     Protein/Creatinine Ratio, Urine; Standing    CHAD positive  -     hydrOXYchloroQUINE (PLAQUENIL) 200 mg tablet; Take 1 tablet (200 mg total) by mouth 2 (two) times daily.  Dispense: 60 tablet; Refill: 6      Seronegative RA:  + CHAD 1:80 speckled , -ve profile   Involving right wrist  + MRI finding suggestive of synovitis and erosions  Erosive involving right wrist  Check hep panel, TB gold  Increase  Methotrexate to 15mg / week  ,  with folic acid 1 mg daily       Chronic gout:  Non tophaceous  Continue allopurinol 300 mg daily  Colcrys stopped   due to cost issues  Uric acid normal    Beta 2GP ,IgM low titer + ve , ACL , DRVVT - ve   Will repeat     History of hyperparathyroidism:  Also noted was history of vitamin-D deficiency  C/w  vitamin-D supplements 83692 units a week  PTH level - stable     Chronic anemia:  F/u per PCP     Exercise, weight loss    Immunizations  Pneumonia 13 given     Compromised immune system secondary to autoimmune disease and use of immunosuppressive drugs. Monitor carefully for infections. Advised to get immediate medical  care if any infection. Also advised strict adherence to age appropriate vaccinations and cancer screenings with PCP      3 month return      Risks vs Benefits and potential side effects of medication prescribed today were discussed with patient. Medication literature given to patient up discharge  Went over uptodate information /literature on the meds prescribed today    Methotrexate- infection/malignancy risk potential discussed, increased risk of lymphoma  Counseled on the need to stay up-to-date on vaccinations and health maintenance screens per PCP  Patient advised to hold DMARD and/or biologic therapy for signs of infection or for surgery. If you are unsure what to do please call our office for instruction. [facility] [phone]      Thank you for allowing us to participate in the care of this patient    Disclaimer: This note was prepared using voice recognition system and is likely to have sound alike errors and is not proof read.  Please call me with any questions.

## 2020-11-06 NOTE — PATIENT INSTRUCTIONS
Methotrexate tablets  What is this medicine?  METHOTREXATE (METH oh TREX ate) is a chemotherapy drug used to treat cancer including breast cancer, leukemia, and lymphoma. This medicine can also be used to treat psoriasis and certain kinds of arthritis.  How should I use this medicine?  Take this medicine by mouth with a glass of water. Follow the directions on the prescription label. Take your medicine at regular intervals. Do not take it more often than directed. Do not stop taking except on your doctor's advice.  Make sure you know why you are taking this medicine and how often you should take it. If this medicine is used for a condition that is not cancer, like arthritis or psoriasis, it should be taken weekly, NOT daily. Taking this medicine more often than directed can cause serious side effects, even death.  Talk to your healthcare provider about safe handling and disposal of this medicine. You may need to take special precautions.  Talk to your pediatrician regarding the use of this medicine in children. While this drug may be prescribed for selected conditions, precautions do apply.  What side effects may I notice from receiving this medicine?  Side effects that you should report to your doctor or health care professional as soon as possible:  · allergic reactions like skin rash, itching or hives, swelling of the face, lips, or tongue  · breathing problems or shortness of breath  · diarrhea  · dry, nonproductive cough  · low blood counts - this medicine may decrease the number of white blood cells, red blood cells and platelets. You may be at increased risk for infections and bleeding.  · mouth sores  · redness, blistering, peeling or loosening of the skin, including inside the mouth  · signs of infection - fever or chills, cough, sore throat, pain or trouble passing urine  · signs and symptoms of bleeding such as bloody or black, tarry stools; red or dark-brown urine; spitting up blood or brown material  that looks like coffee grounds; red spots on the skin; unusual bruising or bleeding from the eye, gums, or nose  · signs and symptoms of kidney injury like trouble passing urine or change in the amount of urine  · signs and symptoms of liver injury like dark yellow or brown urine; general ill feeling or flu-like symptoms; light-colored stools; loss of appetite; nausea; right upper belly pain; unusually weak or tired; yellowing of the eyes or skin  Side effects that usually do not require medical attention (report to your doctor or health care professional if they continue or are bothersome):  · dizziness  · hair loss  · tiredness  · upset stomach  · vomiting  What may interact with this medicine?  This medicine may interact with the following medication:  · acitretin  · aspirin and aspirin-like medicines including salicylates  · azathioprine  · certain antibiotics like penicillins, tetracycline, and chloramphenicol  · cyclosporine  · gold  · hydroxychloroquine  · live virus vaccines  · NSAIDs, medicines for pain and inflammation, like ibuprofen or naproxen  · other cytotoxic agents  · penicillamine  · phenylbutazone  · phenytoin  · probenecid  · retinoids such as isotretinoin and tretinoin  · steroid medicines like prednisone or cortisone  · sulfonamides like sulfasalazine and trimethoprim/sulfamethoxazole  · theophylline  What if I miss a dose?  If you miss a dose, talk with your doctor or health care professional. Do not take double or extra doses.  Where should I keep my medicine?  Keep out of the reach of children.  Store at room temperature between 20 and 25 degrees C (68 and 77 degrees F). Protect from light. Throw away any unused medicine after the expiration date.  What should I tell my health care provider before I take this medicine?  They need to know if you have any of these conditions:  · fluid in the stomach area or lungs  · if you often drink alcohol  · infection or immune system problems  · kidney  disease or on hemodialysis  · liver disease  · low blood counts, like low white cell, platelet, or red cell counts  · lung disease  · radiation therapy  · stomach ulcers  · ulcerative colitis  · an unusual or allergic reaction to methotrexate, other medicines, foods, dyes, or preservatives  · pregnant or trying to get pregnant  · breast-feeding  What should I watch for while using this medicine?  Avoid alcoholic drinks.  This medicine can make you more sensitive to the sun. Keep out of the sun. If you cannot avoid being in the sun, wear protective clothing and use sunscreen. Do not use sun lamps or tanning beds/booths.  You may need blood work done while you are taking this medicine.  Call your doctor or health care professional for advice if you get a fever, chills or sore throat, or other symptoms of a cold or flu. Do not treat yourself. This drug decreases your body's ability to fight infections. Try to avoid being around people who are sick.  This medicine may increase your risk to bruise or bleed. Call your doctor or health care professional if you notice any unusual bleeding.  Check with your doctor or health care professional if you get an attack of severe diarrhea, nausea and vomiting, or if you sweat a lot. The loss of too much body fluid can make it dangerous for you to take this medicine.  Talk to your doctor about your risk of cancer. You may be more at risk for certain types of cancers if you take this medicine.  Both men and women must use effective birth control with this medicine. Do not become pregnant while taking this medicine or until at least 1 normal menstrual cycle has occurred after stopping it. Women should inform their doctor if they wish to become pregnant or think they might be pregnant. Men should not father a child while taking this medicine and for 3 months after stopping it. There is a potential for serious side effects to an unborn child. Talk to your health care professional or  pharmacist for more information. Do not breast-feed an infant while taking this medicine.  NOTE:This sheet is a summary. It may not cover all possible information. If you have questions about this medicine, talk to your doctor, pharmacist, or health care provider. Copyright© 2017 [employer]        Hydroxychloroquine tablets  What is this medicine?  HYDROXYCHLOROQUINE (devendra drox ee KLOR oh kwin) is used to treat rheumatoid arthritis and systemic lupus erythematosus. It is also used to treat malaria.  How should I use this medicine?  Take this medicine by mouth with a glass of water. Follow the directions on the prescription label. If this medicine upsets your stomach take it with food or milk. Take your doses at regular intervals. Do not take your medicine more often than directed.  Talk to your pediatrician regarding the use of this medicine in children. Special care may be needed.  What side effects may I notice from receiving this medicine?  Side effects that you should report to your doctor or health care professional as soon as possible:  · allergic reactions like skin rash, itching or hives, swelling of the face, lips, or tongue  · change in vision  · fever, infection  · hearing loss or ringing  · muscle weakness, tremor, or numbness  · redness, blistering, peeling or loosening of the skin, including inside the mouth  · seizures  · unusual bleeding or bruising  · unusually weak or tired  Side effects that usually do not require medical attention (report to your doctor or health care professional if they continue or are bothersome):  · change in coloration of the mouth or skin  · dizziness  · hair loss, lightening  · headache  · irritability, nervousness, nightmares  · loss of appetite  · stomach upset, diarrhea  What may interact with this medicine?  · antacids  · botulinum toxins  · digoxin  · kaolin  · penicillamine  What if I miss a dose?  If you miss a dose, take it as soon as you can. If it is almost  time for your next dose, take only that dose. Do not take double or extra doses.  Where should I keep my medicine?  Keep out of the reach of children. In children, this medicine can cause overdose with small doses.  Store at room temperature between 15 and 30 degrees C (59 and 86 degrees F). Protect from moisture and light. Throw away any unused medicine after the expiration date.  What should I tell my health care provider before I take this medicine?  They need to know if you have any of these conditions:  · alcoholism  · anemia or other blood disorder  · eye disease  · glucose 6-phosphate dehydrogenase (G6PD) deficiency  · liver disease  · porphyria  · psoriasis  · an unusual or allergic reaction to chloroquine, hydroxychloroquine, other medicines, foods, dyes, or preservatives  · pregnant or trying to get pregnant  · breast-feeding  What should I watch for while using this medicine?  Visit your doctor or health care professional for regular check ups. Tell your doctor if your symptoms do not improve. Arthritis symptoms may take several weeks to improve. If you are taking this medicine for a long time, you will need important blood work done. You will also need to have your eyes checked as directed.  This medicine can make you more sensitive to the sun. Keep out of the sun. If you cannot avoid being in the sun, wear protective clothing and use sunscreen. Do not use sun lamps or tanning beds/booths.  Avoid antacids and kaolin containing products for 2 hours before and after taking a dose of this medicine.  NOTE:This sheet is a summary. It may not cover all possible information. If you have questions about this medicine, talk to your doctor, pharmacist, or health care provider. Copyright© 2017 [employer]        Allopurinol tablets  What is this medicine?  ALLOPURINOL (al oh PURE i nole) reduces the amount of uric acid the body makes. It is used to treat the symptoms of gout. It is also used to treat or prevent  high uric acid levels that occur as a result of certain types of chemotherapy. This medicine may also help patients who frequently have kidney stones.  How should I use this medicine?  Take this medicine by mouth with a glass of water. Follow the directions on the prescription label. If this medicine upsets your stomach, take it with food or milk. Take your doses at regular intervals. Do not take your medicine more often than directed.  Talk to your pediatrician regarding the use of this medicine in children. Special care may be needed. While this drug may be prescribed for children as young as 6 years for selected conditions, precautions do apply.  What side effects may I notice from receiving this medicine?  Side effects that you should report to your doctor or health care professional as soon as possible:  · allergic reactions like skin rash, itching or hives, swelling of the face, lips, or tongue  · breathing problems  · muscle aches or pains  · redness, blistering, peeling or loosening of the skin, including inside the mouth  Side effects that usually do not require medical attention (report to your doctor or health care professional if they continue or are bothersome):  · changes in taste  · diarrhea  · indigestion  · stomach pain or cramps  What may interact with this medicine?  Do not take this medicine with the following medication:  · didanosine, ddI  This medicine may also interact with the following medications:  · amoxicillin or ampicillin  · azathioprine  · certain medicines used to treat gout  · certain types of diuretics  · chlorpropamide  · cyclosporine  · dicumarol  · mercaptopurine  · tolbutamide  · warfarin  What if I miss a dose?  If you miss a dose, take it as soon as you can. If it is almost time for your next dose, take only that dose. Do not take double or extra doses.  Where should I keep my medicine?  Keep out of the reach of children.  Store at room temperature between 15 and 25 degrees C  (59 and 77 degrees F). Protect from light and moisture. Throw away any unused medicine after the expiration date.  What should I tell my health care provider before I take this medicine?  They need to know if you have any of these conditions:  · kidney or liver disease  · an unusual or allergic reaction to allopurinol, other medicines, foods, dyes, or preservatives  · pregnant or trying to get pregnant  · breast feeding  What should I watch for while using this medicine?  Visit your doctor or health care professional for regular checks on your progress. If you are taking this medicine to treat gout, you may not have less frequent attacks at first. Keep taking your medicine regularly and the attacks should get better within 2 to 6 weeks. Drink plenty of water (10 to 12 full glasses a day) while you are taking this medicine. This will help to reduce stomach upset and reduce the risk of getting gout or kidney stones.  Call your doctor or health care professional at once if you get a skin rash together with chills, fever, sore throat, or nausea and vomiting, if you have blood in your urine, or difficulty passing urine.  Do not take vitamin C without asking your doctor or health care professional. Too much vitamin C can increase the chance of getting kidney stones.  You may get drowsy or dizzy. Do not drive, use machinery, or do anything that needs mental alertness until you know how this drug affects you. Do not stand or sit up quickly, especially if you are an older patient. This reduces the risk of dizzy or fainting spells. Alcohol can make you more drowsy and dizzy. Alcohol can also increase the chance of stomach problems and increase the amount of uric acid in your blood. Avoid alcoholic drinks.  NOTE:This sheet is a summary. It may not cover all possible information. If you have questions about this medicine, talk to your doctor, pharmacist, or health care provider. Copyright© 2017 [employer] Standard

## 2020-11-06 NOTE — LETTER
November 6, 2020      [facility]  [address]  [place] [address]  Phone: [phone]  Fax: [phone]       Patient: Juan C Rodriguez   YOB: 1965  Date of Visit: 11/06/2020    To Whom It May Concern:    Pamela Rodriguez  was at [facility] on 11/06/2020.Please excuse him from work today  . If you have any questions or concerns, or if I can be of further assistance, please do not hesitate to contact me.    Sincerely,    Leila Lora MD

## 2020-11-10 LAB
B2 GLYCOPROT1 IGA SER QL: <9 SAU
B2 GLYCOPROT1 IGG SER QL: <9 SGU
B2 GLYCOPROT1 IGM SER QL: 10 SMU

## 2021-02-12 ENCOUNTER — LAB VISIT (OUTPATIENT)
Dept: LAB | Facility: HOSPITAL | Age: 56
End: 2021-02-12
Attending: INTERNAL MEDICINE
Payer: COMMERCIAL

## 2021-02-12 ENCOUNTER — OFFICE VISIT (OUTPATIENT)
Dept: RHEUMATOLOGY | Facility: CLINIC | Age: 56
End: 2021-02-12
Payer: COMMERCIAL

## 2021-02-12 VITALS
WEIGHT: 315 LBS | SYSTOLIC BLOOD PRESSURE: 169 MMHG | HEART RATE: 66 BPM | BODY MASS INDEX: 58.03 KG/M2 | DIASTOLIC BLOOD PRESSURE: 86 MMHG

## 2021-02-12 DIAGNOSIS — M19.90 INFLAMMATORY ARTHRITIS: Primary | ICD-10-CM

## 2021-02-12 DIAGNOSIS — Z79.899 LONG-TERM USE OF PLAQUENIL: ICD-10-CM

## 2021-02-12 DIAGNOSIS — R76.8 ANA POSITIVE: ICD-10-CM

## 2021-02-12 DIAGNOSIS — M1A.00X0 IDIOPATHIC CHRONIC GOUT WITHOUT TOPHUS, UNSPECIFIED SITE: ICD-10-CM

## 2021-02-12 DIAGNOSIS — E55.9 VITAMIN D DEFICIENCY: ICD-10-CM

## 2021-02-12 DIAGNOSIS — E21.3 HYPERPARATHYROIDISM: ICD-10-CM

## 2021-02-12 DIAGNOSIS — Z79.899 ENCOUNTER FOR LONG-TERM (CURRENT) USE OF HIGH-RISK MEDICATION: ICD-10-CM

## 2021-02-12 DIAGNOSIS — M19.90 INFLAMMATORY ARTHRITIS: ICD-10-CM

## 2021-02-12 LAB
BILIRUB UR QL STRIP: NEGATIVE
CLARITY UR: ABNORMAL
COLOR UR: YELLOW
CREAT UR-MCNC: 77 MG/DL (ref 23–375)
GLUCOSE UR QL STRIP: NEGATIVE
HGB UR QL STRIP: NEGATIVE
KETONES UR QL STRIP: NEGATIVE
LEUKOCYTE ESTERASE UR QL STRIP: NEGATIVE
NITRITE UR QL STRIP: NEGATIVE
PH UR STRIP: 6 [PH] (ref 5–8)
PROT UR QL STRIP: NEGATIVE
PROT UR-MCNC: <7 MG/DL (ref 0–15)
PROT/CREAT UR: NORMAL MG/G{CREAT} (ref 0–0.2)
SP GR UR STRIP: 1.01 (ref 1–1.03)
URN SPEC COLLECT METH UR: ABNORMAL

## 2021-02-12 PROCEDURE — 99214 PR OFFICE/OUTPT VISIT, EST, LEVL IV, 30-39 MIN: ICD-10-PCS | Mod: S$GLB,,, | Performed by: INTERNAL MEDICINE

## 2021-02-12 PROCEDURE — 84156 ASSAY OF PROTEIN URINE: CPT

## 2021-02-12 PROCEDURE — 3079F DIAST BP 80-89 MM HG: CPT | Mod: CPTII,S$GLB,, | Performed by: INTERNAL MEDICINE

## 2021-02-12 PROCEDURE — 1126F PR PAIN SEVERITY QUANTIFIED, NO PAIN PRESENT: ICD-10-PCS | Mod: S$GLB,,, | Performed by: INTERNAL MEDICINE

## 2021-02-12 PROCEDURE — 81003 URINALYSIS AUTO W/O SCOPE: CPT

## 2021-02-12 PROCEDURE — 3008F PR BODY MASS INDEX (BMI) DOCUMENTED: ICD-10-PCS | Mod: CPTII,S$GLB,, | Performed by: INTERNAL MEDICINE

## 2021-02-12 PROCEDURE — 3077F SYST BP >= 140 MM HG: CPT | Mod: CPTII,S$GLB,, | Performed by: INTERNAL MEDICINE

## 2021-02-12 PROCEDURE — 99214 OFFICE O/P EST MOD 30 MIN: CPT | Mod: S$GLB,,, | Performed by: INTERNAL MEDICINE

## 2021-02-12 PROCEDURE — 3008F BODY MASS INDEX DOCD: CPT | Mod: CPTII,S$GLB,, | Performed by: INTERNAL MEDICINE

## 2021-02-12 PROCEDURE — 1126F AMNT PAIN NOTED NONE PRSNT: CPT | Mod: S$GLB,,, | Performed by: INTERNAL MEDICINE

## 2021-02-12 PROCEDURE — 99999 PR PBB SHADOW E&M-EST. PATIENT-LVL III: CPT | Mod: PBBFAC,,, | Performed by: INTERNAL MEDICINE

## 2021-02-12 PROCEDURE — 3079F PR MOST RECENT DIASTOLIC BLOOD PRESSURE 80-89 MM HG: ICD-10-PCS | Mod: CPTII,S$GLB,, | Performed by: INTERNAL MEDICINE

## 2021-02-12 PROCEDURE — 3077F PR MOST RECENT SYSTOLIC BLOOD PRESSURE >= 140 MM HG: ICD-10-PCS | Mod: CPTII,S$GLB,, | Performed by: INTERNAL MEDICINE

## 2021-02-12 PROCEDURE — 99999 PR PBB SHADOW E&M-EST. PATIENT-LVL III: ICD-10-PCS | Mod: PBBFAC,,, | Performed by: INTERNAL MEDICINE

## 2021-02-12 RX ORDER — ERGOCALCIFEROL 1.25 MG/1
50000 CAPSULE ORAL
Qty: 12 CAPSULE | Refills: 0 | Status: SHIPPED | OUTPATIENT
Start: 2021-02-12 | End: 2022-04-29 | Stop reason: SDUPTHER

## 2021-03-04 ENCOUNTER — LAB VISIT (OUTPATIENT)
Dept: LAB | Facility: HOSPITAL | Age: 56
End: 2021-03-04
Attending: UROLOGY
Payer: COMMERCIAL

## 2021-03-04 DIAGNOSIS — E66.01 MORBID OBESITY: ICD-10-CM

## 2021-03-04 DIAGNOSIS — E29.1 HYPOGONADISM IN MALE: ICD-10-CM

## 2021-03-04 DIAGNOSIS — I10 HYPERTENSION, UNSPECIFIED TYPE: ICD-10-CM

## 2021-03-04 LAB
ALBUMIN SERPL BCP-MCNC: 3.6 G/DL (ref 3.5–5.2)
ALP SERPL-CCNC: 106 U/L (ref 55–135)
ALT SERPL W/O P-5'-P-CCNC: 45 U/L (ref 10–44)
AST SERPL-CCNC: 37 U/L (ref 10–40)
BILIRUB DIRECT SERPL-MCNC: 0.2 MG/DL (ref 0.1–0.3)
BILIRUB SERPL-MCNC: 0.4 MG/DL (ref 0.1–1)
HCT VFR BLD AUTO: 37.7 % (ref 40–54)
PROT SERPL-MCNC: 7.1 G/DL (ref 6–8.4)

## 2021-03-04 PROCEDURE — 80076 HEPATIC FUNCTION PANEL: CPT | Performed by: UROLOGY

## 2021-03-04 PROCEDURE — 85014 HEMATOCRIT: CPT | Performed by: UROLOGY

## 2021-03-04 PROCEDURE — 36415 COLL VENOUS BLD VENIPUNCTURE: CPT | Mod: PO | Performed by: UROLOGY

## 2021-03-11 ENCOUNTER — OFFICE VISIT (OUTPATIENT)
Dept: UROLOGY | Facility: CLINIC | Age: 56
End: 2021-03-11
Payer: COMMERCIAL

## 2021-03-11 VITALS
TEMPERATURE: 98 F | BODY MASS INDEX: 55.81 KG/M2 | SYSTOLIC BLOOD PRESSURE: 148 MMHG | HEIGHT: 63 IN | WEIGHT: 315 LBS | HEART RATE: 90 BPM | DIASTOLIC BLOOD PRESSURE: 86 MMHG

## 2021-03-11 DIAGNOSIS — Z12.5 PROSTATE CANCER SCREENING: ICD-10-CM

## 2021-03-11 DIAGNOSIS — E29.1 HYPOGONADISM IN MALE: Primary | ICD-10-CM

## 2021-03-11 LAB
BILIRUB SERPL-MCNC: NORMAL MG/DL
BLOOD URINE, POC: NORMAL
CLARITY, POC UA: CLEAR
COLOR, POC UA: YELLOW
GLUCOSE UR QL STRIP: NORMAL
KETONES UR QL STRIP: NORMAL
LEUKOCYTE ESTERASE URINE, POC: NORMAL
NITRITE, POC UA: NORMAL
PH, POC UA: 5
PROTEIN, POC: NORMAL
SPECIFIC GRAVITY, POC UA: 1.02
UROBILINOGEN, POC UA: NORMAL

## 2021-03-11 PROCEDURE — 3008F PR BODY MASS INDEX (BMI) DOCUMENTED: ICD-10-PCS | Mod: CPTII,S$GLB,, | Performed by: UROLOGY

## 2021-03-11 PROCEDURE — 3077F PR MOST RECENT SYSTOLIC BLOOD PRESSURE >= 140 MM HG: ICD-10-PCS | Mod: CPTII,S$GLB,, | Performed by: UROLOGY

## 2021-03-11 PROCEDURE — 99999 PR PBB SHADOW E&M-EST. PATIENT-LVL III: ICD-10-PCS | Mod: PBBFAC,,, | Performed by: UROLOGY

## 2021-03-11 PROCEDURE — 99999 PR PBB SHADOW E&M-EST. PATIENT-LVL III: CPT | Mod: PBBFAC,,, | Performed by: UROLOGY

## 2021-03-11 PROCEDURE — 99213 PR OFFICE/OUTPT VISIT, EST, LEVL III, 20-29 MIN: ICD-10-PCS | Mod: 25,S$GLB,, | Performed by: UROLOGY

## 2021-03-11 PROCEDURE — 81002 POCT URINE DIPSTICK WITHOUT MICROSCOPE: ICD-10-PCS | Mod: S$GLB,,, | Performed by: UROLOGY

## 2021-03-11 PROCEDURE — 99213 OFFICE O/P EST LOW 20 MIN: CPT | Mod: 25,S$GLB,, | Performed by: UROLOGY

## 2021-03-11 PROCEDURE — 3077F SYST BP >= 140 MM HG: CPT | Mod: CPTII,S$GLB,, | Performed by: UROLOGY

## 2021-03-11 PROCEDURE — 1126F AMNT PAIN NOTED NONE PRSNT: CPT | Mod: S$GLB,,, | Performed by: UROLOGY

## 2021-03-11 PROCEDURE — 81002 URINALYSIS NONAUTO W/O SCOPE: CPT | Mod: S$GLB,,, | Performed by: UROLOGY

## 2021-03-11 PROCEDURE — 3008F BODY MASS INDEX DOCD: CPT | Mod: CPTII,S$GLB,, | Performed by: UROLOGY

## 2021-03-11 PROCEDURE — 3079F PR MOST RECENT DIASTOLIC BLOOD PRESSURE 80-89 MM HG: ICD-10-PCS | Mod: CPTII,S$GLB,, | Performed by: UROLOGY

## 2021-03-11 PROCEDURE — 3079F DIAST BP 80-89 MM HG: CPT | Mod: CPTII,S$GLB,, | Performed by: UROLOGY

## 2021-03-11 PROCEDURE — 1126F PR PAIN SEVERITY QUANTIFIED, NO PAIN PRESENT: ICD-10-PCS | Mod: S$GLB,,, | Performed by: UROLOGY

## 2021-03-11 RX ORDER — TESTOSTERONE CYPIONATE 200 MG/ML
INJECTION, SOLUTION INTRAMUSCULAR
Qty: 10 ML | Refills: 1 | Status: SHIPPED | OUTPATIENT
Start: 2021-03-11 | End: 2023-01-30

## 2021-03-29 ENCOUNTER — PATIENT OUTREACH (OUTPATIENT)
Dept: ADMINISTRATIVE | Facility: HOSPITAL | Age: 56
End: 2021-03-29

## 2021-04-08 ENCOUNTER — OFFICE VISIT (OUTPATIENT)
Dept: FAMILY MEDICINE | Facility: CLINIC | Age: 56
End: 2021-04-08
Payer: COMMERCIAL

## 2021-04-08 ENCOUNTER — TELEPHONE (OUTPATIENT)
Dept: FAMILY MEDICINE | Facility: CLINIC | Age: 56
End: 2021-04-08

## 2021-04-08 VITALS
BODY MASS INDEX: 55.81 KG/M2 | HEIGHT: 63 IN | OXYGEN SATURATION: 99 % | HEART RATE: 77 BPM | SYSTOLIC BLOOD PRESSURE: 136 MMHG | WEIGHT: 315 LBS | DIASTOLIC BLOOD PRESSURE: 70 MMHG | RESPIRATION RATE: 18 BRPM | TEMPERATURE: 99 F

## 2021-04-08 DIAGNOSIS — E66.01 MORBID OBESITY DUE TO EXCESS CALORIES: ICD-10-CM

## 2021-04-08 DIAGNOSIS — Z00.00 ROUTINE ADULT HEALTH MAINTENANCE: ICD-10-CM

## 2021-04-08 DIAGNOSIS — Z00.00 ROUTINE ADULT HEALTH MAINTENANCE: Primary | ICD-10-CM

## 2021-04-08 DIAGNOSIS — E66.01 OBESITIES, MORBID: ICD-10-CM

## 2021-04-08 DIAGNOSIS — E78.2 MIXED HYPERLIPIDEMIA: ICD-10-CM

## 2021-04-08 DIAGNOSIS — E11.65 TYPE 2 DIABETES MELLITUS WITH HYPERGLYCEMIA, WITHOUT LONG-TERM CURRENT USE OF INSULIN: ICD-10-CM

## 2021-04-08 DIAGNOSIS — I10 ESSENTIAL HYPERTENSION: Primary | ICD-10-CM

## 2021-04-08 PROCEDURE — 99999 PR PBB SHADOW E&M-EST. PATIENT-LVL IV: CPT | Mod: PBBFAC,,, | Performed by: INTERNAL MEDICINE

## 2021-04-08 PROCEDURE — 99999 PR PBB SHADOW E&M-EST. PATIENT-LVL IV: ICD-10-PCS | Mod: PBBFAC,,, | Performed by: INTERNAL MEDICINE

## 2021-04-08 PROCEDURE — 1126F AMNT PAIN NOTED NONE PRSNT: CPT | Mod: S$GLB,,, | Performed by: INTERNAL MEDICINE

## 2021-04-08 PROCEDURE — 3075F PR MOST RECENT SYSTOLIC BLOOD PRESS GE 130-139MM HG: ICD-10-PCS | Mod: CPTII,S$GLB,, | Performed by: INTERNAL MEDICINE

## 2021-04-08 PROCEDURE — 1126F PR PAIN SEVERITY QUANTIFIED, NO PAIN PRESENT: ICD-10-PCS | Mod: S$GLB,,, | Performed by: INTERNAL MEDICINE

## 2021-04-08 PROCEDURE — 99396 PR PREVENTIVE VISIT,EST,40-64: ICD-10-PCS | Mod: S$GLB,,, | Performed by: INTERNAL MEDICINE

## 2021-04-08 PROCEDURE — 3008F PR BODY MASS INDEX (BMI) DOCUMENTED: ICD-10-PCS | Mod: CPTII,S$GLB,, | Performed by: INTERNAL MEDICINE

## 2021-04-08 PROCEDURE — 3008F BODY MASS INDEX DOCD: CPT | Mod: CPTII,S$GLB,, | Performed by: INTERNAL MEDICINE

## 2021-04-08 PROCEDURE — 3078F PR MOST RECENT DIASTOLIC BLOOD PRESSURE < 80 MM HG: ICD-10-PCS | Mod: CPTII,S$GLB,, | Performed by: INTERNAL MEDICINE

## 2021-04-08 PROCEDURE — 3075F SYST BP GE 130 - 139MM HG: CPT | Mod: CPTII,S$GLB,, | Performed by: INTERNAL MEDICINE

## 2021-04-08 PROCEDURE — 3078F DIAST BP <80 MM HG: CPT | Mod: CPTII,S$GLB,, | Performed by: INTERNAL MEDICINE

## 2021-04-08 PROCEDURE — 99396 PREV VISIT EST AGE 40-64: CPT | Mod: S$GLB,,, | Performed by: INTERNAL MEDICINE

## 2021-04-09 ENCOUNTER — LAB VISIT (OUTPATIENT)
Dept: LAB | Facility: HOSPITAL | Age: 56
End: 2021-04-09
Attending: INTERNAL MEDICINE
Payer: COMMERCIAL

## 2021-04-09 DIAGNOSIS — Z00.00 ROUTINE ADULT HEALTH MAINTENANCE: ICD-10-CM

## 2021-04-09 LAB
CHOLEST SERPL-MCNC: 173 MG/DL (ref 120–199)
CHOLEST/HDLC SERPL: 3.6 {RATIO} (ref 2–5)
COMPLEXED PSA SERPL-MCNC: 0.49 NG/ML (ref 0–4)
ESTIMATED AVG GLUCOSE: 128 MG/DL (ref 68–131)
HBA1C MFR BLD: 6.1 % (ref 4–5.6)
HDLC SERPL-MCNC: 48 MG/DL (ref 40–75)
HDLC SERPL: 27.7 % (ref 20–50)
LDLC SERPL CALC-MCNC: 111.4 MG/DL (ref 63–159)
NONHDLC SERPL-MCNC: 125 MG/DL
TRIGL SERPL-MCNC: 68 MG/DL (ref 30–150)

## 2021-04-09 PROCEDURE — 36415 COLL VENOUS BLD VENIPUNCTURE: CPT | Mod: PO | Performed by: INTERNAL MEDICINE

## 2021-04-09 PROCEDURE — 83036 HEMOGLOBIN GLYCOSYLATED A1C: CPT | Performed by: INTERNAL MEDICINE

## 2021-04-09 PROCEDURE — 84153 ASSAY OF PSA TOTAL: CPT | Performed by: INTERNAL MEDICINE

## 2021-04-09 PROCEDURE — 80061 LIPID PANEL: CPT | Performed by: INTERNAL MEDICINE

## 2021-04-11 ENCOUNTER — TELEPHONE (OUTPATIENT)
Dept: FAMILY MEDICINE | Facility: CLINIC | Age: 56
End: 2021-04-11

## 2021-04-11 RX ORDER — METFORMIN HYDROCHLORIDE 500 MG/1
500 TABLET ORAL
Qty: 90 TABLET | Refills: 3 | Status: SHIPPED | OUTPATIENT
Start: 2021-04-11 | End: 2022-03-20

## 2021-04-15 ENCOUNTER — TELEPHONE (OUTPATIENT)
Dept: FAMILY MEDICINE | Facility: CLINIC | Age: 56
End: 2021-04-15

## 2021-04-16 ENCOUNTER — TELEPHONE (OUTPATIENT)
Dept: FAMILY MEDICINE | Facility: CLINIC | Age: 56
End: 2021-04-16

## 2021-06-16 ENCOUNTER — TELEPHONE (OUTPATIENT)
Dept: RHEUMATOLOGY | Facility: CLINIC | Age: 56
End: 2021-06-16

## 2021-06-17 ENCOUNTER — TELEPHONE (OUTPATIENT)
Dept: PAIN MEDICINE | Facility: CLINIC | Age: 56
End: 2021-06-17

## 2021-06-17 ENCOUNTER — OFFICE VISIT (OUTPATIENT)
Dept: RHEUMATOLOGY | Facility: CLINIC | Age: 56
End: 2021-06-17
Payer: COMMERCIAL

## 2021-06-17 ENCOUNTER — HOSPITAL ENCOUNTER (OUTPATIENT)
Dept: RADIOLOGY | Facility: HOSPITAL | Age: 56
Discharge: HOME | End: 2021-06-17
Attending: INTERNAL MEDICINE
Payer: COMMERCIAL

## 2021-06-17 ENCOUNTER — TELEPHONE (OUTPATIENT)
Dept: RHEUMATOLOGY | Facility: CLINIC | Age: 56
End: 2021-06-17

## 2021-06-17 ENCOUNTER — LAB VISIT (OUTPATIENT)
Dept: LAB | Facility: HOSPITAL | Age: 56
End: 2021-06-17
Attending: INTERNAL MEDICINE
Payer: COMMERCIAL

## 2021-06-17 VITALS
HEIGHT: 63 IN | DIASTOLIC BLOOD PRESSURE: 84 MMHG | WEIGHT: 314.38 LBS | BODY MASS INDEX: 55.7 KG/M2 | HEART RATE: 55 BPM | SYSTOLIC BLOOD PRESSURE: 152 MMHG

## 2021-06-17 DIAGNOSIS — M06.09 RHEUMATOID ARTHRITIS OF MULTIPLE SITES WITH NEGATIVE RHEUMATOID FACTOR: Primary | ICD-10-CM

## 2021-06-17 DIAGNOSIS — M54.50 ACUTE MIDLINE LOW BACK PAIN WITHOUT SCIATICA: ICD-10-CM

## 2021-06-17 DIAGNOSIS — M47.816 FACET ARTHROPATHY, LUMBAR: ICD-10-CM

## 2021-06-17 DIAGNOSIS — M1A.09X0 IDIOPATHIC CHRONIC GOUT OF MULTIPLE SITES WITHOUT TOPHUS: ICD-10-CM

## 2021-06-17 DIAGNOSIS — Z79.899 ENCOUNTER FOR LONG-TERM (CURRENT) USE OF HIGH-RISK MEDICATION: ICD-10-CM

## 2021-06-17 DIAGNOSIS — M10.9 GOUT WITHOUT TOPHUS: ICD-10-CM

## 2021-06-17 DIAGNOSIS — R76.8 ANA POSITIVE: ICD-10-CM

## 2021-06-17 PROBLEM — M06.031 RHEUMATOID ARTHRITIS INVOLVING RIGHT WRIST WITH NEGATIVE RHEUMATOID FACTOR: Status: ACTIVE | Noted: 2021-06-17

## 2021-06-17 LAB
BILIRUB UR QL STRIP: NEGATIVE
CLARITY UR: ABNORMAL
COLOR UR: YELLOW
CREAT UR-MCNC: 71 MG/DL (ref 23–375)
GLUCOSE UR QL STRIP: NEGATIVE
HGB UR QL STRIP: NEGATIVE
KETONES UR QL STRIP: NEGATIVE
LEUKOCYTE ESTERASE UR QL STRIP: NEGATIVE
NITRITE UR QL STRIP: NEGATIVE
PH UR STRIP: 5 [PH] (ref 5–8)
PROT UR QL STRIP: NEGATIVE
PROT UR-MCNC: <7 MG/DL (ref 0–15)
PROT/CREAT UR: NORMAL MG/G{CREAT} (ref 0–0.2)
SP GR UR STRIP: 1.02 (ref 1–1.03)
URN SPEC COLLECT METH UR: ABNORMAL

## 2021-06-17 PROCEDURE — 1125F PR PAIN SEVERITY QUANTIFIED, PAIN PRESENT: ICD-10-PCS | Mod: S$GLB,,, | Performed by: INTERNAL MEDICINE

## 2021-06-17 PROCEDURE — 3008F PR BODY MASS INDEX (BMI) DOCUMENTED: ICD-10-PCS | Mod: CPTII,S$GLB,, | Performed by: INTERNAL MEDICINE

## 2021-06-17 PROCEDURE — 72100 XR LUMBAR SPINE AP AND LATERAL: ICD-10-PCS | Mod: 26,,, | Performed by: RADIOLOGY

## 2021-06-17 PROCEDURE — 72100 X-RAY EXAM L-S SPINE 2/3 VWS: CPT | Mod: 26,,, | Performed by: RADIOLOGY

## 2021-06-17 PROCEDURE — 99999 PR PBB SHADOW E&M-EST. PATIENT-LVL IV: ICD-10-PCS | Mod: PBBFAC,,, | Performed by: INTERNAL MEDICINE

## 2021-06-17 PROCEDURE — 99999 PR PBB SHADOW E&M-EST. PATIENT-LVL IV: CPT | Mod: PBBFAC,,, | Performed by: INTERNAL MEDICINE

## 2021-06-17 PROCEDURE — 81003 URINALYSIS AUTO W/O SCOPE: CPT | Performed by: INTERNAL MEDICINE

## 2021-06-17 PROCEDURE — 3008F BODY MASS INDEX DOCD: CPT | Mod: CPTII,S$GLB,, | Performed by: INTERNAL MEDICINE

## 2021-06-17 PROCEDURE — 72100 X-RAY EXAM L-S SPINE 2/3 VWS: CPT | Mod: TC

## 2021-06-17 PROCEDURE — 84156 ASSAY OF PROTEIN URINE: CPT | Performed by: INTERNAL MEDICINE

## 2021-06-17 PROCEDURE — 1125F AMNT PAIN NOTED PAIN PRSNT: CPT | Mod: S$GLB,,, | Performed by: INTERNAL MEDICINE

## 2021-06-17 PROCEDURE — 99215 OFFICE O/P EST HI 40 MIN: CPT | Mod: S$GLB,,, | Performed by: INTERNAL MEDICINE

## 2021-06-17 PROCEDURE — 99215 PR OFFICE/OUTPT VISIT, EST, LEVL V, 40-54 MIN: ICD-10-PCS | Mod: S$GLB,,, | Performed by: INTERNAL MEDICINE

## 2021-06-17 RX ORDER — ALLOPURINOL 300 MG/1
300 TABLET ORAL DAILY
Qty: 90 TABLET | Refills: 3 | Status: SHIPPED | OUTPATIENT
Start: 2021-06-17 | End: 2023-01-18 | Stop reason: SDUPTHER

## 2021-06-17 RX ORDER — ETANERCEPT 50 MG/ML
50 SOLUTION SUBCUTANEOUS WEEKLY
Qty: 4 ML | Refills: 11 | Status: SHIPPED | OUTPATIENT
Start: 2021-06-17 | End: 2022-06-21 | Stop reason: SDUPTHER

## 2021-06-17 RX ORDER — CYCLOBENZAPRINE HCL 10 MG
10 TABLET ORAL 3 TIMES DAILY PRN
Qty: 90 TABLET | Refills: 0 | Status: SHIPPED | OUTPATIENT
Start: 2021-06-17 | End: 2021-12-01

## 2021-06-17 RX ORDER — METHOTREXATE 2.5 MG/1
15 TABLET ORAL
Qty: 72 TABLET | Refills: 1 | Status: SHIPPED | OUTPATIENT
Start: 2021-06-17 | End: 2021-09-28 | Stop reason: SDUPTHER

## 2021-06-25 ENCOUNTER — SPECIALTY PHARMACY (OUTPATIENT)
Dept: PHARMACY | Facility: CLINIC | Age: 56
End: 2021-06-25

## 2021-07-08 ENCOUNTER — TELEPHONE (OUTPATIENT)
Dept: PAIN MEDICINE | Facility: CLINIC | Age: 56
End: 2021-07-08

## 2021-07-12 ENCOUNTER — TELEPHONE (OUTPATIENT)
Dept: PAIN MEDICINE | Facility: CLINIC | Age: 56
End: 2021-07-12

## 2021-07-13 ENCOUNTER — SPECIALTY PHARMACY (OUTPATIENT)
Dept: PHARMACY | Facility: CLINIC | Age: 56
End: 2021-07-13

## 2021-07-15 ENCOUNTER — TELEPHONE (OUTPATIENT)
Dept: PAIN MEDICINE | Facility: CLINIC | Age: 56
End: 2021-07-15

## 2021-07-30 ENCOUNTER — TELEPHONE (OUTPATIENT)
Dept: PAIN MEDICINE | Facility: CLINIC | Age: 56
End: 2021-07-30

## 2021-08-02 ENCOUNTER — PATIENT OUTREACH (OUTPATIENT)
Dept: ADMINISTRATIVE | Facility: OTHER | Age: 56
End: 2021-08-02

## 2021-08-02 ENCOUNTER — OFFICE VISIT (OUTPATIENT)
Dept: PAIN MEDICINE | Facility: CLINIC | Age: 56
End: 2021-08-02
Payer: COMMERCIAL

## 2021-08-02 VITALS
HEART RATE: 67 BPM | DIASTOLIC BLOOD PRESSURE: 75 MMHG | SYSTOLIC BLOOD PRESSURE: 149 MMHG | BODY MASS INDEX: 56.16 KG/M2 | WEIGHT: 315 LBS

## 2021-08-02 DIAGNOSIS — M47.816 FACET ARTHROPATHY, LUMBAR: Primary | ICD-10-CM

## 2021-08-02 DIAGNOSIS — M48.10 DISH (DIFFUSE IDIOPATHIC SKELETAL HYPEROSTOSIS): ICD-10-CM

## 2021-08-02 DIAGNOSIS — M54.50 ACUTE MIDLINE LOW BACK PAIN WITHOUT SCIATICA: ICD-10-CM

## 2021-08-02 PROCEDURE — 1160F RVW MEDS BY RX/DR IN RCRD: CPT | Mod: CPTII,S$GLB,, | Performed by: ANESTHESIOLOGY

## 2021-08-02 PROCEDURE — 99999 PR PBB SHADOW E&M-EST. PATIENT-LVL V: ICD-10-PCS | Mod: PBBFAC,,, | Performed by: ANESTHESIOLOGY

## 2021-08-02 PROCEDURE — 3077F SYST BP >= 140 MM HG: CPT | Mod: CPTII,S$GLB,, | Performed by: ANESTHESIOLOGY

## 2021-08-02 PROCEDURE — 1125F PR PAIN SEVERITY QUANTIFIED, PAIN PRESENT: ICD-10-PCS | Mod: CPTII,S$GLB,, | Performed by: ANESTHESIOLOGY

## 2021-08-02 PROCEDURE — 3044F HG A1C LEVEL LT 7.0%: CPT | Mod: CPTII,S$GLB,, | Performed by: ANESTHESIOLOGY

## 2021-08-02 PROCEDURE — 99204 OFFICE O/P NEW MOD 45 MIN: CPT | Mod: S$GLB,,, | Performed by: ANESTHESIOLOGY

## 2021-08-02 PROCEDURE — 3078F PR MOST RECENT DIASTOLIC BLOOD PRESSURE < 80 MM HG: ICD-10-PCS | Mod: CPTII,S$GLB,, | Performed by: ANESTHESIOLOGY

## 2021-08-02 PROCEDURE — 3077F PR MOST RECENT SYSTOLIC BLOOD PRESSURE >= 140 MM HG: ICD-10-PCS | Mod: CPTII,S$GLB,, | Performed by: ANESTHESIOLOGY

## 2021-08-02 PROCEDURE — 1125F AMNT PAIN NOTED PAIN PRSNT: CPT | Mod: CPTII,S$GLB,, | Performed by: ANESTHESIOLOGY

## 2021-08-02 PROCEDURE — 99204 PR OFFICE/OUTPT VISIT, NEW, LEVL IV, 45-59 MIN: ICD-10-PCS | Mod: S$GLB,,, | Performed by: ANESTHESIOLOGY

## 2021-08-02 PROCEDURE — 3078F DIAST BP <80 MM HG: CPT | Mod: CPTII,S$GLB,, | Performed by: ANESTHESIOLOGY

## 2021-08-02 PROCEDURE — 3044F PR MOST RECENT HEMOGLOBIN A1C LEVEL <7.0%: ICD-10-PCS | Mod: CPTII,S$GLB,, | Performed by: ANESTHESIOLOGY

## 2021-08-02 PROCEDURE — 3008F BODY MASS INDEX DOCD: CPT | Mod: CPTII,S$GLB,, | Performed by: ANESTHESIOLOGY

## 2021-08-02 PROCEDURE — 1159F MED LIST DOCD IN RCRD: CPT | Mod: CPTII,S$GLB,, | Performed by: ANESTHESIOLOGY

## 2021-08-02 PROCEDURE — 99999 PR PBB SHADOW E&M-EST. PATIENT-LVL V: CPT | Mod: PBBFAC,,, | Performed by: ANESTHESIOLOGY

## 2021-08-02 PROCEDURE — 3008F PR BODY MASS INDEX (BMI) DOCUMENTED: ICD-10-PCS | Mod: CPTII,S$GLB,, | Performed by: ANESTHESIOLOGY

## 2021-08-02 PROCEDURE — 1160F PR REVIEW ALL MEDS BY PRESCRIBER/CLIN PHARMACIST DOCUMENTED: ICD-10-PCS | Mod: CPTII,S$GLB,, | Performed by: ANESTHESIOLOGY

## 2021-08-02 PROCEDURE — 1159F PR MEDICATION LIST DOCUMENTED IN MEDICAL RECORD: ICD-10-PCS | Mod: CPTII,S$GLB,, | Performed by: ANESTHESIOLOGY

## 2021-08-02 RX ORDER — NABUMETONE 500 MG/1
500 TABLET, FILM COATED ORAL 2 TIMES DAILY PRN
Qty: 60 TABLET | Refills: 0 | Status: SHIPPED | OUTPATIENT
Start: 2021-08-02 | End: 2021-09-01

## 2021-08-05 ENCOUNTER — SPECIALTY PHARMACY (OUTPATIENT)
Dept: PHARMACY | Facility: CLINIC | Age: 56
End: 2021-08-05

## 2021-09-07 ENCOUNTER — SPECIALTY PHARMACY (OUTPATIENT)
Dept: PHARMACY | Facility: CLINIC | Age: 56
End: 2021-09-07

## 2021-09-09 ENCOUNTER — TELEPHONE (OUTPATIENT)
Dept: PAIN MEDICINE | Facility: CLINIC | Age: 56
End: 2021-09-09

## 2021-09-11 ENCOUNTER — PATIENT OUTREACH (OUTPATIENT)
Dept: ADMINISTRATIVE | Facility: OTHER | Age: 56
End: 2021-09-11

## 2021-09-11 DIAGNOSIS — E11.9 TYPE 2 DIABETES MELLITUS WITHOUT COMPLICATION, UNSPECIFIED WHETHER LONG TERM INSULIN USE: Primary | ICD-10-CM

## 2021-09-28 ENCOUNTER — OFFICE VISIT (OUTPATIENT)
Dept: RHEUMATOLOGY | Facility: CLINIC | Age: 56
End: 2021-09-28
Payer: COMMERCIAL

## 2021-09-28 ENCOUNTER — LAB VISIT (OUTPATIENT)
Dept: LAB | Facility: HOSPITAL | Age: 56
End: 2021-09-28
Attending: INTERNAL MEDICINE
Payer: COMMERCIAL

## 2021-09-28 VITALS
HEIGHT: 63 IN | HEART RATE: 56 BPM | DIASTOLIC BLOOD PRESSURE: 89 MMHG | WEIGHT: 310.19 LBS | SYSTOLIC BLOOD PRESSURE: 156 MMHG | BODY MASS INDEX: 54.96 KG/M2

## 2021-09-28 DIAGNOSIS — M79.671 PAIN IN BOTH FEET: ICD-10-CM

## 2021-09-28 DIAGNOSIS — Z79.899 ENCOUNTER FOR LONG-TERM (CURRENT) USE OF HIGH-RISK MEDICATION: ICD-10-CM

## 2021-09-28 DIAGNOSIS — M1A.09X0 IDIOPATHIC CHRONIC GOUT OF MULTIPLE SITES WITHOUT TOPHUS: ICD-10-CM

## 2021-09-28 DIAGNOSIS — M79.672 PAIN IN BOTH FEET: ICD-10-CM

## 2021-09-28 DIAGNOSIS — M06.09 RHEUMATOID ARTHRITIS OF MULTIPLE SITES WITH NEGATIVE RHEUMATOID FACTOR: Primary | ICD-10-CM

## 2021-09-28 DIAGNOSIS — M06.09 RHEUMATOID ARTHRITIS OF MULTIPLE SITES WITH NEGATIVE RHEUMATOID FACTOR: ICD-10-CM

## 2021-09-28 DIAGNOSIS — M10.9 GOUT WITHOUT TOPHUS: ICD-10-CM

## 2021-09-28 DIAGNOSIS — D84.9 IMMUNOCOMPROMISED: ICD-10-CM

## 2021-09-28 LAB
ALBUMIN SERPL BCP-MCNC: 3.8 G/DL (ref 3.5–5.2)
ALP SERPL-CCNC: 91 U/L (ref 55–135)
ALT SERPL W/O P-5'-P-CCNC: 34 U/L (ref 10–44)
ANION GAP SERPL CALC-SCNC: 13 MMOL/L (ref 8–16)
AST SERPL-CCNC: 29 U/L (ref 10–40)
BASOPHILS # BLD AUTO: 0.05 K/UL (ref 0–0.2)
BASOPHILS NFR BLD: 0.9 % (ref 0–1.9)
BILIRUB SERPL-MCNC: 0.3 MG/DL (ref 0.1–1)
BUN SERPL-MCNC: 32 MG/DL (ref 6–20)
CALCIUM SERPL-MCNC: 9.7 MG/DL (ref 8.7–10.5)
CHLORIDE SERPL-SCNC: 101 MMOL/L (ref 95–110)
CO2 SERPL-SCNC: 26 MMOL/L (ref 23–29)
CREAT SERPL-MCNC: 1.6 MG/DL (ref 0.5–1.4)
CRP SERPL-MCNC: 31.4 MG/L (ref 0–8.2)
DIFFERENTIAL METHOD: ABNORMAL
EOSINOPHIL # BLD AUTO: 0.1 K/UL (ref 0–0.5)
EOSINOPHIL NFR BLD: 1.4 % (ref 0–8)
ERYTHROCYTE [DISTWIDTH] IN BLOOD BY AUTOMATED COUNT: 16.4 % (ref 11.5–14.5)
ERYTHROCYTE [SEDIMENTATION RATE] IN BLOOD BY WESTERGREN METHOD: 15 MM/HR (ref 0–10)
EST. GFR  (AFRICAN AMERICAN): 54 ML/MIN/1.73 M^2
EST. GFR  (NON AFRICAN AMERICAN): 47 ML/MIN/1.73 M^2
GLUCOSE SERPL-MCNC: 112 MG/DL (ref 70–110)
HCT VFR BLD AUTO: 38.7 % (ref 40–54)
HGB BLD-MCNC: 12.2 G/DL (ref 14–18)
IMM GRANULOCYTES # BLD AUTO: 0.01 K/UL (ref 0–0.04)
IMM GRANULOCYTES NFR BLD AUTO: 0.2 % (ref 0–0.5)
LYMPHOCYTES # BLD AUTO: 2 K/UL (ref 1–4.8)
LYMPHOCYTES NFR BLD: 34.4 % (ref 18–48)
MCH RBC QN AUTO: 26.3 PG (ref 27–31)
MCHC RBC AUTO-ENTMCNC: 31.5 G/DL (ref 32–36)
MCV RBC AUTO: 83 FL (ref 82–98)
MONOCYTES # BLD AUTO: 0.3 K/UL (ref 0.3–1)
MONOCYTES NFR BLD: 5.5 % (ref 4–15)
NEUTROPHILS # BLD AUTO: 3.4 K/UL (ref 1.8–7.7)
NEUTROPHILS NFR BLD: 57.6 % (ref 38–73)
NRBC BLD-RTO: 0 /100 WBC
PLATELET # BLD AUTO: 234 K/UL (ref 150–450)
PMV BLD AUTO: 9.3 FL (ref 9.2–12.9)
POTASSIUM SERPL-SCNC: 3.7 MMOL/L (ref 3.5–5.1)
PROT SERPL-MCNC: 7.7 G/DL (ref 6–8.4)
RBC # BLD AUTO: 4.64 M/UL (ref 4.6–6.2)
SODIUM SERPL-SCNC: 140 MMOL/L (ref 136–145)
URATE SERPL-MCNC: 10.6 MG/DL (ref 3.4–7)
WBC # BLD AUTO: 5.85 K/UL (ref 3.9–12.7)

## 2021-09-28 PROCEDURE — 3008F PR BODY MASS INDEX (BMI) DOCUMENTED: ICD-10-PCS | Mod: CPTII,S$GLB,, | Performed by: INTERNAL MEDICINE

## 2021-09-28 PROCEDURE — 3079F PR MOST RECENT DIASTOLIC BLOOD PRESSURE 80-89 MM HG: ICD-10-PCS | Mod: CPTII,S$GLB,, | Performed by: INTERNAL MEDICINE

## 2021-09-28 PROCEDURE — 99999 PR PBB SHADOW E&M-EST. PATIENT-LVL IV: CPT | Mod: PBBFAC,,, | Performed by: INTERNAL MEDICINE

## 2021-09-28 PROCEDURE — 1159F PR MEDICATION LIST DOCUMENTED IN MEDICAL RECORD: ICD-10-PCS | Mod: CPTII,S$GLB,, | Performed by: INTERNAL MEDICINE

## 2021-09-28 PROCEDURE — 1159F MED LIST DOCD IN RCRD: CPT | Mod: CPTII,S$GLB,, | Performed by: INTERNAL MEDICINE

## 2021-09-28 PROCEDURE — 99215 OFFICE O/P EST HI 40 MIN: CPT | Mod: S$GLB,,, | Performed by: INTERNAL MEDICINE

## 2021-09-28 PROCEDURE — 3044F PR MOST RECENT HEMOGLOBIN A1C LEVEL <7.0%: ICD-10-PCS | Mod: CPTII,S$GLB,, | Performed by: INTERNAL MEDICINE

## 2021-09-28 PROCEDURE — 3079F DIAST BP 80-89 MM HG: CPT | Mod: CPTII,S$GLB,, | Performed by: INTERNAL MEDICINE

## 2021-09-28 PROCEDURE — 1160F PR REVIEW ALL MEDS BY PRESCRIBER/CLIN PHARMACIST DOCUMENTED: ICD-10-PCS | Mod: CPTII,S$GLB,, | Performed by: INTERNAL MEDICINE

## 2021-09-28 PROCEDURE — 84550 ASSAY OF BLOOD/URIC ACID: CPT | Performed by: INTERNAL MEDICINE

## 2021-09-28 PROCEDURE — 1160F RVW MEDS BY RX/DR IN RCRD: CPT | Mod: CPTII,S$GLB,, | Performed by: INTERNAL MEDICINE

## 2021-09-28 PROCEDURE — 3044F HG A1C LEVEL LT 7.0%: CPT | Mod: CPTII,S$GLB,, | Performed by: INTERNAL MEDICINE

## 2021-09-28 PROCEDURE — 99999 PR PBB SHADOW E&M-EST. PATIENT-LVL IV: ICD-10-PCS | Mod: PBBFAC,,, | Performed by: INTERNAL MEDICINE

## 2021-09-28 PROCEDURE — 85651 RBC SED RATE NONAUTOMATED: CPT | Performed by: INTERNAL MEDICINE

## 2021-09-28 PROCEDURE — 3077F PR MOST RECENT SYSTOLIC BLOOD PRESSURE >= 140 MM HG: ICD-10-PCS | Mod: CPTII,S$GLB,, | Performed by: INTERNAL MEDICINE

## 2021-09-28 PROCEDURE — 3077F SYST BP >= 140 MM HG: CPT | Mod: CPTII,S$GLB,, | Performed by: INTERNAL MEDICINE

## 2021-09-28 PROCEDURE — 99215 PR OFFICE/OUTPT VISIT, EST, LEVL V, 40-54 MIN: ICD-10-PCS | Mod: S$GLB,,, | Performed by: INTERNAL MEDICINE

## 2021-09-28 PROCEDURE — 3008F BODY MASS INDEX DOCD: CPT | Mod: CPTII,S$GLB,, | Performed by: INTERNAL MEDICINE

## 2021-09-28 PROCEDURE — 86140 C-REACTIVE PROTEIN: CPT | Performed by: INTERNAL MEDICINE

## 2021-09-28 PROCEDURE — 36415 COLL VENOUS BLD VENIPUNCTURE: CPT | Performed by: INTERNAL MEDICINE

## 2021-09-28 PROCEDURE — 85025 COMPLETE CBC W/AUTO DIFF WBC: CPT | Performed by: INTERNAL MEDICINE

## 2021-09-28 PROCEDURE — 80053 COMPREHEN METABOLIC PANEL: CPT | Performed by: INTERNAL MEDICINE

## 2021-09-28 RX ORDER — METHOTREXATE 2.5 MG/1
15 TABLET ORAL
Qty: 72 TABLET | Refills: 1 | Status: SHIPPED | OUTPATIENT
Start: 2021-09-28 | End: 2022-07-15 | Stop reason: SDUPTHER

## 2021-10-05 ENCOUNTER — OFFICE VISIT (OUTPATIENT)
Dept: PODIATRY | Facility: CLINIC | Age: 56
End: 2021-10-05
Payer: COMMERCIAL

## 2021-10-05 VITALS — WEIGHT: 310.19 LBS | HEIGHT: 63 IN | BODY MASS INDEX: 54.96 KG/M2

## 2021-10-05 DIAGNOSIS — L84 CORN OR CALLUS: ICD-10-CM

## 2021-10-05 DIAGNOSIS — E11.65 TYPE 2 DIABETES MELLITUS WITH HYPERGLYCEMIA, WITHOUT LONG-TERM CURRENT USE OF INSULIN: ICD-10-CM

## 2021-10-05 DIAGNOSIS — B35.3 TINEA PEDIS OF BOTH FEET: Primary | ICD-10-CM

## 2021-10-05 DIAGNOSIS — M06.031 RHEUMATOID ARTHRITIS INVOLVING RIGHT WRIST WITH NEGATIVE RHEUMATOID FACTOR: ICD-10-CM

## 2021-10-05 DIAGNOSIS — M79.672 LEFT FOOT PAIN: ICD-10-CM

## 2021-10-05 PROCEDURE — 3044F HG A1C LEVEL LT 7.0%: CPT | Mod: CPTII,S$GLB,, | Performed by: PODIATRIST

## 2021-10-05 PROCEDURE — 1159F MED LIST DOCD IN RCRD: CPT | Mod: CPTII,S$GLB,, | Performed by: PODIATRIST

## 2021-10-05 PROCEDURE — 11055 PR TRIM HYPERKERATOTIC SKIN LESION, ONE: ICD-10-PCS | Mod: S$GLB,,, | Performed by: PODIATRIST

## 2021-10-05 PROCEDURE — 99204 OFFICE O/P NEW MOD 45 MIN: CPT | Mod: 25,S$GLB,, | Performed by: PODIATRIST

## 2021-10-05 PROCEDURE — 99999 PR PBB SHADOW E&M-EST. PATIENT-LVL III: ICD-10-PCS | Mod: PBBFAC,,, | Performed by: PODIATRIST

## 2021-10-05 PROCEDURE — 3008F PR BODY MASS INDEX (BMI) DOCUMENTED: ICD-10-PCS | Mod: CPTII,S$GLB,, | Performed by: PODIATRIST

## 2021-10-05 PROCEDURE — 3044F PR MOST RECENT HEMOGLOBIN A1C LEVEL <7.0%: ICD-10-PCS | Mod: CPTII,S$GLB,, | Performed by: PODIATRIST

## 2021-10-05 PROCEDURE — 11055 PARING/CUTG B9 HYPRKER LES 1: CPT | Mod: S$GLB,,, | Performed by: PODIATRIST

## 2021-10-05 PROCEDURE — 1159F PR MEDICATION LIST DOCUMENTED IN MEDICAL RECORD: ICD-10-PCS | Mod: CPTII,S$GLB,, | Performed by: PODIATRIST

## 2021-10-05 PROCEDURE — 99204 PR OFFICE/OUTPT VISIT, NEW, LEVL IV, 45-59 MIN: ICD-10-PCS | Mod: 25,S$GLB,, | Performed by: PODIATRIST

## 2021-10-05 PROCEDURE — 3008F BODY MASS INDEX DOCD: CPT | Mod: CPTII,S$GLB,, | Performed by: PODIATRIST

## 2021-10-05 PROCEDURE — 99999 PR PBB SHADOW E&M-EST. PATIENT-LVL III: CPT | Mod: PBBFAC,,, | Performed by: PODIATRIST

## 2021-10-05 RX ORDER — KETOCONAZOLE 20 MG/G
CREAM TOPICAL 2 TIMES DAILY
Qty: 1 TUBE | Refills: 2 | Status: SHIPPED | OUTPATIENT
Start: 2021-10-05 | End: 2022-07-19

## 2021-10-05 RX ORDER — AMMONIUM LACTATE 12 G/100G
1 CREAM TOPICAL 2 TIMES DAILY
Qty: 1 TUBE | Refills: 3 | Status: SHIPPED | OUTPATIENT
Start: 2021-10-05 | End: 2022-07-19

## 2021-10-08 ENCOUNTER — OFFICE VISIT (OUTPATIENT)
Dept: FAMILY MEDICINE | Facility: CLINIC | Age: 56
End: 2021-10-08
Payer: COMMERCIAL

## 2021-10-08 ENCOUNTER — LAB VISIT (OUTPATIENT)
Dept: LAB | Facility: HOSPITAL | Age: 56
End: 2021-10-08
Attending: INTERNAL MEDICINE
Payer: COMMERCIAL

## 2021-10-08 VITALS
OXYGEN SATURATION: 99 % | WEIGHT: 315 LBS | BODY MASS INDEX: 56.31 KG/M2 | TEMPERATURE: 98 F | SYSTOLIC BLOOD PRESSURE: 138 MMHG | RESPIRATION RATE: 18 BRPM | DIASTOLIC BLOOD PRESSURE: 82 MMHG | HEART RATE: 67 BPM

## 2021-10-08 DIAGNOSIS — D64.9 ANEMIA, UNSPECIFIED TYPE: Primary | ICD-10-CM

## 2021-10-08 DIAGNOSIS — M06.031 RHEUMATOID ARTHRITIS INVOLVING RIGHT WRIST WITH NEGATIVE RHEUMATOID FACTOR: ICD-10-CM

## 2021-10-08 DIAGNOSIS — E11.65 TYPE 2 DIABETES MELLITUS WITH HYPERGLYCEMIA, WITHOUT LONG-TERM CURRENT USE OF INSULIN: ICD-10-CM

## 2021-10-08 DIAGNOSIS — E66.01 MORBID OBESITY DUE TO EXCESS CALORIES: ICD-10-CM

## 2021-10-08 DIAGNOSIS — I10 ESSENTIAL HYPERTENSION: ICD-10-CM

## 2021-10-08 DIAGNOSIS — E78.2 MIXED HYPERLIPIDEMIA: ICD-10-CM

## 2021-10-08 DIAGNOSIS — E79.0 HYPERURICEMIA: ICD-10-CM

## 2021-10-08 DIAGNOSIS — N18.31 STAGE 3A CHRONIC KIDNEY DISEASE: ICD-10-CM

## 2021-10-08 PROBLEM — N18.30 STAGE 3 CHRONIC KIDNEY DISEASE: Status: ACTIVE | Noted: 2021-10-08

## 2021-10-08 LAB
ESTIMATED AVG GLUCOSE: 120 MG/DL (ref 68–131)
HBA1C MFR BLD: 5.8 % (ref 4–5.6)

## 2021-10-08 PROCEDURE — 99999 PR PBB SHADOW E&M-EST. PATIENT-LVL V: CPT | Mod: PBBFAC,,, | Performed by: INTERNAL MEDICINE

## 2021-10-08 PROCEDURE — 3075F PR MOST RECENT SYSTOLIC BLOOD PRESS GE 130-139MM HG: ICD-10-PCS | Mod: CPTII,S$GLB,, | Performed by: INTERNAL MEDICINE

## 2021-10-08 PROCEDURE — 3044F PR MOST RECENT HEMOGLOBIN A1C LEVEL <7.0%: ICD-10-PCS | Mod: CPTII,S$GLB,, | Performed by: INTERNAL MEDICINE

## 2021-10-08 PROCEDURE — 36415 COLL VENOUS BLD VENIPUNCTURE: CPT | Mod: PO | Performed by: INTERNAL MEDICINE

## 2021-10-08 PROCEDURE — 99999 PR PBB SHADOW E&M-EST. PATIENT-LVL V: ICD-10-PCS | Mod: PBBFAC,,, | Performed by: INTERNAL MEDICINE

## 2021-10-08 PROCEDURE — 3079F PR MOST RECENT DIASTOLIC BLOOD PRESSURE 80-89 MM HG: ICD-10-PCS | Mod: CPTII,S$GLB,, | Performed by: INTERNAL MEDICINE

## 2021-10-08 PROCEDURE — 3075F SYST BP GE 130 - 139MM HG: CPT | Mod: CPTII,S$GLB,, | Performed by: INTERNAL MEDICINE

## 2021-10-08 PROCEDURE — 3079F DIAST BP 80-89 MM HG: CPT | Mod: CPTII,S$GLB,, | Performed by: INTERNAL MEDICINE

## 2021-10-08 PROCEDURE — 3008F PR BODY MASS INDEX (BMI) DOCUMENTED: ICD-10-PCS | Mod: CPTII,S$GLB,, | Performed by: INTERNAL MEDICINE

## 2021-10-08 PROCEDURE — 3044F HG A1C LEVEL LT 7.0%: CPT | Mod: CPTII,S$GLB,, | Performed by: INTERNAL MEDICINE

## 2021-10-08 PROCEDURE — 1159F PR MEDICATION LIST DOCUMENTED IN MEDICAL RECORD: ICD-10-PCS | Mod: CPTII,S$GLB,, | Performed by: INTERNAL MEDICINE

## 2021-10-08 PROCEDURE — 1159F MED LIST DOCD IN RCRD: CPT | Mod: CPTII,S$GLB,, | Performed by: INTERNAL MEDICINE

## 2021-10-08 PROCEDURE — 99396 PR PREVENTIVE VISIT,EST,40-64: ICD-10-PCS | Mod: S$GLB,,, | Performed by: INTERNAL MEDICINE

## 2021-10-08 PROCEDURE — 83036 HEMOGLOBIN GLYCOSYLATED A1C: CPT | Performed by: INTERNAL MEDICINE

## 2021-10-08 PROCEDURE — 3008F BODY MASS INDEX DOCD: CPT | Mod: CPTII,S$GLB,, | Performed by: INTERNAL MEDICINE

## 2021-10-08 PROCEDURE — 99396 PREV VISIT EST AGE 40-64: CPT | Mod: S$GLB,,, | Performed by: INTERNAL MEDICINE

## 2021-10-08 RX ORDER — ATORVASTATIN CALCIUM 20 MG/1
20 TABLET, FILM COATED ORAL DAILY
Qty: 90 TABLET | Refills: 3 | Status: SHIPPED | OUTPATIENT
Start: 2021-10-08 | End: 2023-08-24 | Stop reason: SDUPTHER

## 2021-10-08 RX ORDER — AZILSARTAN KAMEDOXOMIL AND CHLORTHALIDONE 40; 12.5 MG/1; MG/1
1 TABLET ORAL DAILY
Qty: 90 TABLET | Refills: 2 | Status: SHIPPED | OUTPATIENT
Start: 2021-10-08 | End: 2022-04-29 | Stop reason: SDUPTHER

## 2021-10-14 ENCOUNTER — SPECIALTY PHARMACY (OUTPATIENT)
Dept: PHARMACY | Facility: CLINIC | Age: 56
End: 2021-10-14

## 2021-10-21 ENCOUNTER — PATIENT OUTREACH (OUTPATIENT)
Dept: ADMINISTRATIVE | Facility: HOSPITAL | Age: 56
End: 2021-10-21

## 2021-10-26 ENCOUNTER — PATIENT OUTREACH (OUTPATIENT)
Dept: ADMINISTRATIVE | Facility: OTHER | Age: 56
End: 2021-10-26
Payer: COMMERCIAL

## 2021-10-28 ENCOUNTER — OFFICE VISIT (OUTPATIENT)
Dept: PODIATRY | Facility: CLINIC | Age: 56
End: 2021-10-28
Payer: COMMERCIAL

## 2021-10-28 VITALS — WEIGHT: 315 LBS | BODY MASS INDEX: 55.81 KG/M2 | HEIGHT: 63 IN

## 2021-10-28 DIAGNOSIS — L84 CORN OR CALLUS: ICD-10-CM

## 2021-10-28 DIAGNOSIS — E11.65 TYPE 2 DIABETES MELLITUS WITH HYPERGLYCEMIA, WITHOUT LONG-TERM CURRENT USE OF INSULIN: ICD-10-CM

## 2021-10-28 DIAGNOSIS — M79.672 LEFT FOOT PAIN: Primary | ICD-10-CM

## 2021-10-28 PROCEDURE — 99213 PR OFFICE/OUTPT VISIT, EST, LEVL III, 20-29 MIN: ICD-10-PCS | Mod: 25,S$GLB,, | Performed by: PODIATRIST

## 2021-10-28 PROCEDURE — 99999 PR PBB SHADOW E&M-EST. PATIENT-LVL III: ICD-10-PCS | Mod: PBBFAC,,, | Performed by: PODIATRIST

## 2021-10-28 PROCEDURE — 11055 PARING/CUTG B9 HYPRKER LES 1: CPT | Mod: S$GLB,,, | Performed by: PODIATRIST

## 2021-10-28 PROCEDURE — 3044F HG A1C LEVEL LT 7.0%: CPT | Mod: CPTII,S$GLB,, | Performed by: PODIATRIST

## 2021-10-28 PROCEDURE — 3044F PR MOST RECENT HEMOGLOBIN A1C LEVEL <7.0%: ICD-10-PCS | Mod: CPTII,S$GLB,, | Performed by: PODIATRIST

## 2021-10-28 PROCEDURE — 99999 PR PBB SHADOW E&M-EST. PATIENT-LVL III: CPT | Mod: PBBFAC,,, | Performed by: PODIATRIST

## 2021-10-28 PROCEDURE — 1159F MED LIST DOCD IN RCRD: CPT | Mod: CPTII,S$GLB,, | Performed by: PODIATRIST

## 2021-10-28 PROCEDURE — 99213 OFFICE O/P EST LOW 20 MIN: CPT | Mod: 25,S$GLB,, | Performed by: PODIATRIST

## 2021-10-28 PROCEDURE — 11055 PR TRIM HYPERKERATOTIC SKIN LESION, ONE: ICD-10-PCS | Mod: S$GLB,,, | Performed by: PODIATRIST

## 2021-10-28 PROCEDURE — 3008F PR BODY MASS INDEX (BMI) DOCUMENTED: ICD-10-PCS | Mod: CPTII,S$GLB,, | Performed by: PODIATRIST

## 2021-10-28 PROCEDURE — 1159F PR MEDICATION LIST DOCUMENTED IN MEDICAL RECORD: ICD-10-PCS | Mod: CPTII,S$GLB,, | Performed by: PODIATRIST

## 2021-10-28 PROCEDURE — 3008F BODY MASS INDEX DOCD: CPT | Mod: CPTII,S$GLB,, | Performed by: PODIATRIST

## 2021-11-16 ENCOUNTER — SPECIALTY PHARMACY (OUTPATIENT)
Dept: PHARMACY | Facility: CLINIC | Age: 56
End: 2021-11-16
Payer: COMMERCIAL

## 2021-11-26 ENCOUNTER — OFFICE VISIT (OUTPATIENT)
Dept: OPHTHALMOLOGY | Facility: CLINIC | Age: 56
End: 2021-11-26
Payer: COMMERCIAL

## 2021-11-26 ENCOUNTER — PATIENT OUTREACH (OUTPATIENT)
Dept: ADMINISTRATIVE | Facility: OTHER | Age: 56
End: 2021-11-26
Payer: COMMERCIAL

## 2021-11-26 DIAGNOSIS — H25.013 CORTICAL AGE-RELATED CATARACT OF BOTH EYES: ICD-10-CM

## 2021-11-26 DIAGNOSIS — H52.4 HYPEROPIA WITH ASTIGMATISM AND PRESBYOPIA, BILATERAL: ICD-10-CM

## 2021-11-26 DIAGNOSIS — E11.9 TYPE 2 DIABETES MELLITUS WITHOUT RETINOPATHY: Primary | ICD-10-CM

## 2021-11-26 DIAGNOSIS — H25.13 NUCLEAR SCLEROSIS, BILATERAL: ICD-10-CM

## 2021-11-26 DIAGNOSIS — E11.36 DIABETIC CATARACT OF BOTH EYES: ICD-10-CM

## 2021-11-26 DIAGNOSIS — H52.03 HYPEROPIA WITH ASTIGMATISM AND PRESBYOPIA, BILATERAL: ICD-10-CM

## 2021-11-26 DIAGNOSIS — H52.203 HYPEROPIA WITH ASTIGMATISM AND PRESBYOPIA, BILATERAL: ICD-10-CM

## 2021-11-26 PROCEDURE — 2023F PR DILATED RETINAL EXAM W/O EVID OF RETINOPATHY: ICD-10-PCS | Mod: CPTII,S$GLB,, | Performed by: OPTOMETRIST

## 2021-11-26 PROCEDURE — 92015 DETERMINE REFRACTIVE STATE: CPT | Mod: S$GLB,,, | Performed by: OPTOMETRIST

## 2021-11-26 PROCEDURE — 99999 PR PBB SHADOW E&M-EST. PATIENT-LVL III: CPT | Mod: PBBFAC,,, | Performed by: OPTOMETRIST

## 2021-11-26 PROCEDURE — 92015 PR REFRACTION: ICD-10-PCS | Mod: S$GLB,,, | Performed by: OPTOMETRIST

## 2021-11-26 PROCEDURE — 99999 PR PBB SHADOW E&M-EST. PATIENT-LVL III: ICD-10-PCS | Mod: PBBFAC,,, | Performed by: OPTOMETRIST

## 2021-11-26 PROCEDURE — 2023F DILAT RTA XM W/O RTNOPTHY: CPT | Mod: CPTII,S$GLB,, | Performed by: OPTOMETRIST

## 2021-11-26 PROCEDURE — 92014 PR EYE EXAM, EST PATIENT,COMPREHESV: ICD-10-PCS | Mod: S$GLB,,, | Performed by: OPTOMETRIST

## 2021-11-26 PROCEDURE — 92014 COMPRE OPH EXAM EST PT 1/>: CPT | Mod: S$GLB,,, | Performed by: OPTOMETRIST

## 2021-11-29 ENCOUNTER — LAB VISIT (OUTPATIENT)
Dept: LAB | Facility: HOSPITAL | Age: 56
End: 2021-11-29
Attending: INTERNAL MEDICINE
Payer: COMMERCIAL

## 2021-11-29 DIAGNOSIS — N18.30 STAGE 3 CHRONIC KIDNEY DISEASE, UNSPECIFIED WHETHER STAGE 3A OR 3B CKD: Primary | ICD-10-CM

## 2021-11-29 DIAGNOSIS — N18.30 STAGE 3 CHRONIC KIDNEY DISEASE, UNSPECIFIED WHETHER STAGE 3A OR 3B CKD: ICD-10-CM

## 2021-11-29 PROCEDURE — 80069 RENAL FUNCTION PANEL: CPT | Performed by: INTERNAL MEDICINE

## 2021-11-29 PROCEDURE — 83970 ASSAY OF PARATHORMONE: CPT | Performed by: INTERNAL MEDICINE

## 2021-11-29 PROCEDURE — 36415 COLL VENOUS BLD VENIPUNCTURE: CPT | Mod: PO | Performed by: INTERNAL MEDICINE

## 2021-11-29 PROCEDURE — 85025 COMPLETE CBC W/AUTO DIFF WBC: CPT | Performed by: INTERNAL MEDICINE

## 2021-11-30 LAB
ALBUMIN SERPL BCP-MCNC: 3.6 G/DL (ref 3.5–5.2)
ANION GAP SERPL CALC-SCNC: 8 MMOL/L (ref 8–16)
BASOPHILS # BLD AUTO: 0.05 K/UL (ref 0–0.2)
BASOPHILS NFR BLD: 0.6 % (ref 0–1.9)
BUN SERPL-MCNC: 18 MG/DL (ref 6–20)
CALCIUM SERPL-MCNC: 9.2 MG/DL (ref 8.7–10.5)
CHLORIDE SERPL-SCNC: 101 MMOL/L (ref 95–110)
CO2 SERPL-SCNC: 32 MMOL/L (ref 23–29)
CREAT SERPL-MCNC: 1.3 MG/DL (ref 0.5–1.4)
DIFFERENTIAL METHOD: ABNORMAL
EOSINOPHIL # BLD AUTO: 0.1 K/UL (ref 0–0.5)
EOSINOPHIL NFR BLD: 1.2 % (ref 0–8)
ERYTHROCYTE [DISTWIDTH] IN BLOOD BY AUTOMATED COUNT: 16.7 % (ref 11.5–14.5)
EST. GFR  (AFRICAN AMERICAN): >60 ML/MIN/1.73 M^2
EST. GFR  (NON AFRICAN AMERICAN): >60 ML/MIN/1.73 M^2
GLUCOSE SERPL-MCNC: 89 MG/DL (ref 70–110)
HCT VFR BLD AUTO: 35.5 % (ref 40–54)
HGB BLD-MCNC: 10.8 G/DL (ref 14–18)
IMM GRANULOCYTES # BLD AUTO: 0.04 K/UL (ref 0–0.04)
IMM GRANULOCYTES NFR BLD AUTO: 0.5 % (ref 0–0.5)
LYMPHOCYTES # BLD AUTO: 2 K/UL (ref 1–4.8)
LYMPHOCYTES NFR BLD: 25 % (ref 18–48)
MCH RBC QN AUTO: 25.8 PG (ref 27–31)
MCHC RBC AUTO-ENTMCNC: 30.4 G/DL (ref 32–36)
MCV RBC AUTO: 85 FL (ref 82–98)
MONOCYTES # BLD AUTO: 0.8 K/UL (ref 0.3–1)
MONOCYTES NFR BLD: 9.5 % (ref 4–15)
NEUTROPHILS # BLD AUTO: 5.1 K/UL (ref 1.8–7.7)
NEUTROPHILS NFR BLD: 63.2 % (ref 38–73)
NRBC BLD-RTO: 0 /100 WBC
PHOSPHATE SERPL-MCNC: 3.3 MG/DL (ref 2.7–4.5)
PLATELET # BLD AUTO: 242 K/UL (ref 150–450)
PMV BLD AUTO: 10.5 FL (ref 9.2–12.9)
POTASSIUM SERPL-SCNC: 3.9 MMOL/L (ref 3.5–5.1)
PTH-INTACT SERPL-MCNC: 165.6 PG/ML (ref 9–77)
RBC # BLD AUTO: 4.19 M/UL (ref 4.6–6.2)
SODIUM SERPL-SCNC: 141 MMOL/L (ref 136–145)
WBC # BLD AUTO: 8.12 K/UL (ref 3.9–12.7)

## 2021-12-01 ENCOUNTER — OFFICE VISIT (OUTPATIENT)
Dept: NEPHROLOGY | Facility: CLINIC | Age: 56
End: 2021-12-01
Payer: COMMERCIAL

## 2021-12-01 VITALS
BODY MASS INDEX: 52.2 KG/M2 | HEART RATE: 70 BPM | WEIGHT: 305.75 LBS | DIASTOLIC BLOOD PRESSURE: 76 MMHG | SYSTOLIC BLOOD PRESSURE: 140 MMHG | HEIGHT: 64 IN

## 2021-12-01 DIAGNOSIS — I12.9 PARENCHYMAL RENAL HYPERTENSION, STAGE 1 THROUGH STAGE 4 OR UNSPECIFIED CHRONIC KIDNEY DISEASE: ICD-10-CM

## 2021-12-01 DIAGNOSIS — N18.2 CKD (CHRONIC KIDNEY DISEASE) STAGE 2, GFR 60-89 ML/MIN: Primary | ICD-10-CM

## 2021-12-01 DIAGNOSIS — N18.31 STAGE 3A CHRONIC KIDNEY DISEASE: ICD-10-CM

## 2021-12-01 DIAGNOSIS — N25.81 SECONDARY HYPERPARATHYROIDISM: ICD-10-CM

## 2021-12-01 PROCEDURE — 99204 OFFICE O/P NEW MOD 45 MIN: CPT | Mod: S$GLB,,, | Performed by: INTERNAL MEDICINE

## 2021-12-01 PROCEDURE — 99999 PR PBB SHADOW E&M-EST. PATIENT-LVL IV: ICD-10-PCS | Mod: PBBFAC,,, | Performed by: INTERNAL MEDICINE

## 2021-12-01 PROCEDURE — 99204 PR OFFICE/OUTPT VISIT, NEW, LEVL IV, 45-59 MIN: ICD-10-PCS | Mod: S$GLB,,, | Performed by: INTERNAL MEDICINE

## 2021-12-01 PROCEDURE — 99999 PR PBB SHADOW E&M-EST. PATIENT-LVL IV: CPT | Mod: PBBFAC,,, | Performed by: INTERNAL MEDICINE

## 2021-12-01 PROCEDURE — 3066F NEPHROPATHY DOC TX: CPT | Mod: CPTII,S$GLB,, | Performed by: INTERNAL MEDICINE

## 2021-12-01 PROCEDURE — 3066F PR DOCUMENTATION OF TREATMENT FOR NEPHROPATHY: ICD-10-PCS | Mod: CPTII,S$GLB,, | Performed by: INTERNAL MEDICINE

## 2021-12-08 ENCOUNTER — OFFICE VISIT (OUTPATIENT)
Dept: FAMILY MEDICINE | Facility: CLINIC | Age: 56
End: 2021-12-08
Payer: COMMERCIAL

## 2021-12-08 VITALS
HEART RATE: 58 BPM | SYSTOLIC BLOOD PRESSURE: 136 MMHG | RESPIRATION RATE: 16 BRPM | TEMPERATURE: 97 F | WEIGHT: 306.88 LBS | OXYGEN SATURATION: 99 % | BODY MASS INDEX: 52.68 KG/M2 | DIASTOLIC BLOOD PRESSURE: 72 MMHG

## 2021-12-08 DIAGNOSIS — E79.0 HYPERURICEMIA: ICD-10-CM

## 2021-12-08 DIAGNOSIS — E11.65 TYPE 2 DIABETES MELLITUS WITH HYPERGLYCEMIA, WITHOUT LONG-TERM CURRENT USE OF INSULIN: ICD-10-CM

## 2021-12-08 DIAGNOSIS — E11.9 DIABETES MELLITUS WITHOUT COMPLICATION: ICD-10-CM

## 2021-12-08 DIAGNOSIS — D84.9 IMMUNOCOMPROMISED: ICD-10-CM

## 2021-12-08 DIAGNOSIS — D64.9 ANEMIA, UNSPECIFIED TYPE: Primary | ICD-10-CM

## 2021-12-08 DIAGNOSIS — E78.2 MIXED HYPERLIPIDEMIA: ICD-10-CM

## 2021-12-08 DIAGNOSIS — I10 ESSENTIAL HYPERTENSION: ICD-10-CM

## 2021-12-08 DIAGNOSIS — E66.01 OBESITIES, MORBID: ICD-10-CM

## 2021-12-08 DIAGNOSIS — M06.031 RHEUMATOID ARTHRITIS INVOLVING RIGHT WRIST WITH NEGATIVE RHEUMATOID FACTOR: ICD-10-CM

## 2021-12-08 PROCEDURE — 3066F PR DOCUMENTATION OF TREATMENT FOR NEPHROPATHY: ICD-10-PCS | Mod: CPTII,S$GLB,, | Performed by: INTERNAL MEDICINE

## 2021-12-08 PROCEDURE — 99999 PR PBB SHADOW E&M-EST. PATIENT-LVL IV: CPT | Mod: PBBFAC,,, | Performed by: INTERNAL MEDICINE

## 2021-12-08 PROCEDURE — 99999 PR PBB SHADOW E&M-EST. PATIENT-LVL IV: ICD-10-PCS | Mod: PBBFAC,,, | Performed by: INTERNAL MEDICINE

## 2021-12-08 PROCEDURE — 90471 IMMUNIZATION ADMIN: CPT | Mod: S$GLB,,, | Performed by: INTERNAL MEDICINE

## 2021-12-08 PROCEDURE — 90686 FLU VACCINE (QUAD) GREATER THAN OR EQUAL TO 3YO PRESERVATIVE FREE IM: ICD-10-PCS | Mod: S$GLB,,, | Performed by: INTERNAL MEDICINE

## 2021-12-08 PROCEDURE — 90471 FLU VACCINE (QUAD) GREATER THAN OR EQUAL TO 3YO PRESERVATIVE FREE IM: ICD-10-PCS | Mod: S$GLB,,, | Performed by: INTERNAL MEDICINE

## 2021-12-08 PROCEDURE — 99396 PR PREVENTIVE VISIT,EST,40-64: ICD-10-PCS | Mod: 25,S$GLB,, | Performed by: INTERNAL MEDICINE

## 2021-12-08 PROCEDURE — 99396 PREV VISIT EST AGE 40-64: CPT | Mod: 25,S$GLB,, | Performed by: INTERNAL MEDICINE

## 2021-12-08 PROCEDURE — 3066F NEPHROPATHY DOC TX: CPT | Mod: CPTII,S$GLB,, | Performed by: INTERNAL MEDICINE

## 2021-12-08 PROCEDURE — 90686 IIV4 VACC NO PRSV 0.5 ML IM: CPT | Mod: S$GLB,,, | Performed by: INTERNAL MEDICINE

## 2021-12-20 ENCOUNTER — DOCUMENTATION ONLY (OUTPATIENT)
Dept: RHEUMATOLOGY | Facility: CLINIC | Age: 56
End: 2021-12-20
Payer: COMMERCIAL

## 2021-12-22 ENCOUNTER — DOCUMENTATION ONLY (OUTPATIENT)
Dept: RHEUMATOLOGY | Facility: CLINIC | Age: 56
End: 2021-12-22
Payer: COMMERCIAL

## 2021-12-27 ENCOUNTER — SPECIALTY PHARMACY (OUTPATIENT)
Dept: PHARMACY | Facility: CLINIC | Age: 56
End: 2021-12-27
Payer: COMMERCIAL

## 2022-01-24 ENCOUNTER — PATIENT MESSAGE (OUTPATIENT)
Dept: PHARMACY | Facility: CLINIC | Age: 57
End: 2022-01-24
Payer: COMMERCIAL

## 2022-01-24 ENCOUNTER — SPECIALTY PHARMACY (OUTPATIENT)
Dept: PHARMACY | Facility: CLINIC | Age: 57
End: 2022-01-24
Payer: COMMERCIAL

## 2022-01-24 NOTE — TELEPHONE ENCOUNTER
Patient called back regarding his Enbrel refill. Patient's dose is due on Sunday 1/30. Informed patient a prior auth is required and [facility] will call back to rearrange the shipment once a determination has been made.

## 2022-01-27 NOTE — TELEPHONE ENCOUNTER
Enbrel SureClick 50MG/ML auto-injectors Prior Authorization APPROVED through 01/27/2023. Case Id: [id number].    $80 copay; Enbrel [facility]- no FA required.    Sweetie Anthony, PharmD  Clinical Pharmacist  [facility]   [phone]

## 2022-01-27 NOTE — TELEPHONE ENCOUNTER
Specialty Pharmacy - Refill Coordination  Specialty Pharmacy - Medication/Referral Authorization    Specialty Medication Orders Linked to Encounter    Flowsheet Row Most Recent Value   Medication #1 etanercept (ENBREL SURECLICK) 50 mg/mL (1 mL) (Order#[id number], Rx#[id number])          Refill Questions - Documented Responses    Flowsheet Row Most Recent Value   Patient Availability and HIPAA Verification    Does patient want to proceed with activity? Yes   HIPAA/medical authority confirmed? Yes   Relationship to patient of person spoken to? Self   Refill Screening Questions    Changes to allergies? No   Changes to medications? No   New conditions since last clinic visit? No   Unplanned office visit, urgent care, ED, or hospital admission in the last 4 weeks? No   How does patient/caregiver feel medication is working? Very good   Financial problems or insurance changes? No   How many doses of your specialty medications were missed in the last 4 weeks? 0   Would patient like to speak to a pharmacist? No   When does the patient need to receive the medication? 01/30/22   Refill Delivery Questions    How will the patient receive the medication? Delivery Vonda   When does the patient need to receive the medication? 01/30/22   Shipping Address Home   Address in [facility] confirmed and updated if neccessary? Yes   Expected Copay ($) 80   Is the patient able to afford the medication copay? Yes   Payment Method  CC on file   Days supply of Refill 28   Supplies needed? No supplies needed   Refill activity completed? Yes   Refill activity plan Refill scheduled   Shipment/Pickup Date: 01/28/22          Current Outpatient Medications   Medication Sig    acetaminophen (TYLENOL) 650 MG TbSR Take 1 tablet (650 mg total) by mouth every 8 (eight) hours. (Patient taking differently: Take 650 mg by mouth every 8 (eight) hours as needed.)    allopurinoL (ZYLOPRIM) 300 MG tablet Take 1 tablet (300 mg total) by mouth  "once daily.    amLODIPine (NORVASC) 10 MG tablet Take 1 tablet by mouth once daily    ammonium lactate 12 % Crea Apply 1 Act topically 2 (two) times daily.    atorvastatin (LIPITOR) 20 MG tablet Take 1 tablet (20 mg total) by mouth once daily.    azilsartan med-chlorthalidone (EDARBYCLOR) 40-12.5 mg Tab Take 1 tablet by mouth Daily.    ergocalciferol (ERGOCALCIFEROL) 50,000 unit Cap Take 1 capsule (50,000 Units total) by mouth every 7 days.    etanercept (ENBREL SURECLICK) 50 mg/mL (1 mL) Inject 1 mL (50 mg total) into the skin once a week.    folic acid (FOLVITE) 1 MG tablet Take 1 tablet (1 mg total) by mouth once daily.    ketoconazole (NIZORAL) 2 % cream Apply topically 2 (two) times daily. For two weeks on bottom of feet and in between toes    metFORMIN (GLUCOPHAGE) 500 MG tablet Take 1 tablet (500 mg total) by mouth daily with breakfast.    methotrexate 2.5 MG Tab Take 6 tablets (15 mg total) by mouth every 7 days.    needle, disp, 22 G 22 gauge x 1 1/2" Ndle 1 Units by Misc.(Non-Drug; Combo Route) route every 21 days.    testosterone cypionate (DEPOTESTOTERONE CYPIONATE) 200 mg/mL injection INJECT 1 ML (200MG TOTAL) INTO THE MUSCLE EVERY 28 DAYS FOR ONE DOSE.   Last reviewed on 12/8/2021  9:51 AM by Lola Johnson LPN    Review of patient's allergies indicates:   Allergen Reactions    Naproxen Swelling    Last reviewed on  12/8/2021 9:51 AM by Lola Johnson      Tasks added this encounter   2/20/2022 - Refill Call (Auto Added)   Tasks due within next 3 months   No tasks due.     Sweetie Anthony, PharmD  [facility]  [address] [place] [id number]  Phone: [phone]  Fax: [phone]      "

## 2022-03-03 ENCOUNTER — SPECIALTY PHARMACY (OUTPATIENT)
Dept: PHARMACY | Facility: CLINIC | Age: 57
End: 2022-03-03
Payer: COMMERCIAL

## 2022-03-04 NOTE — TELEPHONE ENCOUNTER
Specialty Pharmacy - Refill Coordination    Specialty Medication Orders Linked to Encounter    Flowsheet Row Most Recent Value   Medication #1 etanercept (ENBREL SURECLICK) 50 mg/mL (1 mL) (Order#[id number], Rx#[id number])          Refill Questions - Documented Responses    Flowsheet Row Most Recent Value   Patient Availability and HIPAA Verification    Does patient want to proceed with activity? Yes   HIPAA/medical authority confirmed? Yes   Relationship to patient of person spoken to? Self   Refill Screening Questions    Changes to allergies? No   Changes to medications? No   New conditions since last clinic visit? No   Unplanned office visit, urgent care, ED, or hospital admission in the last 4 weeks? No   How does patient/caregiver feel medication is working? Good   Financial problems or insurance changes? No   How many doses of your specialty medications were missed in the last 4 weeks? 0   Would patient like to speak to a pharmacist? No   When does the patient need to receive the medication? 03/13/22   Refill Delivery Questions    How will the patient receive the medication? Delivery Vonda   When does the patient need to receive the medication? 03/13/22   Shipping Address Home   Address in [facility] confirmed and updated if neccessary? Yes   Expected Copay ($) 80   Is the patient able to afford the medication copay? Yes   Payment Method  CC on file   Days supply of Refill 28   Supplies needed? No supplies needed   Refill activity completed? Yes   Refill activity plan Refill scheduled   Shipment/Pickup Date: 03/09/22          Current Outpatient Medications   Medication Sig    acetaminophen (TYLENOL) 650 MG TbSR Take 1 tablet (650 mg total) by mouth every 8 (eight) hours. (Patient taking differently: Take 650 mg by mouth every 8 (eight) hours as needed.)    allopurinoL (ZYLOPRIM) 300 MG tablet Take 1 tablet (300 mg total) by mouth once daily.    amLODIPine (NORVASC) 10 MG tablet Take 1  "tablet by mouth once daily    ammonium lactate 12 % Crea Apply 1 Act topically 2 (two) times daily.    atorvastatin (LIPITOR) 20 MG tablet Take 1 tablet (20 mg total) by mouth once daily.    azilsartan med-chlorthalidone (EDARBYCLOR) 40-12.5 mg Tab Take 1 tablet by mouth Daily.    ergocalciferol (ERGOCALCIFEROL) 50,000 unit Cap Take 1 capsule (50,000 Units total) by mouth every 7 days.    etanercept (ENBREL SURECLICK) 50 mg/mL (1 mL) Inject 1 mL (50 mg total) into the skin once a week.    folic acid (FOLVITE) 1 MG tablet Take 1 tablet (1 mg total) by mouth once daily.    ketoconazole (NIZORAL) 2 % cream Apply topically 2 (two) times daily. For two weeks on bottom of feet and in between toes    metFORMIN (GLUCOPHAGE) 500 MG tablet Take 1 tablet (500 mg total) by mouth daily with breakfast.    methotrexate 2.5 MG Tab Take 6 tablets (15 mg total) by mouth every 7 days.    needle, disp, 22 G 22 gauge x 1 1/2" Ndle 1 Units by Misc.(Non-Drug; Combo Route) route every 21 days.    testosterone cypionate (DEPOTESTOTERONE CYPIONATE) 200 mg/mL injection INJECT 1 ML (200MG TOTAL) INTO THE MUSCLE EVERY 28 DAYS FOR ONE DOSE.   Last reviewed on 12/8/2021  9:51 AM by Lola Johnson LPN    Review of patient's allergies indicates:   Allergen Reactions    Naproxen Swelling    Last reviewed on  12/8/2021 9:51 AM by Lola Johnson      Tasks added this encounter   4/3/2022 - Refill Call (Auto Added)   Tasks due within next 3 months   No tasks due.     [facility]  [facility] [address]  Phone: [phone]  Fax: [phone]      "

## 2022-04-01 PROBLEM — M10.9 GOUTY ARTHROPATHY: Status: RESOLVED | Noted: 2017-09-11 | Resolved: 2022-04-01

## 2022-04-01 PROBLEM — Z79.899 ENCOUNTER FOR LONG-TERM (CURRENT) USE OF HIGH-RISK MEDICATION: Status: RESOLVED | Noted: 2018-06-29 | Resolved: 2022-04-01

## 2022-04-01 PROBLEM — M06.031 RHEUMATOID ARTHRITIS INVOLVING RIGHT WRIST WITH NEGATIVE RHEUMATOID FACTOR: Status: RESOLVED | Noted: 2021-06-17 | Resolved: 2022-04-01

## 2022-04-01 PROBLEM — M54.50 ACUTE MIDLINE LOW BACK PAIN WITHOUT SCIATICA: Status: RESOLVED | Noted: 2021-06-17 | Resolved: 2022-04-01

## 2022-04-04 ENCOUNTER — SPECIALTY PHARMACY (OUTPATIENT)
Dept: PHARMACY | Facility: CLINIC | Age: 57
End: 2022-04-04
Payer: COMMERCIAL

## 2022-04-04 NOTE — TELEPHONE ENCOUNTER
Spoke with patient regarding EnbreL refill-- pt stated he has 2 injections left on hand. Pt is due to inject in about 2 weeks, requested we call back.

## 2022-04-13 ENCOUNTER — TELEPHONE (OUTPATIENT)
Dept: RHEUMATOLOGY | Facility: CLINIC | Age: 57
End: 2022-04-13
Payer: COMMERCIAL

## 2022-04-14 ENCOUNTER — PATIENT OUTREACH (OUTPATIENT)
Dept: ADMINISTRATIVE | Facility: OTHER | Age: 57
End: 2022-04-14
Payer: COMMERCIAL

## 2022-04-14 ENCOUNTER — OFFICE VISIT (OUTPATIENT)
Dept: RHEUMATOLOGY | Facility: CLINIC | Age: 57
End: 2022-04-14
Payer: COMMERCIAL

## 2022-04-14 ENCOUNTER — LAB VISIT (OUTPATIENT)
Dept: LAB | Facility: HOSPITAL | Age: 57
End: 2022-04-14
Attending: INTERNAL MEDICINE
Payer: COMMERCIAL

## 2022-04-14 VITALS
HEIGHT: 64 IN | SYSTOLIC BLOOD PRESSURE: 156 MMHG | DIASTOLIC BLOOD PRESSURE: 77 MMHG | HEART RATE: 63 BPM | BODY MASS INDEX: 53.78 KG/M2 | WEIGHT: 315 LBS

## 2022-04-14 DIAGNOSIS — D84.9 IMMUNOCOMPROMISED: ICD-10-CM

## 2022-04-14 DIAGNOSIS — Z79.899 ENCOUNTER FOR LONG-TERM (CURRENT) USE OF HIGH-RISK MEDICATION: ICD-10-CM

## 2022-04-14 DIAGNOSIS — M1A.09X0 IDIOPATHIC CHRONIC GOUT OF MULTIPLE SITES WITHOUT TOPHUS: ICD-10-CM

## 2022-04-14 DIAGNOSIS — Z79.899 ENCOUNTER FOR LONG-TERM (CURRENT) USE OF HIGH-RISK MEDICATION: Primary | ICD-10-CM

## 2022-04-14 DIAGNOSIS — M1A.09X0 IDIOPATHIC CHRONIC GOUT OF MULTIPLE SITES WITHOUT TOPHUS: Primary | ICD-10-CM

## 2022-04-14 DIAGNOSIS — M06.09 RHEUMATOID ARTHRITIS OF MULTIPLE SITES WITH NEGATIVE RHEUMATOID FACTOR: Primary | ICD-10-CM

## 2022-04-14 DIAGNOSIS — E11.65 TYPE 2 DIABETES MELLITUS WITH HYPERGLYCEMIA, WITHOUT LONG-TERM CURRENT USE OF INSULIN: Primary | ICD-10-CM

## 2022-04-14 DIAGNOSIS — M06.09 RHEUMATOID ARTHRITIS OF MULTIPLE SITES WITH NEGATIVE RHEUMATOID FACTOR: ICD-10-CM

## 2022-04-14 DIAGNOSIS — E11.9 TYPE 2 DIABETES MELLITUS WITHOUT COMPLICATION: ICD-10-CM

## 2022-04-14 LAB
ALBUMIN SERPL BCP-MCNC: 3.5 G/DL (ref 3.5–5.2)
ALP SERPL-CCNC: 127 U/L (ref 55–135)
ALT SERPL W/O P-5'-P-CCNC: 36 U/L (ref 10–44)
ANION GAP SERPL CALC-SCNC: 10 MMOL/L (ref 8–16)
AST SERPL-CCNC: 32 U/L (ref 10–40)
BASOPHILS # BLD AUTO: 0.04 K/UL (ref 0–0.2)
BASOPHILS NFR BLD: 0.6 % (ref 0–1.9)
BILIRUB SERPL-MCNC: 0.5 MG/DL (ref 0.1–1)
BUN SERPL-MCNC: 22 MG/DL (ref 6–20)
CALCIUM SERPL-MCNC: 9.4 MG/DL (ref 8.7–10.5)
CHLORIDE SERPL-SCNC: 102 MMOL/L (ref 95–110)
CO2 SERPL-SCNC: 30 MMOL/L (ref 23–29)
CREAT SERPL-MCNC: 1.3 MG/DL (ref 0.5–1.4)
CRP SERPL-MCNC: 33.2 MG/L (ref 0–8.2)
DIFFERENTIAL METHOD: ABNORMAL
EOSINOPHIL # BLD AUTO: 0.1 K/UL (ref 0–0.5)
EOSINOPHIL NFR BLD: 1.3 % (ref 0–8)
ERYTHROCYTE [DISTWIDTH] IN BLOOD BY AUTOMATED COUNT: 15 % (ref 11.5–14.5)
ERYTHROCYTE [SEDIMENTATION RATE] IN BLOOD BY WESTERGREN METHOD: 84 MM/HR (ref 0–23)
EST. GFR  (AFRICAN AMERICAN): >60 ML/MIN/1.73 M^2
EST. GFR  (NON AFRICAN AMERICAN): >60 ML/MIN/1.73 M^2
GLUCOSE SERPL-MCNC: 91 MG/DL (ref 70–110)
HCT VFR BLD AUTO: 39.4 % (ref 40–54)
HGB BLD-MCNC: 12.3 G/DL (ref 14–18)
IMM GRANULOCYTES # BLD AUTO: 0.02 K/UL (ref 0–0.04)
IMM GRANULOCYTES NFR BLD AUTO: 0.3 % (ref 0–0.5)
LYMPHOCYTES # BLD AUTO: 2.3 K/UL (ref 1–4.8)
LYMPHOCYTES NFR BLD: 33.1 % (ref 18–48)
MCH RBC QN AUTO: 26.3 PG (ref 27–31)
MCHC RBC AUTO-ENTMCNC: 31.2 G/DL (ref 32–36)
MCV RBC AUTO: 84 FL (ref 82–98)
MONOCYTES # BLD AUTO: 0.3 K/UL (ref 0.3–1)
MONOCYTES NFR BLD: 3.8 % (ref 4–15)
NEUTROPHILS # BLD AUTO: 4.2 K/UL (ref 1.8–7.7)
NEUTROPHILS NFR BLD: 60.9 % (ref 38–73)
NRBC BLD-RTO: 0 /100 WBC
PLATELET # BLD AUTO: 241 K/UL (ref 150–450)
PMV BLD AUTO: 9.4 FL (ref 9.2–12.9)
POTASSIUM SERPL-SCNC: 3.6 MMOL/L (ref 3.5–5.1)
PROT SERPL-MCNC: 7.9 G/DL (ref 6–8.4)
RBC # BLD AUTO: 4.67 M/UL (ref 4.6–6.2)
SODIUM SERPL-SCNC: 142 MMOL/L (ref 136–145)
URATE SERPL-MCNC: 5.4 MG/DL (ref 3.4–7)
WBC # BLD AUTO: 6.83 K/UL (ref 3.9–12.7)

## 2022-04-14 PROCEDURE — 99999 PR PBB SHADOW E&M-EST. PATIENT-LVL IV: ICD-10-PCS | Mod: PBBFAC,,, | Performed by: INTERNAL MEDICINE

## 2022-04-14 PROCEDURE — 1160F RVW MEDS BY RX/DR IN RCRD: CPT | Mod: CPTII,S$GLB,, | Performed by: INTERNAL MEDICINE

## 2022-04-14 PROCEDURE — 1159F PR MEDICATION LIST DOCUMENTED IN MEDICAL RECORD: ICD-10-PCS | Mod: CPTII,S$GLB,, | Performed by: INTERNAL MEDICINE

## 2022-04-14 PROCEDURE — 3008F PR BODY MASS INDEX (BMI) DOCUMENTED: ICD-10-PCS | Mod: CPTII,S$GLB,, | Performed by: INTERNAL MEDICINE

## 2022-04-14 PROCEDURE — 3078F PR MOST RECENT DIASTOLIC BLOOD PRESSURE < 80 MM HG: ICD-10-PCS | Mod: CPTII,S$GLB,, | Performed by: INTERNAL MEDICINE

## 2022-04-14 PROCEDURE — 3078F DIAST BP <80 MM HG: CPT | Mod: CPTII,S$GLB,, | Performed by: INTERNAL MEDICINE

## 2022-04-14 PROCEDURE — 85652 RBC SED RATE AUTOMATED: CPT | Performed by: INTERNAL MEDICINE

## 2022-04-14 PROCEDURE — 3008F BODY MASS INDEX DOCD: CPT | Mod: CPTII,S$GLB,, | Performed by: INTERNAL MEDICINE

## 2022-04-14 PROCEDURE — 99214 OFFICE O/P EST MOD 30 MIN: CPT | Mod: S$GLB,,, | Performed by: INTERNAL MEDICINE

## 2022-04-14 PROCEDURE — 99999 PR PBB SHADOW E&M-EST. PATIENT-LVL IV: CPT | Mod: PBBFAC,,, | Performed by: INTERNAL MEDICINE

## 2022-04-14 PROCEDURE — 85025 COMPLETE CBC W/AUTO DIFF WBC: CPT | Performed by: INTERNAL MEDICINE

## 2022-04-14 PROCEDURE — 3077F SYST BP >= 140 MM HG: CPT | Mod: CPTII,S$GLB,, | Performed by: INTERNAL MEDICINE

## 2022-04-14 PROCEDURE — 3077F PR MOST RECENT SYSTOLIC BLOOD PRESSURE >= 140 MM HG: ICD-10-PCS | Mod: CPTII,S$GLB,, | Performed by: INTERNAL MEDICINE

## 2022-04-14 PROCEDURE — 86140 C-REACTIVE PROTEIN: CPT | Performed by: INTERNAL MEDICINE

## 2022-04-14 PROCEDURE — 36415 COLL VENOUS BLD VENIPUNCTURE: CPT | Performed by: INTERNAL MEDICINE

## 2022-04-14 PROCEDURE — 3072F LOW RISK FOR RETINOPATHY: CPT | Mod: CPTII,S$GLB,, | Performed by: INTERNAL MEDICINE

## 2022-04-14 PROCEDURE — 99214 PR OFFICE/OUTPT VISIT, EST, LEVL IV, 30-39 MIN: ICD-10-PCS | Mod: S$GLB,,, | Performed by: INTERNAL MEDICINE

## 2022-04-14 PROCEDURE — 1160F PR REVIEW ALL MEDS BY PRESCRIBER/CLIN PHARMACIST DOCUMENTED: ICD-10-PCS | Mod: CPTII,S$GLB,, | Performed by: INTERNAL MEDICINE

## 2022-04-14 PROCEDURE — 1159F MED LIST DOCD IN RCRD: CPT | Mod: CPTII,S$GLB,, | Performed by: INTERNAL MEDICINE

## 2022-04-14 PROCEDURE — 84550 ASSAY OF BLOOD/URIC ACID: CPT | Performed by: INTERNAL MEDICINE

## 2022-04-14 PROCEDURE — 3072F PR LOW RISK FOR RETINOPATHY: ICD-10-PCS | Mod: CPTII,S$GLB,, | Performed by: INTERNAL MEDICINE

## 2022-04-14 PROCEDURE — 80053 COMPREHEN METABOLIC PANEL: CPT | Performed by: INTERNAL MEDICINE

## 2022-04-14 NOTE — PROGRESS NOTES
RHEUMATOLOGY CLINIC FOLLOW UP VISIT  Chief complaints, HPI, ROS, EXAM, Assessment & Plans:-  Juan C Schilling Gainellen a 56 y.o. pleasant male comes in for follow-up for rheumatoid arthritis.  He has longstanding history of seronegative rheumatoid arthritis.  He was under treatment with my associates  on methotrexate monotherapy.  Enbrel was added in 2021. No adverse effects from Enbrel.  Since adding Enbrel and methotrexate he reports significant improvement in his rheumatoid arthritis.  Denies any joint pain from rheumatoid arthritis today.  No pain in hands or feet.  Severe lower back pain I am degenerative arthropathy.  Rheumatological review of system negative otherwise.  Physical examination shows no synovitis, dactylitis, enthesitis or effusion.      1. Rheumatoid arthritis of multiple sites with negative rheumatoid factor    2. Idiopathic chronic gout of multiple sites without tophus    3. Immunocompromised    4. Encounter for long-term (current) use of high-risk medication      Problem List Items Addressed This Visit     Gout    Encounter for long-term (current) use of high-risk medication    Immunocompromised    Rheumatoid arthritis of multiple sites with negative rheumatoid factor - Primary             Seronegative rheumatoid arthritis well controlled on Enbrel and methotrexate.  Continue Enbrel and methotrexate at the same dose.   Compromised immune system secondary to autoimmune disease and use of immunosuppressive drugs. Monitor carefully for infections. Advised to get immediate medical care if any infection. Also advised strict adherence to age appropriate vaccinations and cancer screenings with PCP.   Hold methotrexate and Enbrel if any infection.    Safety labs in process from today.   # Follow up in about 3 months (around 7/14/2022).    Medication List with Changes/Refills   Current Medications    ACETAMINOPHEN (TYLENOL) 650 MG TBSR  "   Take 1 tablet (650 mg total) by mouth every 8 (eight) hours.    ALLOPURINOL (ZYLOPRIM) 300 MG TABLET    Take 1 tablet (300 mg total) by mouth once daily.    AMLODIPINE (NORVASC) 10 MG TABLET    Take 1 tablet by mouth once daily    AMMONIUM LACTATE 12 % CREA    Apply 1 Act topically 2 (two) times daily.    ATORVASTATIN (LIPITOR) 20 MG TABLET    Take 1 tablet (20 mg total) by mouth once daily.    AZILSARTAN MED-CHLORTHALIDONE (EDARBYCLOR) 40-12.5 MG TAB    Take 1 tablet by mouth Daily.    ERGOCALCIFEROL (ERGOCALCIFEROL) 50,000 UNIT CAP    Take 1 capsule (50,000 Units total) by mouth every 7 days.    ETANERCEPT (ENBREL SURECLICK) 50 MG/ML (1 ML)    Inject 1 mL (50 mg total) into the skin once a week.    FOLIC ACID (FOLVITE) 1 MG TABLET    Take 1 tablet (1 mg total) by mouth once daily.    KETOCONAZOLE (NIZORAL) 2 % CREAM    Apply topically 2 (two) times daily. For two weeks on bottom of feet and in between toes    METFORMIN (GLUCOPHAGE) 500 MG TABLET    Take 1 tablet by mouth once daily with breakfast    METHOTREXATE 2.5 MG TAB    Take 6 tablets (15 mg total) by mouth every 7 days.    NEEDLE, DISP, 22 G 22 GAUGE X 1 1/2" NDLE    1 Units by Misc.(Non-Drug; Combo Route) route every 21 days.    TESTOSTERONE CYPIONATE (DEPOTESTOTERONE CYPIONATE) 200 MG/ML INJECTION    INJECT 1 ML (200MG TOTAL) INTO THE MUSCLE EVERY 28 DAYS FOR ONE DOSE.       Past Medical History:   Diagnosis Date    Diabetes mellitus, type 2 diagnosed 2013    DM (diabetes mellitus)     2011  02/25/2014    HTN (hypertension)     Hypogonadism, testicular     Left tibialis posterior tendinitis 11/30/2016    Obesities, morbid        Past Surgical History:   Procedure Laterality Date    COLONOSCOPY N/A 6/29/2018    Procedure: COLONOSCOPY;  Surgeon: Jeremiah Anaya III, MD;  Location: [facility];  Service: Endoscopy;  Laterality: N/A;        Social History     Tobacco Use    Smoking status: Never Smoker    Smokeless tobacco: Never Used "   Substance Use Topics    Alcohol use: Yes     Alcohol/week: 3.0 - 4.0 standard drinks     Types: 3 - 4 Cans of beer per week     Comment: occasional weekends    Drug use: No       Family History   Problem Relation Age of Onset    Cataracts Mother     Diabetes Mother     Hypertension Mother     Diabetes Sister        Review of patient's allergies indicates:   Allergen Reactions    Naproxen Swelling       Disclaimer: This note was prepared using voice recognition system and is likely to have sound alike errors and is not proof read.  Please call me with any questions.

## 2022-04-22 ENCOUNTER — LAB VISIT (OUTPATIENT)
Dept: LAB | Facility: HOSPITAL | Age: 57
End: 2022-04-22
Attending: INTERNAL MEDICINE
Payer: COMMERCIAL

## 2022-04-22 ENCOUNTER — OFFICE VISIT (OUTPATIENT)
Dept: FAMILY MEDICINE | Facility: CLINIC | Age: 57
End: 2022-04-22
Payer: COMMERCIAL

## 2022-04-22 VITALS
BODY MASS INDEX: 54.95 KG/M2 | OXYGEN SATURATION: 98 % | WEIGHT: 315 LBS | DIASTOLIC BLOOD PRESSURE: 80 MMHG | SYSTOLIC BLOOD PRESSURE: 130 MMHG | RESPIRATION RATE: 16 BRPM | TEMPERATURE: 99 F | HEART RATE: 66 BPM

## 2022-04-22 DIAGNOSIS — Z00.00 ROUTINE ADULT HEALTH MAINTENANCE: ICD-10-CM

## 2022-04-22 DIAGNOSIS — E11.65 TYPE 2 DIABETES MELLITUS WITH HYPERGLYCEMIA, WITHOUT LONG-TERM CURRENT USE OF INSULIN: ICD-10-CM

## 2022-04-22 DIAGNOSIS — D84.9 IMMUNOCOMPROMISED: ICD-10-CM

## 2022-04-22 DIAGNOSIS — E66.01 MORBID OBESITY DUE TO EXCESS CALORIES: ICD-10-CM

## 2022-04-22 DIAGNOSIS — M06.09 RHEUMATOID ARTHRITIS OF MULTIPLE SITES WITH NEGATIVE RHEUMATOID FACTOR: Primary | ICD-10-CM

## 2022-04-22 DIAGNOSIS — D56.3 ALPHA THALASSEMIA SILENT CARRIER: ICD-10-CM

## 2022-04-22 DIAGNOSIS — I10 ESSENTIAL HYPERTENSION: ICD-10-CM

## 2022-04-22 DIAGNOSIS — E78.2 MIXED HYPERLIPIDEMIA: ICD-10-CM

## 2022-04-22 LAB
CHOLEST SERPL-MCNC: 176 MG/DL (ref 120–199)
CHOLEST/HDLC SERPL: 3 {RATIO} (ref 2–5)
COMPLEXED PSA SERPL-MCNC: 0.17 NG/ML (ref 0–4)
ESTIMATED AVG GLUCOSE: 123 MG/DL (ref 68–131)
HBA1C MFR BLD: 5.9 % (ref 4–5.6)
HDLC SERPL-MCNC: 58 MG/DL (ref 40–75)
HDLC SERPL: 33 % (ref 20–50)
LDLC SERPL CALC-MCNC: 106.8 MG/DL (ref 63–159)
NONHDLC SERPL-MCNC: 118 MG/DL
TRIGL SERPL-MCNC: 56 MG/DL (ref 30–150)

## 2022-04-22 PROCEDURE — 99999 PR PBB SHADOW E&M-EST. PATIENT-LVL IV: CPT | Mod: PBBFAC,,, | Performed by: INTERNAL MEDICINE

## 2022-04-22 PROCEDURE — 1159F MED LIST DOCD IN RCRD: CPT | Mod: CPTII,S$GLB,, | Performed by: INTERNAL MEDICINE

## 2022-04-22 PROCEDURE — 99999 PR PBB SHADOW E&M-EST. PATIENT-LVL IV: ICD-10-PCS | Mod: PBBFAC,,, | Performed by: INTERNAL MEDICINE

## 2022-04-22 PROCEDURE — 80061 LIPID PANEL: CPT | Performed by: INTERNAL MEDICINE

## 2022-04-22 PROCEDURE — 3075F SYST BP GE 130 - 139MM HG: CPT | Mod: CPTII,S$GLB,, | Performed by: INTERNAL MEDICINE

## 2022-04-22 PROCEDURE — 3008F BODY MASS INDEX DOCD: CPT | Mod: CPTII,S$GLB,, | Performed by: INTERNAL MEDICINE

## 2022-04-22 PROCEDURE — 3072F LOW RISK FOR RETINOPATHY: CPT | Mod: CPTII,S$GLB,, | Performed by: INTERNAL MEDICINE

## 2022-04-22 PROCEDURE — 3072F PR LOW RISK FOR RETINOPATHY: ICD-10-PCS | Mod: CPTII,S$GLB,, | Performed by: INTERNAL MEDICINE

## 2022-04-22 PROCEDURE — 84153 ASSAY OF PSA TOTAL: CPT | Performed by: INTERNAL MEDICINE

## 2022-04-22 PROCEDURE — 3079F PR MOST RECENT DIASTOLIC BLOOD PRESSURE 80-89 MM HG: ICD-10-PCS | Mod: CPTII,S$GLB,, | Performed by: INTERNAL MEDICINE

## 2022-04-22 PROCEDURE — 3008F PR BODY MASS INDEX (BMI) DOCUMENTED: ICD-10-PCS | Mod: CPTII,S$GLB,, | Performed by: INTERNAL MEDICINE

## 2022-04-22 PROCEDURE — 3079F DIAST BP 80-89 MM HG: CPT | Mod: CPTII,S$GLB,, | Performed by: INTERNAL MEDICINE

## 2022-04-22 PROCEDURE — 99396 PR PREVENTIVE VISIT,EST,40-64: ICD-10-PCS | Mod: S$GLB,,, | Performed by: INTERNAL MEDICINE

## 2022-04-22 PROCEDURE — 36415 COLL VENOUS BLD VENIPUNCTURE: CPT | Mod: PO | Performed by: INTERNAL MEDICINE

## 2022-04-22 PROCEDURE — 1159F PR MEDICATION LIST DOCUMENTED IN MEDICAL RECORD: ICD-10-PCS | Mod: CPTII,S$GLB,, | Performed by: INTERNAL MEDICINE

## 2022-04-22 PROCEDURE — 99396 PREV VISIT EST AGE 40-64: CPT | Mod: S$GLB,,, | Performed by: INTERNAL MEDICINE

## 2022-04-22 PROCEDURE — 3075F PR MOST RECENT SYSTOLIC BLOOD PRESS GE 130-139MM HG: ICD-10-PCS | Mod: CPTII,S$GLB,, | Performed by: INTERNAL MEDICINE

## 2022-04-22 PROCEDURE — 83036 HEMOGLOBIN GLYCOSYLATED A1C: CPT | Performed by: INTERNAL MEDICINE

## 2022-04-22 NOTE — TELEPHONE ENCOUNTER
Specialty Pharmacy - Refill Coordination    Specialty Medication Orders Linked to Encounter    Flowsheet Row Most Recent Value   Medication #1 etanercept (ENBREL SURECLICK) 50 mg/mL (1 mL) (Order#[id number], Rx#[id number])          Refill Questions - Documented Responses    Flowsheet Row Most Recent Value   Patient Availability and HIPAA Verification    Does patient want to proceed with activity? Yes   HIPAA/medical authority confirmed? Yes   Relationship to patient of person spoken to? Self   Refill Screening Questions    Changes to allergies? No   Changes to medications? No   New conditions since last clinic visit? No   Unplanned office visit, urgent care, ED, or hospital admission in the last 4 weeks? No   How does patient/caregiver feel medication is working? Very good   Financial problems or insurance changes? No   How many doses of your specialty medications were missed in the last 4 weeks? 0   Would patient like to speak to a pharmacist? No   When does the patient need to receive the medication? 04/26/22   Refill Delivery Questions    How will the patient receive the medication? Delivery Vonda   When does the patient need to receive the medication? 04/26/22   Shipping Address Home   Address in [facility] confirmed and updated if neccessary? Yes   Expected Copay ($) 80   Is the patient able to afford the medication copay? Yes   Payment Method CC on file   Days supply of Refill 28   Supplies needed? No supplies needed   Refill activity completed? Yes   Refill activity plan Refill scheduled   Shipment/Pickup Date: 04/26/22          Current Outpatient Medications   Medication Sig    acetaminophen (TYLENOL) 650 MG TbSR Take 1 tablet (650 mg total) by mouth every 8 (eight) hours. (Patient taking differently: Take 650 mg by mouth every 8 (eight) hours as needed.)    allopurinoL (ZYLOPRIM) 300 MG tablet Take 1 tablet (300 mg total) by mouth once daily.    amLODIPine (NORVASC) 10 MG tablet Take 1 tablet by  "mouth once daily    ammonium lactate 12 % Crea Apply 1 Act topically 2 (two) times daily.    atorvastatin (LIPITOR) 20 MG tablet Take 1 tablet (20 mg total) by mouth once daily.    azilsartan med-chlorthalidone (EDARBYCLOR) 40-12.5 mg Tab Take 1 tablet by mouth Daily.    ergocalciferol (ERGOCALCIFEROL) 50,000 unit Cap Take 1 capsule (50,000 Units total) by mouth every 7 days. (Patient not taking: No sig reported)    etanercept (ENBREL SURECLICK) 50 mg/mL (1 mL) Inject 1 mL (50 mg total) into the skin once a week.    folic acid (FOLVITE) 1 MG tablet Take 1 tablet (1 mg total) by mouth once daily.    ketoconazole (NIZORAL) 2 % cream Apply topically 2 (two) times daily. For two weeks on bottom of feet and in between toes    metFORMIN (GLUCOPHAGE) 500 MG tablet Take 1 tablet by mouth once daily with breakfast    methotrexate 2.5 MG Tab Take 6 tablets (15 mg total) by mouth every 7 days.    needle, disp, 22 G 22 gauge x 1 1/2" Ndle 1 Units by Misc.(Non-Drug; Combo Route) route every 21 days.    testosterone cypionate (DEPOTESTOTERONE CYPIONATE) 200 mg/mL injection INJECT 1 ML (200MG TOTAL) INTO THE MUSCLE EVERY 28 DAYS FOR ONE DOSE. (Patient not taking: No sig reported)   Last reviewed on 4/22/2022 10:57 AM by Charleen Pascual MA    Review of patient's allergies indicates:   Allergen Reactions    Naproxen Swelling    Last reviewed on  4/22/2022 10:57 AM by Charleen Pascual      Tasks added this encounter   5/17/2022 - Refill Call (Auto Added)   Tasks due within next 3 months   No tasks due.     Dilia Linder, PharmD  [facility]  [address] [place] [id number]  Phone: [phone]  Fax: [phone]      "

## 2022-04-22 NOTE — PROGRESS NOTES
Subjective:       Patient ID: Juan C Rodriguez is a 56 y.o. male.    Chief Complaint: Follow-up, Hypertension, Hyperlipidemia, and Diabetes    Follow-up  Associated symptoms include arthralgias and myalgias. Pertinent negatives include no abdominal pain, chest pain, chills, coughing, diaphoresis, fatigue, fever, headaches, joint swelling, nausea, neck pain, numbness, rash, sore throat, vomiting or weakness.   Hypertension  Pertinent negatives include no chest pain, headaches, neck pain, palpitations or shortness of breath.   Hyperlipidemia  Associated symptoms include myalgias. Pertinent negatives include no chest pain or shortness of breath.   Diabetes  Pertinent negatives for hypoglycemia include no confusion, dizziness, headaches, nervousness/anxiousness, pallor, seizures, speech difficulty or tremors. Pertinent negatives for diabetes include no chest pain, no fatigue, no polydipsia, no polyphagia, no polyuria and no weakness.     Past Medical History:   Diagnosis Date    Diabetes mellitus, type 2 diagnosed 2013    DM (diabetes mellitus)     2011  02/25/2014    HTN (hypertension)     Hypogonadism, testicular     Left tibialis posterior tendinitis 11/30/2016    Obesities, morbid      Past Surgical History:   Procedure Laterality Date    COLONOSCOPY N/A 6/29/2018    Procedure: COLONOSCOPY;  Surgeon: Jeremiah Anaya III, MD;  Location: [facility];  Service: Endoscopy;  Laterality: N/A;     Family History   Problem Relation Age of Onset    Cataracts Mother     Diabetes Mother     Hypertension Mother     Diabetes Sister      Social History     Socioeconomic History    Marital status: Single   Tobacco Use    Smoking status: Never Smoker    Smokeless tobacco: Never Used   Substance and Sexual Activity    Alcohol use: Yes     Alcohol/week: 3.0 - 4.0 standard drinks     Types: 3 - 4 Cans of beer per week     Comment: occasional weekends    Drug use: No     Review of Systems    Constitutional: Negative for activity change, appetite change, chills, diaphoresis, fatigue, fever and unexpected weight change.   HENT: Negative for drooling, ear discharge, ear pain, facial swelling, hearing loss, mouth sores, nosebleeds, postnasal drip, rhinorrhea, sinus pressure, sneezing, sore throat, tinnitus, trouble swallowing and voice change.    Eyes: Negative for photophobia, redness and visual disturbance.   Respiratory: Negative for apnea, cough, choking, chest tightness, shortness of breath and wheezing.    Cardiovascular: Negative for chest pain and palpitations.   Gastrointestinal: Negative for abdominal distention, abdominal pain, anal bleeding, blood in stool, constipation, diarrhea, nausea and vomiting.   Endocrine: Negative for cold intolerance, heat intolerance, polydipsia, polyphagia and polyuria.   Genitourinary: Negative for difficulty urinating, dysuria, enuresis, flank pain, frequency, genital sores, hematuria and urgency.   Musculoskeletal: Positive for arthralgias and myalgias. Negative for back pain, gait problem, joint swelling, neck pain and neck stiffness.   Skin: Negative for color change, pallor, rash and wound.   Allergic/Immunologic: Negative for food allergies and immunocompromised state.   Neurological: Negative for dizziness, tremors, seizures, syncope, facial asymmetry, speech difficulty, weakness, light-headedness, numbness and headaches.   Hematological: Negative for adenopathy. Does not bruise/bleed easily.   Psychiatric/Behavioral: Negative for agitation, behavioral problems, confusion, decreased concentration, dysphoric mood, hallucinations, self-injury, sleep disturbance and suicidal ideas. The patient is not nervous/anxious and is not hyperactive.        Objective:      Physical Exam  Vitals and nursing note reviewed.   Constitutional:       General: He is not in acute distress.     Appearance: Normal appearance. He is well-developed. He is not diaphoretic.   HENT:       Head: Normocephalic and atraumatic.      Mouth/Throat:      Pharynx: No oropharyngeal exudate.   Eyes:      General: No scleral icterus.     Pupils: Pupils are equal, round, and reactive to light.   Neck:      Thyroid: No thyromegaly.      Vascular: No carotid bruit or JVD.      Trachea: No tracheal deviation.   Cardiovascular:      Rate and Rhythm: Normal rate and regular rhythm.      Heart sounds: Normal heart sounds.   Pulmonary:      Effort: Pulmonary effort is normal. No respiratory distress.      Breath sounds: Normal breath sounds. No wheezing or rales.   Chest:      Chest wall: No tenderness.   Abdominal:      General: Bowel sounds are normal. There is no distension.      Palpations: Abdomen is soft.      Tenderness: There is no abdominal tenderness. There is no guarding or rebound.   Musculoskeletal:         General: No tenderness. Normal range of motion.      Cervical back: Normal range of motion and neck supple.   Lymphadenopathy:      Cervical: No cervical adenopathy.   Skin:     General: Skin is warm and dry.      Coloration: Skin is not pale.      Findings: No erythema or rash.   Neurological:      Mental Status: He is alert and oriented to person, place, and time.   Psychiatric:         Behavior: Behavior normal.         Thought Content: Thought content normal.         Judgment: Judgment normal.         CMP  Sodium   Date Value Ref Range Status   04/14/2022 142 136 - 145 mmol/L Final     Potassium   Date Value Ref Range Status   04/14/2022 3.6 3.5 - 5.1 mmol/L Final     Chloride   Date Value Ref Range Status   04/14/2022 102 95 - 110 mmol/L Final     CO2   Date Value Ref Range Status   04/14/2022 30 (H) 23 - 29 mmol/L Final     Glucose   Date Value Ref Range Status   04/14/2022 91 70 - 110 mg/dL Final     BUN   Date Value Ref Range Status   04/14/2022 22 (H) 6 - 20 mg/dL Final     Creatinine   Date Value Ref Range Status   04/14/2022 1.3 0.5 - 1.4 mg/dL Final     Calcium   Date Value Ref Range  Status   04/14/2022 9.4 8.7 - 10.5 mg/dL Final     Total Protein   Date Value Ref Range Status   04/14/2022 7.9 6.0 - 8.4 g/dL Final     Albumin   Date Value Ref Range Status   04/14/2022 3.5 3.5 - 5.2 g/dL Final   05/31/2016 4.2 3.6 - 5.1 g/dL Final     Comment:     @ Test Performed By:  [facility] KELLY Chinchilla M.D.,   [id number] [place] [id number]  [facility]  [id number]       Total Bilirubin   Date Value Ref Range Status   04/14/2022 0.5 0.1 - 1.0 mg/dL Final     Comment:     For infants and newborns, interpretation of results should be based  on gestational age, weight and in agreement with clinical  observations.    Premature Infant recommended reference ranges:  Up to 24 hours.............<8.0 mg/dL  Up to 48 hours............<12.0 mg/dL  3-5 days..................<15.0 mg/dL  6-29 days.................<15.0 mg/dL       Alkaline Phosphatase   Date Value Ref Range Status   04/14/2022 127 55 - 135 U/L Final     AST   Date Value Ref Range Status   04/14/2022 32 10 - 40 U/L Final     ALT   Date Value Ref Range Status   04/14/2022 36 10 - 44 U/L Final     Anion Gap   Date Value Ref Range Status   04/14/2022 10 8 - 16 mmol/L Final     eGFR if    Date Value Ref Range Status   04/14/2022 >60 >60 mL/min/1.73 m^2 Final     eGFR if non    Date Value Ref Range Status   04/14/2022 >60 >60 mL/min/1.73 m^2 Final     Comment:     Calculation used to obtain the estimated glomerular filtration  rate (eGFR) is the CKD-EPI equation.        Lab Results   Component Value Date    WBC 6.83 04/14/2022    HGB 12.3 (L) 04/14/2022    HCT 39.4 (L) 04/14/2022    MCV 84 04/14/2022     04/14/2022     Lab Results   Component Value Date    CHOL 173 04/09/2021     Lab Results   Component Value Date    HDL 48 04/09/2021     Lab Results   Component Value Date    LDLCALC 111.4 04/09/2021     Lab Results   Component Value Date    TRIG 68 04/09/2021      Lab Results   Component Value Date    CHOLHDL 27.7 04/09/2021     Lab Results   Component Value Date    TSH 2.126 03/17/2020     Lab Results   Component Value Date    LABA1C 6.1 (H) 03/16/2017    HGBA1C 5.8 (H) 10/08/2021     Assessment:       1. Rheumatoid arthritis of multiple sites with negative rheumatoid factor    2. Morbid obesity due to excess calories    3. Mixed hyperlipidemia    4. Immunocompromised    5. Alpha thalassemia silent carrier    6. Type 2 diabetes mellitus with hyperglycemia, without long-term current use of insulin    7. Essential hypertension    8. Routine adult health maintenance        Plan:   Rheumatoid arthritis of multiple sites with negative rheumatoid factor    Morbid obesity due to excess calories    Mixed hyperlipidemia  -     Lipid Panel; Future; Expected date: 04/22/2022    Immunocompromised    Alpha thalassemia silent carrier    Type 2 diabetes mellitus with hyperglycemia, without long-term current use of insulin  -     Hemoglobin A1C; Future; Expected date: 04/22/2022    Essential hypertension    Routine adult health maintenance  -     PSA, Screening; Future; Expected date: 04/22/2022    Stable---------continue meds------watch diet,exercise, weight loss. As above-----------f/u 4 months-

## 2022-04-25 ENCOUNTER — TELEPHONE (OUTPATIENT)
Dept: RHEUMATOLOGY | Facility: CLINIC | Age: 57
End: 2022-04-25
Payer: COMMERCIAL

## 2022-04-25 NOTE — TELEPHONE ENCOUNTER
----- Message from Sean Schroeder MD sent at 4/25/2022  3:13 PM CDT -----  Repeat ESR/CRP in 2 weeks since the levels were high .  Thanks.

## 2022-04-29 DIAGNOSIS — E55.9 VITAMIN D DEFICIENCY: ICD-10-CM

## 2022-04-29 DIAGNOSIS — I10 ESSENTIAL HYPERTENSION: ICD-10-CM

## 2022-04-29 RX ORDER — AZILSARTAN KAMEDOXOMIL AND CHLORTHALIDONE 40; 12.5 MG/1; MG/1
1 TABLET ORAL DAILY
Qty: 90 TABLET | Refills: 2 | Status: SHIPPED | OUTPATIENT
Start: 2022-04-29 | End: 2022-07-05

## 2022-04-29 RX ORDER — ERGOCALCIFEROL 1.25 MG/1
50000 CAPSULE ORAL
Qty: 12 CAPSULE | Refills: 0 | Status: SHIPPED | OUTPATIENT
Start: 2022-04-29 | End: 2022-08-03

## 2022-04-29 NOTE — TELEPHONE ENCOUNTER
Pt's pharmacy sent rx request. Please review and advise.    LV 4/22/2022  NV 8/22/22  LF 06/08/2021

## 2022-04-29 NOTE — TELEPHONE ENCOUNTER
No new care gaps identified.  Powered by [employer]. Reference number: [id number].   4/29/2022 10:45:25 AM CDT

## 2022-05-15 DIAGNOSIS — I10 ESSENTIAL HYPERTENSION: ICD-10-CM

## 2022-05-15 NOTE — TELEPHONE ENCOUNTER
No new care gaps identified.  [facility] Embedded Care Gaps. Reference number: [id number]. 5/15/2022   10:26:04 AM MESFINT

## 2022-05-16 RX ORDER — AMLODIPINE BESYLATE 10 MG/1
TABLET ORAL
Qty: 90 TABLET | Refills: 3 | Status: SHIPPED | OUTPATIENT
Start: 2022-05-16 | End: 2023-07-24

## 2022-05-16 NOTE — TELEPHONE ENCOUNTER
Refill Authorization Note   Juan C Rodriguez  is requesting a refill authorization.  Brief Assessment and Rationale for Refill:  Approve     Medication Therapy Plan:       Medication Reconciliation Completed: No   Comments:     No Care Gaps recommended.     Note composed:3:18 PM 05/16/2022

## 2022-05-27 ENCOUNTER — SPECIALTY PHARMACY (OUTPATIENT)
Dept: PHARMACY | Facility: CLINIC | Age: 57
End: 2022-05-27
Payer: COMMERCIAL

## 2022-05-27 NOTE — TELEPHONE ENCOUNTER
Specialty Pharmacy - Refill Coordination    Specialty Medication Orders Linked to Encounter    Flowsheet Row Most Recent Value   Medication #1 etanercept (ENBREL SURECLICK) 50 mg/mL (1 mL) (Order#[id number], Rx#[id number])          Refill Questions - Documented Responses    Flowsheet Row Most Recent Value   Patient Availability and HIPAA Verification    Does patient want to proceed with activity? Yes   HIPAA/medical authority confirmed? Yes   Relationship to patient of person spoken to? Self   Refill Screening Questions    Changes to allergies? No   Changes to medications? No   New conditions since last clinic visit? No   Unplanned office visit, urgent care, ED, or hospital admission in the last 4 weeks? No   How does patient/caregiver feel medication is working? Excellent   Financial problems or insurance changes? No   How many doses of your specialty medications were missed in the last 4 weeks? 0   Would patient like to speak to a pharmacist? No   When does the patient need to receive the medication? 05/31/22   Refill Delivery Questions    How will the patient receive the medication? Delivery Vonda   When does the patient need to receive the medication? 05/31/22   Shipping Address Home   Address in [facility] confirmed and updated if neccessary? Yes   Expected Copay ($) 80   Is the patient able to afford the medication copay? Yes   Payment Method CC on file   Days supply of Refill 28   Supplies needed? No supplies needed   Refill activity completed? Yes   Refill activity plan Refill scheduled   Shipment/Pickup Date: 05/31/22          Current Outpatient Medications   Medication Sig    acetaminophen (TYLENOL) 650 MG TbSR Take 1 tablet (650 mg total) by mouth every 8 (eight) hours. (Patient taking differently: Take 650 mg by mouth every 8 (eight) hours as needed.)    allopurinoL (ZYLOPRIM) 300 MG tablet Take 1 tablet (300 mg total) by mouth once daily.    amLODIPine (NORVASC) 10 MG tablet Take 1 tablet by  "mouth once daily    ammonium lactate 12 % Crea Apply 1 Act topically 2 (two) times daily.    atorvastatin (LIPITOR) 20 MG tablet Take 1 tablet (20 mg total) by mouth once daily.    azilsartan med-chlorthalidone (EDARBYCLOR) 40-12.5 mg Tab Take 1 tablet by mouth Daily.    ergocalciferol (ERGOCALCIFEROL) 50,000 unit Cap Take 1 capsule (50,000 Units total) by mouth every 7 days.    etanercept (ENBREL SURECLICK) 50 mg/mL (1 mL) Inject 1 mL (50 mg total) into the skin once a week.    folic acid (FOLVITE) 1 MG tablet Take 1 tablet (1 mg total) by mouth once daily.    ketoconazole (NIZORAL) 2 % cream Apply topically 2 (two) times daily. For two weeks on bottom of feet and in between toes    metFORMIN (GLUCOPHAGE) 500 MG tablet Take 1 tablet by mouth once daily with breakfast    methotrexate 2.5 MG Tab Take 6 tablets (15 mg total) by mouth every 7 days.    needle, disp, 22 G 22 gauge x 1 1/2" Ndle 1 Units by Misc.(Non-Drug; Combo Route) route every 21 days.    testosterone cypionate (DEPOTESTOTERONE CYPIONATE) 200 mg/mL injection INJECT 1 ML (200MG TOTAL) INTO THE MUSCLE EVERY 28 DAYS FOR ONE DOSE. (Patient not taking: No sig reported)   Last reviewed on 4/22/2022 10:57 AM by Charleen Pascual MA    Review of patient's allergies indicates:   Allergen Reactions    Naproxen Swelling    Last reviewed on  4/22/2022 10:57 AM by Charleen Pascual      Tasks added this encounter   No tasks added.   Tasks due within next 3 months   5/17/2022 - Refill Call (Auto Added)     Neto Zuniga, PharmD  [facility]  [address] [place] [address]  Phone: [phone]  Fax: [phone]      "

## 2022-06-17 ENCOUNTER — SPECIALTY PHARMACY (OUTPATIENT)
Dept: PHARMACY | Facility: CLINIC | Age: 57
End: 2022-06-17
Payer: COMMERCIAL

## 2022-06-17 NOTE — TELEPHONE ENCOUNTER
Specialty Pharmacy - Clinical Reassessment    Specialty Medication Orders Linked to Encounter    Flowsheet Row Most Recent Value   Medication #1 etanercept (ENBREL SURECLICK) 50 mg/mL (1 mL) (Order#[id number], Rx#[id number])        Patient Diagnosis   M06.09 - Rheumatoid arthritis of multiple sites with negative rheumatoid factor    Specialty clinical pharmacist review completed for an annual review of reassessment. Reviewed the following areas: current med list, reports of adverse effects, adherence and progress towards therapeutic goals.    Recommendations: none at this time.    Tasks added this encounter   3/17/2023 - Clinical - Follow Up Assesement (Annual)   Tasks due within next 3 months   6/21/2022 - Refill Call (Auto Added)     Lore Christian, PharmD  [facility]  [address]  [facility] [address]  Phone: [phone]  Fax: [phone]

## 2022-06-21 DIAGNOSIS — M06.09 RHEUMATOID ARTHRITIS OF MULTIPLE SITES WITH NEGATIVE RHEUMATOID FACTOR: ICD-10-CM

## 2022-06-21 RX ORDER — ETANERCEPT 50 MG/ML
50 SOLUTION SUBCUTANEOUS WEEKLY
Qty: 4 ML | Refills: 11 | Status: SHIPPED | OUTPATIENT
Start: 2022-06-21 | End: 2023-06-26 | Stop reason: SDUPTHER

## 2022-07-05 ENCOUNTER — TELEPHONE (OUTPATIENT)
Dept: FAMILY MEDICINE | Facility: CLINIC | Age: 57
End: 2022-07-05
Payer: COMMERCIAL

## 2022-07-05 RX ORDER — OLMESARTAN MEDOXOMIL AND HYDROCHLOROTHIAZIDE 20/12.5 20; 12.5 MG/1; MG/1
1 TABLET ORAL DAILY
Qty: 90 TABLET | Refills: 3 | Status: SHIPPED | OUTPATIENT
Start: 2022-07-05 | End: 2023-07-24

## 2022-07-05 NOTE — TELEPHONE ENCOUNTER
----- Message from [facility] Orozco sent at 7/5/2022  7:48 AM CDT -----  Regarding: BP med  Contact: patient  Patient needs to speak to nurse regarding a blood pressure med that his insurance will not pay  for anymore, but he was not sure of the name, please call him back at [phone]

## 2022-07-05 NOTE — TELEPHONE ENCOUNTER
----- Message from [facility] Orozco sent at 7/5/2022  7:48 AM CDT -----  Regarding: BP med  Contact: patient  Patient needs to speak to nurse regarding a blood pressure med that his insurance will not pay  for anymore, but he was not sure of the name, please call him back at [phone]

## 2022-07-05 NOTE — TELEPHONE ENCOUNTER
Spoke with pt he stated that his insurance will no longer pay for th Azilsartan 40-12.5mgs.    Is there something else he can try?

## 2022-07-08 ENCOUNTER — SPECIALTY PHARMACY (OUTPATIENT)
Dept: PHARMACY | Facility: CLINIC | Age: 57
End: 2022-07-08
Payer: COMMERCIAL

## 2022-07-08 NOTE — TELEPHONE ENCOUNTER
Incoming call regarding Enbrel injection. Pt started new medication Benicar HCTZ 3 days ago. Pt needs refill for 7/17/22. Transferred to  Lore

## 2022-07-08 NOTE — TELEPHONE ENCOUNTER
Specialty Pharmacy - Refill Coordination    Specialty Medication Orders Linked to Encounter    Flowsheet Row Most Recent Value   Medication #1 etanercept (ENBREL SURECLICK) 50 mg/mL (1 mL) (Order#[id number], Rx#[id number])          Refill Questions - Documented Responses    Flowsheet Row Most Recent Value   Patient Availability and HIPAA Verification    Does patient want to proceed with activity? Yes   HIPAA/medical authority confirmed? Yes   Relationship to patient of person spoken to? Self   Refill Screening Questions    Changes to allergies? No   Changes to medications? Yes   New conditions since last clinic visit? No   Unplanned office visit, urgent care, ED, or hospital admission in the last 4 weeks? No   How does patient/caregiver feel medication is working? Good   Financial problems or insurance changes? No   How many doses of your specialty medications were missed in the last 4 weeks? 0   Would patient like to speak to a pharmacist? No   When does the patient need to receive the medication? 07/17/22   Refill Delivery Questions    How will the patient receive the medication? Delivery Vonda   When does the patient need to receive the medication? 07/17/22   Shipping Address Home   Address in [facility] confirmed and updated if neccessary? Yes   Expected Copay ($) 80   Is the patient able to afford the medication copay? Yes   Payment Method  CC on file   Days supply of Refill 28   Supplies needed? No supplies needed   Refill activity completed? Yes   Refill activity plan Refill scheduled   Shipment/Pickup Date: 07/13/22          Current Outpatient Medications   Medication Sig    acetaminophen (TYLENOL) 650 MG TbSR Take 1 tablet (650 mg total) by mouth every 8 (eight) hours. (Patient taking differently: Take 650 mg by mouth every 8 (eight) hours as needed.)    allopurinoL (ZYLOPRIM) 300 MG tablet Take 1 tablet (300 mg total) by mouth once daily.    amLODIPine (NORVASC) 10 MG tablet Take 1  "tablet by mouth once daily    ammonium lactate 12 % Crea Apply 1 Act topically 2 (two) times daily.    atorvastatin (LIPITOR) 20 MG tablet Take 1 tablet (20 mg total) by mouth once daily.    ergocalciferol (ERGOCALCIFEROL) 50,000 unit Cap Take 1 capsule (50,000 Units total) by mouth every 7 days.    etanercept (ENBREL SURECLICK) 50 mg/mL (1 mL) Inject 1 mL (50 mg total) into the skin once a week.    folic acid (FOLVITE) 1 MG tablet Take 1 tablet (1 mg total) by mouth once daily.    ketoconazole (NIZORAL) 2 % cream Apply topically 2 (two) times daily. For two weeks on bottom of feet and in between toes    metFORMIN (GLUCOPHAGE) 500 MG tablet Take 1 tablet by mouth once daily with breakfast    methotrexate 2.5 MG Tab Take 6 tablets (15 mg total) by mouth every 7 days.    needle, disp, 22 G 22 gauge x 1 1/2" Ndle 1 Units by Misc.(Non-Drug; Combo Route) route every 21 days.    olmesartan-hydrochlorothiazide (BENICAR HCT) 20-12.5 mg per tablet Take 1 tablet by mouth once daily.    testosterone cypionate (DEPOTESTOTERONE CYPIONATE) 200 mg/mL injection INJECT 1 ML (200MG TOTAL) INTO THE MUSCLE EVERY 28 DAYS FOR ONE DOSE. (Patient not taking: No sig reported)   Last reviewed on 4/22/2022 10:57 AM by Charleen Pascual MA    Review of patient's allergies indicates:   Allergen Reactions    Naproxen Swelling    Last reviewed on  7/5/2022 10:56 AM by Reza Michelle    Interventions added this encounter   Closed: [facility] Patient Intervention - Drug safety: etanercept (ENBREL SURECLICK) 50 mg/mL (1 mL)     Tasks added this encounter   8/7/2022 - Refill Call (Auto Added)   Tasks due within next 3 months   No tasks due.     Lore Christian, PharmD  [facility]  [address] [place] [id number]  Phone: [phone]  Fax: [phone]      "

## 2022-07-15 ENCOUNTER — HOSPITAL ENCOUNTER (OUTPATIENT)
Dept: RADIOLOGY | Facility: HOSPITAL | Age: 57
Discharge: HOME | End: 2022-07-15
Attending: PHYSICIAN ASSISTANT
Payer: COMMERCIAL

## 2022-07-15 ENCOUNTER — OFFICE VISIT (OUTPATIENT)
Dept: RHEUMATOLOGY | Facility: CLINIC | Age: 57
End: 2022-07-15
Payer: COMMERCIAL

## 2022-07-15 ENCOUNTER — TELEPHONE (OUTPATIENT)
Dept: RHEUMATOLOGY | Facility: CLINIC | Age: 57
End: 2022-07-15

## 2022-07-15 VITALS
HEIGHT: 64 IN | SYSTOLIC BLOOD PRESSURE: 146 MMHG | WEIGHT: 315 LBS | BODY MASS INDEX: 53.78 KG/M2 | HEART RATE: 63 BPM | DIASTOLIC BLOOD PRESSURE: 65 MMHG

## 2022-07-15 DIAGNOSIS — M06.09 RHEUMATOID ARTHRITIS OF MULTIPLE SITES WITH NEGATIVE RHEUMATOID FACTOR: Primary | ICD-10-CM

## 2022-07-15 DIAGNOSIS — M1A.09X0 IDIOPATHIC CHRONIC GOUT OF MULTIPLE SITES WITHOUT TOPHUS: ICD-10-CM

## 2022-07-15 DIAGNOSIS — M19.90 INFLAMMATORY ARTHRITIS: ICD-10-CM

## 2022-07-15 DIAGNOSIS — D84.9 IMMUNOCOMPROMISED: ICD-10-CM

## 2022-07-15 DIAGNOSIS — Z79.899 ENCOUNTER FOR LONG-TERM (CURRENT) USE OF HIGH-RISK MEDICATION: ICD-10-CM

## 2022-07-15 DIAGNOSIS — M06.09 RHEUMATOID ARTHRITIS OF MULTIPLE SITES WITH NEGATIVE RHEUMATOID FACTOR: ICD-10-CM

## 2022-07-15 PROCEDURE — 99215 OFFICE O/P EST HI 40 MIN: CPT | Mod: S$GLB,,, | Performed by: PHYSICIAN ASSISTANT

## 2022-07-15 PROCEDURE — 3044F PR MOST RECENT HEMOGLOBIN A1C LEVEL <7.0%: ICD-10-PCS | Mod: CPTII,S$GLB,, | Performed by: PHYSICIAN ASSISTANT

## 2022-07-15 PROCEDURE — 73130 XR HAND COMPLETE 3 VIEWS BILATERAL: ICD-10-PCS | Mod: 26,,, | Performed by: RADIOLOGY

## 2022-07-15 PROCEDURE — 3008F BODY MASS INDEX DOCD: CPT | Mod: CPTII,S$GLB,, | Performed by: PHYSICIAN ASSISTANT

## 2022-07-15 PROCEDURE — 99999 PR PBB SHADOW E&M-EST. PATIENT-LVL IV: CPT | Mod: PBBFAC,,, | Performed by: PHYSICIAN ASSISTANT

## 2022-07-15 PROCEDURE — 3008F PR BODY MASS INDEX (BMI) DOCUMENTED: ICD-10-PCS | Mod: CPTII,S$GLB,, | Performed by: PHYSICIAN ASSISTANT

## 2022-07-15 PROCEDURE — 3077F PR MOST RECENT SYSTOLIC BLOOD PRESSURE >= 140 MM HG: ICD-10-PCS | Mod: CPTII,S$GLB,, | Performed by: PHYSICIAN ASSISTANT

## 2022-07-15 PROCEDURE — 73130 X-RAY EXAM OF HAND: CPT | Mod: 26,,, | Performed by: RADIOLOGY

## 2022-07-15 PROCEDURE — 3078F DIAST BP <80 MM HG: CPT | Mod: CPTII,S$GLB,, | Performed by: PHYSICIAN ASSISTANT

## 2022-07-15 PROCEDURE — 3078F PR MOST RECENT DIASTOLIC BLOOD PRESSURE < 80 MM HG: ICD-10-PCS | Mod: CPTII,S$GLB,, | Performed by: PHYSICIAN ASSISTANT

## 2022-07-15 PROCEDURE — 3044F HG A1C LEVEL LT 7.0%: CPT | Mod: CPTII,S$GLB,, | Performed by: PHYSICIAN ASSISTANT

## 2022-07-15 PROCEDURE — 3072F PR LOW RISK FOR RETINOPATHY: ICD-10-PCS | Mod: CPTII,S$GLB,, | Performed by: PHYSICIAN ASSISTANT

## 2022-07-15 PROCEDURE — 3072F LOW RISK FOR RETINOPATHY: CPT | Mod: CPTII,S$GLB,, | Performed by: PHYSICIAN ASSISTANT

## 2022-07-15 PROCEDURE — 3077F SYST BP >= 140 MM HG: CPT | Mod: CPTII,S$GLB,, | Performed by: PHYSICIAN ASSISTANT

## 2022-07-15 PROCEDURE — 73130 X-RAY EXAM OF HAND: CPT | Mod: TC,50

## 2022-07-15 PROCEDURE — 1159F MED LIST DOCD IN RCRD: CPT | Mod: CPTII,S$GLB,, | Performed by: PHYSICIAN ASSISTANT

## 2022-07-15 PROCEDURE — 1159F PR MEDICATION LIST DOCUMENTED IN MEDICAL RECORD: ICD-10-PCS | Mod: CPTII,S$GLB,, | Performed by: PHYSICIAN ASSISTANT

## 2022-07-15 PROCEDURE — 99999 PR PBB SHADOW E&M-EST. PATIENT-LVL IV: ICD-10-PCS | Mod: PBBFAC,,, | Performed by: PHYSICIAN ASSISTANT

## 2022-07-15 PROCEDURE — 99215 PR OFFICE/OUTPT VISIT, EST, LEVL V, 40-54 MIN: ICD-10-PCS | Mod: S$GLB,,, | Performed by: PHYSICIAN ASSISTANT

## 2022-07-15 RX ORDER — METHOTREXATE 2.5 MG/1
15 TABLET ORAL
Qty: 72 TABLET | Refills: 1 | Status: SHIPPED | OUTPATIENT
Start: 2022-07-15 | End: 2023-02-13 | Stop reason: SDUPTHER

## 2022-07-15 RX ORDER — FOLIC ACID 1 MG/1
1 TABLET ORAL DAILY
Qty: 90 TABLET | Refills: 3 | Status: SHIPPED | OUTPATIENT
Start: 2022-07-15 | End: 2023-02-13 | Stop reason: SDUPTHER

## 2022-07-15 ASSESSMENT — ROUTINE ASSESSMENT OF PATIENT INDEX DATA (RAPID3): MDHAQ FUNCTION SCORE: 0.5

## 2022-07-15 NOTE — PROGRESS NOTES
"     RHEUMATOLOGY OUTPATIENT CLINIC NOTE    7/15/2022    Attending Rheumatologist: Brianna Velarde PA-C  Primary Care Provider: Reza Michelle MD   Chief Complaint/Reason For Consultation:  Disease management      Subjective:        Juan C Rodriguez is a 56 y.o. male here today for routine follow up of seronegative rheumatoid arthritis. Since adding Enbrel and methotrexate he reports significant improvement in his rheumatoid arthritis. Denies any joint pain from rheumatoid arthritis today.  No pain in hands or feet. chronic lower back pain I am degenerative arthropathy.  Rheumatological review of system negative otherwise.  Physical examination shows no synovitis, dactylitis, enthesitis or effusion    Serologies  Lab Results   Component Value Date    TBGOLDPLUS Negative 07/01/2020      Lab Results   Component Value Date    RF <10.0 05/21/2020      Lab Results   Component Value Date    HEPAIGM Negative 07/01/2020    HEPBIGM Negative 07/01/2020    HEPCAB Negative 07/01/2020         Current Rheum Medications:  · MTX, enbrel  Previous Rheum Medications:   · n/a    Past Medical History:   Diagnosis Date    Diabetes mellitus, type 2 diagnosed 2013    DM (diabetes mellitus)     2011  02/25/2014    HTN (hypertension)     Hypogonadism, testicular     Left tibialis posterior tendinitis 11/30/2016    Obesities, morbid      Social History     Socioeconomic History    Marital status: Single   Tobacco Use    Smoking status: Never Smoker    Smokeless tobacco: Never Used   Substance and Sexual Activity    Alcohol use: Yes     Alcohol/week: 3.0 - 4.0 standard drinks     Types: 3 - 4 Cans of beer per week     Comment: occasional weekends    Drug use: No     Review of patient's allergies indicates:   Allergen Reactions    Naproxen Swelling       Objective:   BP (!) 146/65   Pulse 63   Ht 5' 4" (1.626 m)   Wt (!) 147 kg (324 lb 1.2 oz)   BMI 55.63 kg/m²     Immunization History " "  Administered Date(s) Administered    Influenza 12/06/2010    Influenza - Quadrivalent - PF *Preferred* (6 months and older) 12/08/2021    Pneumococcal Conjugate - 13 Valent 07/01/2020    Pneumococcal Polysaccharide - 23 Valent 07/09/2018    Tdap 08/14/2012       Current Outpatient Medications:     acetaminophen (TYLENOL) 650 MG TbSR, Take 1 tablet (650 mg total) by mouth every 8 (eight) hours. (Patient taking differently: Take 650 mg by mouth every 8 (eight) hours as needed.), Disp: , Rfl: 0    allopurinoL (ZYLOPRIM) 300 MG tablet, Take 1 tablet (300 mg total) by mouth once daily., Disp: 90 tablet, Rfl: 3    amLODIPine (NORVASC) 10 MG tablet, Take 1 tablet by mouth once daily, Disp: 90 tablet, Rfl: 3    ammonium lactate 12 % Crea, Apply 1 Act topically 2 (two) times daily., Disp: 1 Tube, Rfl: 3    atorvastatin (LIPITOR) 20 MG tablet, Take 1 tablet (20 mg total) by mouth once daily., Disp: 90 tablet, Rfl: 3    ergocalciferol (ERGOCALCIFEROL) 50,000 unit Cap, Take 1 capsule (50,000 Units total) by mouth every 7 days., Disp: 12 capsule, Rfl: 0    etanercept (ENBREL SURECLICK) 50 mg/mL (1 mL), Inject 1 mL (50 mg total) into the skin once a week., Disp: 4 mL, Rfl: 11    ketoconazole (NIZORAL) 2 % cream, Apply topically 2 (two) times daily. For two weeks on bottom of feet and in between toes, Disp: 1 Tube, Rfl: 2    metFORMIN (GLUCOPHAGE) 500 MG tablet, Take 1 tablet by mouth once daily with breakfast, Disp: 90 tablet, Rfl: 0    needle, disp, 22 G 22 gauge x 1 1/2" Ndle, 1 Units by Misc.(Non-Drug; Combo Route) route every 21 days., Disp: 100 each, Rfl: 99    olmesartan-hydrochlorothiazide (BENICAR HCT) 20-12.5 mg per tablet, Take 1 tablet by mouth once daily., Disp: 90 tablet, Rfl: 3    testosterone cypionate (DEPOTESTOTERONE CYPIONATE) 200 mg/mL injection, INJECT 1 ML (200MG TOTAL) INTO THE MUSCLE EVERY 28 DAYS FOR ONE DOSE., Disp: 10 mL, Rfl: 1    folic acid (FOLVITE) 1 MG tablet, Take 1 tablet (1 mg " total) by mouth once daily., Disp: 90 tablet, Rfl: 3    methotrexate 2.5 MG Tab, Take 6 tablets (15 mg total) by mouth every 7 days., Disp: 72 tablet, Rfl: 1       Recent Results (from the past 336 hour(s))   CBC Auto Differential    Collection Time: 07/15/22  9:31 AM   Result Value Ref Range    WBC 7.85 3.90 - 12.70 K/uL    RBC 4.39 (L) 4.60 - 6.20 M/uL    Hemoglobin 11.6 (L) 14.0 - 18.0 g/dL    Hematocrit 36.4 (L) 40.0 - 54.0 %    MCV 83 82 - 98 fL    MCH 26.4 (L) 27.0 - 31.0 pg    MCHC 31.9 (L) 32.0 - 36.0 g/dL    RDW 18.9 (H) 11.5 - 14.5 %    Platelets 265 150 - 450 K/uL    MPV 9.2 9.2 - 12.9 fL    Immature Granulocytes 0.6 (H) 0.0 - 0.5 %    Gran # (ANC) 4.8 1.8 - 7.7 K/uL    Immature Grans (Abs) 0.05 (H) 0.00 - 0.04 K/uL    Lymph # 2.1 1.0 - 4.8 K/uL    Mono # 0.6 0.3 - 1.0 K/uL    Eos # 0.1 0.0 - 0.5 K/uL    Baso # 0.08 0.00 - 0.20 K/uL    nRBC 0 0 /100 WBC    Gran % 61.5 38.0 - 73.0 %    Lymph % 27.3 18.0 - 48.0 %    Mono % 7.8 4.0 - 15.0 %    Eosinophil % 1.8 0.0 - 8.0 %    Basophil % 1.0 0.0 - 1.9 %    Differential Method Automated    Comprehensive Metabolic Panel    Collection Time: 07/15/22  9:31 AM   Result Value Ref Range    Sodium 141 136 - 145 mmol/L    Potassium 4.3 3.5 - 5.1 mmol/L    Chloride 102 95 - 110 mmol/L    CO2 29 23 - 29 mmol/L    Glucose 85 70 - 110 mg/dL    BUN 20 6 - 20 mg/dL    Creatinine 1.1 0.5 - 1.4 mg/dL    Calcium 9.1 8.7 - 10.5 mg/dL    Total Protein 7.0 6.0 - 8.4 g/dL    Albumin 3.4 (L) 3.5 - 5.2 g/dL    Total Bilirubin 0.4 0.1 - 1.0 mg/dL    Alkaline Phosphatase 113 55 - 135 U/L    AST 27 10 - 40 U/L    ALT 38 10 - 44 U/L    Anion Gap 10 8 - 16 mmol/L    eGFR if African American >60 >60 mL/min/1.73 m^2    eGFR if non African American >60 >60 mL/min/1.73 m^2   C-Reactive Protein    Collection Time: 07/15/22  9:31 AM   Result Value Ref Range    CRP 33.5 (H) 0.0 - 8.2 mg/L   URIC ACID    Collection Time: 07/15/22  9:31 AM   Result Value Ref Range    Uric Acid 5.3 3.4 - 7.0 mg/dL        Imaging:  All imaging reviewed and independently interpreted by me.    XR HAND COMPLETE 3 VIEWS BILATERAL 7/15/22  COMPARISON:  07/01/2020, 05/11/2020     FINDINGS:  Right hand: There is no radiographic evidence of acute osseous, articular, or soft tissue abnormality.  There is moderate to severe scattered osteoarthritis seen throughout the wrist, predominately involving the radiocarpal, 1st CMC, and triscaphe joints.  Mild degenerative findings are also present at the 1st and 5th MCP joints and scattered throughout the IP joints.  No appreciable change from prior.  No definite osseous erosion demonstrated.     Left hand: There is no radiographic evidence of acute osseous, articular, or soft tissue abnormality.  There is mild scattered osteoarthritis seen throughout the IP, MCP, radiocarpal, and triscaphe joints.  No appreciable change from prior.  No definite osseous erosion demonstrated.    Assessment:     1. Rheumatoid arthritis of multiple sites with negative rheumatoid factor    2. Encounter for long-term (current) use of high-risk medication    3. Immunocompromised    4. Idiopathic chronic gout of multiple sites without tophus    5. Inflammatory arthritis          Plan:       · They are negative rheumatoid arthritis well controlled on methotrexate and Enbrel.  Continue same.  Update x-rays of hands and feet today.  · History of gout well controlled on allopurinol.  Continue same  · Compromised immune system secondary to autoimmune disease and use of immunosuppressive drugs. Monitor carefully for infections and toxicities- Currently denies issues with recurrent infections. Advised to get immediate medical care if any infection.  · Recommend Yearly Skin Cancer Screening by Dermatology for all patients on biologic or Manny. advised strict adherence to age appropriate vaccinations (shingles, pneumonia, flu and covid) and cancer screenings with PCP   · Patient advised to hold DMARD and/or biologic therapy for  signs of infection or for surgery. If you are unsure what to do please call our office for instruction.[facility] [phone]  · no current medication related issues, no evidence of toxicity. I ordered labs for toxicity monitoring;  results reviewed and discussed findings with the patient   · Patient understands and contact clinic at any time regarding questions about today's office visit and any medication changes that were made  · Return to clinic: 4 mos w/ lab    The patient understands, chooses and consents to this plan and accepts all the risks which include but are not limited to the risks mentioned above.     Method of contact with patient concerns: [facility]    60 minutes of total time spent on the encounter, which includes face to face time and non-face to face time preparing to see the patient (eg, review of tests), Obtaining and/or reviewing separately obtained history, Documenting clinical information in the electronic or other health record, Independently interpreting results (not separately reported) and communicating results to the patient/family/caregiver, or Care coordination (not separately reported).          Disclaimer: This note was prepared using a voice recognition system and is likely to have sound alike errors within the text.       Brianna Velarde PA-C  Rheumatology Department   [facility]

## 2022-07-15 NOTE — TELEPHONE ENCOUNTER
----- Message from Brianna Velarde PA-C sent at 7/15/2022 11:43 AM CDT -----  Will you please add tb gold and hep labs to his next lab appt for this clinic? TY!

## 2022-07-18 ENCOUNTER — TELEPHONE (OUTPATIENT)
Dept: RHEUMATOLOGY | Facility: CLINIC | Age: 57
End: 2022-07-18
Payer: COMMERCIAL

## 2022-07-18 DIAGNOSIS — R79.82 ELEVATED C-REACTIVE PROTEIN (CRP): ICD-10-CM

## 2022-07-18 DIAGNOSIS — R70.0 ELEVATED SED RATE: Primary | ICD-10-CM

## 2022-07-18 NOTE — TELEPHONE ENCOUNTER
----- Message from Sean Schroeder MD sent at 7/18/2022 10:08 AM CDT -----    ----- Message -----  From: [facility]  Sent: 7/15/2022   9:42 AM CDT  To: Sean Schroeder MD

## 2022-07-18 NOTE — TELEPHONE ENCOUNTER
Please let him know inflammatory markers are still fairly elevated. Would like to schedule him to come in for lab only- SPEP for further eval.

## 2022-07-19 ENCOUNTER — OFFICE VISIT (OUTPATIENT)
Dept: FAMILY MEDICINE | Facility: CLINIC | Age: 57
End: 2022-07-19
Payer: COMMERCIAL

## 2022-07-19 VITALS
WEIGHT: 315 LBS | DIASTOLIC BLOOD PRESSURE: 70 MMHG | SYSTOLIC BLOOD PRESSURE: 138 MMHG | RESPIRATION RATE: 18 BRPM | HEART RATE: 68 BPM | TEMPERATURE: 98 F | BODY MASS INDEX: 55.97 KG/M2

## 2022-07-19 DIAGNOSIS — I10 ESSENTIAL HYPERTENSION: ICD-10-CM

## 2022-07-19 DIAGNOSIS — E78.2 MIXED HYPERLIPIDEMIA: ICD-10-CM

## 2022-07-19 DIAGNOSIS — D84.9 IMMUNOCOMPROMISED: ICD-10-CM

## 2022-07-19 DIAGNOSIS — M06.09 RHEUMATOID ARTHRITIS OF MULTIPLE SITES WITH NEGATIVE RHEUMATOID FACTOR: ICD-10-CM

## 2022-07-19 DIAGNOSIS — E11.65 TYPE 2 DIABETES MELLITUS WITH HYPERGLYCEMIA, WITHOUT LONG-TERM CURRENT USE OF INSULIN: ICD-10-CM

## 2022-07-19 DIAGNOSIS — N25.81 SECONDARY HYPERPARATHYROIDISM: ICD-10-CM

## 2022-07-19 DIAGNOSIS — E66.01 MORBID OBESITY DUE TO EXCESS CALORIES: ICD-10-CM

## 2022-07-19 DIAGNOSIS — N18.31 STAGE 3A CHRONIC KIDNEY DISEASE: Primary | ICD-10-CM

## 2022-07-19 PROCEDURE — 3044F HG A1C LEVEL LT 7.0%: CPT | Mod: CPTII,S$GLB,, | Performed by: INTERNAL MEDICINE

## 2022-07-19 PROCEDURE — 99999 PR PBB SHADOW E&M-EST. PATIENT-LVL IV: ICD-10-PCS | Mod: PBBFAC,,, | Performed by: INTERNAL MEDICINE

## 2022-07-19 PROCEDURE — 3044F PR MOST RECENT HEMOGLOBIN A1C LEVEL <7.0%: ICD-10-PCS | Mod: CPTII,S$GLB,, | Performed by: INTERNAL MEDICINE

## 2022-07-19 PROCEDURE — 99396 PREV VISIT EST AGE 40-64: CPT | Mod: S$GLB,,, | Performed by: INTERNAL MEDICINE

## 2022-07-19 PROCEDURE — 3078F PR MOST RECENT DIASTOLIC BLOOD PRESSURE < 80 MM HG: ICD-10-PCS | Mod: CPTII,S$GLB,, | Performed by: INTERNAL MEDICINE

## 2022-07-19 PROCEDURE — 3072F LOW RISK FOR RETINOPATHY: CPT | Mod: CPTII,S$GLB,, | Performed by: INTERNAL MEDICINE

## 2022-07-19 PROCEDURE — 99396 PR PREVENTIVE VISIT,EST,40-64: ICD-10-PCS | Mod: S$GLB,,, | Performed by: INTERNAL MEDICINE

## 2022-07-19 PROCEDURE — 3078F DIAST BP <80 MM HG: CPT | Mod: CPTII,S$GLB,, | Performed by: INTERNAL MEDICINE

## 2022-07-19 PROCEDURE — 3075F SYST BP GE 130 - 139MM HG: CPT | Mod: CPTII,S$GLB,, | Performed by: INTERNAL MEDICINE

## 2022-07-19 PROCEDURE — 1159F MED LIST DOCD IN RCRD: CPT | Mod: CPTII,S$GLB,, | Performed by: INTERNAL MEDICINE

## 2022-07-19 PROCEDURE — 3072F PR LOW RISK FOR RETINOPATHY: ICD-10-PCS | Mod: CPTII,S$GLB,, | Performed by: INTERNAL MEDICINE

## 2022-07-19 PROCEDURE — 3008F PR BODY MASS INDEX (BMI) DOCUMENTED: ICD-10-PCS | Mod: CPTII,S$GLB,, | Performed by: INTERNAL MEDICINE

## 2022-07-19 PROCEDURE — 3075F PR MOST RECENT SYSTOLIC BLOOD PRESS GE 130-139MM HG: ICD-10-PCS | Mod: CPTII,S$GLB,, | Performed by: INTERNAL MEDICINE

## 2022-07-19 PROCEDURE — 1159F PR MEDICATION LIST DOCUMENTED IN MEDICAL RECORD: ICD-10-PCS | Mod: CPTII,S$GLB,, | Performed by: INTERNAL MEDICINE

## 2022-07-19 PROCEDURE — 3008F BODY MASS INDEX DOCD: CPT | Mod: CPTII,S$GLB,, | Performed by: INTERNAL MEDICINE

## 2022-07-19 PROCEDURE — 99999 PR PBB SHADOW E&M-EST. PATIENT-LVL IV: CPT | Mod: PBBFAC,,, | Performed by: INTERNAL MEDICINE

## 2022-07-19 NOTE — PROGRESS NOTES
Subjective:       Patient ID: Juan C Rodriguez is a 56 y.o. male.    Chief Complaint: Follow-up (HTN), Hypertension, Hyperlipidemia, and Diabetes    Follow-up  Associated symptoms include arthralgias. Pertinent negatives include no abdominal pain, chest pain, chills, coughing, diaphoresis, fatigue, fever, headaches, joint swelling, myalgias, nausea, neck pain, numbness, rash, sore throat, vomiting or weakness.   Hypertension  Pertinent negatives include no chest pain, headaches, neck pain, palpitations or shortness of breath.   Hyperlipidemia  Pertinent negatives include no chest pain, myalgias or shortness of breath.   Diabetes  Pertinent negatives for hypoglycemia include no confusion, dizziness, headaches, nervousness/anxiousness, pallor, seizures, speech difficulty or tremors. Pertinent negatives for diabetes include no chest pain, no fatigue, no polydipsia, no polyphagia, no polyuria and no weakness.     Past Medical History:   Diagnosis Date    Diabetes mellitus, type 2 diagnosed 2013    DM (diabetes mellitus)     2011  02/25/2014    HTN (hypertension)     Hypogonadism, testicular     Left tibialis posterior tendinitis 11/30/2016    Obesities, morbid      Past Surgical History:   Procedure Laterality Date    COLONOSCOPY N/A 6/29/2018    Procedure: COLONOSCOPY;  Surgeon: Jeremiah Anaya III, MD;  Location: [facility];  Service: Endoscopy;  Laterality: N/A;     Family History   Problem Relation Age of Onset    Cataracts Mother     Diabetes Mother     Hypertension Mother     Diabetes Sister      Social History     Socioeconomic History    Marital status: Single   Tobacco Use    Smoking status: Never Smoker    Smokeless tobacco: Never Used   Substance and Sexual Activity    Alcohol use: Yes     Alcohol/week: 3.0 - 4.0 standard drinks     Types: 3 - 4 Cans of beer per week     Comment: occasional weekends    Drug use: No     Review of Systems   Constitutional: Negative for  activity change, appetite change, chills, diaphoresis, fatigue, fever and unexpected weight change.   HENT: Negative for drooling, ear discharge, ear pain, facial swelling, hearing loss, mouth sores, nosebleeds, postnasal drip, rhinorrhea, sinus pressure, sneezing, sore throat, tinnitus, trouble swallowing and voice change.    Eyes: Negative for photophobia, redness and visual disturbance.   Respiratory: Negative for apnea, cough, choking, chest tightness, shortness of breath and wheezing.    Cardiovascular: Negative for chest pain, palpitations and leg swelling.   Gastrointestinal: Negative for abdominal distention, abdominal pain, anal bleeding, blood in stool, constipation, diarrhea, nausea, rectal pain and vomiting.   Endocrine: Negative for cold intolerance, heat intolerance, polydipsia, polyphagia and polyuria.   Genitourinary: Negative for difficulty urinating, dysuria, enuresis, flank pain, frequency, genital sores, hematuria and urgency.   Musculoskeletal: Positive for arthralgias. Negative for back pain, gait problem, joint swelling, myalgias, neck pain and neck stiffness.   Skin: Negative for color change, pallor, rash and wound.   Allergic/Immunologic: Negative for food allergies and immunocompromised state.   Neurological: Negative for dizziness, tremors, seizures, syncope, facial asymmetry, speech difficulty, weakness, light-headedness, numbness and headaches.   Hematological: Negative for adenopathy. Does not bruise/bleed easily.   Psychiatric/Behavioral: Negative for agitation, behavioral problems, confusion, decreased concentration, dysphoric mood, hallucinations, self-injury, sleep disturbance and suicidal ideas. The patient is not nervous/anxious and is not hyperactive.        Objective:      Physical Exam  Vitals and nursing note reviewed.   Constitutional:       General: He is not in acute distress.     Appearance: Normal appearance. He is well-developed. He is not diaphoretic.   HENT:       Head: Normocephalic and atraumatic.      Mouth/Throat:      Pharynx: No oropharyngeal exudate.   Eyes:      General: No scleral icterus.     Pupils: Pupils are equal, round, and reactive to light.   Neck:      Thyroid: No thyromegaly.      Vascular: No carotid bruit or JVD.      Trachea: No tracheal deviation.   Cardiovascular:      Rate and Rhythm: Normal rate and regular rhythm.      Heart sounds: Normal heart sounds.   Pulmonary:      Effort: Pulmonary effort is normal. No respiratory distress.      Breath sounds: Normal breath sounds. No wheezing or rales.   Chest:      Chest wall: No tenderness.   Abdominal:      General: Bowel sounds are normal. There is no distension.      Palpations: Abdomen is soft.      Tenderness: There is no abdominal tenderness. There is no guarding or rebound.   Musculoskeletal:         General: No tenderness. Normal range of motion.      Cervical back: Normal range of motion and neck supple.   Lymphadenopathy:      Cervical: No cervical adenopathy.   Skin:     General: Skin is warm and dry.      Coloration: Skin is not pale.      Findings: No erythema or rash.   Neurological:      Mental Status: He is alert and oriented to person, place, and time.   Psychiatric:         Behavior: Behavior normal.         Thought Content: Thought content normal.         Judgment: Judgment normal.         CMP  Sodium   Date Value Ref Range Status   07/15/2022 141 136 - 145 mmol/L Final     Potassium   Date Value Ref Range Status   07/15/2022 4.3 3.5 - 5.1 mmol/L Final     Chloride   Date Value Ref Range Status   07/15/2022 102 95 - 110 mmol/L Final     CO2   Date Value Ref Range Status   07/15/2022 29 23 - 29 mmol/L Final     Glucose   Date Value Ref Range Status   07/15/2022 85 70 - 110 mg/dL Final     BUN   Date Value Ref Range Status   07/15/2022 20 6 - 20 mg/dL Final     Creatinine   Date Value Ref Range Status   07/15/2022 1.1 0.5 - 1.4 mg/dL Final     Calcium   Date Value Ref Range Status    07/15/2022 9.1 8.7 - 10.5 mg/dL Final     Total Protein   Date Value Ref Range Status   07/15/2022 7.0 6.0 - 8.4 g/dL Final     Albumin   Date Value Ref Range Status   07/15/2022 3.4 (L) 3.5 - 5.2 g/dL Final   05/31/2016 4.2 3.6 - 5.1 g/dL Final     Comment:     @ Test Performed By:  [facility]  KELLY Qiu M.D.,   [address] [place] [id number]  [facility]  [id number]       Total Bilirubin   Date Value Ref Range Status   07/15/2022 0.4 0.1 - 1.0 mg/dL Final     Comment:     For infants and newborns, interpretation of results should be based  on gestational age, weight and in agreement with clinical  observations.    Premature Infant recommended reference ranges:  Up to 24 hours.............<8.0 mg/dL  Up to 48 hours............<12.0 mg/dL  3-5 days..................<15.0 mg/dL  6-29 days.................<15.0 mg/dL       Alkaline Phosphatase   Date Value Ref Range Status   07/15/2022 113 55 - 135 U/L Final     AST   Date Value Ref Range Status   07/15/2022 27 10 - 40 U/L Final     ALT   Date Value Ref Range Status   07/15/2022 38 10 - 44 U/L Final     Anion Gap   Date Value Ref Range Status   07/15/2022 10 8 - 16 mmol/L Final     eGFR if    Date Value Ref Range Status   07/15/2022 >60 >60 mL/min/1.73 m^2 Final     eGFR if non    Date Value Ref Range Status   07/15/2022 >60 >60 mL/min/1.73 m^2 Final     Comment:     Calculation used to obtain the estimated glomerular filtration  rate (eGFR) is the CKD-EPI equation.        Lab Results   Component Value Date    WBC 7.85 07/15/2022    HGB 11.6 (L) 07/15/2022    HCT 36.4 (L) 07/15/2022    MCV 83 07/15/2022     07/15/2022     Lab Results   Component Value Date    CHOL 176 04/22/2022     Lab Results   Component Value Date    HDL 58 04/22/2022     Lab Results   Component Value Date    LDLCALC 106.8 04/22/2022     Lab Results   Component Value Date    TRIG 56 04/22/2022      Lab Results   Component Value Date    CHOLHDL 33.0 04/22/2022     Lab Results   Component Value Date    TSH 2.126 03/17/2020     Lab Results   Component Value Date    LABA1C 6.1 (H) 03/16/2017    HGBA1C 5.9 (H) 04/22/2022     Assessment:       1. Stage 3a chronic kidney disease    2. Rheumatoid arthritis of multiple sites with negative rheumatoid factor    3. Morbid obesity due to excess calories    4. Mixed hyperlipidemia    5. Immunocompromised    6. Essential hypertension    7. Type 2 diabetes mellitus with hyperglycemia, without long-term current use of insulin    8. Secondary hyperparathyroidism        Plan:   Stage 3a chronic kidney disease    Rheumatoid arthritis of multiple sites with negative rheumatoid factor    Morbid obesity due to excess calories    Mixed hyperlipidemia    Immunocompromised    Essential hypertension    Type 2 diabetes mellitus with hyperglycemia, without long-term current use of insulin    Secondary hyperparathyroidism    Stable----------------continue meds-----watch diet,weight loss------------as above-------------f/u 4 months---------

## 2022-07-27 ENCOUNTER — TELEPHONE (OUTPATIENT)
Dept: RHEUMATOLOGY | Facility: CLINIC | Age: 57
End: 2022-07-27
Payer: COMMERCIAL

## 2022-07-27 NOTE — TELEPHONE ENCOUNTER
----- Message from Yue Luciano MA sent at 7/26/2022  4:19 PM CDT -----    ----- Message -----  From: Brianna Velarde PA-C  Sent: 7/26/2022  12:58 PM CDT  To: [facility] Staff    Can we send him a letter about lab work if we cannot get him on the phone?

## 2022-08-23 ENCOUNTER — SPECIALTY PHARMACY (OUTPATIENT)
Dept: PHARMACY | Facility: CLINIC | Age: 57
End: 2022-08-23
Payer: COMMERCIAL

## 2022-08-23 DIAGNOSIS — M06.09 RHEUMATOID ARTHRITIS OF MULTIPLE SITES WITH NEGATIVE RHEUMATOID FACTOR: Primary | ICD-10-CM

## 2022-08-23 NOTE — TELEPHONE ENCOUNTER
Specialty Pharmacy - Refill Coordination    Specialty Medication Orders Linked to Encounter    Flowsheet Row Most Recent Value   Medication #1 etanercept (ENBREL SURECLICK) 50 mg/mL (1 mL) (Order#[id number], Rx#[id number])        Refill Questions - Documented Responses    Flowsheet Row Most Recent Value   Patient Availability and HIPAA Verification    Does patient want to proceed with activity? Unable to Reach        We have had multiple attempts to the patient and have been unsuccessful to reach the patient. We will stop reaching out to the patient but in the event that the patient needs the med and contacts us, we will communicate and begin dispensing for the patient. At your next visit with the patient, please review the importance of being in contact with our specialty pharmacy as a part of our care team.    Interventions added this encounter   Closed: [facility] Provider Intervention - Drug therapy adherence: etanercept (ENBREL SURECLICK) 50 mg/mL (1 mL)     Lore Christian, PharmD  [facility]  [address] [place] [id number]  Phone: [phone]  Fax: [phone]

## 2022-09-06 NOTE — TELEPHONE ENCOUNTER
Specialty Pharmacy - Refill Coordination    Specialty Medication Orders Linked to Encounter      Flowsheet Row Most Recent Value   Medication #1 etanercept (ENBREL SURECLICK) 50 mg/mL (1 mL) (Order#[id number], Rx#[id number])        Pt reports he only missed 1 dose this past Sunday. He denies missing any other doses. Importance of medication adherence for RA treatment discussed. Pt declined further counseling stating he hasn't missed but 1 dose. [facility] dis-enrollment process reviewed and pt states he did not receive any missed calls. He confirmed phone number on file is correct. Pt re-enrolled. Advised he inject Enbrel 1 injection upon delivery the resume once weekly injections Thursday. Pt voiced understanding and had no further questions or concerns.     Refill Questions - Documented Responses      Flowsheet Row Most Recent Value   Patient Availability and HIPAA Verification    Does patient want to proceed with activity? Yes   HIPAA/medical authority confirmed? Yes   Relationship to patient of person spoken to? Self   Refill Screening Questions    Changes to allergies? No   Changes to medications? No   New conditions since last clinic visit? No   Unplanned office visit, urgent care, ED, or hospital admission in the last 4 weeks? No   How does patient/caregiver feel medication is working? Good   Financial problems or insurance changes? No   How many doses of your specialty medications were missed in the last 4 weeks? 1   Why were doses missed? Simply forgot   Would patient like to speak to a pharmacist? No   When does the patient need to receive the medication? 09/08/22   Refill Delivery Questions    How will the patient receive the medication? Delivery Vonda   When does the patient need to receive the medication? 09/08/22   Shipping Address Home   Address in [facility] confirmed and updated if neccessary? Yes   Expected Copay ($) 80   Is the patient able to afford the medication copay? Yes   Payment Method  " CC on file   Days supply of Refill 28   Supplies needed? No supplies needed   Refill activity completed? Yes   Refill activity plan Refill scheduled   Shipment/Pickup Date: 09/07/22            Current Outpatient Medications   Medication Sig    acetaminophen (TYLENOL) 650 MG TbSR Take 1 tablet (650 mg total) by mouth every 8 (eight) hours. (Patient taking differently: Take 650 mg by mouth every 8 (eight) hours as needed.)    allopurinoL (ZYLOPRIM) 300 MG tablet Take 1 tablet (300 mg total) by mouth once daily.    amLODIPine (NORVASC) 10 MG tablet Take 1 tablet by mouth once daily    atorvastatin (LIPITOR) 20 MG tablet Take 1 tablet (20 mg total) by mouth once daily.    ergocalciferol (ERGOCALCIFEROL) 50,000 unit Cap Take 1 capsule by mouth once a week    etanercept (ENBREL SURECLICK) 50 mg/mL (1 mL) Inject 1 mL (50 mg total) into the skin once a week.    folic acid (FOLVITE) 1 MG tablet Take 1 tablet (1 mg total) by mouth once daily.    metFORMIN (GLUCOPHAGE) 500 MG tablet Take 1 tablet by mouth once daily with breakfast    methotrexate 2.5 MG Tab Take 6 tablets (15 mg total) by mouth every 7 days.    needle, disp, 22 G 22 gauge x 1 1/2" Ndle 1 Units by Misc.(Non-Drug; Combo Route) route every 21 days.    olmesartan-hydrochlorothiazide (BENICAR HCT) 20-12.5 mg per tablet Take 1 tablet by mouth once daily.    testosterone cypionate (DEPOTESTOTERONE CYPIONATE) 200 mg/mL injection INJECT 1 ML (200MG TOTAL) INTO THE MUSCLE EVERY 28 DAYS FOR ONE DOSE.   Last reviewed on 8/2/2022  5:00 PM by Charleen Pascual MA    Review of patient's allergies indicates:   Allergen Reactions    Naproxen Swelling    Last reviewed on  8/2/2022 5:00 PM by Charleen Pascual    Interventions added this encounter   Closed: [facility] Provider Intervention - Drug therapy adherence: etanercept (ENBREL SURECLICK) 50 mg/mL (1 mL)     Tasks added this encounter   9/29/2022 - Refill Call (Auto Added)   Tasks due within next 3 months   No tasks due. "     Anastacia Sam, PharmD  [facility]  [address] [facility] [address] [address]  [facility] [address]  Phone: [phone]  Fax: [phone]

## 2022-09-30 ENCOUNTER — SPECIALTY PHARMACY (OUTPATIENT)
Dept: PHARMACY | Facility: CLINIC | Age: 57
End: 2022-09-30
Payer: COMMERCIAL

## 2022-09-30 NOTE — TELEPHONE ENCOUNTER
Specialty Pharmacy - Refill Coordination    Specialty Medication Orders Linked to Encounter      Flowsheet Row Most Recent Value   Medication #1 etanercept (ENBREL SURECLICK) 50 mg/mL (1 mL) (Order#[id number], Rx#[id number])            Refill Questions - Documented Responses      Flowsheet Row Most Recent Value   Patient Availability and HIPAA Verification    Does patient want to proceed with activity? Yes   HIPAA/medical authority confirmed? Yes   Relationship to patient of person spoken to? Self   Refill Screening Questions    Changes to allergies? No   Changes to medications? No   New conditions since last clinic visit? No   Unplanned office visit, urgent care, ED, or hospital admission in the last 4 weeks? No   How does patient/caregiver feel medication is working? Good   Financial problems or insurance changes? No   How many doses of your specialty medications were missed in the last 4 weeks? 0   Would patient like to speak to a pharmacist? No   When does the patient need to receive the medication? 10/09/22   Refill Delivery Questions    How will the patient receive the medication? MEDRx   When does the patient need to receive the medication? 10/09/22   Shipping Address Home   Address in [facility] confirmed and updated if neccessary? Yes   Expected Copay ($) 80   Is the patient able to afford the medication copay? Yes   Payment Method  CC on file   Days supply of Refill 28   Supplies needed? No supplies needed   Refill activity completed? Yes   Refill activity plan Refill scheduled   Shipment/Pickup Date: 10/03/22            Current Outpatient Medications   Medication Sig    acetaminophen (TYLENOL) 650 MG TbSR Take 1 tablet (650 mg total) by mouth every 8 (eight) hours. (Patient taking differently: Take 650 mg by mouth every 8 (eight) hours as needed.)    allopurinoL (ZYLOPRIM) 300 MG tablet Take 1 tablet (300 mg total) by mouth once daily.    amLODIPine (NORVASC) 10 MG tablet Take 1 tablet  "by mouth once daily    atorvastatin (LIPITOR) 20 MG tablet Take 1 tablet (20 mg total) by mouth once daily.    ergocalciferol (ERGOCALCIFEROL) 50,000 unit Cap Take 1 capsule by mouth once a week    etanercept (ENBREL SURECLICK) 50 mg/mL (1 mL) Inject 1 mL (50 mg total) into the skin once a week.    folic acid (FOLVITE) 1 MG tablet Take 1 tablet (1 mg total) by mouth once daily.    metFORMIN (GLUCOPHAGE) 500 MG tablet Take 1 tablet by mouth once daily with breakfast    methotrexate 2.5 MG Tab Take 6 tablets (15 mg total) by mouth every 7 days.    needle, disp, 22 G 22 gauge x 1 1/2" Ndle 1 Units by Misc.(Non-Drug; Combo Route) route every 21 days.    olmesartan-hydrochlorothiazide (BENICAR HCT) 20-12.5 mg per tablet Take 1 tablet by mouth once daily.    testosterone cypionate (DEPOTESTOTERONE CYPIONATE) 200 mg/mL injection INJECT 1 ML (200MG TOTAL) INTO THE MUSCLE EVERY 28 DAYS FOR ONE DOSE.   Last reviewed on 8/2/2022  5:00 PM by Charleen Pascual MA    Review of patient's allergies indicates:   Allergen Reactions    Naproxen Swelling    Last reviewed on  8/2/2022 5:00 PM by Charleen Pascual      Tasks added this encounter   10/30/2022 - Refill Call (Auto Added)   Tasks due within next 3 months   No tasks due.     Kirsty Hahn, PharmD  [facility]  [address] [place] [address]  Phone: [phone]  Fax: [phone]        " Home

## 2022-11-02 ENCOUNTER — SPECIALTY PHARMACY (OUTPATIENT)
Dept: PHARMACY | Facility: CLINIC | Age: 57
End: 2022-11-02
Payer: COMMERCIAL

## 2022-11-02 NOTE — TELEPHONE ENCOUNTER
Specialty Pharmacy - Refill Coordination    Specialty Medication Orders Linked to Encounter      Flowsheet Row Most Recent Value   Medication #1 etanercept (ENBREL SURECLICK) 50 mg/mL (1 mL) (Order#[id number], Rx#[id number])            Refill Questions - Documented Responses      Flowsheet Row Most Recent Value   Patient Availability and HIPAA Verification    Does patient want to proceed with activity? Yes   HIPAA/medical authority confirmed? Yes   Relationship to patient of person spoken to? Self   Refill Screening Questions    Changes to allergies? No   Changes to medications? No   New conditions since last clinic visit? No   Unplanned office visit, urgent care, ED, or hospital admission in the last 4 weeks? No   How does patient/caregiver feel medication is working? Good   Financial problems or insurance changes? No   How many doses of your specialty medications were missed in the last 4 weeks? 0   Would patient like to speak to a pharmacist? No   When does the patient need to receive the medication? 11/06/22   Refill Delivery Questions    How will the patient receive the medication? MEDRx   When does the patient need to receive the medication? 11/06/22   Shipping Address Home   Address in [facility] confirmed and updated if neccessary? Yes   Expected Copay ($) 80   Is the patient able to afford the medication copay? Yes   Payment Method  CC on file   Days supply of Refill 28   Supplies needed? No supplies needed   Refill activity completed? Yes   Refill activity plan Refill scheduled   Shipment/Pickup Date: 11/03/22            Current Outpatient Medications   Medication Sig    acetaminophen (TYLENOL) 650 MG TbSR Take 1 tablet (650 mg total) by mouth every 8 (eight) hours. (Patient taking differently: Take 650 mg by mouth every 8 (eight) hours as needed.)    allopurinoL (ZYLOPRIM) 300 MG tablet Take 1 tablet (300 mg total) by mouth once daily.    amLODIPine (NORVASC) 10 MG tablet Take 1 tablet  "by mouth once daily    atorvastatin (LIPITOR) 20 MG tablet Take 1 tablet (20 mg total) by mouth once daily.    ergocalciferol (ERGOCALCIFEROL) 50,000 unit Cap Take 1 capsule by mouth once a week    etanercept (ENBREL SURECLICK) 50 mg/mL (1 mL) Inject 1 mL (50 mg total) into the skin once a week.    folic acid (FOLVITE) 1 MG tablet Take 1 tablet (1 mg total) by mouth once daily.    metFORMIN (GLUCOPHAGE) 500 MG tablet Take 1 tablet by mouth once daily with breakfast    methotrexate 2.5 MG Tab Take 6 tablets (15 mg total) by mouth every 7 days.    needle, disp, 22 G 22 gauge x 1 1/2" Ndle 1 Units by Misc.(Non-Drug; Combo Route) route every 21 days.    olmesartan-hydrochlorothiazide (BENICAR HCT) 20-12.5 mg per tablet Take 1 tablet by mouth once daily.    testosterone cypionate (DEPOTESTOTERONE CYPIONATE) 200 mg/mL injection INJECT 1 ML (200MG TOTAL) INTO THE MUSCLE EVERY 28 DAYS FOR ONE DOSE.   Last reviewed on 8/2/2022  5:00 PM by Charleen Pascual MA    Review of patient's allergies indicates:   Allergen Reactions    Naproxen Swelling    Last reviewed on  8/2/2022 5:00 PM by Charleen Pascual      Tasks added this encounter   11/27/2022 - Refill Call (Auto Added)   Tasks due within next 3 months   No tasks due.     [facility]  [address] [place] [address]  Phone: [phone]  Fax: [phone]        "

## 2022-11-11 ENCOUNTER — LAB VISIT (OUTPATIENT)
Dept: LAB | Facility: HOSPITAL | Age: 57
End: 2022-11-11
Attending: INTERNAL MEDICINE
Payer: COMMERCIAL

## 2022-11-11 DIAGNOSIS — M1A.09X0 IDIOPATHIC CHRONIC GOUT OF MULTIPLE SITES WITHOUT TOPHUS: ICD-10-CM

## 2022-11-11 DIAGNOSIS — D84.9 IMMUNOCOMPROMISED: ICD-10-CM

## 2022-11-11 DIAGNOSIS — Z79.899 ENCOUNTER FOR LONG-TERM (CURRENT) USE OF HIGH-RISK MEDICATION: ICD-10-CM

## 2022-11-11 PROCEDURE — 86480 TB TEST CELL IMMUN MEASURE: CPT | Performed by: PHYSICIAN ASSISTANT

## 2022-11-11 PROCEDURE — 86803 HEPATITIS C AB TEST: CPT | Performed by: PHYSICIAN ASSISTANT

## 2022-11-11 PROCEDURE — 86704 HEP B CORE ANTIBODY TOTAL: CPT | Performed by: PHYSICIAN ASSISTANT

## 2022-11-11 PROCEDURE — 82306 VITAMIN D 25 HYDROXY: CPT | Performed by: PHYSICIAN ASSISTANT

## 2022-11-11 PROCEDURE — 36415 COLL VENOUS BLD VENIPUNCTURE: CPT | Mod: PO | Performed by: PHYSICIAN ASSISTANT

## 2022-11-11 PROCEDURE — 85025 COMPLETE CBC W/AUTO DIFF WBC: CPT | Performed by: INTERNAL MEDICINE

## 2022-11-11 PROCEDURE — 84550 ASSAY OF BLOOD/URIC ACID: CPT | Performed by: PHYSICIAN ASSISTANT

## 2022-11-11 PROCEDURE — 85652 RBC SED RATE AUTOMATED: CPT | Performed by: INTERNAL MEDICINE

## 2022-11-11 PROCEDURE — 86706 HEP B SURFACE ANTIBODY: CPT | Mod: 91 | Performed by: PHYSICIAN ASSISTANT

## 2022-11-11 PROCEDURE — 87340 HEPATITIS B SURFACE AG IA: CPT | Performed by: PHYSICIAN ASSISTANT

## 2022-11-11 PROCEDURE — 86140 C-REACTIVE PROTEIN: CPT | Performed by: INTERNAL MEDICINE

## 2022-11-11 PROCEDURE — 80053 COMPREHEN METABOLIC PANEL: CPT | Performed by: INTERNAL MEDICINE

## 2022-11-12 LAB
25(OH)D3+25(OH)D2 SERPL-MCNC: 28 NG/ML (ref 30–96)
ALBUMIN SERPL BCP-MCNC: 3.6 G/DL (ref 3.5–5.2)
ALP SERPL-CCNC: 121 U/L (ref 55–135)
ALT SERPL W/O P-5'-P-CCNC: 22 U/L (ref 10–44)
ANION GAP SERPL CALC-SCNC: 10 MMOL/L (ref 8–16)
AST SERPL-CCNC: 27 U/L (ref 10–40)
BASOPHILS # BLD AUTO: 0.06 K/UL (ref 0–0.2)
BASOPHILS NFR BLD: 0.8 % (ref 0–1.9)
BILIRUB SERPL-MCNC: 0.7 MG/DL (ref 0.1–1)
BUN SERPL-MCNC: 13 MG/DL (ref 6–20)
CALCIUM SERPL-MCNC: 9.4 MG/DL (ref 8.7–10.5)
CHLORIDE SERPL-SCNC: 99 MMOL/L (ref 95–110)
CO2 SERPL-SCNC: 30 MMOL/L (ref 23–29)
CREAT SERPL-MCNC: 1 MG/DL (ref 0.5–1.4)
CRP SERPL-MCNC: 45.7 MG/L (ref 0–8.2)
DIFFERENTIAL METHOD: ABNORMAL
EOSINOPHIL # BLD AUTO: 0.1 K/UL (ref 0–0.5)
EOSINOPHIL NFR BLD: 0.8 % (ref 0–8)
ERYTHROCYTE [DISTWIDTH] IN BLOOD BY AUTOMATED COUNT: 17.2 % (ref 11.5–14.5)
ERYTHROCYTE [SEDIMENTATION RATE] IN BLOOD BY PHOTOMETRIC METHOD: 89 MM/HR (ref 0–23)
EST. GFR  (NO RACE VARIABLE): >60 ML/MIN/1.73 M^2
GLUCOSE SERPL-MCNC: 82 MG/DL (ref 70–110)
HBV CORE AB SERPL QL IA: NORMAL
HBV SURFACE AB SER-ACNC: <3 MIU/ML
HBV SURFACE AB SER-ACNC: NORMAL M[IU]/ML
HBV SURFACE AG SERPL QL IA: NORMAL
HCT VFR BLD AUTO: 38.6 % (ref 40–54)
HCV AB SERPL QL IA: NORMAL
HGB BLD-MCNC: 11.6 G/DL (ref 14–18)
IMM GRANULOCYTES # BLD AUTO: 0.03 K/UL (ref 0–0.04)
IMM GRANULOCYTES NFR BLD AUTO: 0.4 % (ref 0–0.5)
LYMPHOCYTES # BLD AUTO: 1.8 K/UL (ref 1–4.8)
LYMPHOCYTES NFR BLD: 23.5 % (ref 18–48)
MCH RBC QN AUTO: 25.8 PG (ref 27–31)
MCHC RBC AUTO-ENTMCNC: 30.1 G/DL (ref 32–36)
MCV RBC AUTO: 86 FL (ref 82–98)
MONOCYTES # BLD AUTO: 0.5 K/UL (ref 0.3–1)
MONOCYTES NFR BLD: 7.2 % (ref 4–15)
NEUTROPHILS # BLD AUTO: 5 K/UL (ref 1.8–7.7)
NEUTROPHILS NFR BLD: 67.3 % (ref 38–73)
NRBC BLD-RTO: 0 /100 WBC
PLATELET # BLD AUTO: 212 K/UL (ref 150–450)
PMV BLD AUTO: 11.1 FL (ref 9.2–12.9)
POTASSIUM SERPL-SCNC: 3.4 MMOL/L (ref 3.5–5.1)
PROT SERPL-MCNC: 7.7 G/DL (ref 6–8.4)
RBC # BLD AUTO: 4.49 M/UL (ref 4.6–6.2)
SODIUM SERPL-SCNC: 139 MMOL/L (ref 136–145)
URATE SERPL-MCNC: 5.8 MG/DL (ref 3.4–7)
WBC # BLD AUTO: 7.49 K/UL (ref 3.9–12.7)

## 2022-11-14 LAB
GAMMA INTERFERON BACKGROUND BLD IA-ACNC: 0.01 IU/ML
M TB IFN-G CD4+ BCKGRND COR BLD-ACNC: 0 IU/ML
MITOGEN IGNF BCKGRD COR BLD-ACNC: 4.38 IU/ML
TB GOLD PLUS: NEGATIVE
TB2 - NIL: -0 IU/ML

## 2022-11-29 ENCOUNTER — SPECIALTY PHARMACY (OUTPATIENT)
Dept: PHARMACY | Facility: CLINIC | Age: 57
End: 2022-11-29
Payer: COMMERCIAL

## 2022-11-29 NOTE — TELEPHONE ENCOUNTER
Specialty Pharmacy - Refill Coordination    Specialty Medication Orders Linked to Encounter      Flowsheet Row Most Recent Value   Medication #1 etanercept (ENBREL SURECLICK) 50 mg/mL (1 mL) (Order#[id number], Rx#[id number])            Refill Questions - Documented Responses      Flowsheet Row Most Recent Value   Patient Availability and HIPAA Verification    Does patient want to proceed with activity? Yes   HIPAA/medical authority confirmed? Yes   Relationship to patient of person spoken to? Self   Refill Screening Questions    Changes to allergies? No   Changes to medications? No   New conditions since last clinic visit? No   Unplanned office visit, urgent care, ED, or hospital admission in the last 4 weeks? No   How does patient/caregiver feel medication is working? Good   Financial problems or insurance changes? No   How many doses of your specialty medications were missed in the last 4 weeks? 0   Would patient like to speak to a pharmacist? No   When does the patient need to receive the medication? 12/09/22   Refill Delivery Questions    How will the patient receive the medication? MEDRx   When does the patient need to receive the medication? 12/09/22   Shipping Address Home   Address in [facility] confirmed and updated if neccessary? Yes   Expected Copay ($) 80   Is the patient able to afford the medication copay? Yes   Payment Method  CC on file   Days supply of Refill 28   Supplies needed? No supplies needed   Refill activity completed? Yes   Refill activity plan Refill scheduled   Shipment/Pickup Date: 12/06/22            Current Outpatient Medications   Medication Sig    acetaminophen (TYLENOL) 650 MG TbSR Take 1 tablet (650 mg total) by mouth every 8 (eight) hours. (Patient taking differently: Take 650 mg by mouth every 8 (eight) hours as needed.)    allopurinoL (ZYLOPRIM) 300 MG tablet Take 1 tablet (300 mg total) by mouth once daily.    amLODIPine (NORVASC) 10 MG tablet Take 1 tablet  "by mouth once daily    atorvastatin (LIPITOR) 20 MG tablet Take 1 tablet (20 mg total) by mouth once daily.    ergocalciferol (ERGOCALCIFEROL) 50,000 unit Cap Take 1 capsule by mouth once a week    etanercept (ENBREL SURECLICK) 50 mg/mL (1 mL) Inject 1 mL (50 mg total) into the skin once a week.    folic acid (FOLVITE) 1 MG tablet Take 1 tablet (1 mg total) by mouth once daily.    metFORMIN (GLUCOPHAGE) 500 MG tablet Take 1 tablet by mouth once daily with breakfast    methotrexate 2.5 MG Tab Take 6 tablets (15 mg total) by mouth every 7 days.    needle, disp, 22 G 22 gauge x 1 1/2" Ndle 1 Units by Misc.(Non-Drug; Combo Route) route every 21 days.    olmesartan-hydrochlorothiazide (BENICAR HCT) 20-12.5 mg per tablet Take 1 tablet by mouth once daily.    testosterone cypionate (DEPOTESTOTERONE CYPIONATE) 200 mg/mL injection INJECT 1 ML (200MG TOTAL) INTO THE MUSCLE EVERY 28 DAYS FOR ONE DOSE.   Last reviewed on 8/2/2022  5:00 PM by Charleen Pascual MA    Review of patient's allergies indicates:   Allergen Reactions    Naproxen Swelling    Last reviewed on  8/2/2022 5:00 PM by Charleen Pascual      Tasks added this encounter   12/30/2022 - Refill Call (Auto Added)   Tasks due within next 3 months   No tasks due.     [facility]  [address] [place] [address]  Phone: [phone]  Fax: [phone]        "

## 2022-12-06 ENCOUNTER — OFFICE VISIT (OUTPATIENT)
Dept: FAMILY MEDICINE | Facility: CLINIC | Age: 57
End: 2022-12-06
Payer: COMMERCIAL

## 2022-12-06 ENCOUNTER — LAB VISIT (OUTPATIENT)
Dept: LAB | Facility: HOSPITAL | Age: 57
End: 2022-12-06
Attending: INTERNAL MEDICINE
Payer: COMMERCIAL

## 2022-12-06 VITALS
SYSTOLIC BLOOD PRESSURE: 128 MMHG | WEIGHT: 315 LBS | RESPIRATION RATE: 16 BRPM | HEART RATE: 66 BPM | DIASTOLIC BLOOD PRESSURE: 80 MMHG | TEMPERATURE: 98 F | BODY MASS INDEX: 55.63 KG/M2

## 2022-12-06 DIAGNOSIS — E11.65 TYPE 2 DIABETES MELLITUS WITH HYPERGLYCEMIA, WITHOUT LONG-TERM CURRENT USE OF INSULIN: ICD-10-CM

## 2022-12-06 DIAGNOSIS — E78.2 MIXED HYPERLIPIDEMIA: ICD-10-CM

## 2022-12-06 DIAGNOSIS — M06.09 RHEUMATOID ARTHRITIS OF MULTIPLE SITES WITH NEGATIVE RHEUMATOID FACTOR: Primary | ICD-10-CM

## 2022-12-06 DIAGNOSIS — D56.3 ALPHA THALASSEMIA SILENT CARRIER: ICD-10-CM

## 2022-12-06 DIAGNOSIS — E66.01 MORBID OBESITY DUE TO EXCESS CALORIES: ICD-10-CM

## 2022-12-06 DIAGNOSIS — I10 ESSENTIAL HYPERTENSION: ICD-10-CM

## 2022-12-06 DIAGNOSIS — E29.1 HYPOGONADISM MALE: ICD-10-CM

## 2022-12-06 DIAGNOSIS — D84.9 IMMUNOCOMPROMISED: ICD-10-CM

## 2022-12-06 LAB
ESTIMATED AVG GLUCOSE: 117 MG/DL (ref 68–131)
HBA1C MFR BLD: 5.7 % (ref 4–5.6)

## 2022-12-06 PROCEDURE — 36415 COLL VENOUS BLD VENIPUNCTURE: CPT | Mod: PO | Performed by: INTERNAL MEDICINE

## 2022-12-06 PROCEDURE — 3074F PR MOST RECENT SYSTOLIC BLOOD PRESSURE < 130 MM HG: ICD-10-PCS | Mod: CPTII,S$GLB,, | Performed by: INTERNAL MEDICINE

## 2022-12-06 PROCEDURE — 3044F PR MOST RECENT HEMOGLOBIN A1C LEVEL <7.0%: ICD-10-PCS | Mod: CPTII,S$GLB,, | Performed by: INTERNAL MEDICINE

## 2022-12-06 PROCEDURE — 3008F BODY MASS INDEX DOCD: CPT | Mod: CPTII,S$GLB,, | Performed by: INTERNAL MEDICINE

## 2022-12-06 PROCEDURE — 3079F DIAST BP 80-89 MM HG: CPT | Mod: CPTII,S$GLB,, | Performed by: INTERNAL MEDICINE

## 2022-12-06 PROCEDURE — 3074F SYST BP LT 130 MM HG: CPT | Mod: CPTII,S$GLB,, | Performed by: INTERNAL MEDICINE

## 2022-12-06 PROCEDURE — 3044F HG A1C LEVEL LT 7.0%: CPT | Mod: CPTII,S$GLB,, | Performed by: INTERNAL MEDICINE

## 2022-12-06 PROCEDURE — 3079F PR MOST RECENT DIASTOLIC BLOOD PRESSURE 80-89 MM HG: ICD-10-PCS | Mod: CPTII,S$GLB,, | Performed by: INTERNAL MEDICINE

## 2022-12-06 PROCEDURE — 99999 PR PBB SHADOW E&M-EST. PATIENT-LVL IV: ICD-10-PCS | Mod: PBBFAC,,, | Performed by: INTERNAL MEDICINE

## 2022-12-06 PROCEDURE — 3072F LOW RISK FOR RETINOPATHY: CPT | Mod: CPTII,S$GLB,, | Performed by: INTERNAL MEDICINE

## 2022-12-06 PROCEDURE — 99396 PR PREVENTIVE VISIT,EST,40-64: ICD-10-PCS | Mod: 25,S$GLB,, | Performed by: INTERNAL MEDICINE

## 2022-12-06 PROCEDURE — 90686 FLU VACCINE (QUAD) GREATER THAN OR EQUAL TO 3YO PRESERVATIVE FREE IM: ICD-10-PCS | Mod: S$GLB,,, | Performed by: INTERNAL MEDICINE

## 2022-12-06 PROCEDURE — 99396 PREV VISIT EST AGE 40-64: CPT | Mod: 25,S$GLB,, | Performed by: INTERNAL MEDICINE

## 2022-12-06 PROCEDURE — 3008F PR BODY MASS INDEX (BMI) DOCUMENTED: ICD-10-PCS | Mod: CPTII,S$GLB,, | Performed by: INTERNAL MEDICINE

## 2022-12-06 PROCEDURE — 83036 HEMOGLOBIN GLYCOSYLATED A1C: CPT | Performed by: INTERNAL MEDICINE

## 2022-12-06 PROCEDURE — 90471 FLU VACCINE (QUAD) GREATER THAN OR EQUAL TO 3YO PRESERVATIVE FREE IM: ICD-10-PCS | Mod: S$GLB,,, | Performed by: INTERNAL MEDICINE

## 2022-12-06 PROCEDURE — 99999 PR PBB SHADOW E&M-EST. PATIENT-LVL IV: CPT | Mod: PBBFAC,,, | Performed by: INTERNAL MEDICINE

## 2022-12-06 PROCEDURE — 3072F PR LOW RISK FOR RETINOPATHY: ICD-10-PCS | Mod: CPTII,S$GLB,, | Performed by: INTERNAL MEDICINE

## 2022-12-06 PROCEDURE — 1159F PR MEDICATION LIST DOCUMENTED IN MEDICAL RECORD: ICD-10-PCS | Mod: CPTII,S$GLB,, | Performed by: INTERNAL MEDICINE

## 2022-12-06 PROCEDURE — 90686 IIV4 VACC NO PRSV 0.5 ML IM: CPT | Mod: S$GLB,,, | Performed by: INTERNAL MEDICINE

## 2022-12-06 PROCEDURE — 90471 IMMUNIZATION ADMIN: CPT | Mod: S$GLB,,, | Performed by: INTERNAL MEDICINE

## 2022-12-06 PROCEDURE — 1159F MED LIST DOCD IN RCRD: CPT | Mod: CPTII,S$GLB,, | Performed by: INTERNAL MEDICINE

## 2022-12-06 NOTE — PROGRESS NOTES
Subjective:       Patient ID: Juan C Rodriguez is a 57 y.o. male.    Chief Complaint: Follow-up (4m), Hypertension, Hyperlipidemia, and Diabetes    Follow-up  Associated symptoms include arthralgias. Pertinent negatives include no abdominal pain, chest pain, chills, coughing, diaphoresis, fatigue, fever, headaches, joint swelling, myalgias, nausea, neck pain, numbness, rash, sore throat, vomiting or weakness.   Hypertension  Pertinent negatives include no chest pain, headaches, neck pain, palpitations or shortness of breath.   Hyperlipidemia  Pertinent negatives include no chest pain, myalgias or shortness of breath.   Diabetes  Pertinent negatives for hypoglycemia include no confusion, dizziness, headaches, nervousness/anxiousness, pallor, seizures, speech difficulty or tremors. Pertinent negatives for diabetes include no chest pain, no fatigue, no polydipsia, no polyphagia, no polyuria and no weakness.   Past Medical History:   Diagnosis Date    Diabetes mellitus, type 2 diagnosed 2013    DM (diabetes mellitus)     2011  02/25/2014    HTN (hypertension)     Hypogonadism, testicular     Left tibialis posterior tendinitis 11/30/2016    Obesities, morbid      Past Surgical History:   Procedure Laterality Date    COLONOSCOPY N/A 6/29/2018    Procedure: COLONOSCOPY;  Surgeon: Jeremiah Anaya III, MD;  Location: [facility];  Service: Endoscopy;  Laterality: N/A;     Family History   Problem Relation Age of Onset    Cataracts Mother     Diabetes Mother     Hypertension Mother     Diabetes Sister      Social History     Socioeconomic History    Marital status: Single   Tobacco Use    Smoking status: Never    Smokeless tobacco: Never   Substance and Sexual Activity    Alcohol use: Yes     Alcohol/week: 3.0 - 4.0 standard drinks     Types: 3 - 4 Cans of beer per week     Comment: occasional weekends    Drug use: No     Review of Systems   Constitutional:  Negative for activity change, appetite change,  chills, diaphoresis, fatigue, fever and unexpected weight change.   HENT:  Negative for drooling, ear discharge, ear pain, facial swelling, hearing loss, mouth sores, nosebleeds, postnasal drip, rhinorrhea, sinus pressure, sneezing, sore throat, tinnitus, trouble swallowing and voice change.    Eyes:  Negative for photophobia, redness and visual disturbance.   Respiratory:  Negative for apnea, cough, choking, chest tightness, shortness of breath and wheezing.    Cardiovascular:  Negative for chest pain and palpitations.   Gastrointestinal:  Negative for abdominal distention, abdominal pain, anal bleeding, blood in stool, constipation, diarrhea, nausea and vomiting.   Endocrine: Negative for cold intolerance, heat intolerance, polydipsia, polyphagia and polyuria.   Genitourinary:  Negative for difficulty urinating, dysuria, enuresis, flank pain, frequency, genital sores, hematuria and urgency.   Musculoskeletal:  Positive for arthralgias. Negative for back pain, gait problem, joint swelling, myalgias, neck pain and neck stiffness.   Skin:  Negative for color change, pallor, rash and wound.   Allergic/Immunologic: Negative for food allergies and immunocompromised state.   Neurological:  Negative for dizziness, tremors, seizures, syncope, facial asymmetry, speech difficulty, weakness, light-headedness, numbness and headaches.   Hematological:  Negative for adenopathy. Does not bruise/bleed easily.   Psychiatric/Behavioral:  Negative for agitation, behavioral problems, confusion, decreased concentration, dysphoric mood, hallucinations, self-injury, sleep disturbance and suicidal ideas. The patient is not nervous/anxious and is not hyperactive.      Objective:      Physical Exam  Vitals and nursing note reviewed.   Constitutional:       General: He is not in acute distress.     Appearance: Normal appearance. He is well-developed. He is not diaphoretic.   HENT:      Head: Normocephalic and atraumatic.      Mouth/Throat:       Pharynx: No oropharyngeal exudate.   Eyes:      General: No scleral icterus.     Pupils: Pupils are equal, round, and reactive to light.   Neck:      Thyroid: No thyromegaly.      Vascular: No carotid bruit or JVD.      Trachea: No tracheal deviation.   Cardiovascular:      Rate and Rhythm: Normal rate and regular rhythm.      Heart sounds: Normal heart sounds.   Pulmonary:      Effort: Pulmonary effort is normal. No respiratory distress.      Breath sounds: Normal breath sounds. No wheezing or rales.   Chest:      Chest wall: No tenderness.   Abdominal:      General: Bowel sounds are normal. There is no distension.      Palpations: Abdomen is soft.      Tenderness: There is no abdominal tenderness. There is no guarding or rebound.   Musculoskeletal:         General: No tenderness. Normal range of motion.      Cervical back: Normal range of motion and neck supple.   Lymphadenopathy:      Cervical: No cervical adenopathy.   Skin:     General: Skin is warm and dry.      Coloration: Skin is not pale.      Findings: No erythema or rash.   Neurological:      Mental Status: He is alert and oriented to person, place, and time.   Psychiatric:         Behavior: Behavior normal.         Thought Content: Thought content normal.         Judgment: Judgment normal.       CMP  Sodium   Date Value Ref Range Status   11/11/2022 139 136 - 145 mmol/L Final     Potassium   Date Value Ref Range Status   11/11/2022 3.4 (L) 3.5 - 5.1 mmol/L Final     Chloride   Date Value Ref Range Status   11/11/2022 99 95 - 110 mmol/L Final     CO2   Date Value Ref Range Status   11/11/2022 30 (H) 23 - 29 mmol/L Final     Glucose   Date Value Ref Range Status   11/11/2022 82 70 - 110 mg/dL Final     BUN   Date Value Ref Range Status   11/11/2022 13 6 - 20 mg/dL Final     Creatinine   Date Value Ref Range Status   11/11/2022 1.0 0.5 - 1.4 mg/dL Final     Calcium   Date Value Ref Range Status   11/11/2022 9.4 8.7 - 10.5 mg/dL Final     Total Protein    Date Value Ref Range Status   11/11/2022 7.7 6.0 - 8.4 g/dL Final     Albumin   Date Value Ref Range Status   11/11/2022 3.6 3.5 - 5.2 g/dL Final   05/31/2016 4.2 3.6 - 5.1 g/dL Final     Comment:     @ Test Performed By:  [employer] [facility] KELLY Chinchilla M.D.,   [address] [place] [id number]  [facility]  [id number]       Total Bilirubin   Date Value Ref Range Status   11/11/2022 0.7 0.1 - 1.0 mg/dL Final     Comment:     For infants and newborns, interpretation of results should be based  on gestational age, weight and in agreement with clinical  observations.    Premature Infant recommended reference ranges:  Up to 24 hours.............<8.0 mg/dL  Up to 48 hours............<12.0 mg/dL  3-5 days..................<15.0 mg/dL  6-29 days.................<15.0 mg/dL       Alkaline Phosphatase   Date Value Ref Range Status   11/11/2022 121 55 - 135 U/L Final     AST   Date Value Ref Range Status   11/11/2022 27 10 - 40 U/L Final     ALT   Date Value Ref Range Status   11/11/2022 22 10 - 44 U/L Final     Anion Gap   Date Value Ref Range Status   11/11/2022 10 8 - 16 mmol/L Final     eGFR if    Date Value Ref Range Status   07/15/2022 >60 >60 mL/min/1.73 m^2 Final     eGFR if non    Date Value Ref Range Status   07/15/2022 >60 >60 mL/min/1.73 m^2 Final     Comment:     Calculation used to obtain the estimated glomerular filtration  rate (eGFR) is the CKD-EPI equation.        Lab Results   Component Value Date    WBC 7.49 11/11/2022    HGB 11.6 (L) 11/11/2022    HCT 38.6 (L) 11/11/2022    MCV 86 11/11/2022     11/11/2022     Lab Results   Component Value Date    CHOL 176 04/22/2022     Lab Results   Component Value Date    HDL 58 04/22/2022     Lab Results   Component Value Date    LDLCALC 106.8 04/22/2022     Lab Results   Component Value Date    TRIG 56 04/22/2022     Lab Results   Component Value Date    CHOLHDL 33.0 04/22/2022      Lab Results   Component Value Date    TSH 2.126 03/17/2020     Lab Results   Component Value Date    LABA1C 6.1 (H) 03/16/2017    HGBA1C 5.9 (H) 04/22/2022     Assessment:       1. Rheumatoid arthritis of multiple sites with negative rheumatoid factor    2. Morbid obesity due to excess calories    3. Mixed hyperlipidemia    4. Immunocompromised    5. Hypogonadism male    6. Essential hypertension    7. Type 2 diabetes mellitus with hyperglycemia, without long-term current use of insulin    8. Alpha thalassemia silent carrier          Plan:   Rheumatoid arthritis of multiple sites with negative rheumatoid factor    Morbid obesity due to excess calories    Mixed hyperlipidemia    Immunocompromised    Hypogonadism male    Essential hypertension    Type 2 diabetes mellitus with hyperglycemia, without long-term current use of insulin  -     Hemoglobin A1C; Future; Expected date: 12/06/2022    Alpha thalassemia silent carrier    Stable-------------continue meds---------------as above-----weight loss--------------f/u 4 months-

## 2023-01-03 ENCOUNTER — SPECIALTY PHARMACY (OUTPATIENT)
Dept: PHARMACY | Facility: CLINIC | Age: 58
End: 2023-01-03
Payer: COMMERCIAL

## 2023-01-03 DIAGNOSIS — M06.09 RHEUMATOID ARTHRITIS OF MULTIPLE SITES WITHOUT RHEUMATOID FACTOR: Primary | ICD-10-CM

## 2023-01-03 NOTE — TELEPHONE ENCOUNTER
Specialty Pharmacy - Refill Coordination    Specialty Medication Orders Linked to Encounter      Flowsheet Row Most Recent Value   Medication #1 etanercept (ENBREL SURECLICK) 50 mg/mL (1 mL) (Order#[id number], Rx#[id number])            Refill Questions - Documented Responses      Flowsheet Row Most Recent Value   Patient Availability and HIPAA Verification    Does patient want to proceed with activity? Yes   HIPAA/medical authority confirmed? Yes   Relationship to patient of person spoken to? Self   Refill Screening Questions    Changes to allergies? No   Changes to medications? No   New conditions since last clinic visit? No   Unplanned office visit, urgent care, ED, or hospital admission in the last 4 weeks? No   How does patient/caregiver feel medication is working? Good   Financial problems or insurance changes? No   How many doses of your specialty medications were missed in the last 4 weeks? 0   Would patient like to speak to a pharmacist? No   When does the patient need to receive the medication? 01/07/23   Refill Delivery Questions    How will the patient receive the medication? MEDRx   When does the patient need to receive the medication? 01/07/23   Shipping Address Home   Address in [facility] confirmed and updated if neccessary? Yes   Expected Copay ($) 80   Is the patient able to afford the medication copay? Yes   Payment Method  CC on file   Days supply of Refill 28   Supplies needed? No supplies needed   Refill activity completed? Yes   Refill activity plan Refill scheduled   Shipment/Pickup Date: 01/04/23            Current Outpatient Medications   Medication Sig    acetaminophen (TYLENOL) 650 MG TbSR Take 1 tablet (650 mg total) by mouth every 8 (eight) hours. (Patient taking differently: Take 650 mg by mouth every 8 (eight) hours as needed.)    allopurinoL (ZYLOPRIM) 300 MG tablet Take 1 tablet (300 mg total) by mouth once daily.    amLODIPine (NORVASC) 10 MG tablet Take 1 tablet  "by mouth once daily    atorvastatin (LIPITOR) 20 MG tablet Take 1 tablet (20 mg total) by mouth once daily.    ergocalciferol (ERGOCALCIFEROL) 50,000 unit Cap Take 1 capsule by mouth once a week    etanercept (ENBREL SURECLICK) 50 mg/mL (1 mL) Inject 1 mL (50 mg total) into the skin once a week.    folic acid (FOLVITE) 1 MG tablet Take 1 tablet (1 mg total) by mouth once daily.    metFORMIN (GLUCOPHAGE) 500 MG tablet Take 1 tablet by mouth once daily with breakfast    methotrexate 2.5 MG Tab Take 6 tablets (15 mg total) by mouth every 7 days.    needle, disp, 22 G 22 gauge x 1 1/2" Ndle 1 Units by Misc.(Non-Drug; Combo Route) route every 21 days.    olmesartan-hydrochlorothiazide (BENICAR HCT) 20-12.5 mg per tablet Take 1 tablet by mouth once daily.    testosterone cypionate (DEPOTESTOTERONE CYPIONATE) 200 mg/mL injection INJECT 1 ML (200MG TOTAL) INTO THE MUSCLE EVERY 28 DAYS FOR ONE DOSE.   Last reviewed on 12/29/2022 10:21 AM by Charleen Pascual MA    Review of patient's allergies indicates:   Allergen Reactions    Naproxen Swelling    Last reviewed on  12/29/2022 10:21 AM by Charleen Pascual      Tasks added this encounter   1/28/2023 - Refill Call (Auto Added)   Tasks due within next 3 months   3/17/2023 - Clinical - Follow Up Assesement (Annual)     [facility]  [address] [place] [id number]  Phone: [phone]  Fax: [phone]        "

## 2023-01-03 NOTE — TELEPHONE ENCOUNTER
Benefit Investigation    Enbrel  [facility] [employer] per Mindi Valero (rep)  Deductible: $1500  Max OOP: $3500  Estimated copay: $80  [facility] is in network

## 2023-01-17 NOTE — PROGRESS NOTES
Subjective:      Juan C Rodriguez is a 57 y.o. male, here today with C/C of:  GOUT      HPI    Gout  Patient here for evaluation of acute gouty arthritis.   He reports ran out of allopurinol a few weeks ago.  Joint pain reported to the right knee with swelling and joint stiffness.  Limitation on activities include difficulty with walking and difficulty with ADLs.   The patient is avoiding high purine foods.  Reports consuming about 3 to 4 alcoholic drinks mainly on weekends.  Current pain level 9/10.  Pain increased with walking and standing.  Tx pain with Tylenol.      Lab Results   Component Value Date    URICACID 5.8 11/11/2022       Review of Systems   Constitutional:  Positive for activity change.   Respiratory: Negative.     Cardiovascular: Negative.    Musculoskeletal:  Positive for arthralgias, gait problem and joint swelling. Negative for myalgias.   Skin: Negative.        Review of patient's allergies indicates:   Allergen Reactions    Naproxen Swelling       Patient Active Problem List   Diagnosis    Gout    Mixed hyperlipidemia    Hypogonadism male    Essential hypertension    Hyperparathyroidism    Alpha thalassemia silent carrier    Left ventricular diastolic dysfunction with preserved systolic function    Osteomalacia    Morbid obesity due to excess calories    Primary central sleep apnea    Encounter for long-term (current) use of high-risk medication    Anemia    Diabetes mellitus, type 2    Immunocompromised    Rheumatoid arthritis of multiple sites with negative rheumatoid factor    Stage 3 chronic kidney disease         Current Outpatient Medications:     acetaminophen (TYLENOL) 650 MG TbSR, Take 1 tablet (650 mg total) by mouth every 8 (eight) hours. (Patient taking differently: Take 650 mg by mouth every 8 (eight) hours as needed.), Disp: , Rfl: 0    allopurinoL (ZYLOPRIM) 300 MG tablet, Take 1 tablet (300 mg total) by mouth once daily., Disp: 90 tablet, Rfl: 3    amLODIPine  "(NORVASC) 10 MG tablet, Take 1 tablet by mouth once daily, Disp: 90 tablet, Rfl: 3    atorvastatin (LIPITOR) 20 MG tablet, Take 1 tablet (20 mg total) by mouth once daily., Disp: 90 tablet, Rfl: 3    ergocalciferol (ERGOCALCIFEROL) 50,000 unit Cap, Take 1 capsule by mouth once a week, Disp: 12 capsule, Rfl: 0    etanercept (ENBREL SURECLICK) 50 mg/mL (1 mL), Inject 1 mL (50 mg total) into the skin once a week., Disp: 4 mL, Rfl: 11    folic acid (FOLVITE) 1 MG tablet, Take 1 tablet (1 mg total) by mouth once daily., Disp: 90 tablet, Rfl: 3    metFORMIN (GLUCOPHAGE) 500 MG tablet, Take 1 tablet by mouth once daily with breakfast, Disp: 90 tablet, Rfl: 0    methotrexate 2.5 MG Tab, Take 6 tablets (15 mg total) by mouth every 7 days., Disp: 72 tablet, Rfl: 1    needle, disp, 22 G 22 gauge x 1 1/2" Ndle, 1 Units by Misc.(Non-Drug; Combo Route) route every 21 days., Disp: 100 each, Rfl: 99    olmesartan-hydrochlorothiazide (BENICAR HCT) 20-12.5 mg per tablet, Take 1 tablet by mouth once daily., Disp: 90 tablet, Rfl: 3    testosterone cypionate (DEPOTESTOTERONE CYPIONATE) 200 mg/mL injection, INJECT 1 ML (200MG TOTAL) INTO THE MUSCLE EVERY 28 DAYS FOR ONE DOSE., Disp: 10 mL, Rfl: 1      Past medical, surgical, family and social histories have been reviewed today.      Objective:     Vitals:    01/18/23 1116   BP: 134/72   Pulse: 69   Temp: 98.1 °F (36.7 °C)   SpO2: 98%   Weight: (!) 143.5 kg (316 lb 5.8 oz)   Height: 5' 4" (1.626 m)   PainSc:   9   PainLoc: Knee       Physical Exam  Vitals reviewed.   Constitutional:       Appearance: He is not ill-appearing.   Musculoskeletal:      Right knee: Swelling and crepitus present. No erythema. Decreased range of motion. Tenderness present. Normal alignment and normal patellar mobility. Normal pulse.   Neurological:      Mental Status: He is alert and oriented to person, place, and time.      Motor: No weakness.      Gait: Gait abnormal.   Psychiatric:         Mood and Affect: " Mood normal.         Behavior: Behavior normal.         Thought Content: Thought content normal.         Judgment: Judgment normal.       Diagnosis/Assessment:     1. Gout without tophus  - X-ray Knee Ortho Right; Future  - colchicine (COLCRYS) 0.6 mg tablet; Take 2 tabs by mouth now, then 1 tab 1 hour later  Dispense: 3 tablet; Refill: 0  - allopurinoL (ZYLOPRIM) 300 MG tablet; Take 1 tablet (300 mg total) by mouth once daily.  Dispense: 90 tablet; Refill: 1  - methylPREDNISolone acetate injection 80 mg       Plan:     Steroid IM today, last A1C stable.  Does not check home sugars.  Colchicine as ordered, restart allopurinol when colchicine completed.  Pain relief/comfort measures discussed.  Low-purine intake discussed.  Avoid oral NSAIDS for pain due to CKD.    Follow-up:     Pending XR.  RTC as directed or on prn basis.        NICOLE Hunt  [facility]       30 minutes of total time spent on the encounter, which includes face to face time and non-face to face time preparing to see the patient.  This included obtaining and/or reviewing separately obtained history, and documenting clinical information in the electronic or other health record.   Also includes independent interpretation of results (not separately reported) and communicating results to the patient/family/caregiver, with care coordination (not separately reported).

## 2023-01-18 ENCOUNTER — OFFICE VISIT (OUTPATIENT)
Dept: FAMILY MEDICINE | Facility: CLINIC | Age: 58
End: 2023-01-18
Payer: COMMERCIAL

## 2023-01-18 ENCOUNTER — HOSPITAL ENCOUNTER (OUTPATIENT)
Dept: RADIOLOGY | Facility: HOSPITAL | Age: 58
Discharge: HOME | End: 2023-01-18
Attending: REGISTERED NURSE
Payer: COMMERCIAL

## 2023-01-18 VITALS
BODY MASS INDEX: 53.78 KG/M2 | WEIGHT: 315 LBS | SYSTOLIC BLOOD PRESSURE: 134 MMHG | OXYGEN SATURATION: 98 % | HEIGHT: 64 IN | HEART RATE: 69 BPM | DIASTOLIC BLOOD PRESSURE: 72 MMHG | TEMPERATURE: 98 F

## 2023-01-18 DIAGNOSIS — M10.9 GOUT WITHOUT TOPHUS: ICD-10-CM

## 2023-01-18 DIAGNOSIS — M10.9 GOUT WITHOUT TOPHUS: Primary | ICD-10-CM

## 2023-01-18 PROCEDURE — 99999 PR PBB SHADOW E&M-EST. PATIENT-LVL V: CPT | Mod: PBBFAC,,, | Performed by: REGISTERED NURSE

## 2023-01-18 PROCEDURE — 99214 PR OFFICE/OUTPT VISIT, EST, LEVL IV, 30-39 MIN: ICD-10-PCS | Mod: 25,S$GLB,, | Performed by: REGISTERED NURSE

## 2023-01-18 PROCEDURE — 3075F SYST BP GE 130 - 139MM HG: CPT | Mod: CPTII,S$GLB,, | Performed by: REGISTERED NURSE

## 2023-01-18 PROCEDURE — 3075F PR MOST RECENT SYSTOLIC BLOOD PRESS GE 130-139MM HG: ICD-10-PCS | Mod: CPTII,S$GLB,, | Performed by: REGISTERED NURSE

## 2023-01-18 PROCEDURE — 73560 X-RAY EXAM OF KNEE 1 OR 2: CPT | Mod: 26,LT,, | Performed by: RADIOLOGY

## 2023-01-18 PROCEDURE — 3008F PR BODY MASS INDEX (BMI) DOCUMENTED: ICD-10-PCS | Mod: CPTII,S$GLB,, | Performed by: REGISTERED NURSE

## 2023-01-18 PROCEDURE — 1159F MED LIST DOCD IN RCRD: CPT | Mod: CPTII,S$GLB,, | Performed by: REGISTERED NURSE

## 2023-01-18 PROCEDURE — 3078F DIAST BP <80 MM HG: CPT | Mod: CPTII,S$GLB,, | Performed by: REGISTERED NURSE

## 2023-01-18 PROCEDURE — 96372 PR INJECTION,THERAP/PROPH/DIAG2ST, IM OR SUBCUT: ICD-10-PCS | Mod: S$GLB,,, | Performed by: REGISTERED NURSE

## 2023-01-18 PROCEDURE — 96372 THER/PROPH/DIAG INJ SC/IM: CPT | Mod: S$GLB,,, | Performed by: REGISTERED NURSE

## 2023-01-18 PROCEDURE — 3078F PR MOST RECENT DIASTOLIC BLOOD PRESSURE < 80 MM HG: ICD-10-PCS | Mod: CPTII,S$GLB,, | Performed by: REGISTERED NURSE

## 2023-01-18 PROCEDURE — 73560 XR KNEE ORTHO RIGHT: ICD-10-PCS | Mod: 26,LT,, | Performed by: RADIOLOGY

## 2023-01-18 PROCEDURE — 73562 X-RAY EXAM OF KNEE 3: CPT | Mod: 26,RT,, | Performed by: RADIOLOGY

## 2023-01-18 PROCEDURE — 73560 X-RAY EXAM OF KNEE 1 OR 2: CPT | Mod: TC,FY,PO,LT

## 2023-01-18 PROCEDURE — 99999 PR PBB SHADOW E&M-EST. PATIENT-LVL V: ICD-10-PCS | Mod: PBBFAC,,, | Performed by: REGISTERED NURSE

## 2023-01-18 PROCEDURE — 73562 XR KNEE ORTHO RIGHT: ICD-10-PCS | Mod: 26,RT,, | Performed by: RADIOLOGY

## 2023-01-18 PROCEDURE — 1159F PR MEDICATION LIST DOCUMENTED IN MEDICAL RECORD: ICD-10-PCS | Mod: CPTII,S$GLB,, | Performed by: REGISTERED NURSE

## 2023-01-18 PROCEDURE — 3008F BODY MASS INDEX DOCD: CPT | Mod: CPTII,S$GLB,, | Performed by: REGISTERED NURSE

## 2023-01-18 PROCEDURE — 99214 OFFICE O/P EST MOD 30 MIN: CPT | Mod: 25,S$GLB,, | Performed by: REGISTERED NURSE

## 2023-01-18 RX ORDER — ALLOPURINOL 300 MG/1
300 TABLET ORAL DAILY
Qty: 90 TABLET | Refills: 1 | Status: SHIPPED | OUTPATIENT
Start: 2023-01-18 | End: 2023-08-16 | Stop reason: SDUPTHER

## 2023-01-18 RX ORDER — METHYLPREDNISOLONE ACETATE 80 MG/ML
80 INJECTION, SUSPENSION INTRA-ARTICULAR; INTRALESIONAL; INTRAMUSCULAR; SOFT TISSUE ONCE
Status: COMPLETED | OUTPATIENT
Start: 2023-01-18 | End: 2023-01-18

## 2023-01-18 RX ORDER — COLCHICINE 0.6 MG/1
TABLET ORAL
Qty: 3 TABLET | Refills: 0 | Status: SHIPPED | OUTPATIENT
Start: 2023-01-18 | End: 2023-01-30

## 2023-01-18 RX ADMIN — METHYLPREDNISOLONE ACETATE 80 MG: 80 INJECTION, SUSPENSION INTRA-ARTICULAR; INTRALESIONAL; INTRAMUSCULAR; SOFT TISSUE at 12:01

## 2023-01-18 NOTE — PROGRESS NOTES
DepoMedrol 80mg given IM as ordered by Dmitry Tomlin NP. Pt instructed to wait 15 minutes following injection for possible reaction. Pt verbalized understanding and tolerated injection well. Will follow up with pt for post pain scale.

## 2023-01-27 NOTE — PROGRESS NOTES
Subjective:      Juan C Rodriguez is a 57 y.o. male, here today with C/C of:  Knee Pain (Right)      HPI    Mr. Rodriguez returns today with persistent c/o right knee pain.  Last seen in office 1/18/2023, chart note reviewed today along with XR.  Pain did improve some but then recurred, worse.  Was seen at [facility] a few days ago, tx with colchicine.  No further txmt.  He was told he needed to see Orthopedist but was not referred to anyone.  He has tried elevating RLE when he can, using cold packs.  Job ---  --- freq/prolonged walking and standing.      Review of Systems   Constitutional:  Positive for activity change.   Musculoskeletal:  Positive for arthralgias, gait problem and joint swelling. Negative for back pain and neck pain.   Skin: Negative.    Neurological:  Positive for weakness. Negative for light-headedness and numbness.       Review of patient's allergies indicates:   Allergen Reactions    Naproxen Swelling       Patient Active Problem List   Diagnosis    Gout    Mixed hyperlipidemia    Hypogonadism male    Essential hypertension    Hyperparathyroidism    Alpha thalassemia silent carrier    Left ventricular diastolic dysfunction with preserved systolic function    Osteomalacia    Morbid obesity due to excess calories    Primary central sleep apnea    Encounter for long-term (current) use of high-risk medication    Anemia    Diabetes mellitus, type 2    Immunocompromised    Rheumatoid arthritis of multiple sites with negative rheumatoid factor    Stage 3 chronic kidney disease         Current Outpatient Medications:     acetaminophen (TYLENOL) 650 MG TbSR, Take 1 tablet (650 mg total) by mouth every 8 (eight) hours. (Patient taking differently: Take 650 mg by mouth every 8 (eight) hours as needed.), Disp: , Rfl: 0    allopurinoL (ZYLOPRIM) 300 MG tablet, Take 1 tablet (300 mg total) by mouth once daily., Disp: 90 tablet, Rfl: 1    amLODIPine (NORVASC) 10 MG tablet, Take 1 tablet by  "mouth once daily, Disp: 90 tablet, Rfl: 3    atorvastatin (LIPITOR) 20 MG tablet, Take 1 tablet (20 mg total) by mouth once daily., Disp: 90 tablet, Rfl: 3    colchicine (COLCRYS) 0.6 mg tablet, Take 2 tabs by mouth now, then 1 tab 1 hour later, Disp: 3 tablet, Rfl: 0    ergocalciferol (ERGOCALCIFEROL) 50,000 unit Cap, Take 1 capsule by mouth once a week, Disp: 12 capsule, Rfl: 0    etanercept (ENBREL SURECLICK) 50 mg/mL (1 mL), Inject 1 mL (50 mg total) into the skin once a week., Disp: 4 mL, Rfl: 11    folic acid (FOLVITE) 1 MG tablet, Take 1 tablet (1 mg total) by mouth once daily. (Patient not taking: Reported on 1/18/2023), Disp: 90 tablet, Rfl: 3    metFORMIN (GLUCOPHAGE) 500 MG tablet, Take 1 tablet by mouth once daily with breakfast, Disp: 90 tablet, Rfl: 0    methotrexate 2.5 MG Tab, Take 6 tablets (15 mg total) by mouth every 7 days. (Patient not taking: Reported on 1/18/2023), Disp: 72 tablet, Rfl: 1    needle, disp, 22 G 22 gauge x 1 1/2" Ndle, 1 Units by Misc.(Non-Drug; Combo Route) route every 21 days., Disp: 100 each, Rfl: 99    olmesartan-hydrochlorothiazide (BENICAR HCT) 20-12.5 mg per tablet, Take 1 tablet by mouth once daily., Disp: 90 tablet, Rfl: 3    testosterone cypionate (DEPOTESTOTERONE CYPIONATE) 200 mg/mL injection, INJECT 1 ML (200MG TOTAL) INTO THE MUSCLE EVERY 28 DAYS FOR ONE DOSE. (Patient not taking: Reported on 1/18/2023), Disp: 10 mL, Rfl: 1      Past medical, surgical, family and social histories have been reviewed today.      Objective:     Vitals:    01/30/23 1009   BP: 112/64   Pulse: 65   Temp: 98.1 °F (36.7 °C)   SpO2: 99%   Weight: (!) 142 kg (313 lb 0.9 oz)   Height: 5' 4" (1.626 m)   PainSc:   9   PainLoc: Knee       Physical Exam  Vitals reviewed.   Musculoskeletal:      Right knee: Swelling (including body habitus), effusion (poss but none seen on recent XR), bony tenderness and crepitus present. Decreased range of motion. Tenderness present. Normal alignment and normal " patellar mobility. Normal pulse.        Legs:    Neurological:      Mental Status: He is alert and oriented to person, place, and time.      Gait: Gait abnormal.   Psychiatric:         Mood and Affect: Mood normal.         Behavior: Behavior normal.         Thought Content: Thought content normal.         Judgment: Judgment normal.       Diagnosis/Assessment:     1. Osteoarthritis of right knee, unspecified osteoarthritis type  - traMADoL (ULTRAM) 50 mg tablet; Take 1 tablet (50 mg total) by mouth every 6 (six) hours as needed for Pain.  Dispense: 28 tablet; Refill: 0  - Ambulatory referral/consult to Orthopedics; Future       Plan:     Avoid oral NSAIDs due to CKD.  Pain relief and comfort measures discussed.  Work note provided excusing him from Friday (left work early) 1/27 and to return on Monday 2/6.    Follow-up:     See Orthopedics for further evaluation.  RTC as directed or on prn basis.        NICOLE Hunt  [facility]       20 minutes of total time spent on the encounter, which includes face to face time and non-face to face time preparing to see the patient.  This included obtaining and/or reviewing separately obtained history, and documenting clinical information in the electronic or other health record.   Also includes independent interpretation of results (not separately reported) and communicating results to the patient/family/caregiver, with care coordination (not separately reported).

## 2023-01-30 ENCOUNTER — OFFICE VISIT (OUTPATIENT)
Dept: FAMILY MEDICINE | Facility: CLINIC | Age: 58
End: 2023-01-30
Payer: COMMERCIAL

## 2023-01-30 ENCOUNTER — TELEPHONE (OUTPATIENT)
Dept: ADMINISTRATIVE | Facility: HOSPITAL | Age: 58
End: 2023-01-30
Payer: COMMERCIAL

## 2023-01-30 VITALS
DIASTOLIC BLOOD PRESSURE: 64 MMHG | HEIGHT: 64 IN | TEMPERATURE: 98 F | SYSTOLIC BLOOD PRESSURE: 112 MMHG | OXYGEN SATURATION: 99 % | BODY MASS INDEX: 53.45 KG/M2 | WEIGHT: 313.06 LBS | HEART RATE: 65 BPM

## 2023-01-30 DIAGNOSIS — M17.11 OSTEOARTHRITIS OF RIGHT KNEE, UNSPECIFIED OSTEOARTHRITIS TYPE: Primary | ICD-10-CM

## 2023-01-30 PROCEDURE — 3074F SYST BP LT 130 MM HG: CPT | Mod: CPTII,S$GLB,, | Performed by: REGISTERED NURSE

## 2023-01-30 PROCEDURE — 3008F BODY MASS INDEX DOCD: CPT | Mod: CPTII,S$GLB,, | Performed by: REGISTERED NURSE

## 2023-01-30 PROCEDURE — 99999 PR PBB SHADOW E&M-EST. PATIENT-LVL IV: CPT | Mod: PBBFAC,,, | Performed by: REGISTERED NURSE

## 2023-01-30 PROCEDURE — 99213 PR OFFICE/OUTPT VISIT, EST, LEVL III, 20-29 MIN: ICD-10-PCS | Mod: S$GLB,,, | Performed by: REGISTERED NURSE

## 2023-01-30 PROCEDURE — 99213 OFFICE O/P EST LOW 20 MIN: CPT | Mod: S$GLB,,, | Performed by: REGISTERED NURSE

## 2023-01-30 PROCEDURE — 1159F PR MEDICATION LIST DOCUMENTED IN MEDICAL RECORD: ICD-10-PCS | Mod: CPTII,S$GLB,, | Performed by: REGISTERED NURSE

## 2023-01-30 PROCEDURE — 3074F PR MOST RECENT SYSTOLIC BLOOD PRESSURE < 130 MM HG: ICD-10-PCS | Mod: CPTII,S$GLB,, | Performed by: REGISTERED NURSE

## 2023-01-30 PROCEDURE — 3008F PR BODY MASS INDEX (BMI) DOCUMENTED: ICD-10-PCS | Mod: CPTII,S$GLB,, | Performed by: REGISTERED NURSE

## 2023-01-30 PROCEDURE — 3078F PR MOST RECENT DIASTOLIC BLOOD PRESSURE < 80 MM HG: ICD-10-PCS | Mod: CPTII,S$GLB,, | Performed by: REGISTERED NURSE

## 2023-01-30 PROCEDURE — 1159F MED LIST DOCD IN RCRD: CPT | Mod: CPTII,S$GLB,, | Performed by: REGISTERED NURSE

## 2023-01-30 PROCEDURE — 99999 PR PBB SHADOW E&M-EST. PATIENT-LVL IV: ICD-10-PCS | Mod: PBBFAC,,, | Performed by: REGISTERED NURSE

## 2023-01-30 PROCEDURE — 3078F DIAST BP <80 MM HG: CPT | Mod: CPTII,S$GLB,, | Performed by: REGISTERED NURSE

## 2023-01-30 RX ORDER — TRAMADOL HYDROCHLORIDE 50 MG/1
50 TABLET ORAL EVERY 6 HOURS PRN
Qty: 28 TABLET | Refills: 0 | Status: SHIPPED | OUTPATIENT
Start: 2023-01-30 | End: 2023-08-24

## 2023-02-06 ENCOUNTER — OFFICE VISIT (OUTPATIENT)
Dept: SPORTS MEDICINE | Facility: CLINIC | Age: 58
End: 2023-02-06
Payer: COMMERCIAL

## 2023-02-06 VITALS
BODY MASS INDEX: 55.81 KG/M2 | RESPIRATION RATE: 20 BRPM | SYSTOLIC BLOOD PRESSURE: 155 MMHG | HEIGHT: 63 IN | WEIGHT: 315 LBS | DIASTOLIC BLOOD PRESSURE: 86 MMHG | HEART RATE: 62 BPM | TEMPERATURE: 98 F

## 2023-02-06 DIAGNOSIS — M17.11 OSTEOARTHRITIS OF RIGHT KNEE, UNSPECIFIED OSTEOARTHRITIS TYPE: Primary | ICD-10-CM

## 2023-02-06 LAB
APPEARANCE FLD: NORMAL
BODY FLD TYPE: NORMAL
COLOR FLD: YELLOW
LYMPHOCYTES NFR FLD MANUAL: 32 %
MONOS+MACROS NFR FLD MANUAL: 44 %
NEUTROPHILS NFR FLD MANUAL: 24 %
WBC # FLD: 345 /CU MM

## 2023-02-06 PROCEDURE — 3008F BODY MASS INDEX DOCD: CPT | Mod: CPTII,S$GLB,, | Performed by: STUDENT IN AN ORGANIZED HEALTH CARE EDUCATION/TRAINING PROGRAM

## 2023-02-06 PROCEDURE — 89060 EXAM SYNOVIAL FLUID CRYSTALS: CPT | Performed by: STUDENT IN AN ORGANIZED HEALTH CARE EDUCATION/TRAINING PROGRAM

## 2023-02-06 PROCEDURE — 20611 DRAIN/INJ JOINT/BURSA W/US: CPT | Mod: RT,S$GLB,, | Performed by: STUDENT IN AN ORGANIZED HEALTH CARE EDUCATION/TRAINING PROGRAM

## 2023-02-06 PROCEDURE — 99999 PR PBB SHADOW E&M-EST. PATIENT-LVL IV: CPT | Mod: PBBFAC,,, | Performed by: STUDENT IN AN ORGANIZED HEALTH CARE EDUCATION/TRAINING PROGRAM

## 2023-02-06 PROCEDURE — 3077F SYST BP >= 140 MM HG: CPT | Mod: CPTII,S$GLB,, | Performed by: STUDENT IN AN ORGANIZED HEALTH CARE EDUCATION/TRAINING PROGRAM

## 2023-02-06 PROCEDURE — 99214 PR OFFICE/OUTPT VISIT, EST, LEVL IV, 30-39 MIN: ICD-10-PCS | Mod: 25,S$GLB,, | Performed by: STUDENT IN AN ORGANIZED HEALTH CARE EDUCATION/TRAINING PROGRAM

## 2023-02-06 PROCEDURE — 3079F DIAST BP 80-89 MM HG: CPT | Mod: CPTII,S$GLB,, | Performed by: STUDENT IN AN ORGANIZED HEALTH CARE EDUCATION/TRAINING PROGRAM

## 2023-02-06 PROCEDURE — 87070 CULTURE OTHR SPECIMN AEROBIC: CPT | Performed by: STUDENT IN AN ORGANIZED HEALTH CARE EDUCATION/TRAINING PROGRAM

## 2023-02-06 PROCEDURE — 89051 BODY FLUID CELL COUNT: CPT | Performed by: STUDENT IN AN ORGANIZED HEALTH CARE EDUCATION/TRAINING PROGRAM

## 2023-02-06 PROCEDURE — 1159F PR MEDICATION LIST DOCUMENTED IN MEDICAL RECORD: ICD-10-PCS | Mod: CPTII,S$GLB,, | Performed by: STUDENT IN AN ORGANIZED HEALTH CARE EDUCATION/TRAINING PROGRAM

## 2023-02-06 PROCEDURE — 99999 PR PBB SHADOW E&M-EST. PATIENT-LVL IV: ICD-10-PCS | Mod: PBBFAC,,, | Performed by: STUDENT IN AN ORGANIZED HEALTH CARE EDUCATION/TRAINING PROGRAM

## 2023-02-06 PROCEDURE — 1159F MED LIST DOCD IN RCRD: CPT | Mod: CPTII,S$GLB,, | Performed by: STUDENT IN AN ORGANIZED HEALTH CARE EDUCATION/TRAINING PROGRAM

## 2023-02-06 PROCEDURE — 3077F PR MOST RECENT SYSTOLIC BLOOD PRESSURE >= 140 MM HG: ICD-10-PCS | Mod: CPTII,S$GLB,, | Performed by: STUDENT IN AN ORGANIZED HEALTH CARE EDUCATION/TRAINING PROGRAM

## 2023-02-06 PROCEDURE — 99214 OFFICE O/P EST MOD 30 MIN: CPT | Mod: 25,S$GLB,, | Performed by: STUDENT IN AN ORGANIZED HEALTH CARE EDUCATION/TRAINING PROGRAM

## 2023-02-06 PROCEDURE — 3008F PR BODY MASS INDEX (BMI) DOCUMENTED: ICD-10-PCS | Mod: CPTII,S$GLB,, | Performed by: STUDENT IN AN ORGANIZED HEALTH CARE EDUCATION/TRAINING PROGRAM

## 2023-02-06 PROCEDURE — 20611 LARGE JOINT ASPIRATION/INJECTION: R KNEE: ICD-10-PCS | Mod: RT,S$GLB,, | Performed by: STUDENT IN AN ORGANIZED HEALTH CARE EDUCATION/TRAINING PROGRAM

## 2023-02-06 PROCEDURE — 3079F PR MOST RECENT DIASTOLIC BLOOD PRESSURE 80-89 MM HG: ICD-10-PCS | Mod: CPTII,S$GLB,, | Performed by: STUDENT IN AN ORGANIZED HEALTH CARE EDUCATION/TRAINING PROGRAM

## 2023-02-06 RX ORDER — TRIAMCINOLONE ACETONIDE 40 MG/ML
40 INJECTION, SUSPENSION INTRA-ARTICULAR; INTRAMUSCULAR
Status: DISCONTINUED | OUTPATIENT
Start: 2023-02-06 | End: 2023-02-06 | Stop reason: HOSPADM

## 2023-02-06 RX ADMIN — TRIAMCINOLONE ACETONIDE 40 MG: 40 INJECTION, SUSPENSION INTRA-ARTICULAR; INTRAMUSCULAR at 10:02

## 2023-02-06 NOTE — PROCEDURES
Large Joint Aspiration/Injection: R knee    Date/Time: 2/6/2023 10:40 AM  Performed by: Tawanda Lora MD  Authorized by: Tawanda Lora MD     Consent Done?:  Yes (Verbal)  Indications:  Pain and joint swelling  Site marked: the procedure site was marked    Timeout: prior to procedure the correct patient, procedure, and site was verified    Prep: patient was prepped and draped in usual sterile fashion      Local anesthesia used?: Yes    Local anesthetic:  Topical anesthetic    Details:  Needle Size:  18 G  Ultrasonic Guidance for needle placement?: Yes    Images are saved and documented.  Approach: superior lateral.  Location:  Knee  Site:  R knee  Medications:  40 mg triamcinolone acetonide 40 mg/mL  Aspirate amount (mL):  14  Aspirate:  Serous  Lab: fluid sent for laboratory analysis    Patient tolerance:  Patient tolerated the procedure well with no immediate complications     Ultrasound guidance was used for needle localization. Images were saved and stored for documentation. The appropriate structures were visualized. Dynamic visualization of the needle was continuous throughout the procedures and maintained good position.     We discussed the proper protocols after the injection such as no submerging pools, baths tubs, or hot tubs for 24 hr.  Showering is okay today.  We also discussed that blood sugars can be elevated after an injection and asked patient to properly checked her sugars over the next few days and contact their PCP if there are any concerns.  We discussed red flags such as fevers, chills, red, warm, tender joint at the area of injection to please seek medical care immediately.

## 2023-02-06 NOTE — PROGRESS NOTES
Patient ID: Juan C Rodriguez  YOB: 1965  MRN: [id number]    Chief Complaint: Pain of the Right Knee    Referred By: Dmitry Tomlin NP for right knee pain    History of Present Illness: Juan C Rodriguez is a 57 y.o. male who presents today with right knee pain.  Referred from primary care for concern for possible acute gout infection in his right knee.  Has a history of gout in his wrist in the past started on allopurinol as well as other anti-inflammatories still having some pain over this area.  Notes it was red and swollen at 1 point denies any fevers chills night sweats or other infectious type symptoms.  Feels like it has gotten a little bit better but has plateaued mostly pain around the kneecap itself.    Past Medical History:   Past Medical History:   Diagnosis Date    Diabetes mellitus, type 2 diagnosed 2013    DM (diabetes mellitus)     2011  02/25/2014    HTN (hypertension)     Hypogonadism, testicular     Left tibialis posterior tendinitis 11/30/2016    Obesities, morbid      Past Surgical History:   Procedure Laterality Date    COLONOSCOPY N/A 6/29/2018    Procedure: COLONOSCOPY;  Surgeon: Jeremiah Anaya III, MD;  Location: [facility];  Service: Endoscopy;  Laterality: N/A;     Family History   Problem Relation Age of Onset    Cataracts Mother     Diabetes Mother     Hypertension Mother     Diabetes Sister      Social History     Socioeconomic History    Marital status: Single   Tobacco Use    Smoking status: Some Days     Types: Cigars    Smokeless tobacco: Never   Substance and Sexual Activity    Alcohol use: Yes     Alcohol/week: 3.0 - 4.0 standard drinks     Types: 3 - 4 Cans of beer per week     Comment: occasional weekends    Drug use: No    Sexual activity: Not Currently     Partners: Female     Medication List with Changes/Refills   Current Medications    ALLOPURINOL (ZYLOPRIM) 300 MG TABLET    Take 1 tablet (300 mg total) by mouth once  "daily.    AMLODIPINE (NORVASC) 10 MG TABLET    Take 1 tablet by mouth once daily    ATORVASTATIN (LIPITOR) 20 MG TABLET    Take 1 tablet (20 mg total) by mouth once daily.    ERGOCALCIFEROL (ERGOCALCIFEROL) 50,000 UNIT CAP    Take 1 capsule by mouth once a week    ETANERCEPT (ENBREL SURECLICK) 50 MG/ML (1 ML)    Inject 1 mL (50 mg total) into the skin once a week.    FOLIC ACID (FOLVITE) 1 MG TABLET    Take 1 tablet (1 mg total) by mouth once daily.    METFORMIN (GLUCOPHAGE) 500 MG TABLET    Take 1 tablet by mouth once daily with breakfast    METHOTREXATE 2.5 MG TAB    Take 6 tablets (15 mg total) by mouth every 7 days.    NEEDLE, DISP, 22 G 22 GAUGE X 1 1/2" NDLE    1 Units by Misc.(Non-Drug; Combo Route) route every 21 days.    OLMESARTAN-HYDROCHLOROTHIAZIDE (BENICAR HCT) 20-12.5 MG PER TABLET    Take 1 tablet by mouth once daily.    TRAMADOL (ULTRAM) 50 MG TABLET    Take 1 tablet (50 mg total) by mouth every 6 (six) hours as needed for Pain.     Review of patient's allergies indicates:   Allergen Reactions    Naproxen Swelling       Physical Exam:   Body mass index is 57.41 kg/m².    GENERAL: Well appearing, in no acute distress.  HEAD: Normocephalic and atraumatic.  ENT: External ears and nose grossly normal.  EYES: EOMI bilaterally  PULMONARY: Respirations are grossly even and non-labored.  NEURO: Awake, alert, and oriented x 3.  SKIN: No obvious rashes appreciated.  PSYCH: Mood & affect are appropriate.    Detailed MSK exam:     Mild warmth no redness noted over the entirety of the right knee.  Pain with full flexion appreciated as well.  Ligament exam grossly normal.  Mild effusion.    Imaging:  X-ray Knee Ortho Right  Narrative: EXAM:  XR KNEE ORTHO RIGHT    CLINICAL HISTORY:    Right knee joint pain    COMPARISON: None.    FINDINGS:    Mild medial joint space narrowing.  Minor marginal spurring.    Minor patellofemoral joint spurring.    There is however large calcifications or spurring at the distal " quadriceps tendon insertion as well as of the patellar ligament insertion anteriorly.    No evidence of joint effusion.  Impression:  Patellar spurring.  Minor degenerative joint disease.    Finalized on: 1/18/2023 12:59 PM By:  Dustin Huggins MD  [facility]# [id number]      2023-01-18 13:[id number]    [facility]      Relevant imaging results were reviewed and interpreted by me and per my read as above.  This was discussed with the patient and / or family today.     Assessment:  Juan C Rodriguez is a 57 y.o. male presents today for right knee pain of unclear etiology does have history of previous gout.  Can not completely rule that out with his history and exam today.  Discussed aspiration under ultrasound guidance which was done today and sent for cell count culture and crystals.  We will follow-up at the end of this week once fluid studies have returned.  Compression and discussed red flags such as redness warmth swelling fever chills night sweats.  Possibly underlying osteoarthritic flare as well.    Osteoarthritis of right knee, unspecified osteoarthritis type  -     Ambulatory referral/consult to Orthopedics  -     WBC & Diff,Body Fluid Joint Fluid, Right Knee  -     Aerobic culture  -     Body fluid crystal Joint Fluid, Right Knee  -     Large Joint Aspiration/Injection: R knee         A copy of today's visit note has been sent to the referring provider.       Tawanda Lora MD    Disclaimer: This note was prepared using a voice recognition system and is likely to have sound alike errors within the text.

## 2023-02-07 LAB
BODY FLD TYPE: NORMAL
CRYSTALS FLD MICRO: NEGATIVE
PATH INTERP FLD-IMP: NORMAL
PATH INTERP FLD-IMP: NORMAL

## 2023-02-08 ENCOUNTER — TELEPHONE (OUTPATIENT)
Dept: SPORTS MEDICINE | Facility: CLINIC | Age: 58
End: 2023-02-08
Payer: COMMERCIAL

## 2023-02-10 ENCOUNTER — LAB VISIT (OUTPATIENT)
Dept: LAB | Facility: HOSPITAL | Age: 58
End: 2023-02-10
Attending: INTERNAL MEDICINE
Payer: COMMERCIAL

## 2023-02-10 ENCOUNTER — OFFICE VISIT (OUTPATIENT)
Dept: SPORTS MEDICINE | Facility: CLINIC | Age: 58
End: 2023-02-10
Payer: COMMERCIAL

## 2023-02-10 DIAGNOSIS — Z79.899 ENCOUNTER FOR LONG-TERM (CURRENT) USE OF HIGH-RISK MEDICATION: ICD-10-CM

## 2023-02-10 DIAGNOSIS — M17.11 OSTEOARTHRITIS OF RIGHT KNEE, UNSPECIFIED OSTEOARTHRITIS TYPE: Primary | ICD-10-CM

## 2023-02-10 LAB
ALBUMIN SERPL BCP-MCNC: 3.6 G/DL (ref 3.5–5.2)
ALP SERPL-CCNC: 121 U/L (ref 55–135)
ALT SERPL W/O P-5'-P-CCNC: 20 U/L (ref 10–44)
ANION GAP SERPL CALC-SCNC: 12 MMOL/L (ref 8–16)
AST SERPL-CCNC: 18 U/L (ref 10–40)
BACTERIA SPEC AEROBE CULT: NO GROWTH
BASOPHILS # BLD AUTO: 0.05 K/UL (ref 0–0.2)
BASOPHILS NFR BLD: 0.5 % (ref 0–1.9)
BILIRUB SERPL-MCNC: 0.4 MG/DL (ref 0.1–1)
BUN SERPL-MCNC: 21 MG/DL (ref 6–20)
CALCIUM SERPL-MCNC: 9.3 MG/DL (ref 8.7–10.5)
CHLORIDE SERPL-SCNC: 99 MMOL/L (ref 95–110)
CO2 SERPL-SCNC: 29 MMOL/L (ref 23–29)
CREAT SERPL-MCNC: 1.3 MG/DL (ref 0.5–1.4)
CRP SERPL-MCNC: 53 MG/L (ref 0–8.2)
DIFFERENTIAL METHOD: ABNORMAL
EOSINOPHIL # BLD AUTO: 0.1 K/UL (ref 0–0.5)
EOSINOPHIL NFR BLD: 1 % (ref 0–8)
ERYTHROCYTE [DISTWIDTH] IN BLOOD BY AUTOMATED COUNT: 16.4 % (ref 11.5–14.5)
EST. GFR  (NO RACE VARIABLE): >60 ML/MIN/1.73 M^2
GLUCOSE SERPL-MCNC: 96 MG/DL (ref 70–110)
HCT VFR BLD AUTO: 38.4 % (ref 40–54)
HGB BLD-MCNC: 11.9 G/DL (ref 14–18)
IMM GRANULOCYTES # BLD AUTO: 0.04 K/UL (ref 0–0.04)
IMM GRANULOCYTES NFR BLD AUTO: 0.4 % (ref 0–0.5)
LYMPHOCYTES # BLD AUTO: 2.2 K/UL (ref 1–4.8)
LYMPHOCYTES NFR BLD: 24.2 % (ref 18–48)
MCH RBC QN AUTO: 25.5 PG (ref 27–31)
MCHC RBC AUTO-ENTMCNC: 31 G/DL (ref 32–36)
MCV RBC AUTO: 82 FL (ref 82–98)
MONOCYTES # BLD AUTO: 0.6 K/UL (ref 0.3–1)
MONOCYTES NFR BLD: 6.3 % (ref 4–15)
NEUTROPHILS # BLD AUTO: 6.2 K/UL (ref 1.8–7.7)
NEUTROPHILS NFR BLD: 67.6 % (ref 38–73)
NRBC BLD-RTO: 0 /100 WBC
PLATELET # BLD AUTO: 209 K/UL (ref 150–450)
PMV BLD AUTO: 9.7 FL (ref 9.2–12.9)
POTASSIUM SERPL-SCNC: 3.6 MMOL/L (ref 3.5–5.1)
PROT SERPL-MCNC: 7.9 G/DL (ref 6–8.4)
RBC # BLD AUTO: 4.67 M/UL (ref 4.6–6.2)
SODIUM SERPL-SCNC: 140 MMOL/L (ref 136–145)
WBC # BLD AUTO: 9.19 K/UL (ref 3.9–12.7)

## 2023-02-10 PROCEDURE — 86140 C-REACTIVE PROTEIN: CPT | Performed by: INTERNAL MEDICINE

## 2023-02-10 PROCEDURE — 99499 NO LOS: ICD-10-PCS | Mod: S$GLB,,, | Performed by: STUDENT IN AN ORGANIZED HEALTH CARE EDUCATION/TRAINING PROGRAM

## 2023-02-10 PROCEDURE — 80053 COMPREHEN METABOLIC PANEL: CPT | Performed by: INTERNAL MEDICINE

## 2023-02-10 PROCEDURE — 99499 UNLISTED E&M SERVICE: CPT | Mod: S$GLB,,, | Performed by: STUDENT IN AN ORGANIZED HEALTH CARE EDUCATION/TRAINING PROGRAM

## 2023-02-10 PROCEDURE — 20611 LARGE JOINT ASPIRATION/INJECTION: R KNEE: ICD-10-PCS | Mod: RT,S$GLB,, | Performed by: STUDENT IN AN ORGANIZED HEALTH CARE EDUCATION/TRAINING PROGRAM

## 2023-02-10 PROCEDURE — 36415 COLL VENOUS BLD VENIPUNCTURE: CPT | Performed by: INTERNAL MEDICINE

## 2023-02-10 PROCEDURE — 85652 RBC SED RATE AUTOMATED: CPT | Performed by: INTERNAL MEDICINE

## 2023-02-10 PROCEDURE — 99999 PR PBB SHADOW E&M-EST. PATIENT-LVL II: ICD-10-PCS | Mod: PBBFAC,,, | Performed by: STUDENT IN AN ORGANIZED HEALTH CARE EDUCATION/TRAINING PROGRAM

## 2023-02-10 PROCEDURE — 20611 DRAIN/INJ JOINT/BURSA W/US: CPT | Mod: RT,S$GLB,, | Performed by: STUDENT IN AN ORGANIZED HEALTH CARE EDUCATION/TRAINING PROGRAM

## 2023-02-10 PROCEDURE — 99999 PR PBB SHADOW E&M-EST. PATIENT-LVL II: CPT | Mod: PBBFAC,,, | Performed by: STUDENT IN AN ORGANIZED HEALTH CARE EDUCATION/TRAINING PROGRAM

## 2023-02-10 PROCEDURE — 85025 COMPLETE CBC W/AUTO DIFF WBC: CPT | Performed by: INTERNAL MEDICINE

## 2023-02-10 RX ORDER — BUPIVACAINE HYDROCHLORIDE 5 MG/ML
2 INJECTION, SOLUTION PERINEURAL
Status: DISCONTINUED | OUTPATIENT
Start: 2023-02-10 | End: 2023-02-10 | Stop reason: HOSPADM

## 2023-02-10 RX ORDER — TRIAMCINOLONE ACETONIDE 40 MG/ML
40 INJECTION, SUSPENSION INTRA-ARTICULAR; INTRAMUSCULAR
Status: DISCONTINUED | OUTPATIENT
Start: 2023-02-10 | End: 2023-02-10 | Stop reason: HOSPADM

## 2023-02-10 RX ORDER — LIDOCAINE HYDROCHLORIDE 10 MG/ML
2 INJECTION INFILTRATION; PERINEURAL
Status: DISCONTINUED | OUTPATIENT
Start: 2023-02-10 | End: 2023-02-10 | Stop reason: HOSPADM

## 2023-02-10 RX ADMIN — TRIAMCINOLONE ACETONIDE 40 MG: 40 INJECTION, SUSPENSION INTRA-ARTICULAR; INTRAMUSCULAR at 01:02

## 2023-02-10 RX ADMIN — LIDOCAINE HYDROCHLORIDE 2 ML: 10 INJECTION INFILTRATION; PERINEURAL at 01:02

## 2023-02-10 RX ADMIN — BUPIVACAINE HYDROCHLORIDE 2 ML: 5 INJECTION, SOLUTION PERINEURAL at 01:02

## 2023-02-10 NOTE — PROCEDURES
Large Joint Aspiration/Injection: R knee    Date/Time: 2/10/2023 1:40 PM  Performed by: Tawanda Lora MD  Authorized by: Tawanda Lora MD     Consent Done?:  Yes (Verbal)  Indications:  Pain and joint swelling  Site marked: the procedure site was marked    Timeout: prior to procedure the correct patient, procedure, and site was verified    Prep: patient was prepped and draped in usual sterile fashion      Local anesthesia used?: Yes    Local anesthetic:  Topical anesthetic    Details:  Needle Size:  22 G  Ultrasonic Guidance for needle placement?: Yes    Images are saved and documented.  Approach: Superior lateral.  Location:  Knee  Site:  R knee  Medications:  40 mg triamcinolone acetonide 40 mg/mL; 2 mL LIDOcaine HCL 10 mg/ml (1%) 10 mg/mL (1 %); 2 mL BUPivacaine 0.5 % (5 mg/mL)  Patient tolerance:  Patient tolerated the procedure well with no immediate complications     Ultrasound guidance was used for needle localization. Images were saved and stored for documentation. The appropriate structures were visualized. Dynamic visualization of the needle was continuous throughout the procedures and maintained good position.     We discussed the proper protocols after the injection such as no submerging pools, baths tubs, or hot tubs for 24 hr.  Showering is okay today.  We also discussed that blood sugars can be elevated after an injection and asked patient to properly checked her sugars over the next few days and contact their PCP if there are any concerns.  We discussed red flags such as fevers, chills, red, warm, tender joint at the area of injection to please seek medical care immediately.      Reviewed labs no sign of infection or gout today.  Discussed proceeding forward with corticosteroid injection.  Follow-up p.r.n..

## 2023-02-10 NOTE — PROGRESS NOTES
Patient ID: Juan C Rodriguez  YOB: 1965  MRN: [id number]    Chief Complaint: Follow-up (Osteoarthritis of right knee)      History of Present Illness: Juan C Rodriguez is a left-hand dominant 57 y.o. male who is here for f/u evaluation of 6/10 pain Osteoarthritis of right knee.     The patient is active in none.  Occupation: Maintenance @ [employer]   Last Appointment: 02/06/2023  Diagnosis: Osteoarthritis of right knee  Prior Procedure: Cultures   PT Location: N/A    The patient returns today reporting that the symptoms *** and is interested in proceeding with *** options.     To review his history, ***    ***    Past Medical History:   Past Medical History:   Diagnosis Date    Diabetes mellitus, type 2 diagnosed 2013    DM (diabetes mellitus)     2011  02/25/2014    HTN (hypertension)     Hypogonadism, testicular     Left tibialis posterior tendinitis 11/30/2016    Obesities, morbid      Past Surgical History:   Procedure Laterality Date    COLONOSCOPY N/A 6/29/2018    Procedure: COLONOSCOPY;  Surgeon: Jeremiah Anaya III, MD;  Location: [facility];  Service: Endoscopy;  Laterality: N/A;     Family History   Problem Relation Age of Onset    Cataracts Mother     Diabetes Mother     Hypertension Mother     Diabetes Sister      Social History     Socioeconomic History    Marital status: Single   Tobacco Use    Smoking status: Some Days     Types: Cigars    Smokeless tobacco: Never   Substance and Sexual Activity    Alcohol use: Yes     Alcohol/week: 3.0 - 4.0 standard drinks     Types: 3 - 4 Cans of beer per week     Comment: occasional weekends    Drug use: No    Sexual activity: Not Currently     Partners: Female     Medication List with Changes/Refills   Current Medications    ALLOPURINOL (ZYLOPRIM) 300 MG TABLET    Take 1 tablet (300 mg total) by mouth once daily.    AMLODIPINE (NORVASC) 10 MG TABLET    Take 1 tablet by mouth once daily     "ATORVASTATIN (LIPITOR) 20 MG TABLET    Take 1 tablet (20 mg total) by mouth once daily.    ERGOCALCIFEROL (ERGOCALCIFEROL) 50,000 UNIT CAP    Take 1 capsule by mouth once a week    ETANERCEPT (ENBREL SURECLICK) 50 MG/ML (1 ML)    Inject 1 mL (50 mg total) into the skin once a week.    FOLIC ACID (FOLVITE) 1 MG TABLET    Take 1 tablet (1 mg total) by mouth once daily.    METFORMIN (GLUCOPHAGE) 500 MG TABLET    Take 1 tablet by mouth once daily with breakfast    METHOTREXATE 2.5 MG TAB    Take 6 tablets (15 mg total) by mouth every 7 days.    NEEDLE, DISP, 22 G 22 GAUGE X 1 1/2" NDLE    1 Units by Misc.(Non-Drug; Combo Route) route every 21 days.    OLMESARTAN-HYDROCHLOROTHIAZIDE (BENICAR HCT) 20-12.5 MG PER TABLET    Take 1 tablet by mouth once daily.    TRAMADOL (ULTRAM) 50 MG TABLET    Take 1 tablet (50 mg total) by mouth every 6 (six) hours as needed for Pain.     Review of patient's allergies indicates:   Allergen Reactions    Naproxen Swelling       Physical Exam:   There is no height or weight on file to calculate BMI.    GENERAL: Well appearing, in no acute distress.  HEAD: Normocephalic and atraumatic.  ENT: External ears and nose grossly normal.  EYES: EOMI bilaterally  PULMONARY: Respirations are grossly even and non-labored.  NEURO: Awake, alert, and oriented x 3.  SKIN: No obvious rashes appreciated.  PSYCH: Mood & affect are appropriate.    Detailed MSK exam:     ***    Imaging:  X-ray Knee Ortho Right  Narrative: EXAM:  XR KNEE ORTHO RIGHT    CLINICAL HISTORY:    Right knee joint pain    COMPARISON: None.    FINDINGS:    Mild medial joint space narrowing.  Minor marginal spurring.    Minor patellofemoral joint spurring.    There is however large calcifications or spurring at the distal quadriceps tendon insertion as well as of the patellar ligament insertion anteriorly.    No evidence of joint effusion.  Impression:  Patellar spurring.  Minor degenerative joint disease.    Finalized on: 1/18/2023 12:59 " PM By:  Dustin Huggins MD  [facility]# [id number]      2023-01-18 13:01:[id number]    [facility]    ***    Relevant imaging results were reviewed and interpreted by me and per my read ***.  This was discussed with the patient and / or family today.     Assessment:  Juan C Rodriguez is a 57 y.o. male ***    Osteoarthritis of right knee, unspecified osteoarthritis type  -     Sports Medicine US - Guidance for Needle Placement           Tawanda Lora MD    Disclaimer: This note was prepared using a voice recognition system and is likely to have sound alike errors within the text.

## 2023-02-11 LAB — ERYTHROCYTE [SEDIMENTATION RATE] IN BLOOD BY PHOTOMETRIC METHOD: 50 MM/HR (ref 0–23)

## 2023-02-13 ENCOUNTER — OFFICE VISIT (OUTPATIENT)
Dept: RHEUMATOLOGY | Facility: CLINIC | Age: 58
End: 2023-02-13
Payer: COMMERCIAL

## 2023-02-13 VITALS
HEART RATE: 68 BPM | BODY MASS INDEX: 55.81 KG/M2 | SYSTOLIC BLOOD PRESSURE: 141 MMHG | DIASTOLIC BLOOD PRESSURE: 74 MMHG | HEIGHT: 63 IN | WEIGHT: 315 LBS

## 2023-02-13 DIAGNOSIS — D84.9 IMMUNOCOMPROMISED: ICD-10-CM

## 2023-02-13 DIAGNOSIS — R79.82 ELEVATED C-REACTIVE PROTEIN (CRP): ICD-10-CM

## 2023-02-13 DIAGNOSIS — M06.09 RHEUMATOID ARTHRITIS OF MULTIPLE SITES WITH NEGATIVE RHEUMATOID FACTOR: Primary | ICD-10-CM

## 2023-02-13 DIAGNOSIS — M19.90 INFLAMMATORY ARTHRITIS: ICD-10-CM

## 2023-02-13 DIAGNOSIS — R70.0 ELEVATED SED RATE: ICD-10-CM

## 2023-02-13 PROCEDURE — 99999 PR PBB SHADOW E&M-EST. PATIENT-LVL IV: ICD-10-PCS | Mod: PBBFAC,,, | Performed by: PHYSICIAN ASSISTANT

## 2023-02-13 PROCEDURE — 3078F PR MOST RECENT DIASTOLIC BLOOD PRESSURE < 80 MM HG: ICD-10-PCS | Mod: CPTII,S$GLB,, | Performed by: PHYSICIAN ASSISTANT

## 2023-02-13 PROCEDURE — 1159F PR MEDICATION LIST DOCUMENTED IN MEDICAL RECORD: ICD-10-PCS | Mod: CPTII,S$GLB,, | Performed by: PHYSICIAN ASSISTANT

## 2023-02-13 PROCEDURE — 3077F SYST BP >= 140 MM HG: CPT | Mod: CPTII,S$GLB,, | Performed by: PHYSICIAN ASSISTANT

## 2023-02-13 PROCEDURE — 99999 PR PBB SHADOW E&M-EST. PATIENT-LVL IV: CPT | Mod: PBBFAC,,, | Performed by: PHYSICIAN ASSISTANT

## 2023-02-13 PROCEDURE — 3008F BODY MASS INDEX DOCD: CPT | Mod: CPTII,S$GLB,, | Performed by: PHYSICIAN ASSISTANT

## 2023-02-13 PROCEDURE — 3077F PR MOST RECENT SYSTOLIC BLOOD PRESSURE >= 140 MM HG: ICD-10-PCS | Mod: CPTII,S$GLB,, | Performed by: PHYSICIAN ASSISTANT

## 2023-02-13 PROCEDURE — 99214 PR OFFICE/OUTPT VISIT, EST, LEVL IV, 30-39 MIN: ICD-10-PCS | Mod: S$GLB,,, | Performed by: PHYSICIAN ASSISTANT

## 2023-02-13 PROCEDURE — 1159F MED LIST DOCD IN RCRD: CPT | Mod: CPTII,S$GLB,, | Performed by: PHYSICIAN ASSISTANT

## 2023-02-13 PROCEDURE — 3008F PR BODY MASS INDEX (BMI) DOCUMENTED: ICD-10-PCS | Mod: CPTII,S$GLB,, | Performed by: PHYSICIAN ASSISTANT

## 2023-02-13 PROCEDURE — 99214 OFFICE O/P EST MOD 30 MIN: CPT | Mod: S$GLB,,, | Performed by: PHYSICIAN ASSISTANT

## 2023-02-13 PROCEDURE — 3078F DIAST BP <80 MM HG: CPT | Mod: CPTII,S$GLB,, | Performed by: PHYSICIAN ASSISTANT

## 2023-02-13 RX ORDER — METHOTREXATE 2.5 MG/1
15 TABLET ORAL
Qty: 72 TABLET | Refills: 1 | Status: SHIPPED | OUTPATIENT
Start: 2023-02-13 | End: 2023-05-16 | Stop reason: SDUPTHER

## 2023-02-13 RX ORDER — FOLIC ACID 1 MG/1
1 TABLET ORAL DAILY
Qty: 90 TABLET | Refills: 3 | Status: SHIPPED | OUTPATIENT
Start: 2023-02-13 | End: 2024-03-12 | Stop reason: SDUPTHER

## 2023-02-13 ASSESSMENT — ROUTINE ASSESSMENT OF PATIENT INDEX DATA (RAPID3): MDHAQ FUNCTION SCORE: 0.5

## 2023-02-13 NOTE — PROGRESS NOTES
RHEUMATOLOGY OUTPATIENT CLINIC NOTE    2/13/2023    Attending Rheumatologist: Brianna Velarde PA-C  Primary Care Provider: Reza Michelle MD   Chief Complaint/Reason For Consultation:  Disease management      Subjective:        Juan C Rodriguez is a 57 y.o. male here today for routine follow up of seronegative rheumatoid arthritis. he has been out of MTX for a few months. Last week had flare of knee pain and swelling. Joint aspiration done by ortho- no crystals, + WBCs, thus far no bacterial growth. Knee is feeling better now. No significant joint stiffness in the morning.    Since adding Enbrel and methotrexate he reports significant improvement in his rheumatoid arthritis. Denies any joint pain from rheumatoid arthritis today.  No pain in hands or feet. chronic lower back pain w/ degenerative arthropathy.  Rheumatological review of system negative otherwise.  Physical examination shows no synovitis, dactylitis, enthesitis or effusion    Serologies  Lab Results   Component Value Date    TBGOLDPLUS Negative 11/11/2022     Lab Results   Component Value Date    RF <10.0 05/21/2020     Lab Results   Component Value Date    HEPAIGM Negative 07/01/2020    HEPBIGM Negative 07/01/2020    HEPBCAB Non-reactive 11/11/2022    HEPCAB Non-reactive 11/11/2022      Lab Results   Component Value Date    CCPANTIBODIE 0.8 05/21/2020         Current Rheum Medications:  MTX 15 mg/wk, enbrel  Previous Rheum Medications:   n/a    Past Medical History:   Diagnosis Date    Diabetes mellitus, type 2 diagnosed 2013    DM (diabetes mellitus)     2011  02/25/2014    HTN (hypertension)     Hypogonadism, testicular     Left tibialis posterior tendinitis 11/30/2016    Obesities, morbid      Social History     Tobacco Use    Smoking status: Some Days     Types: Cigars    Smokeless tobacco: Never   Substance Use Topics    Alcohol use: Yes     Alcohol/week: 3.0 - 4.0 standard drinks     Types: 3 - 4 Cans of beer  "per week     Comment: occasional weekends       Review of patient's allergies indicates:   Allergen Reactions    Naproxen Swelling       Objective:   BP (!) 141/74   Pulse 68   Ht 5' 3" (1.6 m)   Wt (!) 147 kg (324 lb 1.2 oz)   BMI 57.41 kg/m²     Immunization History   Administered Date(s) Administered    Influenza 12/06/2010    Influenza - Quadrivalent - PF *Preferred* (6 months and older) 12/08/2021, 12/06/2022    Pneumococcal Conjugate - 13 Valent 07/01/2020    Pneumococcal Polysaccharide - 23 Valent 07/09/2018    Tdap 08/14/2012     Current Outpatient Medications   Medication Instructions    allopurinoL (ZYLOPRIM) 300 mg, Oral, Daily    amLODIPine (NORVASC) 10 MG tablet Take 1 tablet by mouth once daily    atorvastatin (LIPITOR) 20 mg, Oral, Daily    EnbreL SureClick 50 mg, Subcutaneous, Weekly    ergocalciferol (ERGOCALCIFEROL) 50,000 unit Cap Take 1 capsule by mouth once a week    folic acid (FOLVITE) 1 mg, Oral, Daily    metFORMIN (GLUCOPHAGE) 500 MG tablet Take 1 tablet by mouth once daily with breakfast    methotrexate 15 mg, Oral, Every 7 days    needle (disp) 22 G 1 Units, Misc.(Non-Drug; Combo Route), Every 21 days    olmesartan-hydrochlorothiazide (BENICAR HCT) 20-12.5 mg per tablet 1 tablet, Oral, Daily    traMADoL (ULTRAM) 50 mg, Oral, Every 6 hours PRN           Recent Results (from the past 336 hour(s))   WBC & Diff,Body Fluid Joint Fluid, Right Knee    Collection Time: 02/06/23 11:42 AM   Result Value Ref Range    Body Fluid Type Joint Fluid, Right Knee     Fluid Appearance Hazy     Fluid Color Yellow     WBC, Body Fluid 345 /cu mm    Segs, Fluid 24 %    Lymphs, Fluid 32 %    Monocytes/Macrophages, Fluid 44 %   Body fluid crystal Joint Fluid, Right Knee    Collection Time: 02/06/23 11:42 AM   Result Value Ref Range    Body Fluid Source, Crystal Exam Joint Fluid, Right Knee     Body Fluid Crystal Negative Negative   Pathologist Review of Laboratory Test    Collection Time: 02/06/23 11:42 AM "   Result Value Ref Range    Pathologist Review, Body Fluid REVIEWED    Pathologist Review of Laboratory Test    Collection Time: 02/06/23 11:42 AM   Result Value Ref Range    Pathologist Review, Body Fluid REVIEWED    Aerobic culture    Collection Time: 02/06/23 11:50 AM    Specimen: Knee, Right   Result Value Ref Range    Aerobic Bacterial Culture No growth    CBC Auto Differential    Collection Time: 02/10/23 12:43 PM   Result Value Ref Range    WBC 9.19 3.90 - 12.70 K/uL    RBC 4.67 4.60 - 6.20 M/uL    Hemoglobin 11.9 (L) 14.0 - 18.0 g/dL    Hematocrit 38.4 (L) 40.0 - 54.0 %    MCV 82 82 - 98 fL    MCH 25.5 (L) 27.0 - 31.0 pg    MCHC 31.0 (L) 32.0 - 36.0 g/dL    RDW 16.4 (H) 11.5 - 14.5 %    Platelets 209 150 - 450 K/uL    MPV 9.7 9.2 - 12.9 fL    Immature Granulocytes 0.4 0.0 - 0.5 %    Gran # (ANC) 6.2 1.8 - 7.7 K/uL    Immature Grans (Abs) 0.04 0.00 - 0.04 K/uL    Lymph # 2.2 1.0 - 4.8 K/uL    Mono # 0.6 0.3 - 1.0 K/uL    Eos # 0.1 0.0 - 0.5 K/uL    Baso # 0.05 0.00 - 0.20 K/uL    nRBC 0 0 /100 WBC    Gran % 67.6 38.0 - 73.0 %    Lymph % 24.2 18.0 - 48.0 %    Mono % 6.3 4.0 - 15.0 %    Eosinophil % 1.0 0.0 - 8.0 %    Basophil % 0.5 0.0 - 1.9 %    Differential Method Automated    Comprehensive Metabolic Panel    Collection Time: 02/10/23 12:43 PM   Result Value Ref Range    Sodium 140 136 - 145 mmol/L    Potassium 3.6 3.5 - 5.1 mmol/L    Chloride 99 95 - 110 mmol/L    CO2 29 23 - 29 mmol/L    Glucose 96 70 - 110 mg/dL    BUN 21 (H) 6 - 20 mg/dL    Creatinine 1.3 0.5 - 1.4 mg/dL    Calcium 9.3 8.7 - 10.5 mg/dL    Total Protein 7.9 6.0 - 8.4 g/dL    Albumin 3.6 3.5 - 5.2 g/dL    Total Bilirubin 0.4 0.1 - 1.0 mg/dL    Alkaline Phosphatase 121 55 - 135 U/L    AST 18 10 - 40 U/L    ALT 20 10 - 44 U/L    Anion Gap 12 8 - 16 mmol/L    eGFR >60 >60 mL/min/1.73 m^2   C-Reactive Protein    Collection Time: 02/10/23 12:43 PM   Result Value Ref Range    CRP 53.0 (H) 0.0 - 8.2 mg/L   Sedimentation rate    Collection Time:  02/10/23 12:43 PM   Result Value Ref Range    Sed Rate 50 (H) 0 - 23 mm/Hr       Imaging:  All imaging reviewed and independently interpreted by me.    XR HAND COMPLETE 3 VIEWS BILATERAL 7/15/22  COMPARISON:  07/01/2020, 05/11/2020     FINDINGS:  Right hand: There is no radiographic evidence of acute osseous, articular, or soft tissue abnormality.  There is moderate to severe scattered osteoarthritis seen throughout the wrist, predominately involving the radiocarpal, 1st CMC, and triscaphe joints.  Mild degenerative findings are also present at the 1st and 5th MCP joints and scattered throughout the IP joints.  No appreciable change from prior.  No definite osseous erosion demonstrated.     Left hand: There is no radiographic evidence of acute osseous, articular, or soft tissue abnormality.  There is mild scattered osteoarthritis seen throughout the IP, MCP, radiocarpal, and triscaphe joints.  No appreciable change from prior.  No definite osseous erosion demonstrated.    Assessment:     1. Rheumatoid arthritis of multiple sites with negative rheumatoid factor    2. Inflammatory arthritis    3. Immunocompromised    4. Elevated C-reactive protein (CRP)    5. Elevated sed rate            Plan:       seronegative rheumatoid arthritis well controlled on methotrexate and Enbrel.  Continue same.  Labs skewed at this time w/ recent joint effusion and pain.  He has experienced 40% improvement in disease activity with enbrel. Specifically swollen joints have decreased and pain has decreased by 40%  History of gout well controlled on allopurinol.  Continue same  Compromised immune system secondary to autoimmune disease and use of immunosuppressive drugs. Monitor carefully for infections and toxicities- Currently denies issues with recurrent infections. Advised to get immediate medical care if any infection.  Recommend Yearly Skin Cancer Screening by Dermatology for all patients on biologic or Manny. advised strict adherence to  age appropriate vaccinations (shingles, pneumonia, flu and covid) and cancer screenings with PCP   Patient advised to hold DMARD and/or biologic therapy for signs of infection or for surgery. If you are unsure what to do please call our office for instruction.[facility] [phone]  no current medication related issues, no evidence of toxicity. I ordered labs for toxicity monitoring;  results reviewed and discussed findings with the patient   Patient understands and contact clinic at any time regarding questions about today's office visit and any medication changes that were made  Return to clinic: 4 mos w/ lab one week early    The patient understands, chooses and consents to this plan and accepts all the risks which include but are not limited to the risks mentioned above.     Method of contact with patient concerns: [facility]    30 minutes of total time spent on the encounter, which includes face to face time and non-face to face time preparing to see the patient (eg, review of tests), Obtaining and/or reviewing separately obtained history, Documenting clinical information in the electronic or other health record, Independently interpreting results (not separately reported) and communicating results to the patient/family/caregiver, or Care coordination (not separately reported).          Disclaimer: This note was prepared using a voice recognition system and is likely to have sound alike errors within the text.       Brianna Velarde PA-C  Rheumatology Department   [facility]

## 2023-02-14 ENCOUNTER — SPECIALTY PHARMACY (OUTPATIENT)
Dept: PHARMACY | Facility: CLINIC | Age: 58
End: 2023-02-14
Payer: COMMERCIAL

## 2023-02-14 NOTE — TELEPHONE ENCOUNTER
Specialty Pharmacy - Refill Coordination    Specialty Medication Orders Linked to Encounter      Flowsheet Row Most Recent Value   Medication #1 etanercept (ENBREL SURECLICK) 50 mg/mL (1 mL) (Order#[id number], Rx#[id number])          Refill Questions - Documented Responses      Flowsheet Row Most Recent Value   Patient Availability and HIPAA Verification    Does patient want to proceed with activity? Yes   HIPAA/medical authority confirmed? Yes   Relationship to patient of person spoken to? Self   Refill Screening Questions    Changes to allergies? No   Changes to medications? No   New conditions since last clinic visit? No   Unplanned office visit, urgent care, ED, or hospital admission in the last 4 weeks? Yes  [knee pain]   How does patient/caregiver feel medication is working? Good   Financial problems or insurance changes? No   How many doses of your specialty medications were missed in the last 4 weeks? 1   Why were doses missed? Felt ill or sick   Would patient like to speak to a pharmacist? No   When does the patient need to receive the medication? 02/26/23   Refill Delivery Questions    How will the patient receive the medication? MEDRx   When does the patient need to receive the medication? 02/26/23   Shipping Address Home   Address in [facility] confirmed and updated if neccessary? Yes   Expected Copay ($) 80   Is the patient able to afford the medication copay? Yes   Payment Method  CC on file   Days supply of Refill 28   Supplies needed? Injection Device   Refill activity completed? Yes   Refill activity plan Refill scheduled   Shipment/Pickup Date: 02/22/23            Current Outpatient Medications   Medication Sig    allopurinoL (ZYLOPRIM) 300 MG tablet Take 1 tablet (300 mg total) by mouth once daily.    amLODIPine (NORVASC) 10 MG tablet Take 1 tablet by mouth once daily    atorvastatin (LIPITOR) 20 MG tablet Take 1 tablet (20 mg total) by mouth once daily.    ergocalciferol  "(ERGOCALCIFEROL) 50,000 unit Cap Take 1 capsule by mouth once a week    etanercept (ENBREL SURECLICK) 50 mg/mL (1 mL) Inject 1 mL (50 mg total) into the skin once a week.    folic acid (FOLVITE) 1 MG tablet Take 1 tablet (1 mg total) by mouth once daily.    metFORMIN (GLUCOPHAGE) 500 MG tablet Take 1 tablet by mouth once daily with breakfast    methotrexate 2.5 MG Tab Take 6 tablets (15 mg total) by mouth every 7 days.    needle, disp, 22 G 22 gauge x 1 1/2" Ndle 1 Units by Misc.(Non-Drug; Combo Route) route every 21 days.    olmesartan-hydrochlorothiazide (BENICAR HCT) 20-12.5 mg per tablet Take 1 tablet by mouth once daily.    traMADoL (ULTRAM) 50 mg tablet Take 1 tablet (50 mg total) by mouth every 6 (six) hours as needed for Pain.   Last reviewed on 2/13/2023  8:01 AM by Yue Luciano MA    Review of patient's allergies indicates:   Allergen Reactions    Naproxen Swelling    Last reviewed on  2/13/2023 8:01 AM by Yue Luciano      Tasks added this encounter   3/19/2023 - Refill Call (Auto Added)   Tasks due within next 3 months   3/17/2023 - Clinical - Follow Up Assesement (Annual)     Bipin Wheeler, PharmD  [facility]  [address]  [facility] [address]  Phone: [phone]  Fax: [phone]        "

## 2023-03-20 ENCOUNTER — SPECIALTY PHARMACY (OUTPATIENT)
Dept: PHARMACY | Facility: CLINIC | Age: 58
End: 2023-03-20
Payer: COMMERCIAL

## 2023-03-20 NOTE — TELEPHONE ENCOUNTER
Specialty Pharmacy - Clinical Reassessment    Specialty Medication Orders Linked to Encounter      Flowsheet Row Most Recent Value   Medication #1 etanercept (ENBREL SURECLICK) 50 mg/mL (1 mL) (Order#[id number], Rx#[id number])          Patient Diagnosis   M06.09 - Rheumatoid arthritis of multiple sites with negative rheumatoid factor    Juan C Rodriguez is a 57 y.o. male, who is followed by the specialty pharmacy service for management and education of his Enbrel.  He has been on therapy with Enbrel for 21 months.  I have reviewed his electronic medical record and current medication list and determined that specialty medication adjustment Is not needed at this time.    Patient has not experienced adverse events.  He Is adherent reporting 1 missed doses since last review.  Adherence has been encouraged with the following mechanism(s): habit.  He is meeting goals of therapy and will continue treatment.        2/14/2023 1/3/2023 11/29/2022 11/2/2022 9/30/2022 9/6/2022 7/8/2022   Follow Up Review   # of missed doses 1 0 0 0 0 1 0   Reason Felt ill or sick     Simply forgot    New Medications? No No No No No No Yes   New Conditions? No No No No No No No   New Allergies? No No No No No No No   Med Effective? Good Good Good Good Good Good Good   Urgent Care? Yes No No No No No No   Requested Pharmacist? No No No No No No No            Therapy is appropriate to continue.    Therapy is effective: Yes  On scale of 1 to 10, how does patient rank quality of life? (10 - Best): Unable to Assess  Recommendations: none at this time.  Review Method: Chart Review    Tasks added this encounter   No tasks added.   Tasks due within next 3 months   3/17/2023 - Clinical - Follow Up Assesement (Annual)  3/19/2023 - Refill Call (Auto Added)     Christopher Mckenna, PharmD  [facility]  [facility]  [facility] [address]  Phone: [phone]  Fax: [phone]

## 2023-03-27 ENCOUNTER — SPECIALTY PHARMACY (OUTPATIENT)
Dept: PHARMACY | Facility: CLINIC | Age: 58
End: 2023-03-27
Payer: COMMERCIAL

## 2023-03-27 DIAGNOSIS — M06.09 RHEUMATOID ARTHRITIS OF MULTIPLE SITES WITH NEGATIVE RHEUMATOID FACTOR: Primary | ICD-10-CM

## 2023-03-27 NOTE — TELEPHONE ENCOUNTER
Incoming call from patient, he reports he has one pen on hand for injection due on 4/3. He denies any missed doses. Patient is ok with a call back next Monday for refill. Routing assigned pharmacist.

## 2023-04-11 ENCOUNTER — TELEPHONE (OUTPATIENT)
Dept: RHEUMATOLOGY | Facility: CLINIC | Age: 58
End: 2023-04-11
Payer: COMMERCIAL

## 2023-04-11 NOTE — TELEPHONE ENCOUNTER
"----- Message from Nuria Santana, Philippe sent at 4/11/2023 10:30 AM CDT -----  Regarding: PA reauthorization  Good-morning PA Prachi and Staff,    We are renewing the PA for Mr. Jennifer Alonso. He is due to inject on 4/16. [facility] has been denying PA re-authorizations that do no indicate in percent if the patient has "experienced substantial disease activity improvement (e.g., at least 20% from baseline) in tender joint count, swollen joint count, pain, or disability?" If appropriate, may you make an addendum to your chart notes to include what specific improvements Mr. Rodriguez has experienced and an estimated percentage to optimize the chance of an approval?    Thank you,    Nuria Santana  Clinical Pharmacist  [facility]      "

## 2023-04-11 NOTE — TELEPHONE ENCOUNTER
Incoming call from patient, states next Enbrel dose is needed for 4/16/23. Upon processing, Enbrel is requiring a prior authorization. Will route to assigned pharmacist to complete.

## 2023-04-11 NOTE — TELEPHONE ENCOUNTER
Urgent Enbrel PA began via Counts include 234 beds at the [facility] web portal  Key: [id number]  PA Case ID: [id number]    Staff message sent to provider regarding percentage of disease improvement.

## 2023-04-12 NOTE — TELEPHONE ENCOUNTER
Benefits Investigation   Insurance name: [employer]  Rep name: Nicol    Copay amount: $80 (patient has Enbrel cc)  Deductible: $1500 (not met)  OOPmax: $3500 (not met)  OSP in network? Y  Tier level (if applicable): 3

## 2023-04-12 NOTE — TELEPHONE ENCOUNTER
Enbrel PA re-authorization approved 04/11/23 through 04/11/24.  PA#: [id number]  Approval letter scanned into media.

## 2023-04-12 NOTE — TELEPHONE ENCOUNTER
Specialty Pharmacy - Refill Coordination  Specialty Pharmacy - Medication/Referral Authorization    Specialty Medication Orders Linked to Encounter      Flowsheet Row Most Recent Value   Medication #1 etanercept (ENBREL SURECLICK) 50 mg/mL (1 mL) (Order#[id number], Rx#[id number])            Refill Questions - Documented Responses      Flowsheet Row Most Recent Value   Patient Availability and HIPAA Verification    Does patient want to proceed with activity? Yes   HIPAA/medical authority confirmed? Yes   Relationship to patient of person spoken to? Self   Refill Screening Questions    Changes to allergies? No   Changes to medications? No   New conditions since last clinic visit? No   Unplanned office visit, urgent care, ED, or hospital admission in the last 4 weeks? No   How does patient/caregiver feel medication is working? Good   Financial problems or insurance changes? No   How many doses of your specialty medications were missed in the last 4 weeks? 1   Why were doses missed? Simply forgot   Would patient like to speak to a pharmacist? No   When does the patient need to receive the medication? 04/16/23   Refill Delivery Questions    How will the patient receive the medication? MEDRx   When does the patient need to receive the medication? 04/16/23   Shipping Address Home   Address in [facility] confirmed and updated if neccessary? Yes   Expected Copay ($) 80   Is the patient able to afford the medication copay? Yes   Payment Method  CC on file   Days supply of Refill 28   Supplies needed? No supplies needed   Refill activity completed? Yes   Refill activity plan Refill scheduled   Shipment/Pickup Date: 04/13/23            Current Outpatient Medications   Medication Sig    allopurinoL (ZYLOPRIM) 300 MG tablet Take 1 tablet (300 mg total) by mouth once daily.    amLODIPine (NORVASC) 10 MG tablet Take 1 tablet by mouth once daily    atorvastatin (LIPITOR) 20 MG tablet Take 1 tablet (20 mg total) by  "mouth once daily.    ergocalciferol (ERGOCALCIFEROL) 50,000 unit Cap Take 1 capsule by mouth once a week    etanercept (ENBREL SURECLICK) 50 mg/mL (1 mL) Inject 1 mL (50 mg total) into the skin once a week.    folic acid (FOLVITE) 1 MG tablet Take 1 tablet (1 mg total) by mouth once daily.    metFORMIN (GLUCOPHAGE) 500 MG tablet Take 1 tablet by mouth once daily with breakfast    methotrexate 2.5 MG Tab Take 6 tablets (15 mg total) by mouth every 7 days.    needle, disp, 22 G 22 gauge x 1 1/2" Ndle 1 Units by Misc.(Non-Drug; Combo Route) route every 21 days.    olmesartan-hydrochlorothiazide (BENICAR HCT) 20-12.5 mg per tablet Take 1 tablet by mouth once daily.    traMADoL (ULTRAM) 50 mg tablet Take 1 tablet (50 mg total) by mouth every 6 (six) hours as needed for Pain.   Last reviewed on 2/13/2023  8:01 AM by Yue Luciano MA    Review of patient's allergies indicates:   Allergen Reactions    Naproxen Swelling    Last reviewed on  2/13/2023 8:01 AM by Yue Luciano      Tasks added this encounter   5/7/2023 - Refill Call (Auto Added)   Tasks due within next 3 months   No tasks due.     Nuria Santana, PharmD  [facility]  [address] [facility] [address]  Phone: [phone]  Fax: [phone]        "

## 2023-04-24 ENCOUNTER — LAB VISIT (OUTPATIENT)
Dept: LAB | Facility: HOSPITAL | Age: 58
End: 2023-04-24
Attending: INTERNAL MEDICINE
Payer: COMMERCIAL

## 2023-04-24 ENCOUNTER — OFFICE VISIT (OUTPATIENT)
Dept: FAMILY MEDICINE | Facility: CLINIC | Age: 58
End: 2023-04-24
Payer: COMMERCIAL

## 2023-04-24 VITALS
RESPIRATION RATE: 16 BRPM | WEIGHT: 315 LBS | OXYGEN SATURATION: 100 % | DIASTOLIC BLOOD PRESSURE: 82 MMHG | HEART RATE: 61 BPM | BODY MASS INDEX: 57.52 KG/M2 | SYSTOLIC BLOOD PRESSURE: 130 MMHG | TEMPERATURE: 98 F

## 2023-04-24 DIAGNOSIS — M06.09 RHEUMATOID ARTHRITIS OF MULTIPLE SITES WITH NEGATIVE RHEUMATOID FACTOR: ICD-10-CM

## 2023-04-24 DIAGNOSIS — Z00.00 ROUTINE ADULT HEALTH MAINTENANCE: ICD-10-CM

## 2023-04-24 DIAGNOSIS — E66.01 MORBID OBESITY DUE TO EXCESS CALORIES: ICD-10-CM

## 2023-04-24 DIAGNOSIS — E78.2 MIXED HYPERLIPIDEMIA: ICD-10-CM

## 2023-04-24 DIAGNOSIS — G47.31 PRIMARY CENTRAL SLEEP APNEA: ICD-10-CM

## 2023-04-24 DIAGNOSIS — E11.65 TYPE 2 DIABETES MELLITUS WITH HYPERGLYCEMIA, WITHOUT LONG-TERM CURRENT USE OF INSULIN: Primary | ICD-10-CM

## 2023-04-24 DIAGNOSIS — I10 ESSENTIAL HYPERTENSION: ICD-10-CM

## 2023-04-24 DIAGNOSIS — D84.9 IMMUNOCOMPROMISED: ICD-10-CM

## 2023-04-24 DIAGNOSIS — E11.65 TYPE 2 DIABETES MELLITUS WITH HYPERGLYCEMIA, WITHOUT LONG-TERM CURRENT USE OF INSULIN: ICD-10-CM

## 2023-04-24 LAB
CHOLEST SERPL-MCNC: 189 MG/DL (ref 120–199)
CHOLEST/HDLC SERPL: 2.7 {RATIO} (ref 2–5)
COMPLEXED PSA SERPL-MCNC: 0.13 NG/ML (ref 0–4)
ESTIMATED AVG GLUCOSE: 117 MG/DL (ref 68–131)
HBA1C MFR BLD: 5.7 % (ref 4–5.6)
HDLC SERPL-MCNC: 70 MG/DL (ref 40–75)
HDLC SERPL: 37 % (ref 20–50)
LDLC SERPL CALC-MCNC: 106 MG/DL (ref 63–159)
NONHDLC SERPL-MCNC: 119 MG/DL
TRIGL SERPL-MCNC: 65 MG/DL (ref 30–150)

## 2023-04-24 PROCEDURE — 83036 HEMOGLOBIN GLYCOSYLATED A1C: CPT | Performed by: INTERNAL MEDICINE

## 2023-04-24 PROCEDURE — 99999 PR PBB SHADOW E&M-EST. PATIENT-LVL V: CPT | Mod: PBBFAC,,, | Performed by: INTERNAL MEDICINE

## 2023-04-24 PROCEDURE — 3008F BODY MASS INDEX DOCD: CPT | Mod: CPTII,S$GLB,, | Performed by: INTERNAL MEDICINE

## 2023-04-24 PROCEDURE — 84153 ASSAY OF PSA TOTAL: CPT | Performed by: INTERNAL MEDICINE

## 2023-04-24 PROCEDURE — 99396 PREV VISIT EST AGE 40-64: CPT | Mod: S$GLB,,, | Performed by: INTERNAL MEDICINE

## 2023-04-24 PROCEDURE — 3008F PR BODY MASS INDEX (BMI) DOCUMENTED: ICD-10-PCS | Mod: CPTII,S$GLB,, | Performed by: INTERNAL MEDICINE

## 2023-04-24 PROCEDURE — 3075F SYST BP GE 130 - 139MM HG: CPT | Mod: CPTII,S$GLB,, | Performed by: INTERNAL MEDICINE

## 2023-04-24 PROCEDURE — 1159F PR MEDICATION LIST DOCUMENTED IN MEDICAL RECORD: ICD-10-PCS | Mod: CPTII,S$GLB,, | Performed by: INTERNAL MEDICINE

## 2023-04-24 PROCEDURE — 3079F DIAST BP 80-89 MM HG: CPT | Mod: CPTII,S$GLB,, | Performed by: INTERNAL MEDICINE

## 2023-04-24 PROCEDURE — 99396 PR PREVENTIVE VISIT,EST,40-64: ICD-10-PCS | Mod: S$GLB,,, | Performed by: INTERNAL MEDICINE

## 2023-04-24 PROCEDURE — 36415 COLL VENOUS BLD VENIPUNCTURE: CPT | Mod: PO | Performed by: INTERNAL MEDICINE

## 2023-04-24 PROCEDURE — 80061 LIPID PANEL: CPT | Performed by: INTERNAL MEDICINE

## 2023-04-24 PROCEDURE — 3079F PR MOST RECENT DIASTOLIC BLOOD PRESSURE 80-89 MM HG: ICD-10-PCS | Mod: CPTII,S$GLB,, | Performed by: INTERNAL MEDICINE

## 2023-04-24 PROCEDURE — 3075F PR MOST RECENT SYSTOLIC BLOOD PRESS GE 130-139MM HG: ICD-10-PCS | Mod: CPTII,S$GLB,, | Performed by: INTERNAL MEDICINE

## 2023-04-24 PROCEDURE — 99999 PR PBB SHADOW E&M-EST. PATIENT-LVL V: ICD-10-PCS | Mod: PBBFAC,,, | Performed by: INTERNAL MEDICINE

## 2023-04-24 PROCEDURE — 1159F MED LIST DOCD IN RCRD: CPT | Mod: CPTII,S$GLB,, | Performed by: INTERNAL MEDICINE

## 2023-04-24 NOTE — PROGRESS NOTES
Subjective:       Patient ID: Juan C Rodriguez is a 57 y.o. male.    Chief Complaint: Follow-up (4m), Hypertension, Hyperlipidemia, and Diabetes    Follow-up  Associated symptoms include arthralgias and myalgias. Pertinent negatives include no abdominal pain, chest pain, chills, coughing, diaphoresis, fatigue, fever, headaches, joint swelling, nausea, neck pain, numbness, rash, sore throat, vomiting or weakness.   Hypertension  Pertinent negatives include no chest pain, headaches, neck pain, palpitations or shortness of breath.   Hyperlipidemia  Associated symptoms include myalgias. Pertinent negatives include no chest pain or shortness of breath.   Diabetes  Pertinent negatives for hypoglycemia include no confusion, dizziness, headaches, nervousness/anxiousness, pallor, seizures, speech difficulty or tremors. Pertinent negatives for diabetes include no chest pain, no fatigue, no polydipsia, no polyphagia, no polyuria and no weakness.   Past Medical History:   Diagnosis Date    Diabetes mellitus, type 2 diagnosed 2013    DM (diabetes mellitus)     2011  02/25/2014    HTN (hypertension)     Hypogonadism, testicular     Left tibialis posterior tendinitis 11/30/2016    Obesities, morbid      Past Surgical History:   Procedure Laterality Date    COLONOSCOPY N/A 6/29/2018    Procedure: COLONOSCOPY;  Surgeon: Jeremiah Anaya III, MD;  Location: [facility];  Service: Endoscopy;  Laterality: N/A;     Family History   Problem Relation Age of Onset    Cataracts Mother     Diabetes Mother     Hypertension Mother     Diabetes Sister      Social History     Socioeconomic History    Marital status: Single   Tobacco Use    Smoking status: Some Days     Types: Cigars    Smokeless tobacco: Never   Substance and Sexual Activity    Alcohol use: Yes     Alcohol/week: 3.0 - 4.0 standard drinks     Types: 3 - 4 Cans of beer per week     Comment: occasional weekends    Drug use: No    Sexual activity: Not Currently      Partners: Female     Review of Systems   Constitutional:  Negative for activity change, appetite change, chills, diaphoresis, fatigue, fever and unexpected weight change.   HENT:  Negative for drooling, ear discharge, ear pain, facial swelling, hearing loss, mouth sores, nosebleeds, postnasal drip, rhinorrhea, sinus pressure, sneezing, sore throat, tinnitus, trouble swallowing and voice change.    Eyes:  Negative for photophobia, redness and visual disturbance.   Respiratory:  Negative for apnea, cough, choking, chest tightness, shortness of breath and wheezing.    Cardiovascular:  Negative for chest pain, palpitations and leg swelling.   Gastrointestinal:  Negative for abdominal distention, abdominal pain, anal bleeding, blood in stool, constipation, diarrhea, nausea and vomiting.   Endocrine: Negative for cold intolerance, heat intolerance, polydipsia, polyphagia and polyuria.   Genitourinary:  Negative for difficulty urinating, dysuria, enuresis, flank pain, frequency, genital sores, hematuria and urgency.   Musculoskeletal:  Positive for arthralgias and myalgias. Negative for back pain, gait problem, joint swelling, neck pain and neck stiffness.   Skin:  Negative for color change, pallor, rash and wound.   Allergic/Immunologic: Negative for food allergies and immunocompromised state.   Neurological:  Negative for dizziness, tremors, seizures, syncope, facial asymmetry, speech difficulty, weakness, light-headedness, numbness and headaches.   Hematological:  Negative for adenopathy. Does not bruise/bleed easily.   Psychiatric/Behavioral:  Negative for agitation, behavioral problems, confusion, decreased concentration, dysphoric mood, hallucinations, self-injury, sleep disturbance and suicidal ideas. The patient is not nervous/anxious and is not hyperactive.      Objective:      Physical Exam  Vitals and nursing note reviewed.   Constitutional:       General: He is not in acute distress.     Appearance: Normal  appearance. He is well-developed. He is not diaphoretic.   HENT:      Head: Normocephalic and atraumatic.      Mouth/Throat:      Pharynx: No oropharyngeal exudate.   Eyes:      General: No scleral icterus.     Pupils: Pupils are equal, round, and reactive to light.   Neck:      Thyroid: No thyromegaly.      Vascular: No carotid bruit or JVD.      Trachea: No tracheal deviation.   Cardiovascular:      Rate and Rhythm: Normal rate and regular rhythm.      Heart sounds: Normal heart sounds.   Pulmonary:      Effort: Pulmonary effort is normal. No respiratory distress.      Breath sounds: Normal breath sounds. No wheezing or rales.   Chest:      Chest wall: No tenderness.   Abdominal:      General: Bowel sounds are normal. There is no distension.      Palpations: Abdomen is soft.      Tenderness: There is no abdominal tenderness. There is no guarding or rebound.   Musculoskeletal:         General: No tenderness. Normal range of motion.      Cervical back: Normal range of motion and neck supple.   Lymphadenopathy:      Cervical: No cervical adenopathy.   Skin:     General: Skin is warm and dry.      Coloration: Skin is not pale.      Findings: No erythema or rash.   Neurological:      Mental Status: He is alert and oriented to person, place, and time.   Psychiatric:         Behavior: Behavior normal.         Thought Content: Thought content normal.         Judgment: Judgment normal.       CMP  Sodium   Date Value Ref Range Status   02/10/2023 140 136 - 145 mmol/L Final     Potassium   Date Value Ref Range Status   02/10/2023 3.6 3.5 - 5.1 mmol/L Final     Chloride   Date Value Ref Range Status   02/10/2023 99 95 - 110 mmol/L Final     CO2   Date Value Ref Range Status   02/10/2023 29 23 - 29 mmol/L Final     Glucose   Date Value Ref Range Status   02/10/2023 96 70 - 110 mg/dL Final     BUN   Date Value Ref Range Status   02/10/2023 21 (H) 6 - 20 mg/dL Final     Creatinine   Date Value Ref Range Status   02/10/2023 1.3  0.5 - 1.4 mg/dL Final     Calcium   Date Value Ref Range Status   02/10/2023 9.3 8.7 - 10.5 mg/dL Final     Total Protein   Date Value Ref Range Status   02/10/2023 7.9 6.0 - 8.4 g/dL Final     Albumin   Date Value Ref Range Status   02/10/2023 3.6 3.5 - 5.2 g/dL Final   05/31/2016 4.2 3.6 - 5.1 g/dL Final     Comment:     @ Test Performed By:  [employer] Gabe Diaz M.D., [place],   [address] [place] [address]  [facility]  [id number]       Total Bilirubin   Date Value Ref Range Status   02/10/2023 0.4 0.1 - 1.0 mg/dL Final     Comment:     For infants and newborns, interpretation of results should be based  on gestational age, weight and in agreement with clinical  observations.    Premature Infant recommended reference ranges:  Up to 24 hours.............<8.0 mg/dL  Up to 48 hours............<12.0 mg/dL  3-5 days..................<15.0 mg/dL  6-29 days.................<15.0 mg/dL       Alkaline Phosphatase   Date Value Ref Range Status   02/10/2023 121 55 - 135 U/L Final     AST   Date Value Ref Range Status   02/10/2023 18 10 - 40 U/L Final     ALT   Date Value Ref Range Status   02/10/2023 20 10 - 44 U/L Final     Anion Gap   Date Value Ref Range Status   02/10/2023 12 8 - 16 mmol/L Final     eGFR if    Date Value Ref Range Status   07/15/2022 >60 >60 mL/min/1.73 m^2 Final     eGFR if non    Date Value Ref Range Status   07/15/2022 >60 >60 mL/min/1.73 m^2 Final     Comment:     Calculation used to obtain the estimated glomerular filtration  rate (eGFR) is the CKD-EPI equation.        Lab Results   Component Value Date    WBC 9.19 02/10/2023    HGB 11.9 (L) 02/10/2023    HCT 38.4 (L) 02/10/2023    MCV 82 02/10/2023     02/10/2023     Lab Results   Component Value Date    CHOL 176 04/22/2022     Lab Results   Component Value Date    HDL 58 04/22/2022     Lab Results   Component Value Date    LDLCALC 106.8 04/22/2022      Lab Results   Component Value Date    TRIG 56 04/22/2022     Lab Results   Component Value Date    CHOLHDL 33.0 04/22/2022     Lab Results   Component Value Date    TSH 2.126 03/17/2020     Lab Results   Component Value Date    LABA1C 6.1 (H) 03/16/2017    HGBA1C 5.7 (H) 12/06/2022     Assessment:       1. Type 2 diabetes mellitus with hyperglycemia, without long-term current use of insulin    2. Essential hypertension    3. Immunocompromised    4. Mixed hyperlipidemia    5. Morbid obesity due to excess calories    6. Rheumatoid arthritis of multiple sites with negative rheumatoid factor    7. Primary central sleep apnea    8. Routine adult health maintenance        Plan:   Type 2 diabetes mellitus with hyperglycemia, without long-term current use of insulin  -     Hemoglobin A1C; Future; Expected date: 04/24/2023    Essential hypertension    Immunocompromised    Mixed hyperlipidemia  -     Lipid Panel; Future; Expected date: 04/24/2023    Morbid obesity due to excess calories    Rheumatoid arthritis of multiple sites with negative rheumatoid factor    Primary central sleep apnea  -     Ambulatory referral/consult to Pulmonology; Future; Expected date: 05/01/2023    Routine adult health maintenance  -     PSA, Screening; Future; Expected date: 04/24/2023      Stable--------------continue meds--------watch diet,exercise, weight loss. As above------------f/u 4 months-

## 2023-05-05 ENCOUNTER — PATIENT OUTREACH (OUTPATIENT)
Dept: ADMINISTRATIVE | Facility: HOSPITAL | Age: 58
End: 2023-05-05
Payer: COMMERCIAL

## 2023-05-05 DIAGNOSIS — N18.31 STAGE 3A CHRONIC KIDNEY DISEASE: ICD-10-CM

## 2023-05-05 DIAGNOSIS — E11.65 TYPE 2 DIABETES MELLITUS WITH HYPERGLYCEMIA, WITHOUT LONG-TERM CURRENT USE OF INSULIN: Primary | ICD-10-CM

## 2023-05-05 NOTE — PROGRESS NOTES
Working Diabetes Lab Report.    Pt has lab appt scheduled, 5/09/23.  Micro albumin urine ordered and linked to appt.

## 2023-05-09 ENCOUNTER — LAB VISIT (OUTPATIENT)
Dept: LAB | Facility: HOSPITAL | Age: 58
End: 2023-05-09
Attending: INTERNAL MEDICINE
Payer: COMMERCIAL

## 2023-05-09 DIAGNOSIS — N18.31 STAGE 3A CHRONIC KIDNEY DISEASE: ICD-10-CM

## 2023-05-09 DIAGNOSIS — E11.65 TYPE 2 DIABETES MELLITUS WITH HYPERGLYCEMIA, WITHOUT LONG-TERM CURRENT USE OF INSULIN: ICD-10-CM

## 2023-05-09 DIAGNOSIS — Z79.899 ENCOUNTER FOR LONG-TERM (CURRENT) USE OF HIGH-RISK MEDICATION: ICD-10-CM

## 2023-05-09 LAB
ALBUMIN SERPL BCP-MCNC: 3.6 G/DL (ref 3.5–5.2)
ALP SERPL-CCNC: 113 U/L (ref 55–135)
ALT SERPL W/O P-5'-P-CCNC: 25 U/L (ref 10–44)
ANION GAP SERPL CALC-SCNC: 8 MMOL/L (ref 8–16)
AST SERPL-CCNC: 23 U/L (ref 10–40)
BASOPHILS # BLD AUTO: 0.08 K/UL (ref 0–0.2)
BASOPHILS NFR BLD: 1 % (ref 0–1.9)
BILIRUB SERPL-MCNC: 0.5 MG/DL (ref 0.1–1)
BUN SERPL-MCNC: 18 MG/DL (ref 6–20)
CALCIUM SERPL-MCNC: 9.3 MG/DL (ref 8.7–10.5)
CHLORIDE SERPL-SCNC: 103 MMOL/L (ref 95–110)
CO2 SERPL-SCNC: 30 MMOL/L (ref 23–29)
CREAT SERPL-MCNC: 1.3 MG/DL (ref 0.5–1.4)
CRP SERPL-MCNC: 43 MG/L (ref 0–8.2)
DIFFERENTIAL METHOD: ABNORMAL
EOSINOPHIL # BLD AUTO: 0.2 K/UL (ref 0–0.5)
EOSINOPHIL NFR BLD: 2 % (ref 0–8)
ERYTHROCYTE [DISTWIDTH] IN BLOOD BY AUTOMATED COUNT: 17.8 % (ref 11.5–14.5)
ERYTHROCYTE [SEDIMENTATION RATE] IN BLOOD BY PHOTOMETRIC METHOD: 74 MM/HR (ref 0–23)
EST. GFR  (NO RACE VARIABLE): >60 ML/MIN/1.73 M^2
GLUCOSE SERPL-MCNC: 98 MG/DL (ref 70–110)
HCT VFR BLD AUTO: 35.1 % (ref 40–54)
HGB BLD-MCNC: 11.1 G/DL (ref 14–18)
IMM GRANULOCYTES # BLD AUTO: 0.05 K/UL (ref 0–0.04)
IMM GRANULOCYTES NFR BLD AUTO: 0.7 % (ref 0–0.5)
LYMPHOCYTES # BLD AUTO: 2.6 K/UL (ref 1–4.8)
LYMPHOCYTES NFR BLD: 33.5 % (ref 18–48)
MCH RBC QN AUTO: 26.2 PG (ref 27–31)
MCHC RBC AUTO-ENTMCNC: 31.6 G/DL (ref 32–36)
MCV RBC AUTO: 83 FL (ref 82–98)
MONOCYTES # BLD AUTO: 0.5 K/UL (ref 0.3–1)
MONOCYTES NFR BLD: 6.8 % (ref 4–15)
NEUTROPHILS # BLD AUTO: 4.3 K/UL (ref 1.8–7.7)
NEUTROPHILS NFR BLD: 56 % (ref 38–73)
NRBC BLD-RTO: 0 /100 WBC
PLATELET # BLD AUTO: 220 K/UL (ref 150–450)
PMV BLD AUTO: 9.5 FL (ref 9.2–12.9)
POTASSIUM SERPL-SCNC: 3.7 MMOL/L (ref 3.5–5.1)
PROT SERPL-MCNC: 7.4 G/DL (ref 6–8.4)
RBC # BLD AUTO: 4.24 M/UL (ref 4.6–6.2)
SODIUM SERPL-SCNC: 141 MMOL/L (ref 136–145)
WBC # BLD AUTO: 7.64 K/UL (ref 3.9–12.7)

## 2023-05-09 PROCEDURE — 86140 C-REACTIVE PROTEIN: CPT | Performed by: INTERNAL MEDICINE

## 2023-05-09 PROCEDURE — 36415 COLL VENOUS BLD VENIPUNCTURE: CPT | Performed by: INTERNAL MEDICINE

## 2023-05-09 PROCEDURE — 80053 COMPREHEN METABOLIC PANEL: CPT | Performed by: INTERNAL MEDICINE

## 2023-05-09 PROCEDURE — 82570 ASSAY OF URINE CREATININE: CPT | Performed by: INTERNAL MEDICINE

## 2023-05-09 PROCEDURE — 85025 COMPLETE CBC W/AUTO DIFF WBC: CPT | Performed by: INTERNAL MEDICINE

## 2023-05-09 PROCEDURE — 85652 RBC SED RATE AUTOMATED: CPT | Performed by: INTERNAL MEDICINE

## 2023-05-10 LAB
ALBUMIN/CREAT UR: 17 UG/MG (ref 0–30)
CREAT UR-MCNC: 47 MG/DL (ref 23–375)
MICROALBUMIN UR DL<=1MG/L-MCNC: 8 UG/ML

## 2023-05-11 ENCOUNTER — SPECIALTY PHARMACY (OUTPATIENT)
Dept: PHARMACY | Facility: CLINIC | Age: 58
End: 2023-05-11
Payer: COMMERCIAL

## 2023-05-11 NOTE — TELEPHONE ENCOUNTER
Specialty Pharmacy - Refill Coordination    Specialty Medication Orders Linked to Encounter      Flowsheet Row Most Recent Value   Medication #1 etanercept (ENBREL SURECLICK) 50 mg/mL (1 mL) (Order#[id number], Rx#[id number])            Refill Questions - Documented Responses      Flowsheet Row Most Recent Value   Refill Screening Questions    Changes to allergies? No   Changes to medications? No   New conditions since last clinic visit? No   Unplanned office visit, urgent care, ED, or hospital admission in the last 4 weeks? No   How does patient/caregiver feel medication is working? Excellent   Financial problems or insurance changes? No   How many doses of your specialty medications were missed in the last 4 weeks? 0   Would patient like to speak to a pharmacist? No   When does the patient need to receive the medication? 05/21/23   Refill Delivery Questions    How will the patient receive the medication? MEDRx   When does the patient need to receive the medication? 05/21/23   Shipping Address Home   Address in [facility] confirmed and updated if neccessary? Yes   Expected Copay ($) 80   Is the patient able to afford the medication copay? Yes   Payment Method zero copay   Days supply of Refill 28   Supplies needed? No supplies needed   Refill activity completed? Yes   Refill activity plan Refill scheduled   Shipment/Pickup Date: 05/18/23            Current Outpatient Medications   Medication Sig    allopurinoL (ZYLOPRIM) 300 MG tablet Take 1 tablet (300 mg total) by mouth once daily.    amLODIPine (NORVASC) 10 MG tablet Take 1 tablet by mouth once daily    atorvastatin (LIPITOR) 20 MG tablet Take 1 tablet (20 mg total) by mouth once daily.    ergocalciferol (ERGOCALCIFEROL) 50,000 unit Cap Take 1 capsule by mouth once a week    etanercept (ENBREL SURECLICK) 50 mg/mL (1 mL) Inject 1 mL (50 mg total) into the skin once a week.    folic acid (FOLVITE) 1 MG tablet Take 1 tablet (1 mg total) by mouth once daily.  "   metFORMIN (GLUCOPHAGE) 500 MG tablet Take 1 tablet by mouth once daily with breakfast    methotrexate 2.5 MG Tab Take 6 tablets (15 mg total) by mouth every 7 days.    needle, disp, 22 G 22 gauge x 1 1/2" Ndle 1 Units by Misc.(Non-Drug; Combo Route) route every 21 days.    olmesartan-hydrochlorothiazide (BENICAR HCT) 20-12.5 mg per tablet Take 1 tablet by mouth once daily.    traMADoL (ULTRAM) 50 mg tablet Take 1 tablet (50 mg total) by mouth every 6 (six) hours as needed for Pain.   Last reviewed on 4/24/2023  1:15 PM by Charleen Pascual MA    Review of patient's allergies indicates:   Allergen Reactions    Naproxen Swelling    Last reviewed on  4/24/2023 9:32 AM by Charleen Pascual      Tasks added this encounter   No tasks added.   Tasks due within next 3 months   5/16/2023 - Refill Coordination Outreach (1 time occurrence)     Neto Zuniga, PharmD  [facility]  [address] [place], [place] [address]  [facility] [address]  Phone: [phone]  Fax: [phone]        "

## 2023-05-16 ENCOUNTER — OFFICE VISIT (OUTPATIENT)
Dept: RHEUMATOLOGY | Facility: CLINIC | Age: 58
End: 2023-05-16
Payer: COMMERCIAL

## 2023-05-16 ENCOUNTER — LAB VISIT (OUTPATIENT)
Dept: LAB | Facility: HOSPITAL | Age: 58
End: 2023-05-16
Attending: PHYSICIAN ASSISTANT
Payer: COMMERCIAL

## 2023-05-16 VITALS
HEART RATE: 66 BPM | DIASTOLIC BLOOD PRESSURE: 75 MMHG | SYSTOLIC BLOOD PRESSURE: 156 MMHG | WEIGHT: 315 LBS | HEIGHT: 63 IN | BODY MASS INDEX: 55.81 KG/M2

## 2023-05-16 DIAGNOSIS — R79.82 ELEVATED C-REACTIVE PROTEIN (CRP): ICD-10-CM

## 2023-05-16 DIAGNOSIS — M06.09 RHEUMATOID ARTHRITIS OF MULTIPLE SITES WITH NEGATIVE RHEUMATOID FACTOR: Primary | ICD-10-CM

## 2023-05-16 DIAGNOSIS — R70.0 ELEVATED SED RATE: ICD-10-CM

## 2023-05-16 DIAGNOSIS — Z51.81 MEDICATION MONITORING ENCOUNTER: ICD-10-CM

## 2023-05-16 DIAGNOSIS — M06.09 RHEUMATOID ARTHRITIS OF MULTIPLE SITES WITH NEGATIVE RHEUMATOID FACTOR: ICD-10-CM

## 2023-05-16 DIAGNOSIS — R09.81 SINUS CONGESTION: ICD-10-CM

## 2023-05-16 DIAGNOSIS — M1A.09X0 IDIOPATHIC CHRONIC GOUT OF MULTIPLE SITES WITHOUT TOPHUS: ICD-10-CM

## 2023-05-16 DIAGNOSIS — D84.9 IMMUNOCOMPROMISED: ICD-10-CM

## 2023-05-16 DIAGNOSIS — Z79.899 HIGH RISK MEDICATION USE: ICD-10-CM

## 2023-05-16 LAB
CRP SERPL-MCNC: 30 MG/L (ref 0–8.2)
ERYTHROCYTE [SEDIMENTATION RATE] IN BLOOD BY PHOTOMETRIC METHOD: 65 MM/HR (ref 0–23)

## 2023-05-16 PROCEDURE — 3061F PR NEG MICROALBUMINURIA RESULT DOCUMENTED/REVIEW: ICD-10-PCS | Mod: CPTII,S$GLB,, | Performed by: PHYSICIAN ASSISTANT

## 2023-05-16 PROCEDURE — 99215 OFFICE O/P EST HI 40 MIN: CPT | Mod: S$GLB,,, | Performed by: PHYSICIAN ASSISTANT

## 2023-05-16 PROCEDURE — 3078F DIAST BP <80 MM HG: CPT | Mod: CPTII,S$GLB,, | Performed by: PHYSICIAN ASSISTANT

## 2023-05-16 PROCEDURE — 1160F PR REVIEW ALL MEDS BY PRESCRIBER/CLIN PHARMACIST DOCUMENTED: ICD-10-PCS | Mod: CPTII,S$GLB,, | Performed by: PHYSICIAN ASSISTANT

## 2023-05-16 PROCEDURE — 3066F PR DOCUMENTATION OF TREATMENT FOR NEPHROPATHY: ICD-10-PCS | Mod: CPTII,S$GLB,, | Performed by: PHYSICIAN ASSISTANT

## 2023-05-16 PROCEDURE — 3078F PR MOST RECENT DIASTOLIC BLOOD PRESSURE < 80 MM HG: ICD-10-PCS | Mod: CPTII,S$GLB,, | Performed by: PHYSICIAN ASSISTANT

## 2023-05-16 PROCEDURE — 3077F PR MOST RECENT SYSTOLIC BLOOD PRESSURE >= 140 MM HG: ICD-10-PCS | Mod: CPTII,S$GLB,, | Performed by: PHYSICIAN ASSISTANT

## 2023-05-16 PROCEDURE — 86334 IMMUNOFIX E-PHORESIS SERUM: CPT | Mod: 26,,, | Performed by: PATHOLOGY

## 2023-05-16 PROCEDURE — 3061F NEG MICROALBUMINURIA REV: CPT | Mod: CPTII,S$GLB,, | Performed by: PHYSICIAN ASSISTANT

## 2023-05-16 PROCEDURE — 84165 PROTEIN E-PHORESIS SERUM: CPT | Mod: 26,,, | Performed by: PATHOLOGY

## 2023-05-16 PROCEDURE — 86334 IMMUNOFIX E-PHORESIS SERUM: CPT | Performed by: PHYSICIAN ASSISTANT

## 2023-05-16 PROCEDURE — 86334 PATHOLOGIST INTERPRETATION IFE: ICD-10-PCS | Mod: 26,,, | Performed by: PATHOLOGY

## 2023-05-16 PROCEDURE — 84165 PROTEIN E-PHORESIS SERUM: CPT | Performed by: PHYSICIAN ASSISTANT

## 2023-05-16 PROCEDURE — 3044F PR MOST RECENT HEMOGLOBIN A1C LEVEL <7.0%: ICD-10-PCS | Mod: CPTII,S$GLB,, | Performed by: PHYSICIAN ASSISTANT

## 2023-05-16 PROCEDURE — 86140 C-REACTIVE PROTEIN: CPT | Performed by: PHYSICIAN ASSISTANT

## 2023-05-16 PROCEDURE — 99215 PR OFFICE/OUTPT VISIT, EST, LEVL V, 40-54 MIN: ICD-10-PCS | Mod: S$GLB,,, | Performed by: PHYSICIAN ASSISTANT

## 2023-05-16 PROCEDURE — 36415 COLL VENOUS BLD VENIPUNCTURE: CPT | Performed by: PHYSICIAN ASSISTANT

## 2023-05-16 PROCEDURE — 1159F PR MEDICATION LIST DOCUMENTED IN MEDICAL RECORD: ICD-10-PCS | Mod: CPTII,S$GLB,, | Performed by: PHYSICIAN ASSISTANT

## 2023-05-16 PROCEDURE — 1159F MED LIST DOCD IN RCRD: CPT | Mod: CPTII,S$GLB,, | Performed by: PHYSICIAN ASSISTANT

## 2023-05-16 PROCEDURE — 3044F HG A1C LEVEL LT 7.0%: CPT | Mod: CPTII,S$GLB,, | Performed by: PHYSICIAN ASSISTANT

## 2023-05-16 PROCEDURE — 99999 PR PBB SHADOW E&M-EST. PATIENT-LVL IV: ICD-10-PCS | Mod: PBBFAC,,, | Performed by: PHYSICIAN ASSISTANT

## 2023-05-16 PROCEDURE — 3008F BODY MASS INDEX DOCD: CPT | Mod: CPTII,S$GLB,, | Performed by: PHYSICIAN ASSISTANT

## 2023-05-16 PROCEDURE — 1160F RVW MEDS BY RX/DR IN RCRD: CPT | Mod: CPTII,S$GLB,, | Performed by: PHYSICIAN ASSISTANT

## 2023-05-16 PROCEDURE — 3008F PR BODY MASS INDEX (BMI) DOCUMENTED: ICD-10-PCS | Mod: CPTII,S$GLB,, | Performed by: PHYSICIAN ASSISTANT

## 2023-05-16 PROCEDURE — 84165 PATHOLOGIST INTERPRETATION SPE: ICD-10-PCS | Mod: 26,,, | Performed by: PATHOLOGY

## 2023-05-16 PROCEDURE — 3077F SYST BP >= 140 MM HG: CPT | Mod: CPTII,S$GLB,, | Performed by: PHYSICIAN ASSISTANT

## 2023-05-16 PROCEDURE — 85652 RBC SED RATE AUTOMATED: CPT | Performed by: PHYSICIAN ASSISTANT

## 2023-05-16 PROCEDURE — 99999 PR PBB SHADOW E&M-EST. PATIENT-LVL IV: CPT | Mod: PBBFAC,,, | Performed by: PHYSICIAN ASSISTANT

## 2023-05-16 PROCEDURE — 3066F NEPHROPATHY DOC TX: CPT | Mod: CPTII,S$GLB,, | Performed by: PHYSICIAN ASSISTANT

## 2023-05-16 RX ORDER — METHOTREXATE 2.5 MG/1
15 TABLET ORAL
Qty: 72 TABLET | Refills: 1 | Status: SHIPPED | OUTPATIENT
Start: 2023-05-16 | End: 2023-11-17 | Stop reason: SDUPTHER

## 2023-05-16 NOTE — PROGRESS NOTES
Subjective:      Patient ID: Juan C Rodriguez is a 57 y.o. male.    Chief Complaint: Disease Management and Rheumatoid Arthritis      HPI   Juan C Rodriguez  is a 57 y.o. male here for follow-up seronegative RA and overlapping gout.  Patient on methotrexate 15 mg weekly, folic acid 1 mg daily and Enbrel weekly.  From RA perspective he states he is doing very well.  He is more than 50% improved.  Denies prolonged morning stiffness.  Denies joint effusions.  He is complaining of some pain in the knees and ankles.  Within the 1st few steps it eases up significantly.  Tends to worsen with prolonged activity.  Has had an injection in the knee in the past which was helpful for him.    No acute gout flares recently.  On allopurinol 300 mg daily.  Not taking any colchicine.      Patient denies fevers, chills, photosensitivity, eye pain, shortness of breath, chest pain, hematuria, blood in the stool, rash, sicca symptoms, raynauds, finger ulcerations.  Rheumatologic systems otherwise negative.    Serologies/Labs:  Neg CHAD, RF, CCP  Chronically elevated ESR/CRP  Current Treatment:  MTX 15 mg weekly  Folic acid 1 mg daily  Enbrel q week  Previous Treatment:   na      Current Outpatient Medications:     allopurinoL (ZYLOPRIM) 300 MG tablet, Take 1 tablet (300 mg total) by mouth once daily., Disp: 90 tablet, Rfl: 1    amLODIPine (NORVASC) 10 MG tablet, Take 1 tablet by mouth once daily, Disp: 90 tablet, Rfl: 3    atorvastatin (LIPITOR) 20 MG tablet, Take 1 tablet (20 mg total) by mouth once daily., Disp: 90 tablet, Rfl: 3    ergocalciferol (ERGOCALCIFEROL) 50,000 unit Cap, Take 1 capsule by mouth once a week, Disp: 12 capsule, Rfl: 0    etanercept (ENBREL SURECLICK) 50 mg/mL (1 mL), Inject 1 mL (50 mg total) into the skin once a week., Disp: 4 mL, Rfl: 11    folic acid (FOLVITE) 1 MG tablet, Take 1 tablet (1 mg total) by mouth once daily., Disp: 90 tablet, Rfl: 3    metFORMIN (GLUCOPHAGE) 500 MG  "tablet, Take 1 tablet by mouth once daily with breakfast, Disp: 90 tablet, Rfl: 0    needle, disp, 22 G 22 gauge x 1 1/2" Ndle, 1 Units by Misc.(Non-Drug; Combo Route) route every 21 days., Disp: 100 each, Rfl: 99    olmesartan-hydrochlorothiazide (BENICAR HCT) 20-12.5 mg per tablet, Take 1 tablet by mouth once daily., Disp: 90 tablet, Rfl: 3    traMADoL (ULTRAM) 50 mg tablet, Take 1 tablet (50 mg total) by mouth every 6 (six) hours as needed for Pain., Disp: 28 tablet, Rfl: 0    methotrexate 2.5 MG Tab, Take 6 tablets (15 mg total) by mouth every 7 days., Disp: 72 tablet, Rfl: 1    Past Medical History:   Diagnosis Date    Diabetes mellitus, type 2 diagnosed 2013    DM (diabetes mellitus)     2011  02/25/2014    HTN (hypertension)     Hypogonadism, testicular     Left tibialis posterior tendinitis 11/30/2016    Obesities, morbid      Family History   Problem Relation Age of Onset    Cataracts Mother     Diabetes Mother     Hypertension Mother     Diabetes Sister      Social History     Socioeconomic History    Marital status: Single   Tobacco Use    Smoking status: Some Days     Types: Cigars    Smokeless tobacco: Never   Substance and Sexual Activity    Alcohol use: Yes     Alcohol/week: 3.0 - 4.0 standard drinks     Types: 3 - 4 Cans of beer per week     Comment: occasional weekends    Drug use: No    Sexual activity: Not Currently     Partners: Female     Review of patient's allergies indicates:   Allergen Reactions    Naproxen Swelling       Objective:   BP (!) 156/75   Pulse 66   Ht 5' 3" (1.6 m)   Wt (!) 146.6 kg (323 lb 3.1 oz)   BMI 57.25 kg/m²   Immunization History   Administered Date(s) Administered    COVID-19 MRNA, LN-S PF (MODERNA HALF 0.25 ML DOSE) 01/03/2022    COVID-19, MRNA, LN-S, PF (MODERNA FULL 0.5 ML DOSE) 03/05/2021, 04/01/2021    Influenza 12/06/2010    Influenza - Quadrivalent - PF *Preferred* (6 months and older) 12/08/2021, 12/06/2022    Pneumococcal Conjugate - 13 Valent " 07/01/2020    Pneumococcal Polysaccharide - 23 Valent 07/09/2018    Tdap 08/14/2012       Physical Exam   Constitutional: He is oriented to person, place, and time. No distress.   HENT:   Head: Normocephalic and atraumatic.   Pulmonary/Chest: Effort normal.   Musculoskeletal:         General: Normal range of motion.   Neurological: He is alert and oriented to person, place, and time.   Psychiatric: His behavior is normal. Mood normal.   Nursing note and vitals reviewed.  No synovitis, no dactylitis, no enthesitis  No effusions of large or small joints  100% fist formation  Well preserved ROM        Recent Results (from the past 672 hour(s))   Lipid Panel    Collection Time: 04/24/23  1:39 PM   Result Value Ref Range    Cholesterol 189 120 - 199 mg/dL    Triglycerides 65 30 - 150 mg/dL    HDL 70 40 - 75 mg/dL    LDL Cholesterol 106.0 63.0 - 159.0 mg/dL    HDL/Cholesterol Ratio 37.0 20.0 - 50.0 %    Total Cholesterol/HDL Ratio 2.7 2.0 - 5.0    Non-HDL Cholesterol 119 mg/dL   Hemoglobin A1C    Collection Time: 04/24/23  1:39 PM   Result Value Ref Range    Hemoglobin A1C 5.7 (H) 4.0 - 5.6 %    Estimated Avg Glucose 117 68 - 131 mg/dL   PSA, Screening    Collection Time: 04/24/23  1:39 PM   Result Value Ref Range    PSA, Screen 0.13 0.00 - 4.00 ng/mL   Microalbumin/Creatinine Ratio, Urine    Collection Time: 05/09/23 10:34 AM   Result Value Ref Range    Microalbumin, Urine 8.0 ug/mL    Creatinine, Urine 47.0 23.0 - 375.0 mg/dL    Microalb/Creat Ratio 17.0 0.0 - 30.0 ug/mg   CBC Auto Differential    Collection Time: 05/09/23 10:42 AM   Result Value Ref Range    WBC 7.64 3.90 - 12.70 K/uL    RBC 4.24 (L) 4.60 - 6.20 M/uL    Hemoglobin 11.1 (L) 14.0 - 18.0 g/dL    Hematocrit 35.1 (L) 40.0 - 54.0 %    MCV 83 82 - 98 fL    MCH 26.2 (L) 27.0 - 31.0 pg    MCHC 31.6 (L) 32.0 - 36.0 g/dL    RDW 17.8 (H) 11.5 - 14.5 %    Platelets 220 150 - 450 K/uL    MPV 9.5 9.2 - 12.9 fL    Immature Granulocytes 0.7 (H) 0.0 - 0.5 %    Gran #  (ANC) 4.3 1.8 - 7.7 K/uL    Immature Grans (Abs) 0.05 (H) 0.00 - 0.04 K/uL    Lymph # 2.6 1.0 - 4.8 K/uL    Mono # 0.5 0.3 - 1.0 K/uL    Eos # 0.2 0.0 - 0.5 K/uL    Baso # 0.08 0.00 - 0.20 K/uL    nRBC 0 0 /100 WBC    Gran % 56.0 38.0 - 73.0 %    Lymph % 33.5 18.0 - 48.0 %    Mono % 6.8 4.0 - 15.0 %    Eosinophil % 2.0 0.0 - 8.0 %    Basophil % 1.0 0.0 - 1.9 %    Differential Method Automated    Comprehensive Metabolic Panel    Collection Time: 05/09/23 10:42 AM   Result Value Ref Range    Sodium 141 136 - 145 mmol/L    Potassium 3.7 3.5 - 5.1 mmol/L    Chloride 103 95 - 110 mmol/L    CO2 30 (H) 23 - 29 mmol/L    Glucose 98 70 - 110 mg/dL    BUN 18 6 - 20 mg/dL    Creatinine 1.3 0.5 - 1.4 mg/dL    Calcium 9.3 8.7 - 10.5 mg/dL    Total Protein 7.4 6.0 - 8.4 g/dL    Albumin 3.6 3.5 - 5.2 g/dL    Total Bilirubin 0.5 0.1 - 1.0 mg/dL    Alkaline Phosphatase 113 55 - 135 U/L    AST 23 10 - 40 U/L    ALT 25 10 - 44 U/L    Anion Gap 8 8 - 16 mmol/L    eGFR >60 >60 mL/min/1.73 m^2   C-Reactive Protein    Collection Time: 05/09/23 10:42 AM   Result Value Ref Range    CRP 43.0 (H) 0.0 - 8.2 mg/L   Sedimentation rate    Collection Time: 05/09/23 10:42 AM   Result Value Ref Range    Sed Rate 74 (H) 0 - 23 mm/Hr   C-Reactive Protein    Collection Time: 05/16/23 10:52 AM   Result Value Ref Range    CRP 30.0 (H) 0.0 - 8.2 mg/L       Lab Results   Component Value Date    TBGOLDPLUS Negative 11/11/2022      Lab Results   Component Value Date    HEPAIGM Negative 07/01/2020    HEPBIGM Negative 07/01/2020    HEPBCAB Non-reactive 11/11/2022    HEPCAB Non-reactive 11/11/2022        Imaging  I have personally reviewed images and reports as below.  I agree with the interpretation.  X-ray Knee Ortho Right  Narrative: EXAM:  XR KNEE ORTHO RIGHT    CLINICAL HISTORY:    Right knee joint pain    COMPARISON: None.    FINDINGS:    Mild medial joint space narrowing.  Minor marginal spurring.    Minor patellofemoral joint spurring.    There is  however large calcifications or spurring at the distal quadriceps tendon insertion as well as of the patellar ligament insertion anteriorly.    No evidence of joint effusion.  Impression:  Patellar spurring.  Minor degenerative joint disease.    Finalized on: 1/18/2023 12:59 PM By:  Dustin Huggins MD  [facility]# [id number]      2023-01-18 13:01:[id number]    [facility]        Assessment:     1. Rheumatoid arthritis of multiple sites with negative rheumatoid factor    2. Idiopathic chronic gout of multiple sites without tophus    3. Sinus congestion    4. Immunocompromised    5. Medication monitoring encounter    6. High risk medication use    7. Elevated C-reactive protein (CRP)    8. Elevated sed rate            Plan:     Juan C was seen today for disease management and rheumatoid arthritis.    Diagnoses and all orders for this visit:    Rheumatoid arthritis of multiple sites with negative rheumatoid factor  -     CBC Auto Differential; Standing  -     Comprehensive Metabolic Panel; Standing  -     Sedimentation rate; Standing  -     C-Reactive Protein; Standing  -     methotrexate 2.5 MG Tab; Take 6 tablets (15 mg total) by mouth every 7 days.    Idiopathic chronic gout of multiple sites without tophus  -     Comprehensive Metabolic Panel; Standing  -     Uric Acid; Standing    Sinus congestion  -     Ambulatory referral/consult to ENT; Future    Immunocompromised    Medication monitoring encounter  -     CBC Auto Differential; Standing  -     Comprehensive Metabolic Panel; Standing  -     Sedimentation rate; Standing  -     C-Reactive Protein; Standing    High risk medication use    Elevated C-reactive protein (CRP)  -     Protein Electrophoresis, Serum; Future  -     Immunofixation Electrophoresis; Future  -     Ambulatory referral/consult to ENT; Future    Elevated sed rate  -     Protein Electrophoresis, Serum; Future  -     Immunofixation Electrophoresis; Future  -     Ambulatory referral/consult to ENT;  Future        Seronegative RA  Labs reviewed as above  ESR 74  CRP improved - now 43  CBC/CMP stable  Recheck ESR/CRP today.  Add Spep and VELASQUEZ  50% improvement on Enbrel + MTX - continue same  No active synovitis on exam today  Hepatitis and TB labs updated 11/2022  Gout  GFR > 60  C/w allopurinal 300 mg daily  Consider addition of colcicine 0.6 mg daily  Check Uric acid next lab draw  No active gout attack at present  Elevated CRP/ESR  Clinically joints doing well per report  SPEP and VELASQUEZ as above  C/o sinus congestion x 1 mos  ENT referral  If need abx, pt advised to hold enbrel to cleared  Consider CT chest/abd/pelvis   Drug therapy requiring intensive monitoring for toxicity  High Risk Medication Monitoring encounter  No current medication related issues, no evidence of toxicity  I ordered labs for toxicity monitoring, have personally reviewed the findings, and discussed them with the patient.  Pending labs will be sent via the portal  Compromised immune system secondary to autoimmune disease and/or use of immunosuppressive drugs.  Monitor carefully for infections.  Advised patient to get immediate medical care if any infection arises.  Also advised strict adherence age-appropriate vaccinations and cancer screenings with PCP.  Patient advised to hold DMARD and/or biologic therapy for signs of infection or for surgery. If you are unsure what to do please call our office for instruction.[facility] [phone]  Return to clinic:  3 months with Reg 4 and uric acid    45 minutes of total time spent on the encounter, which includes face to face time and non-face to face time preparing to see the patient (eg, review of tests), Obtaining and/or reviewing separately obtained history, Documenting clinical information in the electronic or other health record, Independently interpreting results (not separately reported) and communicating results to the patient/family/caregiver, or Care coordination (not  separately reported).     Follow up in about 3 months (around 8/16/2023).    The patient understands, chooses and consents to this plan and accepts all the risks which include but are not limited to the risks mentioned above.     Disclaimer: This note was prepared using a voice recognition system and is likely to have sound alike errors within the text.

## 2023-05-17 LAB
ALBUMIN SERPL ELPH-MCNC: 3.59 G/DL (ref 3.35–5.55)
ALPHA1 GLOB SERPL ELPH-MCNC: 0.32 G/DL (ref 0.17–0.41)
ALPHA2 GLOB SERPL ELPH-MCNC: 0.72 G/DL (ref 0.43–0.99)
B-GLOBULIN SERPL ELPH-MCNC: 0.86 G/DL (ref 0.5–1.1)
GAMMA GLOB SERPL ELPH-MCNC: 1.41 G/DL (ref 0.67–1.58)
INTERPRETATION SERPL IFE-IMP: NORMAL
PATHOLOGIST INTERPRETATION IFE: NORMAL
PROT SERPL-MCNC: 6.9 G/DL (ref 6–8.4)

## 2023-05-18 LAB — PATHOLOGIST INTERPRETATION SPE: NORMAL

## 2023-05-22 ENCOUNTER — TELEPHONE (OUTPATIENT)
Dept: RHEUMATOLOGY | Facility: CLINIC | Age: 58
End: 2023-05-22
Payer: COMMERCIAL

## 2023-05-22 NOTE — TELEPHONE ENCOUNTER
Left message.  Inflammation markers showing slow improvement.  Blood proteins are normal.  Please have him continue treatment as discussed and follow-up with labs as scheduled.

## 2023-05-22 NOTE — TELEPHONE ENCOUNTER
----- Message from Rox Koch sent at 5/22/2023  1:17 PM CDT -----  Contact: Juan C  .Type:  Patient Returning Call    Who Called:Juan C  Who Left Message for Patient:Samuel  Does the patient know what this is regarding?:possible results   Would the patient rather a call back or a response via [facility]? call  Best Call Back Number:.[phone]   Additional Information:   Thanks  Lr

## 2023-05-22 NOTE — TELEPHONE ENCOUNTER
Inflammation markers showing slow improvement.  Blood proteins are normal.  Please have him continue treatment as discussed and follow-up with labs as scheduled.  Patient had no questions or concerns

## 2023-05-22 NOTE — TELEPHONE ENCOUNTER
----- Message from Krysten Cruz PA-C sent at 5/18/2023 10:53 AM CDT -----  Inflammation markers showing slow improvement.  Blood proteins are normal.  Please have him continue treatment as discussed and follow-up with labs as scheduled.

## 2023-06-26 DIAGNOSIS — M06.09 RHEUMATOID ARTHRITIS OF MULTIPLE SITES WITH NEGATIVE RHEUMATOID FACTOR: ICD-10-CM

## 2023-06-26 RX ORDER — ETANERCEPT 50 MG/ML
50 SOLUTION SUBCUTANEOUS WEEKLY
Qty: 4 ML | Refills: 11 | Status: ACTIVE | OUTPATIENT
Start: 2023-06-26 | End: 2024-06-25

## 2023-06-30 ENCOUNTER — SPECIALTY PHARMACY (OUTPATIENT)
Dept: PHARMACY | Facility: CLINIC | Age: 58
End: 2023-06-30
Payer: COMMERCIAL

## 2023-06-30 NOTE — TELEPHONE ENCOUNTER
Pt called to refill Enbrel. 340B escalation needed. Able to fill at [facility], no answer when I called pt back.

## 2023-07-13 NOTE — TELEPHONE ENCOUNTER
Specialty Pharmacy - Refill Coordination    Specialty Medication Orders Linked to Encounter      Flowsheet Row Most Recent Value   Medication #1 etanercept (ENBREL SURECLICK) 50 mg/mL (1 mL) (Order#[id number], Rx#[id number])          Refill Questions - Documented Responses      Flowsheet Row Most Recent Value   Patient Availability and HIPAA Verification    Does patient want to proceed with activity? Unable to Reach          We have had multiple attempts to the patient and have been unsuccessful to reach the patient. We will stop reaching out to the patient but in the event that the patient needs the med and contacts us, we will communicate and begin dispensing for the patient. At your next visit with the patient, please review the importance of being in contact with our specialty pharmacy as a part of our care team.      Tara Hahn, PharmD  [facility]  [address] [address]  Phone: [phone]  Fax: [phone]

## 2023-07-23 DIAGNOSIS — I10 ESSENTIAL HYPERTENSION: ICD-10-CM

## 2023-07-23 DIAGNOSIS — E55.9 VITAMIN D DEFICIENCY: ICD-10-CM

## 2023-07-23 NOTE — TELEPHONE ENCOUNTER
Refill Routing Note   Medication(s) are not appropriate for processing by [facility] for the following reason(s):      Medication outside of protocol  Required vitals abnormal  Drug-disease interaction    [facility] action(s):  Defer  Route Care Due:  None identified     Medication Therapy Plan: Drug-Disease: metFORMIN and Stage 3 chronic kidney disease      Appointments  past 12m or future 3m with PCP    Date Provider   Last Visit   4/24/2023 Reza Michelle MD   Next Visit   8/24/2023 Reza Michelle MD   ED visits in past 90 days: 0        Note composed:5:33 PM 07/23/2023

## 2023-07-24 RX ORDER — METFORMIN HYDROCHLORIDE 500 MG/1
TABLET ORAL
Qty: 90 TABLET | Refills: 0 | Status: SHIPPED | OUTPATIENT
Start: 2023-07-24 | End: 2023-11-06 | Stop reason: SDUPTHER

## 2023-07-24 RX ORDER — ERGOCALCIFEROL 1.25 MG/1
CAPSULE ORAL
Qty: 12 CAPSULE | Refills: 0 | Status: SHIPPED | OUTPATIENT
Start: 2023-07-24 | End: 2023-10-06

## 2023-07-24 RX ORDER — AMLODIPINE BESYLATE 10 MG/1
TABLET ORAL
Qty: 90 TABLET | Refills: 0 | Status: SHIPPED | OUTPATIENT
Start: 2023-07-24 | End: 2023-12-14

## 2023-07-24 RX ORDER — OLMESARTAN MEDOXOMIL AND HYDROCHLOROTHIAZIDE 20/12.5 20; 12.5 MG/1; MG/1
1 TABLET ORAL DAILY
Qty: 90 TABLET | Refills: 0 | Status: SHIPPED | OUTPATIENT
Start: 2023-07-24 | End: 2023-11-06 | Stop reason: SDUPTHER

## 2023-08-09 ENCOUNTER — LAB VISIT (OUTPATIENT)
Dept: LAB | Facility: HOSPITAL | Age: 58
End: 2023-08-09
Attending: INTERNAL MEDICINE
Payer: COMMERCIAL

## 2023-08-09 DIAGNOSIS — M1A.09X0 IDIOPATHIC CHRONIC GOUT OF MULTIPLE SITES WITHOUT TOPHUS: ICD-10-CM

## 2023-08-09 DIAGNOSIS — M06.09 RHEUMATOID ARTHRITIS OF MULTIPLE SITES WITH NEGATIVE RHEUMATOID FACTOR: ICD-10-CM

## 2023-08-09 DIAGNOSIS — Z51.81 MEDICATION MONITORING ENCOUNTER: ICD-10-CM

## 2023-08-09 LAB
ALBUMIN SERPL BCP-MCNC: 3.3 G/DL (ref 3.5–5.2)
ALP SERPL-CCNC: 110 U/L (ref 55–135)
ALT SERPL W/O P-5'-P-CCNC: 18 U/L (ref 10–44)
ANION GAP SERPL CALC-SCNC: 11 MMOL/L (ref 8–16)
AST SERPL-CCNC: 21 U/L (ref 10–40)
BASOPHILS # BLD AUTO: 0.08 K/UL (ref 0–0.2)
BASOPHILS NFR BLD: 0.9 % (ref 0–1.9)
BILIRUB SERPL-MCNC: 0.3 MG/DL (ref 0.1–1)
BUN SERPL-MCNC: 16 MG/DL (ref 6–20)
CALCIUM SERPL-MCNC: 9.1 MG/DL (ref 8.7–10.5)
CHLORIDE SERPL-SCNC: 101 MMOL/L (ref 95–110)
CO2 SERPL-SCNC: 27 MMOL/L (ref 23–29)
CREAT SERPL-MCNC: 1.3 MG/DL (ref 0.5–1.4)
CRP SERPL-MCNC: 41.7 MG/L (ref 0–8.2)
DIFFERENTIAL METHOD: ABNORMAL
EOSINOPHIL # BLD AUTO: 0.1 K/UL (ref 0–0.5)
EOSINOPHIL NFR BLD: 1.5 % (ref 0–8)
ERYTHROCYTE [DISTWIDTH] IN BLOOD BY AUTOMATED COUNT: 15.9 % (ref 11.5–14.5)
ERYTHROCYTE [SEDIMENTATION RATE] IN BLOOD BY WESTERGREN METHOD: 47 MM/HR (ref 0–10)
EST. GFR  (NO RACE VARIABLE): >60 ML/MIN/1.73 M^2
GLUCOSE SERPL-MCNC: 90 MG/DL (ref 70–110)
HCT VFR BLD AUTO: 36.7 % (ref 40–54)
HGB BLD-MCNC: 11.6 G/DL (ref 14–18)
IMM GRANULOCYTES # BLD AUTO: 0.06 K/UL (ref 0–0.04)
IMM GRANULOCYTES NFR BLD AUTO: 0.7 % (ref 0–0.5)
LYMPHOCYTES # BLD AUTO: 2.5 K/UL (ref 1–4.8)
LYMPHOCYTES NFR BLD: 28.8 % (ref 18–48)
MCH RBC QN AUTO: 25.3 PG (ref 27–31)
MCHC RBC AUTO-ENTMCNC: 31.6 G/DL (ref 32–36)
MCV RBC AUTO: 80 FL (ref 82–98)
MONOCYTES # BLD AUTO: 0.8 K/UL (ref 0.3–1)
MONOCYTES NFR BLD: 9.1 % (ref 4–15)
NEUTROPHILS # BLD AUTO: 5.1 K/UL (ref 1.8–7.7)
NEUTROPHILS NFR BLD: 59 % (ref 38–73)
NRBC BLD-RTO: 0 /100 WBC
PLATELET # BLD AUTO: 254 K/UL (ref 150–450)
PMV BLD AUTO: 9.8 FL (ref 9.2–12.9)
POTASSIUM SERPL-SCNC: 3.5 MMOL/L (ref 3.5–5.1)
PROT SERPL-MCNC: 7.3 G/DL (ref 6–8.4)
RBC # BLD AUTO: 4.59 M/UL (ref 4.6–6.2)
SODIUM SERPL-SCNC: 139 MMOL/L (ref 136–145)
URATE SERPL-MCNC: 6.6 MG/DL (ref 3.4–7)
WBC # BLD AUTO: 8.6 K/UL (ref 3.9–12.7)

## 2023-08-09 PROCEDURE — 86140 C-REACTIVE PROTEIN: CPT | Performed by: PHYSICIAN ASSISTANT

## 2023-08-09 PROCEDURE — 85025 COMPLETE CBC W/AUTO DIFF WBC: CPT | Performed by: PHYSICIAN ASSISTANT

## 2023-08-09 PROCEDURE — 84550 ASSAY OF BLOOD/URIC ACID: CPT | Performed by: PHYSICIAN ASSISTANT

## 2023-08-09 PROCEDURE — 85651 RBC SED RATE NONAUTOMATED: CPT | Performed by: PHYSICIAN ASSISTANT

## 2023-08-09 PROCEDURE — 36415 COLL VENOUS BLD VENIPUNCTURE: CPT | Mod: PO | Performed by: PHYSICIAN ASSISTANT

## 2023-08-09 PROCEDURE — 80053 COMPREHEN METABOLIC PANEL: CPT | Performed by: PHYSICIAN ASSISTANT

## 2023-08-16 ENCOUNTER — OFFICE VISIT (OUTPATIENT)
Dept: RHEUMATOLOGY | Facility: CLINIC | Age: 58
End: 2023-08-16
Payer: COMMERCIAL

## 2023-08-16 ENCOUNTER — HOSPITAL ENCOUNTER (OUTPATIENT)
Dept: RADIOLOGY | Facility: HOSPITAL | Age: 58
Discharge: HOME | End: 2023-08-16
Attending: PHYSICIAN ASSISTANT
Payer: COMMERCIAL

## 2023-08-16 VITALS
DIASTOLIC BLOOD PRESSURE: 77 MMHG | HEART RATE: 56 BPM | HEIGHT: 63 IN | WEIGHT: 315 LBS | BODY MASS INDEX: 55.81 KG/M2 | SYSTOLIC BLOOD PRESSURE: 152 MMHG

## 2023-08-16 DIAGNOSIS — Z51.81 MEDICATION MONITORING ENCOUNTER: ICD-10-CM

## 2023-08-16 DIAGNOSIS — Z79.899 IMMUNOCOMPROMISED STATE DUE TO DRUG THERAPY: ICD-10-CM
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
## 2023-08-16 DIAGNOSIS — Z79.899 HIGH RISK MEDICATION USE: ICD-10-CM

## 2023-08-16 DIAGNOSIS — M06.09 RHEUMATOID ARTHRITIS OF MULTIPLE SITES WITH NEGATIVE RHEUMATOID FACTOR: Primary | ICD-10-CM

## 2023-08-16 DIAGNOSIS — M10.9 GOUT WITHOUT TOPHUS: ICD-10-CM

## 2023-08-16 DIAGNOSIS — R10.9 FLANK PAIN: ICD-10-CM

## 2023-08-16 DIAGNOSIS — S89.91XA INJURY OF RIGHT KNEE, INITIAL ENCOUNTER: ICD-10-CM

## 2023-08-16 DIAGNOSIS — D84.821 IMMUNOCOMPROMISED STATE DUE TO DRUG THERAPY: ICD-10-CM

## 2023-08-16 DIAGNOSIS — M1A.09X0 IDIOPATHIC CHRONIC GOUT OF MULTIPLE SITES WITHOUT TOPHUS: ICD-10-CM

## 2023-08-16 PROCEDURE — 3066F NEPHROPATHY DOC TX: CPT | Mod: CPTII,S$GLB,, | Performed by: PHYSICIAN ASSISTANT

## 2023-08-16 PROCEDURE — 3077F SYST BP >= 140 MM HG: CPT | Mod: CPTII,S$GLB,, | Performed by: PHYSICIAN ASSISTANT

## 2023-08-16 PROCEDURE — 3066F PR DOCUMENTATION OF TREATMENT FOR NEPHROPATHY: ICD-10-PCS | Mod: CPTII,S$GLB,, | Performed by: PHYSICIAN ASSISTANT

## 2023-08-16 PROCEDURE — 3044F HG A1C LEVEL LT 7.0%: CPT | Mod: CPTII,S$GLB,, | Performed by: PHYSICIAN ASSISTANT

## 2023-08-16 PROCEDURE — 1159F PR MEDICATION LIST DOCUMENTED IN MEDICAL RECORD: ICD-10-PCS | Mod: CPTII,S$GLB,, | Performed by: PHYSICIAN ASSISTANT

## 2023-08-16 PROCEDURE — 3008F PR BODY MASS INDEX (BMI) DOCUMENTED: ICD-10-PCS | Mod: CPTII,S$GLB,, | Performed by: PHYSICIAN ASSISTANT

## 2023-08-16 PROCEDURE — 71100 X-RAY EXAM RIBS UNI 2 VIEWS: CPT | Mod: TC,LT

## 2023-08-16 PROCEDURE — 99214 PR OFFICE/OUTPT VISIT, EST, LEVL IV, 30-39 MIN: ICD-10-PCS | Mod: S$GLB,,, | Performed by: PHYSICIAN ASSISTANT

## 2023-08-16 PROCEDURE — 99999 PR PBB SHADOW E&M-EST. PATIENT-LVL V: ICD-10-PCS | Mod: PBBFAC,,, | Performed by: PHYSICIAN ASSISTANT

## 2023-08-16 PROCEDURE — 1159F MED LIST DOCD IN RCRD: CPT | Mod: CPTII,S$GLB,, | Performed by: PHYSICIAN ASSISTANT

## 2023-08-16 PROCEDURE — 99214 OFFICE O/P EST MOD 30 MIN: CPT | Mod: S$GLB,,, | Performed by: PHYSICIAN ASSISTANT

## 2023-08-16 PROCEDURE — 71100 XR RIBS 2 VIEW LEFT: ICD-10-PCS | Mod: 26,LT,, | Performed by: RADIOLOGY

## 2023-08-16 PROCEDURE — 3061F NEG MICROALBUMINURIA REV: CPT | Mod: CPTII,S$GLB,, | Performed by: PHYSICIAN ASSISTANT

## 2023-08-16 PROCEDURE — 1160F RVW MEDS BY RX/DR IN RCRD: CPT | Mod: CPTII,S$GLB,, | Performed by: PHYSICIAN ASSISTANT

## 2023-08-16 PROCEDURE — 3078F DIAST BP <80 MM HG: CPT | Mod: CPTII,S$GLB,, | Performed by: PHYSICIAN ASSISTANT

## 2023-08-16 PROCEDURE — 3077F PR MOST RECENT SYSTOLIC BLOOD PRESSURE >= 140 MM HG: ICD-10-PCS | Mod: CPTII,S$GLB,, | Performed by: PHYSICIAN ASSISTANT

## 2023-08-16 PROCEDURE — 3008F BODY MASS INDEX DOCD: CPT | Mod: CPTII,S$GLB,, | Performed by: PHYSICIAN ASSISTANT

## 2023-08-16 PROCEDURE — 99999 PR PBB SHADOW E&M-EST. PATIENT-LVL V: CPT | Mod: PBBFAC,,, | Performed by: PHYSICIAN ASSISTANT

## 2023-08-16 PROCEDURE — 1160F PR REVIEW ALL MEDS BY PRESCRIBER/CLIN PHARMACIST DOCUMENTED: ICD-10-PCS | Mod: CPTII,S$GLB,, | Performed by: PHYSICIAN ASSISTANT

## 2023-08-16 PROCEDURE — 3078F PR MOST RECENT DIASTOLIC BLOOD PRESSURE < 80 MM HG: ICD-10-PCS | Mod: CPTII,S$GLB,, | Performed by: PHYSICIAN ASSISTANT

## 2023-08-16 PROCEDURE — 3061F PR NEG MICROALBUMINURIA RESULT DOCUMENTED/REVIEW: ICD-10-PCS | Mod: CPTII,S$GLB,, | Performed by: PHYSICIAN ASSISTANT

## 2023-08-16 PROCEDURE — 3044F PR MOST RECENT HEMOGLOBIN A1C LEVEL <7.0%: ICD-10-PCS | Mod: CPTII,S$GLB,, | Performed by: PHYSICIAN ASSISTANT

## 2023-08-16 PROCEDURE — 71100 X-RAY EXAM RIBS UNI 2 VIEWS: CPT | Mod: 26,LT,, | Performed by: RADIOLOGY

## 2023-08-16 RX ORDER — ALLOPURINOL 300 MG/1
300 TABLET ORAL DAILY
Qty: 90 TABLET | Refills: 1 | Status: SHIPPED | OUTPATIENT
Start: 2023-08-16 | End: 2023-11-17 | Stop reason: SDUPTHER

## 2023-08-16 RX ORDER — METHYLPREDNISOLONE 4 MG/1
TABLET ORAL
Qty: 1 EACH | Refills: 0 | Status: SHIPPED | OUTPATIENT
Start: 2023-08-16 | End: 2024-02-01

## 2023-08-16 ASSESSMENT — ROUTINE ASSESSMENT OF PATIENT INDEX DATA (RAPID3): MDHAQ FUNCTION SCORE: 0.6

## 2023-08-16 NOTE — PROGRESS NOTES
Subjective:      Patient ID: Juan C Rodriguez is a 57 y.o. male.    Chief Complaint: Rheumatoid Arthritis      HPI   Juan C Rodriguez  is a 57 y.o. male here for follow-up seronegative RA and overlapping gout.  Patient on methotrexate 15 mg weekly, folic acid 1 mg daily and Enbrel weekly.  From RA perspective he states he is doing very well.  He is more than 70% improved.  Denies prolonged morning stiffness.  Denies joint effusions.  He is complaining of some pain in the knees and ankles.  Within the 1st few steps it eases up significantly.  Tends to worsen with prolonged activity.  Has had an injection in the knee in the past which was helpful for him.    No acute gout flares recently.  On allopurinol 300 mg daily.  Not taking any colchicine.      Had a recent injury.  He fell at work.  Since then he is increasing pain in the right knee.  Also complaining of pain in his left flank.  He was seen in urgent care and had x-rays of the knee which were negative for fracture.  He denies shortness of breath.    Patient denies fevers, chills, photosensitivity, eye pain, shortness of breath, chest pain, hematuria, blood in the stool, rash, sicca symptoms, raynauds, finger ulcerations.  Rheumatologic systems otherwise negative.    Serologies/Labs:  Neg CHAD, RF, CCP  Chronically elevated ESR/CRP  Current Treatment:  MTX 15 mg weekly  Folic acid 1 mg daily  Enbrel q week  Previous Treatment:   na      Current Outpatient Medications:     amLODIPine (NORVASC) 10 MG tablet, Take 1 tablet by mouth once daily, Disp: 90 tablet, Rfl: 0    atorvastatin (LIPITOR) 20 MG tablet, Take 1 tablet (20 mg total) by mouth once daily., Disp: 90 tablet, Rfl: 3    ergocalciferol (ERGOCALCIFEROL) 50,000 unit Cap, Take 1 capsule by mouth once a week, Disp: 12 capsule, Rfl: 0    etanercept (ENBREL SURECLICK) 50 mg/mL (1 mL), Inject 1 mL (50 mg total) into the skin once a week., Disp: 4 mL, Rfl: 11    folic acid (FOLVITE)  "1 MG tablet, Take 1 tablet (1 mg total) by mouth once daily., Disp: 90 tablet, Rfl: 3    metFORMIN (GLUCOPHAGE) 500 MG tablet, Take 1 tablet by mouth once daily with breakfast, Disp: 90 tablet, Rfl: 0    methotrexate 2.5 MG Tab, Take 6 tablets (15 mg total) by mouth every 7 days., Disp: 72 tablet, Rfl: 1    needle, disp, 22 G 22 gauge x 1 1/2" Ndle, 1 Units by Misc.(Non-Drug; Combo Route) route every 21 days., Disp: 100 each, Rfl: 99    olmesartan-hydrochlorothiazide (BENICAR HCT) 20-12.5 mg per tablet, Take 1 tablet by mouth once daily, Disp: 90 tablet, Rfl: 0    traMADoL (ULTRAM) 50 mg tablet, Take 1 tablet (50 mg total) by mouth every 6 (six) hours as needed for Pain., Disp: 28 tablet, Rfl: 0    allopurinoL (ZYLOPRIM) 300 MG tablet, Take 1 tablet (300 mg total) by mouth once daily., Disp: 90 tablet, Rfl: 1    methylPREDNISolone (MEDROL DOSEPACK) 4 mg tablet, use as directed, Disp: 1 each, Rfl: 0    Past Medical History:   Diagnosis Date    Diabetes mellitus, type 2 diagnosed 2013    DM (diabetes mellitus)     2011  02/25/2014    HTN (hypertension)     Hypogonadism, testicular     Left tibialis posterior tendinitis 11/30/2016    Obesities, morbid      Family History   Problem Relation Age of Onset    Cataracts Mother     Diabetes Mother     Hypertension Mother     Diabetes Sister      Social History     Socioeconomic History    Marital status: Single   Tobacco Use    Smoking status: Some Days     Current packs/day: 0.00     Types: Cigars    Smokeless tobacco: Never   Substance and Sexual Activity    Alcohol use: Yes     Alcohol/week: 3.0 - 4.0 standard drinks of alcohol     Types: 3 - 4 Cans of beer per week     Comment: occasional weekends    Drug use: No    Sexual activity: Not Currently     Partners: Female     Review of patient's allergies indicates:   Allergen Reactions    Naproxen Swelling       Objective:   BP (!) 152/77   Pulse (!) 56   Ht 5' 3" (1.6 m)   Wt (!) 144.7 kg (319 lb 0.1 oz)   BMI " 56.51 kg/m²   Immunization History   Administered Date(s) Administered    COVID-19 MRNA, LN-S PF (MODERNA HALF 0.25 ML DOSE) 01/03/2022    COVID-19, MRNA, LN-S, PF (MODERNA FULL 0.5 ML DOSE) 03/05/2021, 04/01/2021    Influenza 12/06/2010    Influenza - Quadrivalent - PF *Preferred* (6 months and older) 12/08/2021, 12/06/2022    Pneumococcal Conjugate - 13 Valent 07/01/2020    Pneumococcal Polysaccharide - 23 Valent 07/09/2018    Tdap 08/14/2012       Physical Exam   Constitutional: He is oriented to person, place, and time. No distress.   HENT:   Head: Normocephalic and atraumatic.   Pulmonary/Chest: Effort normal.   Musculoskeletal:         General: Normal range of motion.   Neurological: He is alert and oriented to person, place, and time.   Psychiatric: His behavior is normal. Mood normal.   Nursing note and vitals reviewed.    No synovitis, no dactylitis, no enthesitis  No effusions of large or small joints  100% fist formation  Well preserved ROM    Right knee ADONAY  ROM 0-90   Diffuse TTP  No obvious effusion    Recent Results (from the past 672 hour(s))   CBC Auto Differential    Collection Time: 08/09/23  9:05 AM   Result Value Ref Range    WBC 8.60 3.90 - 12.70 K/uL    RBC 4.59 (L) 4.60 - 6.20 M/uL    Hemoglobin 11.6 (L) 14.0 - 18.0 g/dL    Hematocrit 36.7 (L) 40.0 - 54.0 %    MCV 80 (L) 82 - 98 fL    MCH 25.3 (L) 27.0 - 31.0 pg    MCHC 31.6 (L) 32.0 - 36.0 g/dL    RDW 15.9 (H) 11.5 - 14.5 %    Platelets 254 150 - 450 K/uL    MPV 9.8 9.2 - 12.9 fL    Immature Granulocytes 0.7 (H) 0.0 - 0.5 %    Gran # (ANC) 5.1 1.8 - 7.7 K/uL    Immature Grans (Abs) 0.06 (H) 0.00 - 0.04 K/uL    Lymph # 2.5 1.0 - 4.8 K/uL    Mono # 0.8 0.3 - 1.0 K/uL    Eos # 0.1 0.0 - 0.5 K/uL    Baso # 0.08 0.00 - 0.20 K/uL    nRBC 0 0 /100 WBC    Gran % 59.0 38.0 - 73.0 %    Lymph % 28.8 18.0 - 48.0 %    Mono % 9.1 4.0 - 15.0 %    Eosinophil % 1.5 0.0 - 8.0 %    Basophil % 0.9 0.0 - 1.9 %    Differential Method Automated    Comprehensive  Metabolic Panel    Collection Time: 08/09/23  9:05 AM   Result Value Ref Range    Sodium 139 136 - 145 mmol/L    Potassium 3.5 3.5 - 5.1 mmol/L    Chloride 101 95 - 110 mmol/L    CO2 27 23 - 29 mmol/L    Glucose 90 70 - 110 mg/dL    BUN 16 6 - 20 mg/dL    Creatinine 1.3 0.5 - 1.4 mg/dL    Calcium 9.1 8.7 - 10.5 mg/dL    Total Protein 7.3 6.0 - 8.4 g/dL    Albumin 3.3 (L) 3.5 - 5.2 g/dL    Total Bilirubin 0.3 0.1 - 1.0 mg/dL    Alkaline Phosphatase 110 55 - 135 U/L    AST 21 10 - 40 U/L    ALT 18 10 - 44 U/L    eGFR >60 >60 mL/min/1.73 m^2    Anion Gap 11 8 - 16 mmol/L   Sedimentation rate    Collection Time: 08/09/23  9:05 AM   Result Value Ref Range    Sed Rate 47 (H) 0 - 10 mm/Hr   C-Reactive Protein    Collection Time: 08/09/23  9:05 AM   Result Value Ref Range    CRP 41.7 (H) 0.0 - 8.2 mg/L   Uric Acid    Collection Time: 08/09/23  9:05 AM   Result Value Ref Range    Uric Acid 6.6 3.4 - 7.0 mg/dL       Lab Results   Component Value Date    TBGOLDPLUS Negative 11/11/2022      Lab Results   Component Value Date    HEPAIGM Negative 07/01/2020    HEPBIGM Negative 07/01/2020    HEPBCAB Non-reactive 11/11/2022    HEPCAB Non-reactive 11/11/2022        Imaging  I have personally reviewed images and reports as below.  I agree with the interpretation.  X-Ray Ribs 2 View Left  Narrative: EXAM: XR RIBS 2 VIEW LEFT    CLINICAL HISTORY: [Pain]    FINDINGS: 4 views of the left ribs.  No evidence of acute fracture or dislocation.  Normal mineralization.  Soft tissues are unremarkable.  Visualized lungs are clear.  No pleural effusion or pneumothorax.  Scattered degenerative change.  Mild atherosclerotic disease.  Impression:  No acute findings.  No displaced rib fracture.    Finalized on: 8/16/2023 9:46 AM By:  Ignacio Mckee MD  [facility]# [id number]      2023-08-16 09:48:[id number]    [facility]        Assessment:     1. Rheumatoid arthritis of multiple sites with negative rheumatoid factor    2. Idiopathic chronic gout of multiple sites  without tophus    3. Medication monitoring encounter    4. High risk medication use    5. Immunocompromised state due to drug therapy    6. Gout without tophus    7. Fall, initial encounter    8. Flank pain    9. Injury of right knee, initial encounter              Plan:     Juan C was seen today for rheumatoid arthritis.    Diagnoses and all orders for this visit:    Rheumatoid arthritis of multiple sites with negative rheumatoid factor  -     methylPREDNISolone (MEDROL DOSEPACK) 4 mg tablet; use as directed    Idiopathic chronic gout of multiple sites without tophus    Medication monitoring encounter    High risk medication use    Immunocompromised state due to drug therapy    Gout without tophus  -     allopurinoL (ZYLOPRIM) 300 MG tablet; Take 1 tablet (300 mg total) by mouth once daily.    Fall, initial encounter  -     Ambulatory referral/consult to Orthopedics; Future    Flank pain  -     X-Ray Ribs 2 View Left; Future  -     Ambulatory referral/consult to Orthopedics; Future    Injury of right knee, initial encounter  -     Ambulatory referral/consult to Orthopedics; Future          Seronegative RA  Labs reviewed as above  ESR 74  CRP improved - now 43  CBC/CMP stable  Recheck ESR/CRP today.  Add Spep and VELASQUEZ  50% improvement on Enbrel + MTX - continue same  No active synovitis on exam today  Hepatitis and TB labs updated 11/2022  Gout  GFR > 60  C/w allopurinal 300 mg daily  Consider addition of colcicine 0.6 mg daily  Check Uric acid next lab draw  No active gout attack at present  Elevated CRP/ESR  Clinically joints doing well per report  SPEP and VELASQUEZ as above  C/o sinus congestion x 1 mos  ENT referral  If need abx, pt advised to hold enbrel to cleared  Consider CT chest/abd/pelvis   Fall w knee injury and flank pain  Xray ribs to r/o acute fracture  No fx of the knee per pt report  Recommend f/u w his orthopedist for pain following an acute injury  RTW on Monday  Able to ambulate today  Drug therapy  requiring intensive monitoring for toxicity  High Risk Medication Monitoring encounter  No current medication related issues, no evidence of toxicity  I ordered labs for toxicity monitoring, have personally reviewed the findings, and discussed them with the patient.  Pending labs will be sent via the portal  Compromised immune system secondary to autoimmune disease and/or use of immunosuppressive drugs.  Monitor carefully for infections.  Advised patient to get immediate medical care if any infection arises.  Also advised strict adherence age-appropriate vaccinations and cancer screenings with PCP.  Patient advised to hold DMARD and/or biologic therapy for signs of infection or for surgery. If you are unsure what to do please call our office for instruction.[facility] [phone]  Return to clinic:  3 months with Reg 4 and uric acid    45 minutes of total time spent on the encounter, which includes face to face time and non-face to face time preparing to see the patient (eg, review of tests), Obtaining and/or reviewing separately obtained history, Documenting clinical information in the electronic or other health record, Independently interpreting results (not separately reported) and communicating results to the patient/family/caregiver, or Care coordination (not separately reported).     No follow-ups on file.    The patient understands, chooses and consents to this plan and accepts all the risks which include but are not limited to the risks mentioned above.     Disclaimer: This note was prepared using a voice recognition system and is likely to have sound alike errors within the text.

## 2023-08-17 ENCOUNTER — TELEPHONE (OUTPATIENT)
Dept: RHEUMATOLOGY | Facility: CLINIC | Age: 58
End: 2023-08-17
Payer: COMMERCIAL

## 2023-08-17 NOTE — TELEPHONE ENCOUNTER
"----- Message from Brittnee Washington sent at 8/17/2023  2:44 PM CDT -----  Contact: Juan C Simpson is calling in regards to referral and results. Wanted to proceed with scheduling an appt with Dr magana but the referral is listed as "Don't schedule." In addition to that, pt would like results from yesterday's xray. Please return call to [phone].     "

## 2023-08-17 NOTE — TELEPHONE ENCOUNTER
Spoke to patient made him aware that he will have to 's comp. Office.  Please let him know there is no evidence of rib fracture on x-ray.

## 2023-08-22 DIAGNOSIS — M25.562 LEFT KNEE PAIN, UNSPECIFIED CHRONICITY: Primary | ICD-10-CM

## 2023-08-23 ENCOUNTER — HOSPITAL ENCOUNTER (OUTPATIENT)
Dept: RADIOLOGY | Facility: HOSPITAL | Age: 58
Discharge: HOME | End: 2023-08-23
Attending: STUDENT IN AN ORGANIZED HEALTH CARE EDUCATION/TRAINING PROGRAM
Payer: COMMERCIAL

## 2023-08-23 ENCOUNTER — OFFICE VISIT (OUTPATIENT)
Dept: SPORTS MEDICINE | Facility: CLINIC | Age: 58
End: 2023-08-23
Payer: COMMERCIAL

## 2023-08-23 VITALS — WEIGHT: 315 LBS | BODY MASS INDEX: 55.81 KG/M2 | HEIGHT: 63 IN

## 2023-08-23 DIAGNOSIS — M25.562 LEFT KNEE PAIN, UNSPECIFIED CHRONICITY: ICD-10-CM

## 2023-08-23 DIAGNOSIS — E66.01 CLASS 3 SEVERE OBESITY DUE TO EXCESS CALORIES WITH SERIOUS COMORBIDITY AND BODY MASS INDEX (BMI) OF 50.0 TO 59.9 IN ADULT: Primary | ICD-10-CM

## 2023-08-23 DIAGNOSIS — G89.29 CHRONIC PAIN OF BOTH KNEES: ICD-10-CM

## 2023-08-23 DIAGNOSIS — M25.561 CHRONIC PAIN OF BOTH KNEES: ICD-10-CM

## 2023-08-23 DIAGNOSIS — M17.11 PRIMARY OSTEOARTHRITIS OF RIGHT KNEE: ICD-10-CM

## 2023-08-23 DIAGNOSIS — M25.562 CHRONIC PAIN OF BOTH KNEES: ICD-10-CM

## 2023-08-23 PROCEDURE — 73564 X-RAY EXAM KNEE 4 OR MORE: CPT | Mod: TC,RT

## 2023-08-23 PROCEDURE — 3066F NEPHROPATHY DOC TX: CPT | Mod: CPTII,S$GLB,, | Performed by: STUDENT IN AN ORGANIZED HEALTH CARE EDUCATION/TRAINING PROGRAM

## 2023-08-23 PROCEDURE — 3066F PR DOCUMENTATION OF TREATMENT FOR NEPHROPATHY: ICD-10-PCS | Mod: CPTII,S$GLB,, | Performed by: STUDENT IN AN ORGANIZED HEALTH CARE EDUCATION/TRAINING PROGRAM

## 2023-08-23 PROCEDURE — 3044F HG A1C LEVEL LT 7.0%: CPT | Mod: CPTII,S$GLB,, | Performed by: STUDENT IN AN ORGANIZED HEALTH CARE EDUCATION/TRAINING PROGRAM

## 2023-08-23 PROCEDURE — 99999 PR PBB SHADOW E&M-EST. PATIENT-LVL IV: CPT | Mod: PBBFAC,,, | Performed by: STUDENT IN AN ORGANIZED HEALTH CARE EDUCATION/TRAINING PROGRAM

## 2023-08-23 PROCEDURE — 1159F MED LIST DOCD IN RCRD: CPT | Mod: CPTII,S$GLB,, | Performed by: STUDENT IN AN ORGANIZED HEALTH CARE EDUCATION/TRAINING PROGRAM

## 2023-08-23 PROCEDURE — 3061F NEG MICROALBUMINURIA REV: CPT | Mod: CPTII,S$GLB,, | Performed by: STUDENT IN AN ORGANIZED HEALTH CARE EDUCATION/TRAINING PROGRAM

## 2023-08-23 PROCEDURE — 73562 X-RAY EXAM OF KNEE 3: CPT | Mod: 26,LT,, | Performed by: RADIOLOGY

## 2023-08-23 PROCEDURE — 3008F BODY MASS INDEX DOCD: CPT | Mod: CPTII,S$GLB,, | Performed by: STUDENT IN AN ORGANIZED HEALTH CARE EDUCATION/TRAINING PROGRAM

## 2023-08-23 PROCEDURE — 99999 PR PBB SHADOW E&M-EST. PATIENT-LVL IV: ICD-10-PCS | Mod: PBBFAC,,, | Performed by: STUDENT IN AN ORGANIZED HEALTH CARE EDUCATION/TRAINING PROGRAM

## 2023-08-23 PROCEDURE — 3061F PR NEG MICROALBUMINURIA RESULT DOCUMENTED/REVIEW: ICD-10-PCS | Mod: CPTII,S$GLB,, | Performed by: STUDENT IN AN ORGANIZED HEALTH CARE EDUCATION/TRAINING PROGRAM

## 2023-08-23 PROCEDURE — 73564 X-RAY EXAM KNEE 4 OR MORE: CPT | Mod: 26,RT,, | Performed by: RADIOLOGY

## 2023-08-23 PROCEDURE — 99214 OFFICE O/P EST MOD 30 MIN: CPT | Mod: S$GLB,,, | Performed by: STUDENT IN AN ORGANIZED HEALTH CARE EDUCATION/TRAINING PROGRAM

## 2023-08-23 PROCEDURE — 3044F PR MOST RECENT HEMOGLOBIN A1C LEVEL <7.0%: ICD-10-PCS | Mod: CPTII,S$GLB,, | Performed by: STUDENT IN AN ORGANIZED HEALTH CARE EDUCATION/TRAINING PROGRAM

## 2023-08-23 PROCEDURE — 73562 XR KNEE ORTHO RIGHT WITH FLEXION: ICD-10-PCS | Mod: 26,LT,, | Performed by: RADIOLOGY

## 2023-08-23 PROCEDURE — 73564 XR KNEE ORTHO RIGHT WITH FLEXION: ICD-10-PCS | Mod: 26,RT,, | Performed by: RADIOLOGY

## 2023-08-23 PROCEDURE — 99214 PR OFFICE/OUTPT VISIT, EST, LEVL IV, 30-39 MIN: ICD-10-PCS | Mod: S$GLB,,, | Performed by: STUDENT IN AN ORGANIZED HEALTH CARE EDUCATION/TRAINING PROGRAM

## 2023-08-23 PROCEDURE — 3008F PR BODY MASS INDEX (BMI) DOCUMENTED: ICD-10-PCS | Mod: CPTII,S$GLB,, | Performed by: STUDENT IN AN ORGANIZED HEALTH CARE EDUCATION/TRAINING PROGRAM

## 2023-08-23 PROCEDURE — 1159F PR MEDICATION LIST DOCUMENTED IN MEDICAL RECORD: ICD-10-PCS | Mod: CPTII,S$GLB,, | Performed by: STUDENT IN AN ORGANIZED HEALTH CARE EDUCATION/TRAINING PROGRAM

## 2023-08-23 NOTE — LETTER
August 23, 2023    Juan C Rodriguez  [address]  [place] [address]             [facility]  Sports Medicine  [address]  [place] [address]  Phone: [phone]  Fax: [phone]   August 23, 2023     Patient: Juan C Rodriguez   YOB: 1965   Date of Visit: 8/23/2023       To Whom it May Concern:    Juan C Rodriguez was seen in my clinic on 8/23/2023.    Please excuse him from any classes or work missed.    If you have any questions or concerns, please don't hesitate to call.    Sincerely,         Tawanda Lora MD

## 2023-08-23 NOTE — PATIENT INSTRUCTIONS
Assessment:  Juan C Rodriguez is a 57 y.o. male   Chief Complaint   Patient presents with    Right Knee - Pain       Encounter Diagnosis   Name Primary?    Primary osteoarthritis of right knee Yes        Plan:  Reviewed your x-rays with you today and discussed pertinent findings.   We have reviewed the natural history of this disorder and discussed treatment and management options moving forward   -maintain healthy weight (every pound is 4 pounds of pressure on the knee)    -daily moderate exercise (walk, bike, swim 30 minutes per day) to keep joints moving    -daily strengthening exercises (through therapy or on own) to keep muscles supporting joint healthy and strong    -glucosamine 1500mg daily (look for USP label on bottle)    -tylenol as needed for pain (follow directions on the bottle)    -anti-inflammatory medication such as alleve may be helpful- take 1-2 tabs twice daily for 7 days. If it helps your pain, continue. If you do not feel any change, you may stop and then take it as needed.    (you may be given a once daily anti-inflammatory such as MOBIC. If given, avoid other anti-inflammatory medications such as advil, ibuprofen, motrin, naprosyn, alleve, etc)    -if swollen and painful, ice, decrease activity, and take anti-inflammatory daily for 5-7 days and if no relief call your doctor for further options    -consider cortisone injection (every 3-4 months at most)- anti- inflammatory steroid medication that can be injected directly into the joint to reduce inflammation    -consider hyaluronic acid injections (eufflexxa, hyalgan, synvisc, supartz) (every 6 months at most)- protein injection that helps decrease pain and irritability in the joint. It is best used to help prolong intervals between cortisone injections to minimize steroid injections. These are currently approved for knee injections. Discuss with your doctor if other joint involved. Call to seek approval prior to the  injections.    -long-term treatment may include a total joint replacement (keep diary of good days and bad days, then evaluate as to when you are ready)     Placed a medication referral for visco-supplementation injections. This is a series of three injections over three weeks. In this procedure, a gel-like fluid called hyaluronic acid is injected into the knee joint. Hyaluronic acid is a naturally occurring substance found in the synovial fluid surrounding joints. People with osteoarthritis have a lower-than-normal concentration of hyaluronic acid in their joints The theory is that adding hyaluronic acid to the arthritic joint will facilitate movement and reduce pain through a cellular level of improvement. The receptors within the knee are then down regulated to reduce the sensitivity and there is a greater stimulation production of hyaluronic acid.   An ambulatory referral to [facility] was placed within the [facility] system today. You should expect a phone call within the next few days from Centralized Scheduling. If you do not hear lfrom them, please reach out to the main line to schedule.     General Arthritis info:    -shiny white stuff at end of a chicken bones is cartilage    -arthritis is wearing away of the cartilage that lines the end of your bones    -osteoarthritis is thought to be a wear and tear phenomenon    -symptoms are due to inflammation of joint causing stiffness, aching, and sometimes swelling    -occasionally sharp pain will occur causing a give way sensation    -Risk factors: genetic, weight, female > male, age    Supplements for pain, inflammation, arthritis.    Glucosamine sulfate/chondroitin sulfate has been shown to be safe and effective for knee arthritis (and possibly other joints affected with arthritis). This is an over the counter medication/supplement and usually needs to be taken for 1-2 months before results are seen. If you do not see any results after this time,  consider stopping it. The dose is usually found on the bottle, and is 1500mg to start (first 1-2 months) then you can back down to 1000 mg (current recommendations are 1500mg per day, all at once).    Some people can have stomach problems with this and if you do, you may stop taking it or divide up the doses. If you are ALLERGIC TO SHELLFISH, please do not take. Follow the instructions on the bottle.    Other supplements that have been shown to help with joint and muscle pain include:    (Unless otherwise noted, as these supplements are not FDA controlled, please follow the recommendations on the bottle)    Turmeric 500mg PO BID    Flax seed oil    Avocado soybean unsaponifiables    BHAVESH-e    MSM    Cherry juice extract (tart)    Rosehip    Anikinara    Diacerein    If there are any concerns about taking these supplements with other medications you may already be taking, you can speak to your pharmacist, physician, health care provider, or research other medical information available to you. Side effects and interactions are possible with any supplement or medication - be safe!     Follow-up: For visco  or sooner if there are any problems between now and then.    Thank you for choosing [facility] and Dr. Tawanda Lora for your orthopedic & sports medicine care. It is our goal to provide you with exceptional care that will help keep you healthy, active, and get you back in the game.    Please do not hesitate to reach out to us via email, phone, or MyChart with any questions, concerns, or feedback.    If you felt that you received exemplary care today, please consider leaving us feedback on Healthgrades at:  [email or web address]    If you are experiencing pain/discomfort ,or have questions after 5pm and would like to be connected to the [facility] on-call team, please call this number and specify which Sports Medicine  provider is treating you: [phone]

## 2023-08-23 NOTE — PROGRESS NOTES
Patient ID: Juan C Rodriguez  YOB: 1965  MRN: [id number]    Chief Complaint: Pain of the Right Knee    History of Present Illness: Juan C Rodriguez is a 57 y.o. male who is here for f/u evaluation of right knee.     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 Treatment to date includes rest, ice, activity modification, tramadol. They believe that they are unchanged with this treatment. Current pain level at rest is 7/10 (Numeric Pain Rating Scale).  Associated symptoms include: Swelling Yes, Instability No, Pain that affects your sleep Yes, Mechanical Yes, locking/catching No, Neurological No, limited range of motion Yes.     To review his history, previous patient that was seen for possible acute gout infection in his right knee back in early 2023.  Has a history of gout in his wrist in the past started on allopurinol as well. Is seeing Rheumatology group currently as well and was referred her from Krysten Cruz PA-C for consult on new right knee injury.       Hemoglobin A1C   Date Value Ref Range Status   04/24/2023 5.7 (H) 4.0 - 5.6 % Final     Comment:     ADA Screening Guidelines:  5.7-6.4%  Consistent with prediabetes  >or=6.5%  Consistent with diabetes    High levels of fetal hemoglobin interfere with the HbA1C  assay. Heterozygous hemoglobin variants (HbS, HgC, etc)do  not significantly interfere with this assay.   However, presence of multiple variants may affect accuracy.     12/06/2022 5.7 (H) 4.0 - 5.6 % Final     Comment:     ADA Screening Guidelines:  5.7-6.4%  Consistent with prediabetes  >or=6.5%  Consistent with  diabetes    High levels of fetal hemoglobin interfere with the HbA1C  assay. Heterozygous hemoglobin variants (HbS, HgC, etc)do  not significantly interfere with this assay.   However, presence of multiple variants may affect accuracy.     04/22/2022 5.9 (H) 4.0 - 5.6 % Final     Comment:     ADA Screening Guidelines:  5.7-6.4%  Consistent with prediabetes  >or=6.5%  Consistent with diabetes    High levels of fetal hemoglobin interfere with the HbA1C  assay. Heterozygous hemoglobin variants (HbS, HgC, etc)do  not significantly interfere with this assay.   However, presence of multiple variants may affect accuracy.         Past Medical History:   Past Medical History:   Diagnosis Date    Diabetes mellitus, type 2 diagnosed 2013    DM (diabetes mellitus)     2011  02/25/2014    HTN (hypertension)     Hypogonadism, testicular     Left tibialis posterior tendinitis 11/30/2016    Obesities, morbid      Past Surgical History:   Procedure Laterality Date    COLONOSCOPY N/A 6/29/2018    Procedure: COLONOSCOPY;  Surgeon: Jeremiah Anaya III, MD;  Location: [facility];  Service: Endoscopy;  Laterality: N/A;     Family History   Problem Relation Age of Onset    Cataracts Mother     Diabetes Mother     Hypertension Mother     Diabetes Sister      Social History     Socioeconomic History    Marital status: Single   Tobacco Use    Smoking status: Some Days     Current packs/day: 0.00     Types: Cigars    Smokeless tobacco: Never   Substance and Sexual Activity    Alcohol use: Yes     Alcohol/week: 3.0 - 4.0 standard drinks of alcohol     Types: 3 - 4 Cans of beer per week     Comment: occasional weekends    Drug use: No    Sexual activity: Not Currently     Partners: Female     Medication List with Changes/Refills   Current Medications    ALLOPURINOL (ZYLOPRIM) 300 MG TABLET    Take 1 tablet (300 mg total) by mouth once daily.    AMLODIPINE (NORVASC) 10 MG TABLET    Take 1 tablet by mouth once daily    ATORVASTATIN  "(LIPITOR) 20 MG TABLET    Take 1 tablet (20 mg total) by mouth once daily.    ERGOCALCIFEROL (ERGOCALCIFEROL) 50,000 UNIT CAP    Take 1 capsule by mouth once a week    ETANERCEPT (ENBREL SURECLICK) 50 MG/ML (1 ML)    Inject 1 mL (50 mg total) into the skin once a week.    FOLIC ACID (FOLVITE) 1 MG TABLET    Take 1 tablet (1 mg total) by mouth once daily.    METFORMIN (GLUCOPHAGE) 500 MG TABLET    Take 1 tablet by mouth once daily with breakfast    METHOTREXATE 2.5 MG TAB    Take 6 tablets (15 mg total) by mouth every 7 days.    METHYLPREDNISOLONE (MEDROL DOSEPACK) 4 MG TABLET    use as directed    NEEDLE, DISP, 22 G 22 GAUGE X 1 1/2" NDLE    1 Units by Misc.(Non-Drug; Combo Route) route every 21 days.    OLMESARTAN-HYDROCHLOROTHIAZIDE (BENICAR HCT) 20-12.5 MG PER TABLET    Take 1 tablet by mouth once daily    TRAMADOL (ULTRAM) 50 MG TABLET    Take 1 tablet (50 mg total) by mouth every 6 (six) hours as needed for Pain.     Review of patient's allergies indicates:   Allergen Reactions    Naproxen Swelling       Physical Exam:   Body mass index is 56.51 kg/m².    GENERAL: Well appearing, in no acute distress.  HEAD: Normocephalic and atraumatic.  ENT: External ears and nose grossly normal.  EYES: EOMI bilaterally  PULMONARY: Respirations are grossly even and non-labored.  NEURO: Awake, alert, and oriented x 3.  SKIN: No obvious rashes appreciated.  PSYCH: Mood & affect are appropriate.    Detailed MSK exam:     Tenderness over the right medial joint line of the knee.    Imaging:  X-ray Knee Ortho Right with Flexion  Narrative: EXAMINATION:  XR KNEE ORTHO RIGHT WITH FLEXION    CLINICAL HISTORY:  Pain in left knee    TECHNIQUE:  AP standing as well as PA flexion standing and Merchant views of both knees were performed.  A lateral view of the right knee is also performed.    COMPARISON:  Prior radiographs    FINDINGS:  No acute osseous abnormality.  Degenerative changes noted bilaterally most prevalent within the right " knee medial compartment including moderate joint space loss.  Small right knee suprapatellar joint effusion suspected.  Impression: As above    Electronically signed by: Deacon Morrison MD  Date:    08/23/2023  Time:    10:49      Relevant imaging results were reviewed and interpreted by me and per my read as above.  This was discussed with the patient and / or family today.     Assessment:  Juan C Rodriguez is a 57 y.o. male presents today for persistent right medial knee pain.  Fall 1 month ago has been improving but feels like his cortisone shot has worn off.  Interested in moving forward with viscosupplementation we discussed at length weight loss and exercise mainstays of treatment for osteoarthritis of the knee and is interested in a referral to lifestyle wellness.  Order placed today.  Will start Visco right knee once approved by insurance.    MEDICAL NECESSITY FOR VISCOSUPPLEMETNATION: After thorough evaluation of the patient, I have determined that visco-supplementation is medically necessary. The patient has painful degenerative changes of the knee with failure of conservative treatments including lifestyle modifications and rehabilitation exercises.  Oral analgesis/NSAIDs have not adequately controlled symptoms and there is radiographic evidence of Kellgren Karan grade 2 or greater osteoarthritic changes, or in lack of radiographic evidence, there is arthroscopic or other evidence of chondrosis.       Class 3 severe obesity due to excess calories with serious comorbidity and body mass index (BMI) of 50.0 to 59.9 in adult  -     Ambulatory referral/consult to [facility]; Future; Expected date: 08/30/2023    Primary osteoarthritis of right knee  -     Prior authorization Order  -     Ambulatory referral/consult to [facility]; Future; Expected date: 08/30/2023    Chronic pain of both knees  -     Ambulatory referral/consult to [facility];  Future; Expected date: 08/30/2023       MEDICAL NECESSITY FOR VISCOSUPPLEMETNATION: After thorough evaluation of the patient, I have determined that visco-supplementation is medically necessary. The patient has painful degenerative changes of the knee with failure of conservative treatments including lifestyle modifications and rehabilitation exercises.  Oral analgesis/NSAIDs have not adequately controlled symptoms and there is radiographic evidence of Kellgren Karan grade 2 or greater osteoarthritic changes, or in lack of radiographic evidence, there is arthroscopic or other evidence of chondrosis.        Tawanda Lora MD    Disclaimer: This note was prepared using a voice recognition system and is likely to have sound alike errors within the text.

## 2023-08-24 ENCOUNTER — OFFICE VISIT (OUTPATIENT)
Dept: FAMILY MEDICINE | Facility: CLINIC | Age: 58
End: 2023-08-24
Payer: COMMERCIAL

## 2023-08-24 ENCOUNTER — LAB VISIT (OUTPATIENT)
Dept: LAB | Facility: HOSPITAL | Age: 58
End: 2023-08-24
Attending: INTERNAL MEDICINE
Payer: COMMERCIAL

## 2023-08-24 VITALS
RESPIRATION RATE: 16 BRPM | OXYGEN SATURATION: 96 % | SYSTOLIC BLOOD PRESSURE: 138 MMHG | TEMPERATURE: 98 F | WEIGHT: 315 LBS | HEART RATE: 60 BPM | BODY MASS INDEX: 56.28 KG/M2 | DIASTOLIC BLOOD PRESSURE: 82 MMHG

## 2023-08-24 DIAGNOSIS — E78.2 MIXED HYPERLIPIDEMIA: ICD-10-CM

## 2023-08-24 DIAGNOSIS — E66.01 MORBID OBESITY DUE TO EXCESS CALORIES: ICD-10-CM

## 2023-08-24 DIAGNOSIS — E21.3 HYPERPARATHYROIDISM: ICD-10-CM

## 2023-08-24 DIAGNOSIS — I10 ESSENTIAL HYPERTENSION: ICD-10-CM

## 2023-08-24 DIAGNOSIS — M06.09 RHEUMATOID ARTHRITIS OF MULTIPLE SITES WITH NEGATIVE RHEUMATOID FACTOR: Primary | ICD-10-CM

## 2023-08-24 DIAGNOSIS — D56.3 ALPHA THALASSEMIA SILENT CARRIER: ICD-10-CM

## 2023-08-24 DIAGNOSIS — D84.9 IMMUNOCOMPROMISED: ICD-10-CM

## 2023-08-24 DIAGNOSIS — E11.65 TYPE 2 DIABETES MELLITUS WITH HYPERGLYCEMIA, WITHOUT LONG-TERM CURRENT USE OF INSULIN: ICD-10-CM

## 2023-08-24 LAB
ESTIMATED AVG GLUCOSE: 120 MG/DL (ref 68–131)
HBA1C MFR BLD: 5.8 % (ref 4–5.6)
TSH SERPL DL<=0.005 MIU/L-ACNC: 1.82 UIU/ML (ref 0.4–4)

## 2023-08-24 PROCEDURE — 3075F PR MOST RECENT SYSTOLIC BLOOD PRESS GE 130-139MM HG: ICD-10-PCS | Mod: CPTII,S$GLB,, | Performed by: INTERNAL MEDICINE

## 2023-08-24 PROCEDURE — 99214 OFFICE O/P EST MOD 30 MIN: CPT | Mod: S$GLB,,, | Performed by: INTERNAL MEDICINE

## 2023-08-24 PROCEDURE — 3044F PR MOST RECENT HEMOGLOBIN A1C LEVEL <7.0%: ICD-10-PCS | Mod: CPTII,S$GLB,, | Performed by: INTERNAL MEDICINE

## 2023-08-24 PROCEDURE — 99214 PR OFFICE/OUTPT VISIT, EST, LEVL IV, 30-39 MIN: ICD-10-PCS | Mod: S$GLB,,, | Performed by: INTERNAL MEDICINE

## 2023-08-24 PROCEDURE — 1159F MED LIST DOCD IN RCRD: CPT | Mod: CPTII,S$GLB,, | Performed by: INTERNAL MEDICINE

## 2023-08-24 PROCEDURE — 3008F PR BODY MASS INDEX (BMI) DOCUMENTED: ICD-10-PCS | Mod: CPTII,S$GLB,, | Performed by: INTERNAL MEDICINE

## 2023-08-24 PROCEDURE — 3061F PR NEG MICROALBUMINURIA RESULT DOCUMENTED/REVIEW: ICD-10-PCS | Mod: CPTII,S$GLB,, | Performed by: INTERNAL MEDICINE

## 2023-08-24 PROCEDURE — 36415 COLL VENOUS BLD VENIPUNCTURE: CPT | Mod: PO | Performed by: INTERNAL MEDICINE

## 2023-08-24 PROCEDURE — 3008F BODY MASS INDEX DOCD: CPT | Mod: CPTII,S$GLB,, | Performed by: INTERNAL MEDICINE

## 2023-08-24 PROCEDURE — 3079F DIAST BP 80-89 MM HG: CPT | Mod: CPTII,S$GLB,, | Performed by: INTERNAL MEDICINE

## 2023-08-24 PROCEDURE — 3044F HG A1C LEVEL LT 7.0%: CPT | Mod: CPTII,S$GLB,, | Performed by: INTERNAL MEDICINE

## 2023-08-24 PROCEDURE — 1159F PR MEDICATION LIST DOCUMENTED IN MEDICAL RECORD: ICD-10-PCS | Mod: CPTII,S$GLB,, | Performed by: INTERNAL MEDICINE

## 2023-08-24 PROCEDURE — 3075F SYST BP GE 130 - 139MM HG: CPT | Mod: CPTII,S$GLB,, | Performed by: INTERNAL MEDICINE

## 2023-08-24 PROCEDURE — 83036 HEMOGLOBIN GLYCOSYLATED A1C: CPT | Performed by: INTERNAL MEDICINE

## 2023-08-24 PROCEDURE — 83970 ASSAY OF PARATHORMONE: CPT | Performed by: INTERNAL MEDICINE

## 2023-08-24 PROCEDURE — 3066F NEPHROPATHY DOC TX: CPT | Mod: CPTII,S$GLB,, | Performed by: INTERNAL MEDICINE

## 2023-08-24 PROCEDURE — 99999 PR PBB SHADOW E&M-EST. PATIENT-LVL IV: CPT | Mod: PBBFAC,,, | Performed by: INTERNAL MEDICINE

## 2023-08-24 PROCEDURE — 3066F PR DOCUMENTATION OF TREATMENT FOR NEPHROPATHY: ICD-10-PCS | Mod: CPTII,S$GLB,, | Performed by: INTERNAL MEDICINE

## 2023-08-24 PROCEDURE — 3079F PR MOST RECENT DIASTOLIC BLOOD PRESSURE 80-89 MM HG: ICD-10-PCS | Mod: CPTII,S$GLB,, | Performed by: INTERNAL MEDICINE

## 2023-08-24 PROCEDURE — 84443 ASSAY THYROID STIM HORMONE: CPT | Performed by: INTERNAL MEDICINE

## 2023-08-24 PROCEDURE — 3061F NEG MICROALBUMINURIA REV: CPT | Mod: CPTII,S$GLB,, | Performed by: INTERNAL MEDICINE

## 2023-08-24 PROCEDURE — 99999 PR PBB SHADOW E&M-EST. PATIENT-LVL IV: ICD-10-PCS | Mod: PBBFAC,,, | Performed by: INTERNAL MEDICINE

## 2023-08-24 RX ORDER — ATORVASTATIN CALCIUM 20 MG/1
20 TABLET, FILM COATED ORAL DAILY
Qty: 90 TABLET | Refills: 3 | Status: SHIPPED | OUTPATIENT
Start: 2023-08-24 | End: 2024-03-19 | Stop reason: SDUPTHER

## 2023-08-24 NOTE — PROGRESS NOTES
Subjective:       Patient ID: Juan C Rodriguez is a 57 y.o. male.    Chief Complaint: Follow-up, Hypertension, Hyperlipidemia, and Diabetes    Follow-up  Associated symptoms include arthralgias and myalgias. Pertinent negatives include no abdominal pain, chest pain, chills, coughing, diaphoresis, fatigue, fever, headaches, nausea, neck pain, numbness, rash, sore throat, vomiting or weakness.   Hypertension  Pertinent negatives include no chest pain, headaches, neck pain, palpitations or shortness of breath.   Hyperlipidemia  Associated symptoms include myalgias. Pertinent negatives include no chest pain or shortness of breath.   Diabetes  Pertinent negatives for hypoglycemia include no confusion, dizziness, headaches, nervousness/anxiousness, pallor, seizures, speech difficulty or tremors. Pertinent negatives for diabetes include no chest pain, no fatigue, no polydipsia, no polyphagia, no polyuria and no weakness.     Past Medical History:   Diagnosis Date    Diabetes mellitus, type 2 diagnosed 2013    DM (diabetes mellitus)     2011  02/25/2014    HTN (hypertension)     Hypogonadism, testicular     Left tibialis posterior tendinitis 11/30/2016    Obesities, morbid      Past Surgical History:   Procedure Laterality Date    COLONOSCOPY N/A 6/29/2018    Procedure: COLONOSCOPY;  Surgeon: Jeremiah Anaya III, MD;  Location: [facility];  Service: Endoscopy;  Laterality: N/A;     Family History   Problem Relation Age of Onset    Cataracts Mother     Diabetes Mother     Hypertension Mother     Diabetes Sister      Social History     Socioeconomic History    Marital status: Single   Tobacco Use    Smoking status: Some Days     Current packs/day: 0.00     Types: Cigars    Smokeless tobacco: Never   Substance and Sexual Activity    Alcohol use: Yes     Alcohol/week: 3.0 - 4.0 standard drinks of alcohol     Types: 3 - 4 Cans of beer per week     Comment: occasional weekends    Drug use: No    Sexual  activity: Not Currently     Partners: Female     Review of Systems   Constitutional:  Negative for activity change, appetite change, chills, diaphoresis, fatigue, fever and unexpected weight change.   HENT:  Negative for drooling, ear discharge, ear pain, facial swelling, hearing loss, mouth sores, nosebleeds, postnasal drip, rhinorrhea, sinus pressure, sneezing, sore throat, tinnitus, trouble swallowing and voice change.    Eyes:  Negative for photophobia, redness and visual disturbance.   Respiratory:  Negative for apnea, cough, choking, chest tightness, shortness of breath and wheezing.    Cardiovascular:  Negative for chest pain and palpitations.   Gastrointestinal:  Negative for abdominal distention, abdominal pain, anal bleeding, blood in stool, constipation, diarrhea, nausea and vomiting.   Endocrine: Negative for cold intolerance, heat intolerance, polydipsia, polyphagia and polyuria.   Genitourinary:  Negative for difficulty urinating, dysuria, enuresis, flank pain, frequency, genital sores, hematuria and urgency.   Musculoskeletal:  Positive for arthralgias and myalgias. Negative for back pain, gait problem, neck pain and neck stiffness.   Skin:  Negative for color change, pallor, rash and wound.   Allergic/Immunologic: Negative for food allergies and immunocompromised state.   Neurological:  Negative for dizziness, tremors, seizures, syncope, facial asymmetry, speech difficulty, weakness, light-headedness, numbness and headaches.   Hematological:  Negative for adenopathy. Does not bruise/bleed easily.   Psychiatric/Behavioral:  Negative for agitation, behavioral problems, confusion, decreased concentration, dysphoric mood, hallucinations, self-injury, sleep disturbance and suicidal ideas. The patient is not nervous/anxious and is not hyperactive.        Objective:      Physical Exam  Vitals and nursing note reviewed.   Constitutional:       General: He is not in acute distress.     Appearance: Normal  appearance. He is well-developed. He is not diaphoretic.   HENT:      Head: Normocephalic and atraumatic.      Mouth/Throat:      Pharynx: No oropharyngeal exudate.   Eyes:      General: No scleral icterus.     Pupils: Pupils are equal, round, and reactive to light.   Neck:      Thyroid: No thyromegaly.      Vascular: No carotid bruit or JVD.      Trachea: No tracheal deviation.   Cardiovascular:      Rate and Rhythm: Normal rate and regular rhythm.      Heart sounds: Normal heart sounds.   Pulmonary:      Effort: Pulmonary effort is normal. No respiratory distress.      Breath sounds: Normal breath sounds. No wheezing or rales.   Chest:      Chest wall: No tenderness.   Abdominal:      General: Bowel sounds are normal. There is no distension.      Palpations: Abdomen is soft.      Tenderness: There is no abdominal tenderness. There is no guarding or rebound.   Musculoskeletal:         General: No tenderness. Normal range of motion.      Cervical back: Normal range of motion and neck supple.   Lymphadenopathy:      Cervical: No cervical adenopathy.   Skin:     General: Skin is warm and dry.      Coloration: Skin is not pale.      Findings: No erythema or rash.   Neurological:      Mental Status: He is alert and oriented to person, place, and time.   Psychiatric:         Behavior: Behavior normal.         Thought Content: Thought content normal.         Judgment: Judgment normal.         CMP  Sodium   Date Value Ref Range Status   08/09/2023 139 136 - 145 mmol/L Final     Potassium   Date Value Ref Range Status   08/09/2023 3.5 3.5 - 5.1 mmol/L Final     Chloride   Date Value Ref Range Status   08/09/2023 101 95 - 110 mmol/L Final     CO2   Date Value Ref Range Status   08/09/2023 27 23 - 29 mmol/L Final     Glucose   Date Value Ref Range Status   08/09/2023 90 70 - 110 mg/dL Final     BUN   Date Value Ref Range Status   08/09/2023 16 6 - 20 mg/dL Final     Creatinine   Date Value Ref Range Status   08/09/2023 1.3  0.5 - 1.4 mg/dL Final     Calcium   Date Value Ref Range Status   08/09/2023 9.1 8.7 - 10.5 mg/dL Final     Total Protein   Date Value Ref Range Status   08/09/2023 7.3 6.0 - 8.4 g/dL Final     Albumin   Date Value Ref Range Status   08/09/2023 3.3 (L) 3.5 - 5.2 g/dL Final   05/31/2016 4.2 3.6 - 5.1 g/dL Final     Comment:     @ Test Performed By:  [facility] Gabe Diaz M.D., CHRISSY.,   [address] [place] [id number]  [facility]  [id number]       Total Bilirubin   Date Value Ref Range Status   08/09/2023 0.3 0.1 - 1.0 mg/dL Final     Comment:     For infants and newborns, interpretation of results should be based  on gestational age, weight and in agreement with clinical  observations.    Premature Infant recommended reference ranges:  Up to 24 hours.............<8.0 mg/dL  Up to 48 hours............<12.0 mg/dL  3-5 days..................<15.0 mg/dL  6-29 days.................<15.0 mg/dL       Alkaline Phosphatase   Date Value Ref Range Status   08/09/2023 110 55 - 135 U/L Final     AST   Date Value Ref Range Status   08/09/2023 21 10 - 40 U/L Final     ALT   Date Value Ref Range Status   08/09/2023 18 10 - 44 U/L Final     Anion Gap   Date Value Ref Range Status   08/09/2023 11 8 - 16 mmol/L Final     eGFR if    Date Value Ref Range Status   07/15/2022 >60 >60 mL/min/1.73 m^2 Final     eGFR if non    Date Value Ref Range Status   07/15/2022 >60 >60 mL/min/1.73 m^2 Final     Comment:     Calculation used to obtain the estimated glomerular filtration  rate (eGFR) is the CKD-EPI equation.        Lab Results   Component Value Date    WBC 8.60 08/09/2023    HGB 11.6 (L) 08/09/2023    HCT 36.7 (L) 08/09/2023    MCV 80 (L) 08/09/2023     08/09/2023     Lab Results   Component Value Date    CHOL 189 04/24/2023     Lab Results   Component Value Date    HDL 70 04/24/2023     Lab Results   Component Value Date    LDLCALC 106.0  04/24/2023     Lab Results   Component Value Date    TRIG 65 04/24/2023     Lab Results   Component Value Date    CHOLHDL 37.0 04/24/2023     Lab Results   Component Value Date    TSH 2.126 03/17/2020     Lab Results   Component Value Date    LABA1C 6.1 (H) 03/16/2017    HGBA1C 5.7 (H) 04/24/2023     Assessment:       1. Rheumatoid arthritis of multiple sites with negative rheumatoid factor    2. Morbid obesity due to excess calories    3. Mixed hyperlipidemia    4. Immunocompromised    5. Hyperparathyroidism    6. Essential hypertension    7. Type 2 diabetes mellitus with hyperglycemia, without long-term current use of insulin    8. Alpha thalassemia silent carrier        Plan:   Rheumatoid arthritis of multiple sites with negative rheumatoid factor    Morbid obesity due to excess calories    Mixed hyperlipidemia    Immunocompromised    Hyperparathyroidism  -     PTH, intact; Future; Expected date: 08/24/2023    Essential hypertension  -     TSH; Future; Expected date: 08/24/2023    Type 2 diabetes mellitus with hyperglycemia, without long-term current use of insulin  -     Hemoglobin A1C; Future; Expected date: 08/24/2023    Alpha thalassemia silent carrier    Other orders  -     atorvastatin (LIPITOR) 20 MG tablet; Take 1 tablet (20 mg total) by mouth once daily.  Dispense: 90 tablet; Refill: 3    Stable------------continue meds--------as above----------f/u 4 months----------

## 2023-08-25 LAB — PTH-INTACT SERPL-MCNC: 177.3 PG/ML (ref 9–77)

## 2023-08-28 ENCOUNTER — TELEPHONE (OUTPATIENT)
Dept: RHEUMATOLOGY | Facility: CLINIC | Age: 58
End: 2023-08-28
Payer: COMMERCIAL

## 2023-08-28 NOTE — TELEPHONE ENCOUNTER
----- Message from Krysten Cruz PA-C sent at 8/16/2023  1:08 PM CDT -----  Please let him know there is no evidence of rib fracture on x-ray.

## 2023-09-08 ENCOUNTER — TELEPHONE (OUTPATIENT)
Dept: SPORTS MEDICINE | Facility: CLINIC | Age: 58
End: 2023-09-08
Payer: COMMERCIAL

## 2023-09-08 NOTE — TELEPHONE ENCOUNTER
----- Message from Dayanna Arechiga sent at 9/8/2023 12:03 PM CDT -----  Contact: Sofia    ----- Message -----  From: Kamille Gordon  Sent: 9/8/2023  11:00 AM CDT  To: Guido Neff Staff    Sofia would like a call back at [phone], fax at [phone], in regards to needing clinical notes from patient's appointment that was on 8/23/23 and would like also any available information of patient's upcoming injection appointment.  Thanks  Am

## 2023-09-25 ENCOUNTER — OFFICE VISIT (OUTPATIENT)
Dept: SPORTS MEDICINE | Facility: CLINIC | Age: 58
End: 2023-09-25
Payer: COMMERCIAL

## 2023-09-25 VITALS — BODY MASS INDEX: 55.81 KG/M2 | WEIGHT: 315 LBS | HEIGHT: 63 IN

## 2023-09-25 DIAGNOSIS — M17.11 PRIMARY OSTEOARTHRITIS OF RIGHT KNEE: ICD-10-CM

## 2023-09-25 DIAGNOSIS — M25.561 RIGHT KNEE PAIN, UNSPECIFIED CHRONICITY: Primary | ICD-10-CM

## 2023-09-25 PROCEDURE — 99999 PR PBB SHADOW E&M-EST. PATIENT-LVL III: ICD-10-PCS | Mod: PBBFAC,,, | Performed by: STUDENT IN AN ORGANIZED HEALTH CARE EDUCATION/TRAINING PROGRAM

## 2023-09-25 PROCEDURE — 99499 NO LOS: ICD-10-PCS | Mod: S$GLB,,, | Performed by: STUDENT IN AN ORGANIZED HEALTH CARE EDUCATION/TRAINING PROGRAM

## 2023-09-25 PROCEDURE — 99999 PR PBB SHADOW E&M-EST. PATIENT-LVL III: CPT | Mod: PBBFAC,,, | Performed by: STUDENT IN AN ORGANIZED HEALTH CARE EDUCATION/TRAINING PROGRAM

## 2023-09-25 PROCEDURE — 20611 LARGE JOINT ASPIRATION/INJECTION: R KNEE: ICD-10-PCS | Mod: RT,S$GLB,, | Performed by: STUDENT IN AN ORGANIZED HEALTH CARE EDUCATION/TRAINING PROGRAM

## 2023-09-25 PROCEDURE — 99499 UNLISTED E&M SERVICE: CPT | Mod: S$GLB,,, | Performed by: STUDENT IN AN ORGANIZED HEALTH CARE EDUCATION/TRAINING PROGRAM

## 2023-09-25 PROCEDURE — 20611 DRAIN/INJ JOINT/BURSA W/US: CPT | Mod: RT,S$GLB,, | Performed by: STUDENT IN AN ORGANIZED HEALTH CARE EDUCATION/TRAINING PROGRAM

## 2023-09-25 NOTE — PATIENT INSTRUCTIONS
Assessment:  Juan C Rodriguez is a 57 y.o. male   Chief Complaint   Patient presents with    Right Knee - Procedure       Encounter Diagnosis   Name Primary?    Right knee pain, unspecified chronicity Yes        Plan:  We discussed the proper protocols after the injection such as no submerging pools, baths tubs, or hot tubs for 24 hr.  Showering is okay today.  We also discussed that blood sugars can be elevated after an injection and asked patient to properly checked her sugars over the next few days and contact their PCP if there are any concerns.  We discussed red flags such as fevers, chills, red, warm, tender joint at the area of injection to please seek medical care immediately.        Follow-up: Next injection or sooner if there are any problems between now and then.    Thank you for choosing [facility] and Dr. Tawanda Lora for your orthopedic & sports medicine care. It is our goal to provide you with exceptional care that will help keep you healthy, active, and get you back in the game.    Please do not hesitate to reach out to us via email, phone, or MyChart with any questions, concerns, or feedback.    If you felt that you received exemplary care today, please consider leaving us feedback on Healthgrades at:  [email or web address]    If you are experiencing pain/discomfort ,or have questions after 5pm and would like to be connected to the [facility] [place] on-call team, please call this number and specify which Sports Medicine provider is treating you: [phone]

## 2023-09-25 NOTE — PROCEDURES
Large Joint Aspiration/Injection: R knee    Date/Time: 9/25/2023 11:40 AM    Performed by: Tawanda Lora MD  Authorized by: Tawanda Lora MD    Consent Done?:  Yes (Verbal)  Indications:  Pain  Site marked: the procedure site was marked    Timeout: prior to procedure the correct patient, procedure, and site was verified    Prep: patient was prepped and draped in usual sterile fashion      Local anesthesia used?: Yes    Local anesthetic:  Topical anesthetic    Details:  Needle Size:  22 G  Ultrasonic Guidance for needle placement?: Yes    Images are saved and documented.  Approach: Superior lateral.  Location:  Knee  Site:  R knee  Medications:  20 mg sodium hyaluronate (EUFLEXXA) 10 mg/mL(mw 2.4 -3.6 million)  Patient tolerance:  Patient tolerated the procedure well with no immediate complications     Ultrasound guidance was used for needle localization. Images were saved and stored for documentation. The appropriate structures were visualized. Dynamic visualization of the needle was continuous throughout the procedures and maintained good position.     We discussed the proper protocols after the injection such as no submerging pools, baths tubs, or hot tubs for 24 hr.  Showering is okay today.  We discussed red flags such as fevers, chills, red, warm, tender joint at the area of injection to please seek medical care immediately.      MEDICAL NECESSITY FOR VISCOSUPPLEMETNATION: After thorough evaluation of the patient, I have determined that visco-supplementation is medically necessary. The patient has painful degenerative changes of the knee with failure of conservative treatments including lifestyle modifications and rehabilitation exercises.  Oral analgesis/NSAIDs have not adequately controlled symptoms and there is radiographic evidence of Kellgren Karan grade 2 or greater osteoarthritic changes, or in lack of radiographic evidence, there is arthroscopic or other evidence of chondrosis.     Euflexxa:   Right knee 1/3

## 2023-09-25 NOTE — LETTER
Patient: Juan C Rodriguez   YOB: 1965   Clinic Number: [id number]   Today's Date: September 25, 2023        Certificate to Return to Work     Juan C Felix was seen by Tawanda Lora MD on 9/25/2023.    Please excuse him from work missed on 9/25/23      If you have any questions or concerns, please feel free to contact the office at [phone].    Thank you.    Tawanda Lora MD        Signature:

## 2023-10-04 ENCOUNTER — OFFICE VISIT (OUTPATIENT)
Dept: SPORTS MEDICINE | Facility: CLINIC | Age: 58
End: 2023-10-04
Payer: COMMERCIAL

## 2023-10-04 VITALS — BODY MASS INDEX: 55.81 KG/M2 | HEIGHT: 63 IN | WEIGHT: 315 LBS

## 2023-10-04 DIAGNOSIS — M25.561 RIGHT KNEE PAIN, UNSPECIFIED CHRONICITY: Primary | ICD-10-CM

## 2023-10-04 DIAGNOSIS — M17.11 PRIMARY OSTEOARTHRITIS OF RIGHT KNEE: ICD-10-CM

## 2023-10-04 PROCEDURE — 99499 NO LOS: ICD-10-PCS | Mod: S$GLB,,, | Performed by: STUDENT IN AN ORGANIZED HEALTH CARE EDUCATION/TRAINING PROGRAM

## 2023-10-04 PROCEDURE — 20611 DRAIN/INJ JOINT/BURSA W/US: CPT | Mod: RT,S$GLB,, | Performed by: STUDENT IN AN ORGANIZED HEALTH CARE EDUCATION/TRAINING PROGRAM

## 2023-10-04 PROCEDURE — 99999 PR PBB SHADOW E&M-EST. PATIENT-LVL III: ICD-10-PCS | Mod: PBBFAC,,, | Performed by: STUDENT IN AN ORGANIZED HEALTH CARE EDUCATION/TRAINING PROGRAM

## 2023-10-04 PROCEDURE — 99499 UNLISTED E&M SERVICE: CPT | Mod: S$GLB,,, | Performed by: STUDENT IN AN ORGANIZED HEALTH CARE EDUCATION/TRAINING PROGRAM

## 2023-10-04 PROCEDURE — 20611 LARGE JOINT ASPIRATION/INJECTION: R KNEE: ICD-10-PCS | Mod: RT,S$GLB,, | Performed by: STUDENT IN AN ORGANIZED HEALTH CARE EDUCATION/TRAINING PROGRAM

## 2023-10-04 PROCEDURE — 99999 PR PBB SHADOW E&M-EST. PATIENT-LVL III: CPT | Mod: PBBFAC,,, | Performed by: STUDENT IN AN ORGANIZED HEALTH CARE EDUCATION/TRAINING PROGRAM

## 2023-10-04 NOTE — LETTER
October 4, 2023      [facility]  [address]  [place] [address]  Phone: [phone]  Fax: [phone]       Patient: Juan C Rodriguez   YOB: 1965  Date of Visit: 10/04/2023    To Whom It May Concern:    Pamlea Rodriguez  was at [facility] on 10/04/2023. The patient may return to work with no restrictions. If you have any questions or concerns, or if I can be of further assistance, please do not hesitate to contact me.    Sincerely,    MD Bong Inman MA

## 2023-10-04 NOTE — PROCEDURES
Large Joint Aspiration/Injection: R knee    Date/Time: 10/4/2023 11:20 AM    Performed by: Tawanda Lora MD  Authorized by: Tawanda Lora MD    Consent Done?:  Yes (Verbal)  Indications:  Pain  Site marked: the procedure site was marked    Timeout: prior to procedure the correct patient, procedure, and site was verified    Prep: patient was prepped and draped in usual sterile fashion      Local anesthesia used?: Yes    Local anesthetic:  Topical anesthetic    Details:  Needle Size:  22 G  Ultrasonic Guidance for needle placement?: Yes    Images are saved and documented.  Approach: Superior lateral.  Location:  Knee  Site:  R knee  Medications:  20 mg sodium hyaluronate (EUFLEXXA) 10 mg/mL(mw 2.4 -3.6 million)  Patient tolerance:  Patient tolerated the procedure well with no immediate complications     Ultrasound guidance was used for needle localization. Images were saved and stored for documentation. The appropriate structures were visualized. Dynamic visualization of the needle was continuous throughout the procedures and maintained good position.     We discussed the proper protocols after the injection such as no submerging pools, baths tubs, or hot tubs for 24 hr.  Showering is okay today.  We discussed red flags such as fevers, chills, red, warm, tender joint at the area of injection to please seek medical care immediately.      MEDICAL NECESSITY FOR VISCOSUPPLEMETNATION: After thorough evaluation of the patient, I have determined that visco-supplementation is medically necessary. The patient has painful degenerative changes of the knee with failure of conservative treatments including lifestyle modifications and rehabilitation exercises.  Oral analgesis/NSAIDs have not adequately controlled symptoms and there is radiographic evidence of Kellgren Karan grade 2 or greater osteoarthritic changes, or in lack of radiographic evidence, there is arthroscopic or other evidence of chondrosis.     Euflexxa:   Right knee 2/3

## 2023-10-06 DIAGNOSIS — E55.9 VITAMIN D DEFICIENCY: ICD-10-CM

## 2023-10-06 RX ORDER — ERGOCALCIFEROL 1.25 MG/1
CAPSULE ORAL
Qty: 12 CAPSULE | Refills: 0 | Status: SHIPPED | OUTPATIENT
Start: 2023-10-06 | End: 2024-03-19 | Stop reason: SDUPTHER

## 2023-10-11 ENCOUNTER — OFFICE VISIT (OUTPATIENT)
Dept: SPORTS MEDICINE | Facility: CLINIC | Age: 58
End: 2023-10-11
Payer: COMMERCIAL

## 2023-10-11 VITALS — BODY MASS INDEX: 55.81 KG/M2 | HEIGHT: 63 IN | WEIGHT: 315 LBS

## 2023-10-11 DIAGNOSIS — M17.11 PRIMARY OSTEOARTHRITIS OF RIGHT KNEE: Primary | ICD-10-CM

## 2023-10-11 PROCEDURE — 99499 UNLISTED E&M SERVICE: CPT | Mod: S$GLB,,, | Performed by: STUDENT IN AN ORGANIZED HEALTH CARE EDUCATION/TRAINING PROGRAM

## 2023-10-11 PROCEDURE — 99999 PR PBB SHADOW E&M-EST. PATIENT-LVL III: CPT | Mod: PBBFAC,,, | Performed by: STUDENT IN AN ORGANIZED HEALTH CARE EDUCATION/TRAINING PROGRAM

## 2023-10-11 PROCEDURE — 20611 LARGE JOINT ASPIRATION/INJECTION: R KNEE: ICD-10-PCS | Mod: RT,S$GLB,, | Performed by: STUDENT IN AN ORGANIZED HEALTH CARE EDUCATION/TRAINING PROGRAM

## 2023-10-11 PROCEDURE — 20611 DRAIN/INJ JOINT/BURSA W/US: CPT | Mod: RT,S$GLB,, | Performed by: STUDENT IN AN ORGANIZED HEALTH CARE EDUCATION/TRAINING PROGRAM

## 2023-10-11 PROCEDURE — 99499 NO LOS: ICD-10-PCS | Mod: S$GLB,,, | Performed by: STUDENT IN AN ORGANIZED HEALTH CARE EDUCATION/TRAINING PROGRAM

## 2023-10-11 PROCEDURE — 99999 PR PBB SHADOW E&M-EST. PATIENT-LVL III: ICD-10-PCS | Mod: PBBFAC,,, | Performed by: STUDENT IN AN ORGANIZED HEALTH CARE EDUCATION/TRAINING PROGRAM

## 2023-10-11 NOTE — PROCEDURES
Large Joint Aspiration/Injection: R knee    Date/Time: 10/11/2023 11:20 AM    Performed by: Tawanda Lora MD  Authorized by: Tawanda Lora MD    Consent Done?:  Yes (Verbal)  Indications:  Pain  Site marked: the procedure site was marked    Timeout: prior to procedure the correct patient, procedure, and site was verified    Prep: patient was prepped and draped in usual sterile fashion      Local anesthesia used?: Yes    Local anesthetic:  Topical anesthetic    Details:  Needle Size:  22 G  Ultrasonic Guidance for needle placement?: Yes    Images are saved and documented.  Approach: Superior lateral.  Location:  Knee  Site:  R knee  Medications:  20 mg sodium hyaluronate (EUFLEXXA) 10 mg/mL(mw 2.4 -3.6 million)  Patient tolerance:  Patient tolerated the procedure well with no immediate complications     Ultrasound guidance was used for needle localization. Images were saved and stored for documentation. The appropriate structures were visualized. Dynamic visualization of the needle was continuous throughout the procedures and maintained good position.     We discussed the proper protocols after the injection such as no submerging pools, baths tubs, or hot tubs for 24 hr.  Showering is okay today.  We discussed red flags such as fevers, chills, red, warm, tender joint at the area of injection to please seek medical care immediately.      MEDICAL NECESSITY FOR VISCOSUPPLEMETNATION: After thorough evaluation of the patient, I have determined that visco-supplementation is medically necessary. The patient has painful degenerative changes of the knee with failure of conservative treatments including lifestyle modifications and rehabilitation exercises.  Oral analgesis/NSAIDs have not adequately controlled symptoms and there is radiographic evidence of Kellgren Karan grade 2 or greater osteoarthritic changes, or in lack of radiographic evidence, there is arthroscopic or other evidence of chondrosis.     Euflexxa:   Right knee 3/3

## 2023-10-11 NOTE — LETTER
Patient: Juan C Rodriguez   YOB: 1965   Clinic Number: [id number]   Today's Date: October 11, 2023        Certificate to Return to Work     Juan C Felix was seen by Tawanda Lora MD on 10/11/2023.      Juan C Felix can return to work on 10/12/23 with full duty.      If you have any questions or concerns, please feel free to contact the office at [phone].    Thank you.    Tawanda Lora MD        Signature:

## 2023-10-11 NOTE — PATIENT INSTRUCTIONS
Assessment:  Juan C Rodriguez is a 57 y.o. male   Chief Complaint   Patient presents with    Right Knee - Pain       Encounter Diagnosis   Name Primary?    Primary osteoarthritis of right knee Yes        Plan:  We discussed the proper protocols after the injection such as no submerging pools, baths tubs, or hot tubs for 24 hr.  Showering is okay today.  We also discussed that blood sugars can be elevated after an injection and asked patient to properly checked her sugars over the next few days and contact their PCP if there are any concerns.  We discussed red flags such as fevers, chills, red, warm, tender joint at the area of injection to please seek medical care immediately.         General Arthritis info:    -shiny white stuff at end of a chicken bones is cartilage    -arthritis is wearing away of the cartilage that lines the end of your bones    -osteoarthritis is thought to be a wear and tear phenomenon    -symptoms are due to inflammation of joint causing stiffness, aching, and sometimes swelling    -occasionally sharp pain will occur causing a give way sensation    -Risk factors: genetic, weight, female > male, age    Treatment options:    -maintain healthy weight (every pound is 4 pounds of pressure on the knee)    -daily moderate exercise (walk, bike, swim 30 minutes per day) to keep joints moving    -daily strengthening exercises (through therapy or on own) to keep muscles supporting joint healthy and strong    -glucosamine 1500mg daily (look for USP label on bottle)    -tylenol as needed for pain (follow directions on the bottle)    -anti-inflammatory medication such as alleve may be helpful- take 1-2 tabs twice daily for 7 days. If it helps your pain, continue. If you do not feel any change, you may stop and then take it as needed.    (you may be given a once daily anti-inflammatory such as MOBIC. If given, avoid other anti-inflammatory medications such as advil, ibuprofen, motrin,  naprosyn, alleve, etc)    -if swollen and painful, ice, decrease activity, and take anti-inflammatory daily for 5-7 days and if no relief call your doctor for further options    -consider cortisone injection (every 3-4 months at most)- anti- inflammatory steroid medication that can be injected directly into the joint to reduce inflammation    -consider hyaluronic acid injections (eufflexxa, hyalgan, synvisc, supartz) (every 6 months at most)- protein injection that helps decrease pain and irritability in the joint. It is best used to help prolong intervals between cortisone injections to minimize steroid injections. These are currently approved for knee injections. Discuss with your doctor if other joint involved. Call to seek approval prior to the injections.    -long-term treatment may include a total joint replacement (keep diary of good days and bad days, then evaluate as to when you are ready)   Supplements for pain, inflammation, arthritis.    Glucosamine sulfate/chondroitin sulfate has been shown to be safe and effective for knee arthritis (and possibly other joints affected with arthritis). This is an over the counter medication/supplement and usually needs to be taken for 1-2 months before results are seen. If you do not see any results after this time, consider stopping it. The dose is usually found on the bottle, and is 1500mg to start (first 1-2 months) then you can back down to 1000 mg (current recommendations are 1500mg per day, all at once).    Some people can have stomach problems with this and if you do, you may stop taking it or divide up the doses. If you are ALLERGIC TO SHELLFISH, please do not take. Follow the instructions on the bottle.    Other supplements that have been shown to help with joint and muscle pain include:    (Unless otherwise noted, as these supplements are not FDA controlled, please follow the recommendations on the bottle)    Turmeric 500mg PO BID    Flax seed oil    Avocado  soybean unsaponifiables    BHAVESH-e    MSM    Cherry juice extract (tart)    Cali    Anikishantia    Diacerein    If there are any concerns about taking these supplements with other medications you may already be taking, you can speak to your pharmacist, physician, health care provider, or research other medical information available to you. Side effects and interactions are possible with any supplement or medication - be safe!     Follow-up: AS needed or sooner if there are any problems between now and then.    Thank you for choosing [facility] and Dr. Tawanda Lora for your orthopedic & sports medicine care. It is our goal to provide you with exceptional care that will help keep you healthy, active, and get you back in the game.    Please do not hesitate to reach out to us via email, phone, or MyChart with any questions, concerns, or feedback.    If you felt that you received exemplary care today, please consider leaving us feedback on Healthgrades at:  [email or web address]    If you are experiencing pain/discomfort ,or have questions after 5pm and would like to be connected to the [facility] on-call team, please call this number and specify which Sports Medicine provider is treating you: [phone]

## 2023-11-06 ENCOUNTER — TELEPHONE (OUTPATIENT)
Dept: PHARMACY | Facility: CLINIC | Age: 58
End: 2023-11-06
Payer: COMMERCIAL

## 2023-11-06 RX ORDER — METFORMIN HYDROCHLORIDE 500 MG/1
500 TABLET ORAL DAILY
Qty: 90 TABLET | Refills: 1 | Status: SHIPPED | OUTPATIENT
Start: 2023-11-06

## 2023-11-06 RX ORDER — OLMESARTAN MEDOXOMIL AND HYDROCHLOROTHIAZIDE 20/12.5 20; 12.5 MG/1; MG/1
1 TABLET ORAL DAILY
Qty: 90 TABLET | Refills: 3 | Status: SHIPPED | OUTPATIENT
Start: 2023-11-06 | End: 2024-10-31

## 2023-11-06 NOTE — TELEPHONE ENCOUNTER
No care due was identified.  [facility] Embedded Care Due Messages. Reference number: [id number].   11/06/2023 2:38:35 PM CST

## 2023-11-06 NOTE — TELEPHONE ENCOUNTER
Refill Decision Note      Refill Decision Note   Juan C Rodriguez  is requesting a refill authorization.  Brief Assessment and Rationale for Refill:  Approve     Medication Therapy Plan:         Comments:     Note composed:2:39 PM 11/06/2023             Appointments     Last Visit   8/24/2023 Reza Michelle MD   Next Visit   12/26/2023 Reza Michelle MD

## 2023-11-06 NOTE — TELEPHONE ENCOUNTER
Refill Decision Note   Juan C Rodriguez  is requesting a refill authorization.  Brief Assessment and Rationale for Refill:  Approve     Medication Therapy Plan:         Comments:     Note composed:2:35 PM 11/06/2023             Appointments     Last Visit   8/24/2023 Reza Michelle MD   Next Visit   12/26/2023 Reza Michelle MD

## 2023-11-06 NOTE — TELEPHONE ENCOUNTER
No care due was identified.  [facility] Embedded Care Due Messages. Reference number: [id number].   11/06/2023 2:32:33 PM CST

## 2023-11-17 ENCOUNTER — OFFICE VISIT (OUTPATIENT)
Dept: RHEUMATOLOGY | Facility: CLINIC | Age: 58
End: 2023-11-17
Payer: COMMERCIAL

## 2023-11-17 ENCOUNTER — LAB VISIT (OUTPATIENT)
Dept: LAB | Facility: HOSPITAL | Age: 58
End: 2023-11-17
Attending: PHYSICIAN ASSISTANT
Payer: COMMERCIAL

## 2023-11-17 VITALS
WEIGHT: 315 LBS | HEIGHT: 63 IN | BODY MASS INDEX: 55.81 KG/M2 | DIASTOLIC BLOOD PRESSURE: 85 MMHG | HEART RATE: 62 BPM | SYSTOLIC BLOOD PRESSURE: 157 MMHG

## 2023-11-17 DIAGNOSIS — Z79.899 IMMUNOCOMPROMISED STATE DUE TO DRUG THERAPY: Primary | ICD-10-CM

## 2023-11-17 DIAGNOSIS — M1A.09X0 IDIOPATHIC CHRONIC GOUT OF MULTIPLE SITES WITHOUT TOPHUS: ICD-10-CM

## 2023-11-17 DIAGNOSIS — Z79.899 HIGH RISK MEDICATION USE: ICD-10-CM

## 2023-11-17 DIAGNOSIS — M19.90 INFLAMMATORY ARTHRITIS: ICD-10-CM

## 2023-11-17 DIAGNOSIS — M06.09 RHEUMATOID ARTHRITIS OF MULTIPLE SITES WITH NEGATIVE RHEUMATOID FACTOR: ICD-10-CM

## 2023-11-17 DIAGNOSIS — D84.821 IMMUNOCOMPROMISED STATE DUE TO DRUG THERAPY: ICD-10-CM

## 2023-11-17 DIAGNOSIS — D84.821 IMMUNOCOMPROMISED STATE DUE TO DRUG THERAPY: Primary | ICD-10-CM

## 2023-11-17 DIAGNOSIS — R09.81 SINUS CONGESTION: ICD-10-CM

## 2023-11-17 DIAGNOSIS — Z51.81 MEDICATION MONITORING ENCOUNTER: ICD-10-CM

## 2023-11-17 DIAGNOSIS — M06.09 RHEUMATOID ARTHRITIS OF MULTIPLE SITES WITH NEGATIVE RHEUMATOID FACTOR: Primary | ICD-10-CM

## 2023-11-17 DIAGNOSIS — Z79.899 IMMUNOCOMPROMISED STATE DUE TO DRUG THERAPY: ICD-10-CM

## 2023-11-17 DIAGNOSIS — R70.0 ELEVATED SED RATE: ICD-10-CM

## 2023-11-17 DIAGNOSIS — R79.82 ELEVATED C-REACTIVE PROTEIN (CRP): ICD-10-CM

## 2023-11-17 DIAGNOSIS — M10.9 GOUT WITHOUT TOPHUS: ICD-10-CM

## 2023-11-17 LAB
ALBUMIN SERPL BCP-MCNC: 3.5 G/DL (ref 3.5–5.2)
ALP SERPL-CCNC: 118 U/L (ref 55–135)
ALT SERPL W/O P-5'-P-CCNC: 20 U/L (ref 10–44)
ANION GAP SERPL CALC-SCNC: 10 MMOL/L (ref 8–16)
AST SERPL-CCNC: 18 U/L (ref 10–40)
BASOPHILS # BLD AUTO: 0.07 K/UL (ref 0–0.2)
BASOPHILS NFR BLD: 0.9 % (ref 0–1.9)
BILIRUB SERPL-MCNC: 0.5 MG/DL (ref 0.1–1)
BUN SERPL-MCNC: 24 MG/DL (ref 6–20)
CALCIUM SERPL-MCNC: 9.4 MG/DL (ref 8.7–10.5)
CHLORIDE SERPL-SCNC: 100 MMOL/L (ref 95–110)
CO2 SERPL-SCNC: 31 MMOL/L (ref 23–29)
CREAT SERPL-MCNC: 1.3 MG/DL (ref 0.5–1.4)
CRP SERPL-MCNC: 28.4 MG/L (ref 0–8.2)
DIFFERENTIAL METHOD: ABNORMAL
EOSINOPHIL # BLD AUTO: 0.1 K/UL (ref 0–0.5)
EOSINOPHIL NFR BLD: 1.7 % (ref 0–8)
ERYTHROCYTE [DISTWIDTH] IN BLOOD BY AUTOMATED COUNT: 17.4 % (ref 11.5–14.5)
ERYTHROCYTE [SEDIMENTATION RATE] IN BLOOD BY PHOTOMETRIC METHOD: 47 MM/HR (ref 0–23)
EST. GFR  (NO RACE VARIABLE): >60 ML/MIN/1.73 M^2
GLUCOSE SERPL-MCNC: 97 MG/DL (ref 70–110)
HCT VFR BLD AUTO: 38.3 % (ref 40–54)
HGB BLD-MCNC: 12 G/DL (ref 14–18)
IMM GRANULOCYTES # BLD AUTO: 0.04 K/UL (ref 0–0.04)
IMM GRANULOCYTES NFR BLD AUTO: 0.5 % (ref 0–0.5)
LYMPHOCYTES # BLD AUTO: 2.6 K/UL (ref 1–4.8)
LYMPHOCYTES NFR BLD: 35.3 % (ref 18–48)
MCH RBC QN AUTO: 25.6 PG (ref 27–31)
MCHC RBC AUTO-ENTMCNC: 31.3 G/DL (ref 32–36)
MCV RBC AUTO: 82 FL (ref 82–98)
MONOCYTES # BLD AUTO: 0.5 K/UL (ref 0.3–1)
MONOCYTES NFR BLD: 7.2 % (ref 4–15)
NEUTROPHILS # BLD AUTO: 4.1 K/UL (ref 1.8–7.7)
NEUTROPHILS NFR BLD: 54.4 % (ref 38–73)
NRBC BLD-RTO: 0 /100 WBC
PLATELET # BLD AUTO: 224 K/UL (ref 150–450)
PMV BLD AUTO: 9.5 FL (ref 9.2–12.9)
POTASSIUM SERPL-SCNC: 3.9 MMOL/L (ref 3.5–5.1)
PROT SERPL-MCNC: 7.6 G/DL (ref 6–8.4)
RBC # BLD AUTO: 4.69 M/UL (ref 4.6–6.2)
SODIUM SERPL-SCNC: 141 MMOL/L (ref 136–145)
URATE SERPL-MCNC: 6.3 MG/DL (ref 3.4–7)
WBC # BLD AUTO: 7.47 K/UL (ref 3.9–12.7)

## 2023-11-17 PROCEDURE — 3061F NEG MICROALBUMINURIA REV: CPT | Mod: CPTII,S$GLB,, | Performed by: PHYSICIAN ASSISTANT

## 2023-11-17 PROCEDURE — 99999 PR PBB SHADOW E&M-EST. PATIENT-LVL IV: ICD-10-PCS | Mod: PBBFAC,,, | Performed by: PHYSICIAN ASSISTANT

## 2023-11-17 PROCEDURE — 3077F SYST BP >= 140 MM HG: CPT | Mod: CPTII,S$GLB,, | Performed by: PHYSICIAN ASSISTANT

## 2023-11-17 PROCEDURE — 99999 PR PBB SHADOW E&M-EST. PATIENT-LVL IV: CPT | Mod: PBBFAC,,, | Performed by: PHYSICIAN ASSISTANT

## 2023-11-17 PROCEDURE — 1160F PR REVIEW ALL MEDS BY PRESCRIBER/CLIN PHARMACIST DOCUMENTED: ICD-10-PCS | Mod: CPTII,S$GLB,, | Performed by: PHYSICIAN ASSISTANT

## 2023-11-17 PROCEDURE — 1159F MED LIST DOCD IN RCRD: CPT | Mod: CPTII,S$GLB,, | Performed by: PHYSICIAN ASSISTANT

## 2023-11-17 PROCEDURE — 3077F PR MOST RECENT SYSTOLIC BLOOD PRESSURE >= 140 MM HG: ICD-10-PCS | Mod: CPTII,S$GLB,, | Performed by: PHYSICIAN ASSISTANT

## 2023-11-17 PROCEDURE — 99214 PR OFFICE/OUTPT VISIT, EST, LEVL IV, 30-39 MIN: ICD-10-PCS | Mod: S$GLB,,, | Performed by: PHYSICIAN ASSISTANT

## 2023-11-17 PROCEDURE — 80053 COMPREHEN METABOLIC PANEL: CPT | Performed by: PHYSICIAN ASSISTANT

## 2023-11-17 PROCEDURE — 3066F PR DOCUMENTATION OF TREATMENT FOR NEPHROPATHY: ICD-10-PCS | Mod: CPTII,S$GLB,, | Performed by: PHYSICIAN ASSISTANT

## 2023-11-17 PROCEDURE — 99214 OFFICE O/P EST MOD 30 MIN: CPT | Mod: S$GLB,,, | Performed by: PHYSICIAN ASSISTANT

## 2023-11-17 PROCEDURE — 3044F HG A1C LEVEL LT 7.0%: CPT | Mod: CPTII,S$GLB,, | Performed by: PHYSICIAN ASSISTANT

## 2023-11-17 PROCEDURE — 86140 C-REACTIVE PROTEIN: CPT | Performed by: PHYSICIAN ASSISTANT

## 2023-11-17 PROCEDURE — 3061F PR NEG MICROALBUMINURIA RESULT DOCUMENTED/REVIEW: ICD-10-PCS | Mod: CPTII,S$GLB,, | Performed by: PHYSICIAN ASSISTANT

## 2023-11-17 PROCEDURE — 3066F NEPHROPATHY DOC TX: CPT | Mod: CPTII,S$GLB,, | Performed by: PHYSICIAN ASSISTANT

## 2023-11-17 PROCEDURE — 36415 COLL VENOUS BLD VENIPUNCTURE: CPT | Performed by: PHYSICIAN ASSISTANT

## 2023-11-17 PROCEDURE — 1159F PR MEDICATION LIST DOCUMENTED IN MEDICAL RECORD: ICD-10-PCS | Mod: CPTII,S$GLB,, | Performed by: PHYSICIAN ASSISTANT

## 2023-11-17 PROCEDURE — 85025 COMPLETE CBC W/AUTO DIFF WBC: CPT | Performed by: PHYSICIAN ASSISTANT

## 2023-11-17 PROCEDURE — 3044F PR MOST RECENT HEMOGLOBIN A1C LEVEL <7.0%: ICD-10-PCS | Mod: CPTII,S$GLB,, | Performed by: PHYSICIAN ASSISTANT

## 2023-11-17 PROCEDURE — 85652 RBC SED RATE AUTOMATED: CPT | Performed by: PHYSICIAN ASSISTANT

## 2023-11-17 PROCEDURE — 1160F RVW MEDS BY RX/DR IN RCRD: CPT | Mod: CPTII,S$GLB,, | Performed by: PHYSICIAN ASSISTANT

## 2023-11-17 PROCEDURE — 3079F DIAST BP 80-89 MM HG: CPT | Mod: CPTII,S$GLB,, | Performed by: PHYSICIAN ASSISTANT

## 2023-11-17 PROCEDURE — 3008F BODY MASS INDEX DOCD: CPT | Mod: CPTII,S$GLB,, | Performed by: PHYSICIAN ASSISTANT

## 2023-11-17 PROCEDURE — 3008F PR BODY MASS INDEX (BMI) DOCUMENTED: ICD-10-PCS | Mod: CPTII,S$GLB,, | Performed by: PHYSICIAN ASSISTANT

## 2023-11-17 PROCEDURE — 84550 ASSAY OF BLOOD/URIC ACID: CPT | Performed by: PHYSICIAN ASSISTANT

## 2023-11-17 PROCEDURE — 3079F PR MOST RECENT DIASTOLIC BLOOD PRESSURE 80-89 MM HG: ICD-10-PCS | Mod: CPTII,S$GLB,, | Performed by: PHYSICIAN ASSISTANT

## 2023-11-17 RX ORDER — ALLOPURINOL 300 MG/1
300 TABLET ORAL DAILY
Qty: 90 TABLET | Refills: 1 | Status: SHIPPED | OUTPATIENT
Start: 2023-11-17 | End: 2024-03-12 | Stop reason: SDUPTHER

## 2023-11-17 RX ORDER — METHOTREXATE 2.5 MG/1
15 TABLET ORAL
Qty: 72 TABLET | Refills: 1 | Status: SHIPPED | OUTPATIENT
Start: 2023-11-17 | End: 2024-03-12 | Stop reason: SDUPTHER

## 2023-11-17 ASSESSMENT — ROUTINE ASSESSMENT OF PATIENT INDEX DATA (RAPID3): MDHAQ FUNCTION SCORE: 0.6

## 2023-11-17 NOTE — PATIENT INSTRUCTIONS
Call [phone] and ask for help in scheduling an appt w ENT from an active referral if you don't here from anyone by Wed.

## 2023-11-17 NOTE — PROGRESS NOTES
Subjective:      Patient ID: Juan C Rodriguez is a 57 y.o. male.    Chief Complaint: Rheumatoid Arthritis    HPI   Juan C Rodriguez  is a 57 y.o. male here for follow-up seronegative RA and overlapping gout.  Patient on methotrexate 15 mg weekly, folic acid 1 mg daily and Enbrel weekly.  From RA perspective he states he is doing very well.  He is more than 70% improved.  Denies prolonged morning stiffness.  Denies joint effusions.     Has chronically elevated sed rate and CRP.  In the past SPEP and VELASQUEZ were negative.  Patient still complaining of chronic congestion.  Says on occasion he is able to get a little bit of yellow phlegm out.  Not coughing.  In past I had put in a referral to ENT.  He has not yet scheduled an appointment.  Has not followed up with primary care in regards to the discolored drainage.    No acute gout flares recently.  On allopurinol 300 mg daily.  Not taking any colchicine.      Also with history of knee osteoarthritis.  Currently seeing Orthopedics for viscosupplementation.  He has completed a round of Euflexxa injections just over a month ago.  He tolerated them well.  He is still complaining of knee pain at 5/10.  Is mechanical in nature.    Patient denies fevers, chills, photosensitivity, eye pain, shortness of breath, chest pain, hematuria, blood in the stool, rash, sicca symptoms, raynauds, finger ulcerations.  Rheumatologic systems otherwise negative.    Serologies/Labs:  Neg CHAD, RF, CCP  Chronically elevated ESR/CRP  Current Treatment:  MTX 15 mg weekly  Folic acid 1 mg daily  Enbrel q week  Allopurinol 300 mg daily  Previous Treatment:   na    Current Outpatient Medications:     allopurinoL (ZYLOPRIM) 300 MG tablet, Take 1 tablet (300 mg total) by mouth once daily., Disp: 90 tablet, Rfl: 1    amLODIPine (NORVASC) 10 MG tablet, Take 1 tablet by mouth once daily, Disp: 90 tablet, Rfl: 0    atorvastatin (LIPITOR) 20 MG tablet, Take 1 tablet (20 mg total) by  "mouth once daily., Disp: 90 tablet, Rfl: 3    ergocalciferol (ERGOCALCIFEROL) 50,000 unit Cap, Take 1 capsule by mouth once a week, Disp: 12 capsule, Rfl: 0    etanercept (ENBREL SURECLICK) 50 mg/mL (1 mL), Inject 1 mL (50 mg total) into the skin once a week., Disp: 4 mL, Rfl: 11    folic acid (FOLVITE) 1 MG tablet, Take 1 tablet (1 mg total) by mouth once daily., Disp: 90 tablet, Rfl: 3    metFORMIN (GLUCOPHAGE) 500 MG tablet, Take 1 tablet (500 mg total) by mouth once daily., Disp: 90 tablet, Rfl: 1    methotrexate 2.5 MG Tab, Take 6 tablets (15 mg total) by mouth every 7 days., Disp: 72 tablet, Rfl: 1    methylPREDNISolone (MEDROL DOSEPACK) 4 mg tablet, use as directed, Disp: 1 each, Rfl: 0    needle, disp, 22 G 22 gauge x 1 1/2" Ndle, 1 Units by Misc.(Non-Drug; Combo Route) route every 21 days., Disp: 100 each, Rfl: 99    olmesartan-hydrochlorothiazide (BENICAR HCT) 20-12.5 mg per tablet, Take 1 tablet by mouth once daily., Disp: 90 tablet, Rfl: 3    Past Medical History:   Diagnosis Date    Diabetes mellitus, type 2 diagnosed 2013    DM (diabetes mellitus)     2011  02/25/2014    HTN (hypertension)     Hypogonadism, testicular     Left tibialis posterior tendinitis 11/30/2016    Obesities, morbid      Family History   Problem Relation Age of Onset    Cataracts Mother     Diabetes Mother     Hypertension Mother     Diabetes Sister      Social History     Socioeconomic History    Marital status: Single   Tobacco Use    Smoking status: Some Days     Types: Cigars    Smokeless tobacco: Never   Substance and Sexual Activity    Alcohol use: Yes     Alcohol/week: 3.0 - 4.0 standard drinks of alcohol     Types: 3 - 4 Cans of beer per week     Comment: occasional weekends    Drug use: No    Sexual activity: Not Currently     Partners: Female     Review of patient's allergies indicates:   Allergen Reactions    Naproxen Swelling       Objective:   BP (!) 157/85   Pulse 62   Ht 5' 3" (1.6 m)   Wt (!) 143.1 kg (315 " lb 7.7 oz)   BMI 55.88 kg/m²   Immunization History   Administered Date(s) Administered    COVID-19 MRNA, LN-S PF (MODERNA HALF 0.25 ML DOSE) 01/03/2022    COVID-19, MRNA, LN-S, PF (MODERNA FULL 0.5 ML DOSE) 03/05/2021, 04/01/2021    Influenza 12/06/2010    Influenza - Quadrivalent - PF *Preferred* (6 months and older) 12/08/2021, 12/06/2022    Pneumococcal Conjugate - 13 Valent 07/01/2020    Pneumococcal Polysaccharide - 23 Valent 07/09/2018    Tdap 08/14/2012       Physical Exam   Constitutional: He is oriented to person, place, and time. No distress.   HENT:   Head: Normocephalic and atraumatic.   Pulmonary/Chest: Effort normal.   Musculoskeletal:         General: Normal range of motion.   Neurological: He is alert and oriented to person, place, and time.   Psychiatric: His behavior is normal. Mood normal.   Nursing note and vitals reviewed.    No synovitis, no dactylitis, no enthesitis  No effusions of large or small joints  100% fist formation  Well preserved ROM    No results found for this or any previous visit (from the past 672 hour(s)).      Lab Results   Component Value Date    TBGOLDPLUS Negative 11/11/2022      Lab Results   Component Value Date    HEPAIGM Negative 07/01/2020    HEPBIGM Negative 07/01/2020    HEPBCAB Non-reactive 11/11/2022    HEPCAB Non-reactive 11/11/2022        Imaging  I have personally reviewed images and reports as below.  I agree with the interpretation.  X-ray Knee Ortho Right with Flexion  Narrative: EXAMINATION:  XR KNEE ORTHO RIGHT WITH FLEXION    CLINICAL HISTORY:  Pain in left knee    TECHNIQUE:  AP standing as well as PA flexion standing and Merchant views of both knees were performed.  A lateral view of the right knee is also performed.    COMPARISON:  Prior radiographs    FINDINGS:  No acute osseous abnormality.  Degenerative changes noted bilaterally most prevalent within the right knee medial compartment including moderate joint space loss.  Small right knee  suprapatellar joint effusion suspected.  Impression: As above    Electronically signed by: Deacon Morrison MD  Date:    08/23/2023  Time:    10:49      Assessment:     1. Rheumatoid arthritis of multiple sites with negative rheumatoid factor    2. Idiopathic chronic gout of multiple sites without tophus    3. Medication monitoring encounter    4. High risk medication use    5. Immunocompromised state due to drug therapy    6. Gout without tophus    7. Sinus congestion          Plan:     Juan C was seen today for rheumatoid arthritis.    Diagnoses and all orders for this visit:    Rheumatoid arthritis of multiple sites with negative rheumatoid factor    Idiopathic chronic gout of multiple sites without tophus    Medication monitoring encounter    High risk medication use  -     Ambulatory referral/consult to ENT; Future    Immunocompromised state due to drug therapy  -     Ambulatory referral/consult to ENT; Future    Gout without tophus    Sinus congestion  -     Ambulatory referral/consult to ENT; Future    Seronegative RA  Needs labs today  > 75% improvement on Enbrel + MTX - continue same  No active synovitis on exam today  Hepatitis and TB labs updated 11/2022  Gout  GFR > 60  C/w allopurinal 300 mg daily  Consider addition of colcicine 0.6 mg daily  Check Uric acid next lab draw  No active gout attack at present  Elevated CRP/ESR  Clinically joints doing well per report  C/o sinus congestion x 1 mos  ENT referral  Consider CT chest/abd/pelvis   Rt knee pain and OA  Getting euflexxa w ortho  Tolerating them will  Still w pain  Mgmt per ortho  Drug therapy requiring intensive monitoring for toxicity  High Risk Medication Monitoring encounter  No current medication related issues, no evidence of toxicity  I ordered labs for toxicity monitoring, have personally reviewed the findings, and discussed them with the patient.  Pending labs will be sent via the portal  Compromised immune system secondary to autoimmune  disease and/or use of immunosuppressive drugs.  Monitor carefully for infections.  Advised patient to get immediate medical care if any infection arises.  Also advised strict adherence age-appropriate vaccinations and cancer screenings with PCP.  Patient advised to hold DMARD and/or biologic therapy for signs of infection or for surgery. If you are unsure what to do please call our office for instruction.[facility] [phone]  Return to clinic:  16 wks w Reg 4 and uric acid day of    Follow up in about 4 months (around 3/17/2024).    The patient understands, chooses and consents to this plan and accepts all the risks which include but are not limited to the risks mentioned above.     Disclaimer: This note was prepared using a voice recognition system and is likely to have sound alike errors within the text.

## 2023-11-21 ENCOUNTER — TELEPHONE (OUTPATIENT)
Dept: RHEUMATOLOGY | Facility: CLINIC | Age: 58
End: 2023-11-21
Payer: COMMERCIAL

## 2023-11-21 NOTE — TELEPHONE ENCOUNTER
Please let him know CRP is improved, but still significantly elevated.  I would recommend CT chest abdomen and pelvis.  Also highly recommend evaluation with ear nose and throat for what could be chronic sinus infection.  With yellow phlegm, would recommend stopping methotrexate and Enbrel until it improves.  He may likely need antibiotics.  I have put in an order for the CT chest abdomen and pelvis.  Please help coordinate with him.     Spoke to patient schedule CT scan on 12/11/2023 at 3:15

## 2023-11-21 NOTE — TELEPHONE ENCOUNTER
----- Message from Krysten Cruz PA-C sent at 11/17/2023  3:25 PM CST -----  Please let him know CRP is improved, but still significantly elevated.  I would recommend CT chest abdomen and pelvis.  Also highly recommend evaluation with ear nose and throat for what could be chronic sinus infection.  With yellow phlegm, would recommend stopping methotrexate and Enbrel until it improves.  He may likely need antibiotics.  I have put in an order for the CT chest abdomen and pelvis.  Please help coordinate with him.

## 2023-11-22 ENCOUNTER — TELEPHONE (OUTPATIENT)
Dept: RHEUMATOLOGY | Facility: CLINIC | Age: 58
End: 2023-11-22
Payer: COMMERCIAL

## 2023-11-22 NOTE — TELEPHONE ENCOUNTER
----- Message from Amy Betancourt sent at 11/22/2023 12:04 PM CST -----  Contact: Juan C Irizarry is calling to speak to the nurse regarding the medication he picked up was not on ice, he said its normally in a ice chest. Please give him a call back at [phone]    Thanks  LJ

## 2023-11-22 NOTE — PROGRESS NOTES
Chief complaint:    Chief Complaint   Patient presents with    Other     Sinus congestion           Referring Provider:  Krysten Cruz Pa-c  [address] [place] [address]      History of present illness:     Mr. Rodriguez is a 57 y.o. with compromised immune system secondary to autoimmune disease and/or use of immunosuppressive drugs presenting for evaluation of sinus issues.     The patient reports the following allergy/sinus symptoms:     Major sinusitis symptoms:  No - Purulent anterior nasal discharge  Yes - Purulent or discolored posterior nasal discharge  No - Nasal congestion or obstruction  No - Facial Congestion or fullness  No - Hyposmia or anosmia  No - Fever    Feels his eyes will get clogged and draining.  No facial numbness or double vision.   Also with feeling of mucus in his throat when he lies down. Associated globus and dysphagia at times. Does get heartburn with some foods.    Symptoms have been present for several months.   Treatment has included: none  Prior sinus surgery: none.      History      Past Medical History:   Past Medical History:   Diagnosis Date    Diabetes mellitus, type 2 diagnosed 2013    DM (diabetes mellitus)     2011  02/25/2014    HTN (hypertension)     Hypogonadism, testicular     Left tibialis posterior tendinitis 11/30/2016    Obesities, morbid          Past Surgical History:  Past Surgical History:   Procedure Laterality Date    COLONOSCOPY N/A 6/29/2018    Procedure: COLONOSCOPY;  Surgeon: Jeremiah Anaya III, MD;  Location: [facility];  Service: Endoscopy;  Laterality: N/A;         Medications: Medication list reviewed. He  has a current medication list which includes the following prescription(s): allopurinol, amlodipine, atorvastatin, ergocalciferol, enbrel sureclick, folic acid, metformin, methotrexate, methylprednisolone, needle (disp) 22 g, and olmesartan-hydrochlorothiazide.     Allergies:   Review of patient's allergies indicates:  "  Allergen Reactions    Naproxen Swelling         Family history: family history includes Cataracts in his mother; Diabetes in his mother and sister; Hypertension in his mother.         Social History          Alcohol use:  reports current alcohol use of about 3.0 - 4.0 standard drinks of alcohol per week.            Tobacco:  reports that he has been smoking cigars. He has never used smokeless tobacco.         Physical Examination      Vitals: Height 5' 3" (1.6 m), weight (!) 142.5 kg (314 lb 2.5 oz).      General: Well developed, well nourished, well hydrated.     Voice: no dysphonia, no dysarthria      Head/Face: Normocephalic, atraumatic. No scars or lesions. Facial musculature equal.     Eyes: No scleral icterus or conjunctival hemorrhage. EOMI. PERRLA.     Ears:     Right ear: No gross deformity. EAC is clear of debris and erythema. TM are intact with a pneumatized middle ear. No signs of retraction, fluid or infection.      Left ear: No gross deformity. EAC is clear of debris and erythema. TM are intact with a pneumatized middle ear. No signs of retraction, fluid or infection.      Nose: No gross deformity or lesions. No purulent discharge. 0.  No significant NSD.     Mouth/Oropharynx: Lips without any lesions. No mucosal lesions within the oropharynx. No tonsillar exudate or lesions. Pharyngeal walls symmetrical. Uvula midline. Tongue midline without lesions.     Neurologic: Moving all extremities without gross abnormality.CN II-XII grossly intact. House-Brackmann 1/6. No signs of nystagmus.        Data reviewed      Review of records:      I reviewed records from the referring provider's office visits describing the history, workup, and/or treatment of this problem thus far.     Laboratory:               Component Ref Range & Units 5 d ago  (11/17/23) 3 mo ago  (8/9/23) 6 mo ago  (5/9/23) 9 mo ago  (2/10/23) 1 yr ago  (11/11/22) 1 yr ago  (7/15/22) 1 yr ago  (4/14/22)   WBC 3.90 - 12.70 K/uL 7.47 8.60 7.64 " 9.19 7.49 7.85 6.83   RBC 4.60 - 6.20 M/uL 4.69 4.59 Low  4.24 Low  4.67 4.49 Low  4.39 Low  4.67   Hemoglobin 14.0 - 18.0 g/dL 12.0 Low  11.6 Low  11.1 Low  11.9 Low  11.6 Low  11.6 Low  12.3 Low    Hematocrit 40.0 - 54.0 % 38.3 Low  36.7 Low  35.1 Low  38.4 Low  38.6 Low  36.4 Low  39.4 Low    MCV 82 - 98 fL 82 80 Low  83 82 86 83 84   MCH 27.0 - 31.0 pg 25.6 Low  25.3 Low  26.2 Low  25.5 Low  25.8 Low  26.4 Low  26.3 Low    MCHC 32.0 - 36.0 g/dL 31.3 Low  31.6 Low  31.6 Low  31.0 Low  30.1 Low  31.9 Low  31.2 Low    RDW 11.5 - 14.5 % 17.4 High  15.9 High  17.8 High  16.4 High  17.2 High  18.9 High  15.0 High    Platelets 150 - 450 K/uL 224 254 220 209 212 265 241   MPV 9.2 - 12.9 fL 9.5 9.8 9.5 9.7 11.1 9.2 9.4   Immature Granulocytes 0.0 - 0.5 % 0.5 0.7 High  0.7 High  0.4 0.4 0.6 High  0.3   Gran # (ANC) 1.8 - 7.7 K/uL 4.1 5.1 4.3 6.2 5.0 4.8 4.2   Immature Grans (Abs) 0.00 - 0.04 K/uL 0.04 0.06 High  CM 0.05 High  CM 0.04 CM 0.03 CM 0.05 High  CM 0.02 CM              NASAL ENDOSCOPY       Indication: Juan C Rodriguez is a 57 y.o. male  with sinonasal symptoms that were not able to be explained by anterior rhinoscopy alone, thus necessitating nasal endoscopy.     Procedure: Risks, benefits, and alternatives of the procedure were discussed with the patient, and the patient consented to the nasal endoscopy.  The nasal cavity was sprayed with a topical decongestant and anesthetic (if needed). The endoscope was passed into each nostril and each nasal cavity was visualized.  On each side the nasal cavity, sinuses (if open), turbinates, middle and superior meatus, sphenoethmoidal recess and septum were examined with the findings described below. At the end of the examination, the scope was removed. The patient tolerated the procedure well with no complications.       Endoscopic Sinonasal Exam Findings:  -     The right side has normal mucosa  -     The left side has normal mucosa  -     Nasal  secretions: No discolored secretions noted bilaterally  -     Nasal septum: no significant deviation or perforation appreciated   -     Inferior turbinate: Normal mucosa without significant hypertrophy bilaterally  -     Middle turbinate: Normal mucosa without significant hypertrophy bilaterally  -     Other findings:   healthy, viable mucosa      Assessment/Plan:    1. Laryngopharyngeal reflux (LPR)    2. High risk medication use    3. Immunocompromised state due to drug therapy    4. Sinus congestion        No evidence of sinusitis; healthy, viable mucosa  Suspect major symptoms are related to untreated GERD - diet and lifestyle measures discussed. He would like to try this first before strating PPI  OTC lubricating eye drops for eye symptoms. F/u ophtho if not improved  Return to clinic as needed            Simon Santamaria MD  [facility]   [facility]  [id number] [place] [address].  OTTONIEL Waldrop [address]  P: [phone]  F: [phone]

## 2023-11-24 ENCOUNTER — OFFICE VISIT (OUTPATIENT)
Dept: OTOLARYNGOLOGY | Facility: CLINIC | Age: 58
End: 2023-11-24
Payer: COMMERCIAL

## 2023-11-24 VITALS — WEIGHT: 314.13 LBS | HEIGHT: 63 IN | BODY MASS INDEX: 55.66 KG/M2

## 2023-11-24 DIAGNOSIS — D84.821 IMMUNOCOMPROMISED STATE DUE TO DRUG THERAPY: ICD-10-CM

## 2023-11-24 DIAGNOSIS — Z79.899 HIGH RISK MEDICATION USE: ICD-10-CM

## 2023-11-24 DIAGNOSIS — K21.9 LARYNGOPHARYNGEAL REFLUX (LPR): Primary | ICD-10-CM

## 2023-11-24 DIAGNOSIS — Z79.899 IMMUNOCOMPROMISED STATE DUE TO DRUG THERAPY: ICD-10-CM

## 2023-11-24 DIAGNOSIS — R09.81 SINUS CONGESTION: ICD-10-CM

## 2023-11-24 PROCEDURE — 3061F PR NEG MICROALBUMINURIA RESULT DOCUMENTED/REVIEW: ICD-10-PCS | Mod: CPTII,S$GLB,, | Performed by: STUDENT IN AN ORGANIZED HEALTH CARE EDUCATION/TRAINING PROGRAM

## 2023-11-24 PROCEDURE — 31231 NASAL ENDOSCOPY DX: CPT | Mod: S$GLB,,, | Performed by: STUDENT IN AN ORGANIZED HEALTH CARE EDUCATION/TRAINING PROGRAM

## 2023-11-24 PROCEDURE — 3044F HG A1C LEVEL LT 7.0%: CPT | Mod: CPTII,S$GLB,, | Performed by: STUDENT IN AN ORGANIZED HEALTH CARE EDUCATION/TRAINING PROGRAM

## 2023-11-24 PROCEDURE — 3008F PR BODY MASS INDEX (BMI) DOCUMENTED: ICD-10-PCS | Mod: CPTII,S$GLB,, | Performed by: STUDENT IN AN ORGANIZED HEALTH CARE EDUCATION/TRAINING PROGRAM

## 2023-11-24 PROCEDURE — 3008F BODY MASS INDEX DOCD: CPT | Mod: CPTII,S$GLB,, | Performed by: STUDENT IN AN ORGANIZED HEALTH CARE EDUCATION/TRAINING PROGRAM

## 2023-11-24 PROCEDURE — 1159F PR MEDICATION LIST DOCUMENTED IN MEDICAL RECORD: ICD-10-PCS | Mod: CPTII,S$GLB,, | Performed by: STUDENT IN AN ORGANIZED HEALTH CARE EDUCATION/TRAINING PROGRAM

## 2023-11-24 PROCEDURE — 3061F NEG MICROALBUMINURIA REV: CPT | Mod: CPTII,S$GLB,, | Performed by: STUDENT IN AN ORGANIZED HEALTH CARE EDUCATION/TRAINING PROGRAM

## 2023-11-24 PROCEDURE — 99999 PR PBB SHADOW E&M-EST. PATIENT-LVL III: ICD-10-PCS | Mod: PBBFAC,,, | Performed by: STUDENT IN AN ORGANIZED HEALTH CARE EDUCATION/TRAINING PROGRAM

## 2023-11-24 PROCEDURE — 3066F PR DOCUMENTATION OF TREATMENT FOR NEPHROPATHY: ICD-10-PCS | Mod: CPTII,S$GLB,, | Performed by: STUDENT IN AN ORGANIZED HEALTH CARE EDUCATION/TRAINING PROGRAM

## 2023-11-24 PROCEDURE — 3066F NEPHROPATHY DOC TX: CPT | Mod: CPTII,S$GLB,, | Performed by: STUDENT IN AN ORGANIZED HEALTH CARE EDUCATION/TRAINING PROGRAM

## 2023-11-24 PROCEDURE — 99203 OFFICE O/P NEW LOW 30 MIN: CPT | Mod: 25,S$GLB,, | Performed by: STUDENT IN AN ORGANIZED HEALTH CARE EDUCATION/TRAINING PROGRAM

## 2023-11-24 PROCEDURE — 99999 PR PBB SHADOW E&M-EST. PATIENT-LVL III: CPT | Mod: PBBFAC,,, | Performed by: STUDENT IN AN ORGANIZED HEALTH CARE EDUCATION/TRAINING PROGRAM

## 2023-11-24 PROCEDURE — 3044F PR MOST RECENT HEMOGLOBIN A1C LEVEL <7.0%: ICD-10-PCS | Mod: CPTII,S$GLB,, | Performed by: STUDENT IN AN ORGANIZED HEALTH CARE EDUCATION/TRAINING PROGRAM

## 2023-11-24 PROCEDURE — 1159F MED LIST DOCD IN RCRD: CPT | Mod: CPTII,S$GLB,, | Performed by: STUDENT IN AN ORGANIZED HEALTH CARE EDUCATION/TRAINING PROGRAM

## 2023-11-24 PROCEDURE — 31231 PR NASAL ENDOSCOPY, DX: ICD-10-PCS | Mod: S$GLB,,, | Performed by: STUDENT IN AN ORGANIZED HEALTH CARE EDUCATION/TRAINING PROGRAM

## 2023-11-24 PROCEDURE — 99203 PR OFFICE/OUTPT VISIT, NEW, LEVL III, 30-44 MIN: ICD-10-PCS | Mod: 25,S$GLB,, | Performed by: STUDENT IN AN ORGANIZED HEALTH CARE EDUCATION/TRAINING PROGRAM

## 2023-12-06 ENCOUNTER — PATIENT OUTREACH (OUTPATIENT)
Dept: ADMINISTRATIVE | Facility: HOSPITAL | Age: 58
End: 2023-12-06
Payer: COMMERCIAL

## 2023-12-06 NOTE — PROGRESS NOTES
OHN HTN REPORT: per chart review pt last documented bp is elevated, pt has a appt with pcp 12/26/23 already scheduled.

## 2023-12-11 ENCOUNTER — HOSPITAL ENCOUNTER (OUTPATIENT)
Dept: RADIOLOGY | Facility: HOSPITAL | Age: 58
Discharge: HOME | End: 2023-12-11
Attending: PHYSICIAN ASSISTANT
Payer: COMMERCIAL

## 2023-12-11 DIAGNOSIS — Z79.899 IMMUNOCOMPROMISED STATE DUE TO DRUG THERAPY: ICD-10-CM

## 2023-12-11 DIAGNOSIS — R79.82 ELEVATED C-REACTIVE PROTEIN (CRP): ICD-10-CM

## 2023-12-11 DIAGNOSIS — D84.821 IMMUNOCOMPROMISED STATE DUE TO DRUG THERAPY: ICD-10-CM

## 2023-12-11 DIAGNOSIS — R70.0 ELEVATED SED RATE: ICD-10-CM

## 2023-12-11 PROCEDURE — A9698 NON-RAD CONTRAST MATERIALNOC: HCPCS | Performed by: PHYSICIAN ASSISTANT

## 2023-12-11 PROCEDURE — 25500020 PHARM REV CODE 255: Performed by: PHYSICIAN ASSISTANT

## 2023-12-11 PROCEDURE — 71250 CT THORAX DX C-: CPT | Mod: 26,,, | Performed by: RADIOLOGY

## 2023-12-11 PROCEDURE — 74176 CT ABD & PELVIS W/O CONTRAST: CPT | Mod: TC

## 2023-12-11 PROCEDURE — 74176 CT CHEST ABDOMEN PELVIS WITHOUT CONTRAST(XPD): ICD-10-PCS | Mod: 26,,, | Performed by: RADIOLOGY

## 2023-12-11 PROCEDURE — 71250 CT CHEST ABDOMEN PELVIS WITHOUT CONTRAST(XPD): ICD-10-PCS | Mod: 26,,, | Performed by: RADIOLOGY

## 2023-12-11 PROCEDURE — 74176 CT ABD & PELVIS W/O CONTRAST: CPT | Mod: 26,,, | Performed by: RADIOLOGY

## 2023-12-11 RX ADMIN — IOHEXOL 1000 ML: 12 SOLUTION ORAL at 02:12

## 2023-12-13 DIAGNOSIS — I10 ESSENTIAL HYPERTENSION: ICD-10-CM

## 2023-12-14 RX ORDER — AMLODIPINE BESYLATE 10 MG/1
TABLET ORAL
Qty: 90 TABLET | Refills: 2 | Status: SHIPPED | OUTPATIENT
Start: 2023-12-14

## 2023-12-14 NOTE — TELEPHONE ENCOUNTER
Refill Routing Note   Medication(s) are not appropriate for processing by [facility] for the following reason(s):        Required vitals abnormal    [facility] action(s):  Defer     Requires labs : Yes             Appointments  past 12m or future 3m with PCP    Date Provider   Last Visit   8/24/2023 Reza Michelle MD   Next Visit   12/26/2023 Reza Michelle MD   ED visits in past 90 days: 0        Note composed:10:09 PM 12/13/2023

## 2023-12-14 NOTE — TELEPHONE ENCOUNTER
Care Due:                  Date            Visit Type   Department     Provider  --------------------------------------------------------------------------------                                EP -                              PRIMARY      [facility]  Last Visit: 08-      CARE ([facility])   ECHO Michelle                              EP -                              PRIMARY      [facility]  Next Visit: 12-      CARE ([facility])   ECHO Michelle                                                            Last  Test          Frequency    Reason                     Performed    Due Date  --------------------------------------------------------------------------------    HBA1C.......  6 months...  metFORMIN................  08- 02-    [facility] Embedded Care Due Messages. Reference number: [id number].   12/13/2023 6:23:10 PM CST

## 2023-12-27 ENCOUNTER — PATIENT OUTREACH (OUTPATIENT)
Dept: ADMINISTRATIVE | Facility: HOSPITAL | Age: 58
End: 2023-12-27
Payer: COMMERCIAL

## 2023-12-29 ENCOUNTER — TELEPHONE (OUTPATIENT)
Dept: RHEUMATOLOGY | Facility: CLINIC | Age: 58
End: 2023-12-29
Payer: COMMERCIAL

## 2023-12-29 NOTE — TELEPHONE ENCOUNTER
Patient was notified of lab/imaging results per provider. Patient verbalized understanding. All questions answered.  Patient was grateful for the call. -DD         Patient needs a hepatology referral

## 2024-02-01 ENCOUNTER — OFFICE VISIT (OUTPATIENT)
Dept: FAMILY MEDICINE | Facility: CLINIC | Age: 59
End: 2024-02-01
Payer: COMMERCIAL

## 2024-02-01 ENCOUNTER — LAB VISIT (OUTPATIENT)
Dept: LAB | Facility: HOSPITAL | Age: 59
End: 2024-02-01
Attending: INTERNAL MEDICINE
Payer: COMMERCIAL

## 2024-02-01 ENCOUNTER — OFFICE VISIT (OUTPATIENT)
Dept: SURGERY | Facility: CLINIC | Age: 59
End: 2024-02-01
Payer: COMMERCIAL

## 2024-02-01 VITALS
HEART RATE: 60 BPM | TEMPERATURE: 98 F | BODY MASS INDEX: 53.83 KG/M2 | WEIGHT: 303.81 LBS | HEIGHT: 63 IN | SYSTOLIC BLOOD PRESSURE: 135 MMHG | DIASTOLIC BLOOD PRESSURE: 73 MMHG

## 2024-02-01 VITALS
TEMPERATURE: 98 F | RESPIRATION RATE: 18 BRPM | HEIGHT: 63 IN | WEIGHT: 307.13 LBS | OXYGEN SATURATION: 99 % | HEART RATE: 70 BPM | SYSTOLIC BLOOD PRESSURE: 138 MMHG | BODY MASS INDEX: 54.42 KG/M2 | DIASTOLIC BLOOD PRESSURE: 82 MMHG

## 2024-02-01 DIAGNOSIS — E11.65 TYPE 2 DIABETES MELLITUS WITH HYPERGLYCEMIA, WITHOUT LONG-TERM CURRENT USE OF INSULIN: Primary | ICD-10-CM

## 2024-02-01 DIAGNOSIS — L02.32 BOIL OF BUTTOCK: ICD-10-CM

## 2024-02-01 DIAGNOSIS — E11.65 TYPE 2 DIABETES MELLITUS WITH HYPERGLYCEMIA, WITHOUT LONG-TERM CURRENT USE OF INSULIN: ICD-10-CM

## 2024-02-01 DIAGNOSIS — K61.0 PERIANAL ABSCESS: Primary | ICD-10-CM

## 2024-02-01 PROCEDURE — 3075F SYST BP GE 130 - 139MM HG: CPT | Mod: CPTII,S$GLB,, | Performed by: INTERNAL MEDICINE

## 2024-02-01 PROCEDURE — 36415 COLL VENOUS BLD VENIPUNCTURE: CPT | Performed by: INTERNAL MEDICINE

## 2024-02-01 PROCEDURE — 3008F BODY MASS INDEX DOCD: CPT | Mod: CPTII,S$GLB,, | Performed by: INTERNAL MEDICINE

## 2024-02-01 PROCEDURE — 99999 PR PBB SHADOW E&M-EST. PATIENT-LVL IV: CPT | Mod: PBBFAC,,, | Performed by: SURGERY

## 2024-02-01 PROCEDURE — 99213 OFFICE O/P EST LOW 20 MIN: CPT | Mod: S$GLB,,, | Performed by: INTERNAL MEDICINE

## 2024-02-01 PROCEDURE — 1159F MED LIST DOCD IN RCRD: CPT | Mod: CPTII,S$GLB,, | Performed by: INTERNAL MEDICINE

## 2024-02-01 PROCEDURE — 1160F RVW MEDS BY RX/DR IN RCRD: CPT | Mod: CPTII,S$GLB,, | Performed by: SURGERY

## 2024-02-01 PROCEDURE — 3078F DIAST BP <80 MM HG: CPT | Mod: CPTII,S$GLB,, | Performed by: SURGERY

## 2024-02-01 PROCEDURE — 1159F MED LIST DOCD IN RCRD: CPT | Mod: CPTII,S$GLB,, | Performed by: SURGERY

## 2024-02-01 PROCEDURE — 99202 OFFICE O/P NEW SF 15 MIN: CPT | Mod: 25,S$GLB,, | Performed by: SURGERY

## 2024-02-01 PROCEDURE — 46050 I&D PERIANAL ABSCESS SUPFC: CPT | Mod: S$GLB,,, | Performed by: SURGERY

## 2024-02-01 PROCEDURE — 99999 PR PBB SHADOW E&M-EST. PATIENT-LVL V: CPT | Mod: PBBFAC,,, | Performed by: INTERNAL MEDICINE

## 2024-02-01 PROCEDURE — 83036 HEMOGLOBIN GLYCOSYLATED A1C: CPT | Performed by: INTERNAL MEDICINE

## 2024-02-01 PROCEDURE — 3075F SYST BP GE 130 - 139MM HG: CPT | Mod: CPTII,S$GLB,, | Performed by: SURGERY

## 2024-02-01 PROCEDURE — 3079F DIAST BP 80-89 MM HG: CPT | Mod: CPTII,S$GLB,, | Performed by: INTERNAL MEDICINE

## 2024-02-01 PROCEDURE — 3008F BODY MASS INDEX DOCD: CPT | Mod: CPTII,S$GLB,, | Performed by: SURGERY

## 2024-02-01 RX ORDER — SULFAMETHOXAZOLE AND TRIMETHOPRIM 800; 160 MG/1; MG/1
1 TABLET ORAL 2 TIMES DAILY
Qty: 20 TABLET | Refills: 0 | Status: SHIPPED | OUTPATIENT
Start: 2024-02-01 | End: 2024-02-11

## 2024-02-01 RX ORDER — MUPIROCIN 20 MG/G
OINTMENT TOPICAL 3 TIMES DAILY
Qty: 30 G | Refills: 0 | Status: SHIPPED | OUTPATIENT
Start: 2024-02-01

## 2024-02-01 RX ORDER — HYDROCODONE BITARTRATE AND ACETAMINOPHEN 5; 325 MG/1; MG/1
TABLET ORAL
Qty: 15 TABLET | Refills: 0 | Status: SHIPPED | OUTPATIENT
Start: 2024-02-01

## 2024-02-01 NOTE — PROGRESS NOTES
Subjective:       Patient ID: Juan C Rodriguez is a 58 y.o. male.    Chief Complaint: Follow-up    Several days---------boil right buttocks--------draining--------no fever-    Follow-up  Associated symptoms include arthralgias and myalgias. Pertinent negatives include no abdominal pain, chest pain, chills, coughing, diaphoresis, fatigue, fever, headaches, joint swelling, nausea, neck pain, numbness, rash, sore throat, vomiting or weakness.     Past Medical History:   Diagnosis Date    Diabetes mellitus, type 2 diagnosed 2013    DM (diabetes mellitus)     2011  02/25/2014    HTN (hypertension)     Hypogonadism, testicular     Left tibialis posterior tendinitis 11/30/2016    Obesities, morbid      Past Surgical History:   Procedure Laterality Date    COLONOSCOPY N/A 6/29/2018    Procedure: COLONOSCOPY;  Surgeon: Jeremiah Anaya III, MD;  Location: [facility];  Service: Endoscopy;  Laterality: N/A;     Family History   Problem Relation Age of Onset    Cataracts Mother     Diabetes Mother     Hypertension Mother     Diabetes Sister      Social History     Socioeconomic History    Marital status: Single   Tobacco Use    Smoking status: Some Days     Types: Cigars    Smokeless tobacco: Never   Substance and Sexual Activity    Alcohol use: Yes     Alcohol/week: 3.0 - 4.0 standard drinks of alcohol     Types: 3 - 4 Cans of beer per week     Comment: occasional weekends    Drug use: No    Sexual activity: Not Currently     Partners: Female     Review of Systems   Constitutional:  Negative for activity change, appetite change, chills, diaphoresis, fatigue, fever and unexpected weight change.   HENT:  Negative for drooling, ear discharge, ear pain, facial swelling, hearing loss, mouth sores, nosebleeds, postnasal drip, rhinorrhea, sinus pressure, sneezing, sore throat, tinnitus, trouble swallowing and voice change.    Eyes:  Negative for photophobia, redness and visual disturbance.   Respiratory:  Negative  for apnea, cough, choking, chest tightness, shortness of breath and wheezing.    Cardiovascular:  Negative for chest pain, palpitations and leg swelling.   Gastrointestinal:  Negative for abdominal distention, abdominal pain, anal bleeding, blood in stool, constipation, diarrhea, nausea and vomiting.   Endocrine: Negative for cold intolerance, heat intolerance, polydipsia, polyphagia and polyuria.   Genitourinary:  Negative for difficulty urinating, dysuria, enuresis, flank pain, frequency, genital sores, hematuria and urgency.   Musculoskeletal:  Positive for arthralgias and myalgias. Negative for back pain, gait problem, joint swelling, neck pain and neck stiffness.   Skin:  Negative for color change, pallor, rash and wound.   Allergic/Immunologic: Negative for food allergies and immunocompromised state.   Neurological:  Negative for dizziness, tremors, seizures, syncope, facial asymmetry, speech difficulty, weakness, light-headedness, numbness and headaches.   Hematological:  Negative for adenopathy. Does not bruise/bleed easily.   Psychiatric/Behavioral:  Negative for agitation, behavioral problems, confusion, decreased concentration, dysphoric mood, hallucinations, self-injury, sleep disturbance and suicidal ideas. The patient is not nervous/anxious and is not hyperactive.        Objective:      Physical Exam  Vitals and nursing note reviewed.   Constitutional:       General: He is not in acute distress.     Appearance: Normal appearance. He is well-developed. He is not diaphoretic.   HENT:      Head: Normocephalic and atraumatic.      Mouth/Throat:      Pharynx: No oropharyngeal exudate.   Eyes:      General: No scleral icterus.     Pupils: Pupils are equal, round, and reactive to light.   Neck:      Thyroid: No thyromegaly.      Vascular: No carotid bruit or JVD.      Trachea: No tracheal deviation.   Cardiovascular:      Rate and Rhythm: Normal rate and regular rhythm.      Heart sounds: Normal heart sounds.    Pulmonary:      Effort: Pulmonary effort is normal. No respiratory distress.      Breath sounds: Normal breath sounds. No wheezing or rales.   Chest:      Chest wall: No tenderness.   Abdominal:      General: Bowel sounds are normal. There is no distension.      Palpations: Abdomen is soft.      Tenderness: There is no abdominal tenderness. There is no guarding or rebound.   Musculoskeletal:         General: No tenderness. Normal range of motion.      Cervical back: Normal range of motion and neck supple.   Lymphadenopathy:      Cervical: No cervical adenopathy.   Skin:     General: Skin is warm and dry.      Coloration: Skin is not pale.      Findings: No erythema or rash.      Comments: Draining boil right buttocks---------   Neurological:      Mental Status: He is alert and oriented to person, place, and time.      Cranial Nerves: No cranial nerve deficit.      Coordination: Coordination normal.   Psychiatric:         Behavior: Behavior normal.         Thought Content: Thought content normal.         Judgment: Judgment normal.         CMP  Sodium   Date Value Ref Range Status   11/17/2023 141 136 - 145 mmol/L Final     Potassium   Date Value Ref Range Status   11/17/2023 3.9 3.5 - 5.1 mmol/L Final     Chloride   Date Value Ref Range Status   11/17/2023 100 95 - 110 mmol/L Final     CO2   Date Value Ref Range Status   11/17/2023 31 (H) 23 - 29 mmol/L Final     Glucose   Date Value Ref Range Status   11/17/2023 97 70 - 110 mg/dL Final     BUN   Date Value Ref Range Status   11/17/2023 24 (H) 6 - 20 mg/dL Final     Creatinine   Date Value Ref Range Status   11/17/2023 1.3 0.5 - 1.4 mg/dL Final     Calcium   Date Value Ref Range Status   11/17/2023 9.4 8.7 - 10.5 mg/dL Final     Total Protein   Date Value Ref Range Status   11/17/2023 7.6 6.0 - 8.4 g/dL Final     Albumin   Date Value Ref Range Status   11/17/2023 3.5 3.5 - 5.2 g/dL Final   05/31/2016 4.2 3.6 - 5.1 g/dL Final     Comment:     @ Test Performed  By:  [facility] [place]  Ángel Diaz M.D., FCAP.,   [address] [place] [address]  [facility]  [id number]       Total Bilirubin   Date Value Ref Range Status   11/17/2023 0.5 0.1 - 1.0 mg/dL Final     Comment:     For infants and newborns, interpretation of results should be based  on gestational age, weight and in agreement with clinical  observations.    Premature Infant recommended reference ranges:  Up to 24 hours.............<8.0 mg/dL  Up to 48 hours............<12.0 mg/dL  3-5 days..................<15.0 mg/dL  6-29 days.................<15.0 mg/dL       Alkaline Phosphatase   Date Value Ref Range Status   11/17/2023 118 55 - 135 U/L Final     AST   Date Value Ref Range Status   11/17/2023 18 10 - 40 U/L Final     ALT   Date Value Ref Range Status   11/17/2023 20 10 - 44 U/L Final     Anion Gap   Date Value Ref Range Status   11/17/2023 10 8 - 16 mmol/L Final     eGFR if    Date Value Ref Range Status   07/15/2022 >60 >60 mL/min/1.73 m^2 Final     eGFR if non    Date Value Ref Range Status   07/15/2022 >60 >60 mL/min/1.73 m^2 Final     Comment:     Calculation used to obtain the estimated glomerular filtration  rate (eGFR) is the CKD-EPI equation.        Lab Results   Component Value Date    WBC 7.47 11/17/2023    HGB 12.0 (L) 11/17/2023    HCT 38.3 (L) 11/17/2023    MCV 82 11/17/2023     11/17/2023     Lab Results   Component Value Date    CHOL 189 04/24/2023     Lab Results   Component Value Date    HDL 70 04/24/2023     Lab Results   Component Value Date    LDLCALC 106.0 04/24/2023     Lab Results   Component Value Date    TRIG 65 04/24/2023     Lab Results   Component Value Date    CHOLHDL 37.0 04/24/2023     Lab Results   Component Value Date    TSH 1.819 08/24/2023     Lab Results   Component Value Date    LABA1C 6.1 (H) 03/16/2017    HGBA1C 5.8 (H) 08/24/2023     Assessment:       1. Type 2 diabetes mellitus with  hyperglycemia, without long-term current use of insulin    2. Boil of buttock        Plan:   Type 2 diabetes mellitus with hyperglycemia, without long-term current use of insulin  -     Hemoglobin A1C; Future; Expected date: 02/01/2024    Boil of buttock  -     sulfamethoxazole-trimethoprim 800-160mg (BACTRIM DS) 800-160 mg Tab; Take 1 tablet by mouth 2 (two) times daily. for 10 days  Dispense: 20 tablet; Refill: 0  -     mupirocin (BACTROBAN) 2 % ointment; Apply topically 3 (three) times daily.  Dispense: 30 g; Refill: 0  -     Ambulatory referral/consult to General Surgery; Future; Expected date: 02/08/2024      As above---------------f/u 2 weeks----------------go to er for worsening symptoms-----------------------

## 2024-02-01 NOTE — PATIENT INSTRUCTIONS
It is okay to shower and get the area wet.      Removed the gauze tomorrow.      You will take care of the area by cleansing it in the shower once or twice a day.  You can use gauze or a panty liner over the area to keep it from getting her clothes wet.      If you have increasing pain redness drainage or swelling please let us know.      Please complete all the antibiotics.      Please take the pain medicine as needed    Our office phone numbers are  [phone] and

## 2024-02-01 NOTE — PROGRESS NOTES
"Patient ID: Juan C Rodriguez is a 58 y.o. male.    Chief Complaint: Other (Buttock right side)      HPI:  Patient presents for evaluation of a buttock abscess.  He started to have swelling in the right superior medial buttocks several days ago.  This should spontaneously drained.  He was seen by his primary care doctor started on antibiotics and a surgery consult was requested.  The patient presents urgently    Review of Systems   Gastrointestinal:         Pain swelling and drainage from the medial right buttock     Current Outpatient Medications   Medication Sig Dispense Refill    allopurinoL (ZYLOPRIM) 300 MG tablet Take 1 tablet (300 mg total) by mouth once daily. 90 tablet 1    amLODIPine (NORVASC) 10 MG tablet Take 1 tablet by mouth once daily 90 tablet 2    atorvastatin (LIPITOR) 20 MG tablet Take 1 tablet (20 mg total) by mouth once daily. 90 tablet 3    ergocalciferol (ERGOCALCIFEROL) 50,000 unit Cap Take 1 capsule by mouth once a week 12 capsule 0    etanercept (ENBREL SURECLICK) 50 mg/mL (1 mL) Inject 1 mL (50 mg total) into the skin once a week. 4 mL 11    folic acid (FOLVITE) 1 MG tablet Take 1 tablet (1 mg total) by mouth once daily. 90 tablet 3    metFORMIN (GLUCOPHAGE) 500 MG tablet Take 1 tablet (500 mg total) by mouth once daily. 90 tablet 1    methotrexate 2.5 MG Tab Take 6 tablets (15 mg total) by mouth every 7 days. 72 tablet 1    mupirocin (BACTROBAN) 2 % ointment Apply topically 3 (three) times daily. 30 g 0    needle, disp, 22 G 22 gauge x 1 1/2" Ndle 1 Units by Misc.(Non-Drug; Combo Route) route every 21 days. 100 each 99    olmesartan-hydrochlorothiazide (BENICAR HCT) 20-12.5 mg per tablet Take 1 tablet by mouth once daily. 90 tablet 3    sulfamethoxazole-trimethoprim 800-160mg (BACTRIM DS) 800-160 mg Tab Take 1 tablet by mouth 2 (two) times daily. for 10 days 20 tablet 0    HYDROcodone-acetaminophen (NORCO) 5-325 mg per tablet 1 every 6 hours as needed for pain 15 tablet 0 "     No current facility-administered medications for this visit.       Review of patient's allergies indicates:   Allergen Reactions    Naproxen Swelling       Past Medical History:   Diagnosis Date    Diabetes mellitus, type 2 diagnosed 2013    DM (diabetes mellitus)     2011  02/25/2014    HTN (hypertension)     Hypogonadism, testicular     Left tibialis posterior tendinitis 11/30/2016    Obesities, morbid        Past Surgical History:   Procedure Laterality Date    COLONOSCOPY N/A 6/29/2018    Procedure: COLONOSCOPY;  Surgeon: Jeremiah Anaya III, MD;  Location: [facility];  Service: Endoscopy;  Laterality: N/A;       Social History     Socioeconomic History    Marital status: Single   Tobacco Use    Smoking status: Some Days     Types: Cigars    Smokeless tobacco: Never   Substance and Sexual Activity    Alcohol use: Yes     Alcohol/week: 3.0 - 4.0 standard drinks of alcohol     Types: 3 - 4 Cans of beer per week     Comment: occasional weekends    Drug use: No    Sexual activity: Not Currently     Partners: Female       Vitals:    02/01/24 1028   BP: 135/73   Pulse: 60   Temp: 98.1 °F (36.7 °C)       Physical Exam  Constitutional:       Appearance: He is obese.   Genitourinary:     Comments: There is an area of erythema, swelling and drainage on the right medial buttock a proximally 2 cm from the anal verge located superiorly  Neurological:      Mental Status: He is alert.     Assessment & Plan:  Patient has a spontaneous but incompletely drained right huber anal abscess.      Patient would benefit from drainage in clinic.  The wound would then be packed with gauze which can be removed tomorrow.  He would cleanse the wound with showering.      The risks of the procedure include infection, bleeding and fistula formation.  He will need to follow-up 2 weeks after the procedure for a post procedure check.      You should complete his antibiotics

## 2024-02-01 NOTE — PROCEDURES
"INCISION AND DRAINAGE    Date/Time: 2/1/2024 11:20 AM    Performed by: Jeremiah Dumont MD  Authorized by: Jeremiah Dumont MD    Time out: Immediately prior to procedure a "time out" was called to verify the correct patient, procedure, equipment, support staff and site/side marked as required.    Consent Done?:  Yes (Verbal)    Type:  Abscess (Right huber anal)  Body area:  Anogenital  Anesthesia:  Local infiltration  Local anesthetic: Lidocaine 1% with epinephrine  Anesthetic total (ml):  8  Scalpel size:  15  Incision type: Cruciate.  Drainage:  Pus  Drainage amount:  Scant  Wound treatment:  Incision, drainage, wound left open and wound packed  Packing material: 4 x 4 gauze.  Patient tolerance:  Patient tolerated the procedure well with no immediate complications  Pain Assessment: 3    Postop instructions were provided.  Follow up appointment was made    "

## 2024-02-02 LAB
ESTIMATED AVG GLUCOSE: 114 MG/DL (ref 68–131)
HBA1C MFR BLD: 5.6 % (ref 4–5.6)

## 2024-02-08 ENCOUNTER — OFFICE VISIT (OUTPATIENT)
Dept: PRIMARY CARE CLINIC | Facility: CLINIC | Age: 59
End: 2024-02-08
Payer: COMMERCIAL

## 2024-02-08 VITALS
DIASTOLIC BLOOD PRESSURE: 84 MMHG | WEIGHT: 310.44 LBS | HEIGHT: 62 IN | BODY MASS INDEX: 57.13 KG/M2 | OXYGEN SATURATION: 97 % | HEART RATE: 63 BPM | SYSTOLIC BLOOD PRESSURE: 132 MMHG | TEMPERATURE: 99 F

## 2024-02-08 DIAGNOSIS — E66.01 CLASS 3 SEVERE OBESITY DUE TO EXCESS CALORIES WITH SERIOUS COMORBIDITY AND BODY MASS INDEX (BMI) OF 50.0 TO 59.9 IN ADULT: ICD-10-CM

## 2024-02-08 DIAGNOSIS — M17.11 PRIMARY OSTEOARTHRITIS OF RIGHT KNEE: ICD-10-CM

## 2024-02-08 DIAGNOSIS — G89.29 CHRONIC PAIN OF BOTH KNEES: ICD-10-CM

## 2024-02-08 DIAGNOSIS — I10 ESSENTIAL HYPERTENSION: ICD-10-CM

## 2024-02-08 DIAGNOSIS — E11.65 TYPE 2 DIABETES MELLITUS WITH HYPERGLYCEMIA, WITHOUT LONG-TERM CURRENT USE OF INSULIN: Primary | ICD-10-CM

## 2024-02-08 DIAGNOSIS — M25.561 CHRONIC PAIN OF BOTH KNEES: ICD-10-CM

## 2024-02-08 DIAGNOSIS — E78.2 MIXED HYPERLIPIDEMIA: ICD-10-CM

## 2024-02-08 DIAGNOSIS — R06.83 SNORING: ICD-10-CM

## 2024-02-08 DIAGNOSIS — M25.562 CHRONIC PAIN OF BOTH KNEES: ICD-10-CM

## 2024-02-08 PROCEDURE — 1159F MED LIST DOCD IN RCRD: CPT | Mod: CPTII,S$GLB,, | Performed by: FAMILY MEDICINE

## 2024-02-08 PROCEDURE — 3079F DIAST BP 80-89 MM HG: CPT | Mod: CPTII,S$GLB,, | Performed by: FAMILY MEDICINE

## 2024-02-08 PROCEDURE — 3008F BODY MASS INDEX DOCD: CPT | Mod: CPTII,S$GLB,, | Performed by: FAMILY MEDICINE

## 2024-02-08 PROCEDURE — 99215 OFFICE O/P EST HI 40 MIN: CPT | Mod: S$GLB,,, | Performed by: FAMILY MEDICINE

## 2024-02-08 PROCEDURE — 99999 PR PBB SHADOW E&M-EST. PATIENT-LVL V: CPT | Mod: PBBFAC,,, | Performed by: FAMILY MEDICINE

## 2024-02-08 PROCEDURE — 3044F HG A1C LEVEL LT 7.0%: CPT | Mod: CPTII,S$GLB,, | Performed by: FAMILY MEDICINE

## 2024-02-08 PROCEDURE — 3075F SYST BP GE 130 - 139MM HG: CPT | Mod: CPTII,S$GLB,, | Performed by: FAMILY MEDICINE

## 2024-02-08 PROCEDURE — 1160F RVW MEDS BY RX/DR IN RCRD: CPT | Mod: CPTII,S$GLB,, | Performed by: FAMILY MEDICINE

## 2024-02-08 NOTE — PATIENT INSTRUCTIONS
Goal < 25 grams added sugar daily   (Your estimated added sugar is >50 grams/day/often more)    Try crush pineapple sugar free powder and add to plain seltzer water/carbonated water    For tea: ask for half sweet/half unsweet

## 2024-02-08 NOTE — PROGRESS NOTES
Subjective     Referring MD: Guido   PCP: Viviana  BMI noted  Diet: variable   Exercise/Activity: limited due to knee pain   Sleep: good  Stressors: health concerns  Anxiety/Depression Screen/PHQ-2: none  Works at [employer]    Patient ID: Juan C Rodriguez is a 58 y.o. male.    Chief Complaint: weight gain     Hx of DM II, recent A1c at goal. Pt has not been readily checking sugars. He had wellness call to get glucometer working. He plans to call again. On metformin monotherapy.  Labs show prediabetes range. Pt states   2016 labs; pt had A1c x 2 of 6.6  Pt does inquire about glp1 medications.     Also hx of CKD; has seen Dr. Whitt.  Last cmp: egfr> 60 over past year      Eating more sugar. Some nocturia/improved when he has more water.     Pt did not have testing for MESSI. Still snoring; declines testing. States no EDS or witnessed apnea.     BP controlled. On arb.   Hyperlipidemia; on statin  Reports has not yet had eye exam.     Hx of RA; on enbrel. Also has gout. Has done well on this one.     Recent abscess  R buttocks; will see Dr. Michelle soon. On abx therapy-tolerating.       Food recall:  Bfast: instant grits, 1/2 slice ham, coffee (half and half and equal)  Lunch: ham/cheese sandwich/mustard and lara  Sweet tea with lemon  Dinner: stops on way home, fried chicken, cooks breakfast  Likes green beans, greens, salads  Snack: chocolate, jorge crackers  Water: adequate  Sugar Sweetened beverages: sweet tea, pineapple soda  Etoh: weekends/2-3 drinks (regular budweiser beer)  Tob: 1-2 cigars/weekend    Will get baked chicken or fish from [place]     No hx of gallstones, pancreatitis, MEN-2, med thyroid ca (also no fam hx)      HPI       Objective     PAST MEDICAL HISTORY:  Past Medical History:   Diagnosis Date    Diabetes mellitus, type 2 diagnosed 2013    DM (diabetes mellitus)     2011  02/25/2014    HTN (hypertension)     Hypogonadism, testicular     Left tibialis  posterior tendinitis 11/30/2016    Obesities, morbid          PAST SURGICAL HISTORY:  Past Surgical History:   Procedure Laterality Date    COLONOSCOPY N/A 6/29/2018    Procedure: COLONOSCOPY;  Surgeon: Jeremiah Anaya III, MD;  Location: [facility];  Service: Endoscopy;  Laterality: N/A;       FAMILY HISTORY:  Family History   Problem Relation Age of Onset    Cataracts Mother     Diabetes Mother     Hypertension Mother     Diabetes Sister           SOCIAL HISTORY:  Social History     Social History Narrative    Not on file       MEDICATIONS:  Medications have been reviewed.    ALLERGIES:  Allergies have been reviewed.    Vitals:    02/08/24 1042   BP: 132/84   Pulse: 63   Temp: 98.8 °F (37.1 °C)     Wt Readings from Last 10 Encounters:   02/08/24 (!) 140.8 kg (310 lb 6.5 oz)   02/01/24 (!) 137.8 kg (303 lb 12.7 oz)   02/01/24 (!) 139.3 kg (307 lb 1.6 oz)   11/24/23 (!) 142.5 kg (314 lb 2.5 oz)   11/17/23 (!) 143.1 kg (315 lb 7.7 oz)   10/11/23 (!) 143.8 kg (317 lb 0.3 oz)   10/04/23 (!) 143.8 kg (317 lb)   09/25/23 (!) 143.8 kg (317 lb)   08/24/23 (!) 144.1 kg (317 lb 10.9 oz)   08/23/23 (!) 144.7 kg (319 lb)       Lab Results   Component Value Date    WBC 7.47 11/17/2023    HGB 12.0 (L) 11/17/2023    HCT 38.3 (L) 11/17/2023     11/17/2023    CHOL 189 04/24/2023    TRIG 65 04/24/2023    HDL 70 04/24/2023    ALT 20 11/17/2023    AST 18 11/17/2023     11/17/2023    K 3.9 11/17/2023     11/17/2023    CREATININE 1.3 11/17/2023    BUN 24 (H) 11/17/2023    CO2 31 (H) 11/17/2023    TSH 1.819 08/24/2023    PSA 0.13 04/24/2023    HGBA1C 5.6 02/01/2024       Review of Systems   Constitutional:  Negative for activity change, appetite change, fatigue and unexpected weight change.   HENT:  Negative for mouth dryness.    Eyes:  Negative for visual disturbance.   Respiratory:  Negative for apnea, chest tightness and shortness of breath.    Cardiovascular:  Negative for chest pain.   Gastrointestinal:  Negative  for abdominal pain, constipation, diarrhea, nausea, vomiting and reflux.   Musculoskeletal:  Negative for arthralgias and myalgias.   Integumentary:  Negative for rash.   Allergic/Immunologic: Negative for food allergies.   Neurological:  Negative for vertigo, tremors, syncope and weakness.   Psychiatric/Behavioral:  Negative for behavioral problems, self-injury, sleep disturbance and suicidal ideas. The patient is not nervous/anxious.        Physical Exam  Vitals and nursing note reviewed.   Constitutional:       General: He is not in acute distress.  HENT:      Head: Normocephalic and atraumatic.      Mouth/Throat:      Pharynx: Oropharynx is clear.   Eyes:      General: No scleral icterus.     Pupils: Pupils are equal, round, and reactive to light.   Neck:      Comments: No TM  Cardiovascular:      Rate and Rhythm: Normal rate and regular rhythm.      Pulses: Normal pulses.      Heart sounds: Normal heart sounds. No murmur heard.     No friction rub. No gallop.   Pulmonary:      Effort: Pulmonary effort is normal. No respiratory distress.      Breath sounds: Normal breath sounds. No wheezing, rhonchi or rales.   Abdominal:      General: Bowel sounds are normal. There is no distension.      Palpations: Abdomen is soft.      Tenderness: There is no abdominal tenderness.   Musculoskeletal:         General: No swelling.      Cervical back: Normal range of motion and neck supple. No tenderness.   Lymphadenopathy:      Cervical: No cervical adenopathy.   Skin:     General: Skin is warm.      Capillary Refill: Capillary refill takes less than 2 seconds.      Findings: No erythema or rash.   Neurological:      Mental Status: He is alert and oriented to person, place, and time.   Psychiatric:         Mood and Affect: Mood normal.         Behavior: Behavior normal.              Assessment and Plan       ICD-10-CM ICD-9-CM   1. Type 2 diabetes mellitus with hyperglycemia, without long-term current use of insulin  E11.65  250.00     790.29   2. Primary osteoarthritis of right knee  M17.11 715.16   3. Chronic pain of both knees  M25.561 719.46    M25.562 338.29    G89.29    4. Class 3 severe obesity due to excess calories with serious comorbidity and body mass index (BMI) of 50.0 to 59.9 in adult  E66.01 278.01    Z68.43 V85.43   5. Mixed hyperlipidemia  E78.2 272.2   6. Essential hypertension  I10 401.9   7. Snoring  R06.83 786.09      Type 2 diabetes mellitus with hyperglycemia, without long-term current use of insulin  -     Ambulatory Referral/Consult to Lifestyle Nutrition; Future; Expected date: 02/15/2024    Primary osteoarthritis of right knee  Chronic pain of both knees  -     Ambulatory referral/consult to [facility]    Class 3 severe obesity due to excess calories with serious comorbidity and body mass index (BMI) of 50.0 to 59.9 in adult  -     Ambulatory referral/consult to [facility]  -     Ambulatory Referral/Consult to Lifestyle Nutrition; Future; Expected date: 02/15/2024    Mixed hyperlipidemia  At goal; on statin     Essential hypertension  Also hx of CKD: last labs have been improved    Snoring  Pt declines sleep study    Extensive dw pt about overall nutrition, all possible anti obesity meds, consideration of glp1  Pt is contemplative and does not want to pursue through our clinic here; would like to discuss with his PCP. Is willing to meet with dietitian.  Have arranged a f/u appt in case he changes his mind; (will message Dr Michelle)     > 45 min with pt; additional 5 min chart review  No glp1 coverage at this time, no nutrition coverage as well    Follow up in about 3 months (around 5/8/2024), or if symptoms worsen or fail to improve.

## 2024-02-12 ENCOUNTER — OFFICE VISIT (OUTPATIENT)
Dept: SURGERY | Facility: CLINIC | Age: 59
End: 2024-02-12
Payer: COMMERCIAL

## 2024-02-12 VITALS
DIASTOLIC BLOOD PRESSURE: 70 MMHG | WEIGHT: 306.44 LBS | HEART RATE: 53 BPM | HEIGHT: 62 IN | BODY MASS INDEX: 56.39 KG/M2 | SYSTOLIC BLOOD PRESSURE: 164 MMHG

## 2024-02-12 DIAGNOSIS — K61.0 PERIANAL ABSCESS: Primary | ICD-10-CM

## 2024-02-12 PROCEDURE — 99024 POSTOP FOLLOW-UP VISIT: CPT | Mod: S$GLB,,,

## 2024-02-12 PROCEDURE — 3044F HG A1C LEVEL LT 7.0%: CPT | Mod: CPTII,S$GLB,,

## 2024-02-12 PROCEDURE — 99999 PR PBB SHADOW E&M-EST. PATIENT-LVL IV: CPT | Mod: PBBFAC,,,

## 2024-02-12 PROCEDURE — 3077F SYST BP >= 140 MM HG: CPT | Mod: CPTII,S$GLB,,

## 2024-02-12 PROCEDURE — 3078F DIAST BP <80 MM HG: CPT | Mod: CPTII,S$GLB,,

## 2024-02-12 PROCEDURE — 1160F RVW MEDS BY RX/DR IN RCRD: CPT | Mod: CPTII,S$GLB,,

## 2024-02-12 PROCEDURE — 1159F MED LIST DOCD IN RCRD: CPT | Mod: CPTII,S$GLB,,

## 2024-02-12 NOTE — LETTER
February 12, 2024      [facility]  [address] [place] [id number]  Phone: [phone]  Fax: [phone]       Patient: Juan C Rodriguez   YOB: 1965  Date of Visit: 02/12/2024    To Whom It May Concern:    Pamela Rodriguez  was at [facility] on 02/12/2024. The patient may return to work/school on 02/13/2024 with no restrictions. If you have any questions or concerns, or if I can be of further assistance, please do not hesitate to contact me.    Sincerely,    Emily Dawson PA-C

## 2024-02-12 NOTE — PROGRESS NOTES
Patient ID: Juan C Rodriguez is a 58 y.o. male.    Chief Complaint: Post-op Evaluation      HPI:  Patient presents for evaluation of a buttock abscess.  He started to have swelling in the right superior medial buttocks several days ago.  This should spontaneously drained.  He was seen by his primary care doctor started on antibiotics and a surgery consult was requested.  The patient presents urgently      Interval History:  Since the last clinic visit, the patient has done well. Minimal drainage from the area. Pain only when applying topical ointment. Denies fevers, chills, nausea, and vomiting. Normal BM    Review of Systems   Constitutional:  Negative for chills, fever and unexpected weight change.   HENT:  Negative for congestion.    Eyes:  Negative for visual disturbance.   Respiratory:  Negative for shortness of breath.    Cardiovascular:  Negative for chest pain.   Gastrointestinal:  Negative for abdominal distention, abdominal pain, constipation, nausea, rectal pain and vomiting.   Genitourinary:  Negative for dysuria.   Musculoskeletal:  Negative for arthralgias.   Skin:  Positive for wound (Right huber-anal). Negative for rash.   Neurological:  Negative for light-headedness.   Hematological:  Negative for adenopathy.       Current Outpatient Medications   Medication Sig Dispense Refill    allopurinoL (ZYLOPRIM) 300 MG tablet Take 1 tablet (300 mg total) by mouth once daily. 90 tablet 1    amLODIPine (NORVASC) 10 MG tablet Take 1 tablet by mouth once daily 90 tablet 2    atorvastatin (LIPITOR) 20 MG tablet Take 1 tablet (20 mg total) by mouth once daily. 90 tablet 3    ergocalciferol (ERGOCALCIFEROL) 50,000 unit Cap Take 1 capsule by mouth once a week 12 capsule 0    etanercept (ENBREL SURECLICK) 50 mg/mL (1 mL) Inject 1 mL (50 mg total) into the skin once a week. 4 mL 11    folic acid (FOLVITE) 1 MG tablet Take 1 tablet (1 mg total) by mouth once daily. 90 tablet 3    HYDROcodone-acetaminophen  "(NORCO) 5-325 mg per tablet Take 1 tablet by mouth every 6 hours as needed for pain 15 tablet 0    metFORMIN (GLUCOPHAGE) 500 MG tablet Take 1 tablet (500 mg total) by mouth once daily. 90 tablet 1    methotrexate 2.5 MG Tab Take 6 tablets (15 mg total) by mouth every 7 days. 72 tablet 1    mupirocin (BACTROBAN) 2 % ointment Apply topically 3 (three) times daily. 30 g 0    needle, disp, 22 G 22 gauge x 1 1/2" Ndle 1 Units by Misc.(Non-Drug; Combo Route) route every 21 days. 100 each 99    olmesartan-hydrochlorothiazide (BENICAR HCT) 20-12.5 mg per tablet Take 1 tablet by mouth once daily. 90 tablet 3     No current facility-administered medications for this visit.       Review of patient's allergies indicates:   Allergen Reactions    Naproxen Swelling       Past Medical History:   Diagnosis Date    Diabetes mellitus, type 2 diagnosed 2013    DM (diabetes mellitus)     2011  02/25/2014    HTN (hypertension)     Hypogonadism, testicular     Left tibialis posterior tendinitis 11/30/2016    Obesities, morbid        Past Surgical History:   Procedure Laterality Date    COLONOSCOPY N/A 6/29/2018    Procedure: COLONOSCOPY;  Surgeon: Jeremiah Anaya III, MD;  Location: [facility];  Service: Endoscopy;  Laterality: N/A;       Social History     Socioeconomic History    Marital status: Single   Tobacco Use    Smoking status: Some Days     Types: Cigars    Smokeless tobacco: Never   Substance and Sexual Activity    Alcohol use: Yes     Alcohol/week: 3.0 - 4.0 standard drinks of alcohol     Types: 3 - 4 Cans of beer per week     Comment: occasional weekends    Drug use: No    Sexual activity: Not Currently     Partners: Female       Vitals:    02/12/24 1026   BP: (!) 164/70   Pulse: (!) 53       Physical Exam  Constitutional:       General: He is not in acute distress.     Appearance: He is obese. He is not ill-appearing.   HENT:      Head: Normocephalic and atraumatic.      Right Ear: External ear normal.      Left Ear: " External ear normal.      Nose: Nose normal.      Mouth/Throat:      Mouth: Mucous membranes are moist.   Cardiovascular:      Rate and Rhythm: Bradycardia present.   Pulmonary:      Effort: Pulmonary effort is normal. No respiratory distress.   Genitourinary:     Comments: Right superior huber-anal area with healthy wound bed, no Soi. No surrounding induration or erythema, non-tender. Healing well.   Skin:     General: Skin is warm and dry.   Neurological:      Mental Status: He is alert and oriented to person, place, and time.       Assessment & Plan:  59 y/o male with perianal abscess s/p drainage in clinic who has recovered well.    - Continue local wound care as needed.  - Discussed that about 40% of perianal abscess will progress to huber-rectal fistulas. Discussed s/s of this and advised patient to call with these symptoms.  - RTC 3 months for wound check.    Emily Dawson PA-C  [facility]

## 2024-02-15 ENCOUNTER — OFFICE VISIT (OUTPATIENT)
Dept: FAMILY MEDICINE | Facility: CLINIC | Age: 59
End: 2024-02-15
Payer: COMMERCIAL

## 2024-02-15 VITALS
WEIGHT: 310.63 LBS | DIASTOLIC BLOOD PRESSURE: 72 MMHG | TEMPERATURE: 97 F | HEART RATE: 65 BPM | BODY MASS INDEX: 57.16 KG/M2 | SYSTOLIC BLOOD PRESSURE: 138 MMHG | RESPIRATION RATE: 18 BRPM | HEIGHT: 62 IN | OXYGEN SATURATION: 97 %

## 2024-02-15 DIAGNOSIS — I10 ESSENTIAL HYPERTENSION: ICD-10-CM

## 2024-02-15 DIAGNOSIS — E11.65 TYPE 2 DIABETES MELLITUS WITH HYPERGLYCEMIA, WITHOUT LONG-TERM CURRENT USE OF INSULIN: ICD-10-CM

## 2024-02-15 DIAGNOSIS — D84.9 IMMUNOCOMPROMISED: ICD-10-CM

## 2024-02-15 DIAGNOSIS — E78.2 MIXED HYPERLIPIDEMIA: Primary | ICD-10-CM

## 2024-02-15 DIAGNOSIS — M06.09 RHEUMATOID ARTHRITIS OF MULTIPLE SITES WITH NEGATIVE RHEUMATOID FACTOR: ICD-10-CM

## 2024-02-15 DIAGNOSIS — E66.01 CLASS 3 SEVERE OBESITY DUE TO EXCESS CALORIES WITH SERIOUS COMORBIDITY AND BODY MASS INDEX (BMI) OF 50.0 TO 59.9 IN ADULT: ICD-10-CM

## 2024-02-15 DIAGNOSIS — D64.9 ANEMIA, UNSPECIFIED TYPE: ICD-10-CM

## 2024-02-15 PROCEDURE — 3044F HG A1C LEVEL LT 7.0%: CPT | Mod: CPTII,S$GLB,, | Performed by: INTERNAL MEDICINE

## 2024-02-15 PROCEDURE — 3008F BODY MASS INDEX DOCD: CPT | Mod: CPTII,S$GLB,, | Performed by: INTERNAL MEDICINE

## 2024-02-15 PROCEDURE — 1159F MED LIST DOCD IN RCRD: CPT | Mod: CPTII,S$GLB,, | Performed by: INTERNAL MEDICINE

## 2024-02-15 PROCEDURE — 3075F SYST BP GE 130 - 139MM HG: CPT | Mod: CPTII,S$GLB,, | Performed by: INTERNAL MEDICINE

## 2024-02-15 PROCEDURE — 99396 PREV VISIT EST AGE 40-64: CPT | Mod: S$GLB,,, | Performed by: INTERNAL MEDICINE

## 2024-02-15 PROCEDURE — 3078F DIAST BP <80 MM HG: CPT | Mod: CPTII,S$GLB,, | Performed by: INTERNAL MEDICINE

## 2024-02-15 PROCEDURE — 99999 PR PBB SHADOW E&M-EST. PATIENT-LVL IV: CPT | Mod: PBBFAC,,, | Performed by: INTERNAL MEDICINE

## 2024-02-15 RX ORDER — TIRZEPATIDE 2.5 MG/.5ML
2.5 INJECTION, SOLUTION SUBCUTANEOUS
Qty: 4 PEN | Refills: 4 | Status: SHIPPED | OUTPATIENT
Start: 2024-02-15 | End: 2024-04-04

## 2024-02-15 NOTE — PROGRESS NOTES
Subjective:       Patient ID: Juan C Rodriguez is a 58 y.o. male.    Chief Complaint: Follow-up, Hypertension, Hyperlipidemia, and Diabetes    Boil better----    Follow-up  Associated symptoms include arthralgias. Pertinent negatives include no abdominal pain, chest pain, chills, coughing, diaphoresis, fatigue, fever, headaches, joint swelling, myalgias, nausea, neck pain, numbness, rash, sore throat, vomiting or weakness.   Hypertension  Pertinent negatives include no chest pain, headaches, neck pain, palpitations or shortness of breath.   Hyperlipidemia  Pertinent negatives include no chest pain, myalgias or shortness of breath.   Diabetes  Pertinent negatives for hypoglycemia include no confusion, dizziness, headaches, nervousness/anxiousness, pallor, seizures, speech difficulty or tremors. Pertinent negatives for diabetes include no chest pain, no fatigue, no polydipsia, no polyphagia, no polyuria and no weakness.     Past Medical History:   Diagnosis Date    Diabetes mellitus, type 2 diagnosed 2013    DM (diabetes mellitus)     2011  02/25/2014    HTN (hypertension)     Hypogonadism, testicular     Left tibialis posterior tendinitis 11/30/2016    Obesities, morbid      Past Surgical History:   Procedure Laterality Date    COLONOSCOPY N/A 6/29/2018    Procedure: COLONOSCOPY;  Surgeon: Jeremiah Anaya III, MD;  Location: [facility];  Service: Endoscopy;  Laterality: N/A;     Family History   Problem Relation Age of Onset    Cataracts Mother     Diabetes Mother     Hypertension Mother     Diabetes Sister      Social History     Socioeconomic History    Marital status: Single   Tobacco Use    Smoking status: Some Days     Types: Cigars    Smokeless tobacco: Never   Substance and Sexual Activity    Alcohol use: Yes     Alcohol/week: 3.0 - 4.0 standard drinks of alcohol     Types: 3 - 4 Cans of beer per week     Comment: occasional weekends    Drug use: No    Sexual activity: Not Currently      Partners: Female     Review of Systems   Constitutional:  Negative for activity change, appetite change, chills, diaphoresis, fatigue, fever and unexpected weight change.   HENT:  Negative for drooling, ear discharge, ear pain, facial swelling, hearing loss, mouth sores, nosebleeds, postnasal drip, rhinorrhea, sinus pressure, sneezing, sore throat, tinnitus, trouble swallowing and voice change.    Eyes:  Negative for photophobia, redness and visual disturbance.   Respiratory:  Negative for apnea, cough, choking, chest tightness, shortness of breath and wheezing.    Cardiovascular:  Negative for chest pain, palpitations and leg swelling.   Gastrointestinal:  Negative for abdominal distention, abdominal pain, anal bleeding, blood in stool, constipation, diarrhea, nausea, rectal pain and vomiting.   Endocrine: Negative for cold intolerance, heat intolerance, polydipsia, polyphagia and polyuria.   Genitourinary:  Negative for difficulty urinating, dysuria, enuresis, flank pain, frequency, genital sores, hematuria and urgency.   Musculoskeletal:  Positive for arthralgias. Negative for back pain, gait problem, joint swelling, myalgias, neck pain and neck stiffness.   Skin:  Negative for color change, pallor, rash and wound.   Allergic/Immunologic: Negative for food allergies and immunocompromised state.   Neurological:  Negative for dizziness, tremors, seizures, syncope, facial asymmetry, speech difficulty, weakness, light-headedness, numbness and headaches.   Hematological:  Negative for adenopathy. Does not bruise/bleed easily.   Psychiatric/Behavioral:  Negative for agitation, behavioral problems, confusion, decreased concentration, dysphoric mood, hallucinations, self-injury, sleep disturbance and suicidal ideas. The patient is not nervous/anxious and is not hyperactive.        Objective:      Physical Exam  Vitals and nursing note reviewed.   Constitutional:       General: He is not in acute distress.     Appearance:  Normal appearance. He is well-developed. He is not diaphoretic.   HENT:      Head: Normocephalic and atraumatic.      Mouth/Throat:      Pharynx: No oropharyngeal exudate.   Eyes:      General: No scleral icterus.     Pupils: Pupils are equal, round, and reactive to light.   Neck:      Thyroid: No thyromegaly.      Vascular: No carotid bruit or JVD.      Trachea: No tracheal deviation.   Cardiovascular:      Rate and Rhythm: Normal rate and regular rhythm.      Heart sounds: Normal heart sounds.   Pulmonary:      Effort: Pulmonary effort is normal. No respiratory distress.      Breath sounds: Normal breath sounds. No wheezing or rales.   Chest:      Chest wall: No tenderness.   Abdominal:      General: Bowel sounds are normal. There is no distension.      Palpations: Abdomen is soft.      Tenderness: There is no abdominal tenderness. There is no guarding or rebound.   Musculoskeletal:         General: No tenderness. Normal range of motion.      Cervical back: Normal range of motion and neck supple.   Lymphadenopathy:      Cervical: No cervical adenopathy.   Skin:     General: Skin is warm and dry.      Coloration: Skin is not pale.      Findings: No erythema or rash.   Neurological:      Mental Status: He is alert and oriented to person, place, and time.   Psychiatric:         Behavior: Behavior normal.         Thought Content: Thought content normal.         Judgment: Judgment normal.         CMP  Sodium   Date Value Ref Range Status   11/17/2023 141 136 - 145 mmol/L Final     Potassium   Date Value Ref Range Status   11/17/2023 3.9 3.5 - 5.1 mmol/L Final     Chloride   Date Value Ref Range Status   11/17/2023 100 95 - 110 mmol/L Final     CO2   Date Value Ref Range Status   11/17/2023 31 (H) 23 - 29 mmol/L Final     Glucose   Date Value Ref Range Status   11/17/2023 97 70 - 110 mg/dL Final     BUN   Date Value Ref Range Status   11/17/2023 24 (H) 6 - 20 mg/dL Final     Creatinine   Date Value Ref Range Status    11/17/2023 1.3 0.5 - 1.4 mg/dL Final     Calcium   Date Value Ref Range Status   11/17/2023 9.4 8.7 - 10.5 mg/dL Final     Total Protein   Date Value Ref Range Status   11/17/2023 7.6 6.0 - 8.4 g/dL Final     Albumin   Date Value Ref Range Status   11/17/2023 3.5 3.5 - 5.2 g/dL Final   05/31/2016 4.2 3.6 - 5.1 g/dL Final     Comment:     @ Test Performed By:  [employer] [facility]  Ángel Diaz M.D., CHRISSY.,   [address] [place] [id number]  [facility]  [id number]       Total Bilirubin   Date Value Ref Range Status   11/17/2023 0.5 0.1 - 1.0 mg/dL Final     Comment:     For infants and newborns, interpretation of results should be based  on gestational age, weight and in agreement with clinical  observations.    Premature Infant recommended reference ranges:  Up to 24 hours.............<8.0 mg/dL  Up to 48 hours............<12.0 mg/dL  3-5 days..................<15.0 mg/dL  6-29 days.................<15.0 mg/dL       Alkaline Phosphatase   Date Value Ref Range Status   11/17/2023 118 55 - 135 U/L Final     AST   Date Value Ref Range Status   11/17/2023 18 10 - 40 U/L Final     ALT   Date Value Ref Range Status   11/17/2023 20 10 - 44 U/L Final     Anion Gap   Date Value Ref Range Status   11/17/2023 10 8 - 16 mmol/L Final     eGFR if    Date Value Ref Range Status   07/15/2022 >60 >60 mL/min/1.73 m^2 Final     eGFR if non    Date Value Ref Range Status   07/15/2022 >60 >60 mL/min/1.73 m^2 Final     Comment:     Calculation used to obtain the estimated glomerular filtration  rate (eGFR) is the CKD-EPI equation.        Lab Results   Component Value Date    WBC 7.47 11/17/2023    HGB 12.0 (L) 11/17/2023    HCT 38.3 (L) 11/17/2023    MCV 82 11/17/2023     11/17/2023     Lab Results   Component Value Date    CHOL 189 04/24/2023     Lab Results   Component Value Date    HDL 70 04/24/2023     Lab Results   Component Value Date    LDLCALC 106.0  04/24/2023     Lab Results   Component Value Date    TRIG 65 04/24/2023     Lab Results   Component Value Date    CHOLHDL 37.0 04/24/2023     Lab Results   Component Value Date    TSH 1.819 08/24/2023     Lab Results   Component Value Date    LABA1C 6.1 (H) 03/16/2017    HGBA1C 5.6 02/01/2024     Assessment:       1. Mixed hyperlipidemia    2. Essential hypertension    3. Anemia, unspecified type    4. Type 2 diabetes mellitus with hyperglycemia, without long-term current use of insulin    5. Immunocompromised    6. Rheumatoid arthritis of multiple sites with negative rheumatoid factor    7. Class 3 severe obesity due to excess calories with serious comorbidity and body mass index (BMI) of 50.0 to 59.9 in adult        Plan:   Mixed hyperlipidemia    Essential hypertension    Anemia, unspecified type    Type 2 diabetes mellitus with hyperglycemia, without long-term current use of insulin---------------start mounjaro-------------  -     tirzepatide (MOUNJARO) 2.5 mg/0.5 mL PnIj; Inject 2.5 mg into the skin every 7 days.  Dispense: 4 Pen; Refill: 4    Immunocompromised    Rheumatoid arthritis of multiple sites with negative rheumatoid factor    Class 3 severe obesity due to excess calories with serious comorbidity and body mass index (BMI) of 50.0 to 59.9 in adult      Stable--------------------continue meds------------------------as above-------------------f/u 1 month-----------

## 2024-02-21 ENCOUNTER — TELEPHONE (OUTPATIENT)
Dept: PHARMACY | Facility: CLINIC | Age: 59
End: 2024-02-21
Payer: COMMERCIAL

## 2024-03-08 ENCOUNTER — OFFICE VISIT (OUTPATIENT)
Dept: OPHTHALMOLOGY | Facility: CLINIC | Age: 59
End: 2024-03-08
Payer: COMMERCIAL

## 2024-03-08 DIAGNOSIS — H52.03 HYPEROPIA WITH ASTIGMATISM AND PRESBYOPIA, BILATERAL: ICD-10-CM

## 2024-03-08 DIAGNOSIS — E11.9 TYPE 2 DIABETES MELLITUS WITHOUT RETINOPATHY: Primary | ICD-10-CM

## 2024-03-08 DIAGNOSIS — H25.042 POSTERIOR SUBCAPSULAR POLAR AGE-RELATED CATARACT OF LEFT EYE: ICD-10-CM

## 2024-03-08 DIAGNOSIS — H25.13 NUCLEAR SCLEROSIS, BILATERAL: ICD-10-CM

## 2024-03-08 DIAGNOSIS — H52.4 HYPEROPIA WITH ASTIGMATISM AND PRESBYOPIA, BILATERAL: ICD-10-CM

## 2024-03-08 DIAGNOSIS — E11.36 DIABETIC CATARACT OF BOTH EYES: ICD-10-CM

## 2024-03-08 DIAGNOSIS — H25.013 CORTICAL AGE-RELATED CATARACT OF BOTH EYES: ICD-10-CM

## 2024-03-08 DIAGNOSIS — H52.203 HYPEROPIA WITH ASTIGMATISM AND PRESBYOPIA, BILATERAL: ICD-10-CM

## 2024-03-08 PROCEDURE — 1159F MED LIST DOCD IN RCRD: CPT | Mod: CPTII,S$GLB,, | Performed by: OPTOMETRIST

## 2024-03-08 PROCEDURE — 99999 PR PBB SHADOW E&M-EST. PATIENT-LVL III: CPT | Mod: PBBFAC,,, | Performed by: OPTOMETRIST

## 2024-03-08 PROCEDURE — 1160F RVW MEDS BY RX/DR IN RCRD: CPT | Mod: CPTII,S$GLB,, | Performed by: OPTOMETRIST

## 2024-03-08 PROCEDURE — 92014 COMPRE OPH EXAM EST PT 1/>: CPT | Mod: S$GLB,,, | Performed by: OPTOMETRIST

## 2024-03-08 PROCEDURE — 2023F DILAT RTA XM W/O RTNOPTHY: CPT | Mod: CPTII,S$GLB,, | Performed by: OPTOMETRIST

## 2024-03-08 PROCEDURE — 3044F HG A1C LEVEL LT 7.0%: CPT | Mod: CPTII,S$GLB,, | Performed by: OPTOMETRIST

## 2024-03-08 PROCEDURE — 92015 DETERMINE REFRACTIVE STATE: CPT | Mod: S$GLB,,, | Performed by: OPTOMETRIST

## 2024-03-12 ENCOUNTER — LAB VISIT (OUTPATIENT)
Dept: LAB | Facility: HOSPITAL | Age: 59
End: 2024-03-12
Attending: PHYSICIAN ASSISTANT
Payer: COMMERCIAL

## 2024-03-12 ENCOUNTER — OFFICE VISIT (OUTPATIENT)
Dept: RHEUMATOLOGY | Facility: CLINIC | Age: 59
End: 2024-03-12
Payer: COMMERCIAL

## 2024-03-12 ENCOUNTER — TELEPHONE (OUTPATIENT)
Dept: RHEUMATOLOGY | Facility: CLINIC | Age: 59
End: 2024-03-12

## 2024-03-12 ENCOUNTER — PATIENT OUTREACH (OUTPATIENT)
Dept: ADMINISTRATIVE | Facility: HOSPITAL | Age: 59
End: 2024-03-12
Payer: COMMERCIAL

## 2024-03-12 VITALS
HEART RATE: 52 BPM | HEIGHT: 62 IN | WEIGHT: 294.13 LBS | SYSTOLIC BLOOD PRESSURE: 145 MMHG | DIASTOLIC BLOOD PRESSURE: 70 MMHG | BODY MASS INDEX: 54.13 KG/M2

## 2024-03-12 DIAGNOSIS — R93.89 ABNORMAL CHEST CT: ICD-10-CM

## 2024-03-12 DIAGNOSIS — Z51.81 MEDICATION MONITORING ENCOUNTER: ICD-10-CM

## 2024-03-12 DIAGNOSIS — I89.8 CALCIFIED LYMPH NODES: ICD-10-CM

## 2024-03-12 DIAGNOSIS — M06.09 RHEUMATOID ARTHRITIS OF MULTIPLE SITES WITH NEGATIVE RHEUMATOID FACTOR: Primary | ICD-10-CM

## 2024-03-12 DIAGNOSIS — K76.0 STEATOSIS OF LIVER: ICD-10-CM

## 2024-03-12 DIAGNOSIS — M10.9 GOUT WITHOUT TOPHUS: ICD-10-CM

## 2024-03-12 DIAGNOSIS — Z79.899 IMMUNOCOMPROMISED STATE DUE TO DRUG THERAPY: ICD-10-CM

## 2024-03-12 DIAGNOSIS — M19.90 INFLAMMATORY ARTHRITIS: ICD-10-CM

## 2024-03-12 DIAGNOSIS — M1A.09X0 IDIOPATHIC CHRONIC GOUT OF MULTIPLE SITES WITHOUT TOPHUS: ICD-10-CM

## 2024-03-12 DIAGNOSIS — Z79.899 HIGH RISK MEDICATION USE: ICD-10-CM

## 2024-03-12 DIAGNOSIS — D84.821 IMMUNOCOMPROMISED STATE DUE TO DRUG THERAPY: ICD-10-CM

## 2024-03-12 DIAGNOSIS — M06.09 RHEUMATOID ARTHRITIS OF MULTIPLE SITES WITH NEGATIVE RHEUMATOID FACTOR: ICD-10-CM

## 2024-03-12 LAB
ALBUMIN SERPL BCP-MCNC: 3.7 G/DL (ref 3.5–5.2)
ALP SERPL-CCNC: 92 U/L (ref 55–135)
ALT SERPL W/O P-5'-P-CCNC: 22 U/L (ref 10–44)
ANION GAP SERPL CALC-SCNC: 7 MMOL/L (ref 8–16)
AST SERPL-CCNC: 21 U/L (ref 10–40)
BASOPHILS # BLD AUTO: 0.06 K/UL (ref 0–0.2)
BASOPHILS NFR BLD: 1 % (ref 0–1.9)
BILIRUB SERPL-MCNC: 0.4 MG/DL (ref 0.1–1)
BUN SERPL-MCNC: 27 MG/DL (ref 6–20)
CALCIUM SERPL-MCNC: 9.1 MG/DL (ref 8.7–10.5)
CHLORIDE SERPL-SCNC: 104 MMOL/L (ref 95–110)
CO2 SERPL-SCNC: 28 MMOL/L (ref 23–29)
CREAT SERPL-MCNC: 1.4 MG/DL (ref 0.5–1.4)
CRP SERPL-MCNC: 27.8 MG/L (ref 0–8.2)
DIFFERENTIAL METHOD BLD: ABNORMAL
EOSINOPHIL # BLD AUTO: 0.1 K/UL (ref 0–0.5)
EOSINOPHIL NFR BLD: 1.5 % (ref 0–8)
ERYTHROCYTE [DISTWIDTH] IN BLOOD BY AUTOMATED COUNT: 16.3 % (ref 11.5–14.5)
ERYTHROCYTE [SEDIMENTATION RATE] IN BLOOD BY PHOTOMETRIC METHOD: 81 MM/HR (ref 0–23)
EST. GFR  (NO RACE VARIABLE): 58 ML/MIN/1.73 M^2
GLUCOSE SERPL-MCNC: 88 MG/DL (ref 70–110)
HCT VFR BLD AUTO: 38.1 % (ref 40–54)
HGB BLD-MCNC: 12.3 G/DL (ref 14–18)
IMM GRANULOCYTES # BLD AUTO: 0.01 K/UL (ref 0–0.04)
IMM GRANULOCYTES NFR BLD AUTO: 0.2 % (ref 0–0.5)
LYMPHOCYTES # BLD AUTO: 2 K/UL (ref 1–4.8)
LYMPHOCYTES NFR BLD: 32.9 % (ref 18–48)
MCH RBC QN AUTO: 25.5 PG (ref 27–31)
MCHC RBC AUTO-ENTMCNC: 32.3 G/DL (ref 32–36)
MCV RBC AUTO: 79 FL (ref 82–98)
MONOCYTES # BLD AUTO: 0.5 K/UL (ref 0.3–1)
MONOCYTES NFR BLD: 8.1 % (ref 4–15)
NEUTROPHILS # BLD AUTO: 3.5 K/UL (ref 1.8–7.7)
NEUTROPHILS NFR BLD: 56.3 % (ref 38–73)
NRBC BLD-RTO: 0 /100 WBC
PLATELET # BLD AUTO: 226 K/UL (ref 150–450)
PMV BLD AUTO: 9.1 FL (ref 9.2–12.9)
POTASSIUM SERPL-SCNC: 3.7 MMOL/L (ref 3.5–5.1)
PROT SERPL-MCNC: 7.7 G/DL (ref 6–8.4)
RBC # BLD AUTO: 4.82 M/UL (ref 4.6–6.2)
SODIUM SERPL-SCNC: 139 MMOL/L (ref 136–145)
URATE SERPL-MCNC: 7.3 MG/DL (ref 3.4–7)
WBC # BLD AUTO: 6.2 K/UL (ref 3.9–12.7)

## 2024-03-12 PROCEDURE — 3044F HG A1C LEVEL LT 7.0%: CPT | Mod: CPTII,S$GLB,, | Performed by: PHYSICIAN ASSISTANT

## 2024-03-12 PROCEDURE — 85025 COMPLETE CBC W/AUTO DIFF WBC: CPT | Performed by: PHYSICIAN ASSISTANT

## 2024-03-12 PROCEDURE — 99215 OFFICE O/P EST HI 40 MIN: CPT | Mod: S$GLB,,, | Performed by: PHYSICIAN ASSISTANT

## 2024-03-12 PROCEDURE — 3077F SYST BP >= 140 MM HG: CPT | Mod: CPTII,S$GLB,, | Performed by: PHYSICIAN ASSISTANT

## 2024-03-12 PROCEDURE — 36415 COLL VENOUS BLD VENIPUNCTURE: CPT | Performed by: PHYSICIAN ASSISTANT

## 2024-03-12 PROCEDURE — 1160F RVW MEDS BY RX/DR IN RCRD: CPT | Mod: CPTII,S$GLB,, | Performed by: PHYSICIAN ASSISTANT

## 2024-03-12 PROCEDURE — 80053 COMPREHEN METABOLIC PANEL: CPT | Performed by: PHYSICIAN ASSISTANT

## 2024-03-12 PROCEDURE — 99999 PR PBB SHADOW E&M-EST. PATIENT-LVL V: CPT | Mod: PBBFAC,,, | Performed by: PHYSICIAN ASSISTANT

## 2024-03-12 PROCEDURE — 84550 ASSAY OF BLOOD/URIC ACID: CPT | Performed by: PHYSICIAN ASSISTANT

## 2024-03-12 PROCEDURE — 3078F DIAST BP <80 MM HG: CPT | Mod: CPTII,S$GLB,, | Performed by: PHYSICIAN ASSISTANT

## 2024-03-12 PROCEDURE — 3008F BODY MASS INDEX DOCD: CPT | Mod: CPTII,S$GLB,, | Performed by: PHYSICIAN ASSISTANT

## 2024-03-12 PROCEDURE — 85652 RBC SED RATE AUTOMATED: CPT | Performed by: PHYSICIAN ASSISTANT

## 2024-03-12 PROCEDURE — 86140 C-REACTIVE PROTEIN: CPT | Performed by: PHYSICIAN ASSISTANT

## 2024-03-12 PROCEDURE — 1159F MED LIST DOCD IN RCRD: CPT | Mod: CPTII,S$GLB,, | Performed by: PHYSICIAN ASSISTANT

## 2024-03-12 RX ORDER — METHOTREXATE 2.5 MG/1
15 TABLET ORAL
Qty: 72 TABLET | Refills: 1 | Status: SHIPPED | OUTPATIENT
Start: 2024-03-12

## 2024-03-12 RX ORDER — ALLOPURINOL 300 MG/1
300 TABLET ORAL DAILY
Qty: 90 TABLET | Refills: 1 | Status: SHIPPED | OUTPATIENT
Start: 2024-03-12

## 2024-03-12 RX ORDER — FOLIC ACID 1 MG/1
1 TABLET ORAL DAILY
Qty: 90 TABLET | Refills: 3 | Status: SHIPPED | OUTPATIENT
Start: 2024-03-12

## 2024-03-12 ASSESSMENT — ROUTINE ASSESSMENT OF PATIENT INDEX DATA (RAPID3): MDHAQ FUNCTION SCORE: 0.9

## 2024-03-12 NOTE — PROGRESS NOTES
Subjective:      Patient ID: Juan C Rodriguez is a 58 y.o. male.    Chief Complaint: Rheumatoid Arthritis and Disease Management    HPI   Juan C Rodriguez  is a 58 y.o. male here for follow-up seronegative RA and overlapping gout.  Patient on methotrexate 15 mg weekly, folic acid 1 mg daily and Enbrel weekly.  From RA perspective he states he is doing very well.  He is more than 75% improved.  Denies prolonged morning stiffness.  Denies joint effusions.     Also with history of gout.  No recent flares.  On allopurinol 300 mg daily.  Not taking any colchicine.      Has chronically elevated sed rate and CRP.  In the past SPEP and VELASQUEZ were negative.  I ordered a CT scan late last year.  He had it in mid December.  It shows fatty liver.  I have recommended he see a liver specialist.  Also shows calcifications of the perihilar lymph nodes.  In past TB Gold negative.  Last TB test was about 1 year ago.  Denies h/o sarcoidosis.    Has knee osteoarthritis.  Sees Orthopedics.  Recently underwent viscosupplementation.  Knee has improved.  He continues to follow-up with ortho.    Patient denies fevers, chills, photosensitivity, eye pain, shortness of breath, chest pain, hematuria, blood in the stool, rash, sicca symptoms, raynauds, finger ulcerations.  Rheumatologic systems otherwise negative.    Serologies/Labs:  Neg CHAD, RF, CCP  Chronically elevated ESR/CRP  Current Treatment:  MTX 15 mg weekly  Folic acid 1 mg daily  Enbrel q week  Allopurinol 300 mg daily  Previous Treatment:   na    Current Outpatient Medications:     amLODIPine (NORVASC) 10 MG tablet, Take 1 tablet by mouth once daily, Disp: 90 tablet, Rfl: 2    atorvastatin (LIPITOR) 20 MG tablet, Take 1 tablet (20 mg total) by mouth once daily., Disp: 90 tablet, Rfl: 3    ergocalciferol (ERGOCALCIFEROL) 50,000 unit Cap, Take 1 capsule by mouth once a week, Disp: 12 capsule, Rfl: 0    etanercept (ENBREL SURECLICK) 50 mg/mL (1 mL), Inject 1  "mL (50 mg total) into the skin once a week., Disp: 4 mL, Rfl: 11    HYDROcodone-acetaminophen (NORCO) 5-325 mg per tablet, Take 1 tablet by mouth every 6 hours as needed for pain, Disp: 15 tablet, Rfl: 0    metFORMIN (GLUCOPHAGE) 500 MG tablet, Take 1 tablet (500 mg total) by mouth once daily., Disp: 90 tablet, Rfl: 1    mupirocin (BACTROBAN) 2 % ointment, Apply topically 3 (three) times daily., Disp: 30 g, Rfl: 0    needle, disp, 22 G 22 gauge x 1 1/2" Ndle, 1 Units by Misc.(Non-Drug; Combo Route) route every 21 days., Disp: 100 each, Rfl: 99    olmesartan-hydrochlorothiazide (BENICAR HCT) 20-12.5 mg per tablet, Take 1 tablet by mouth once daily., Disp: 90 tablet, Rfl: 3    tirzepatide (MOUNJARO) 2.5 mg/0.5 mL PnIj, Inject 2.5 mg into the skin every 7 days., Disp: 4 Pen, Rfl: 4    allopurinoL (ZYLOPRIM) 300 MG tablet, Take 1 tablet (300 mg total) by mouth once daily., Disp: 90 tablet, Rfl: 1    folic acid (FOLVITE) 1 MG tablet, Take 1 tablet (1 mg total) by mouth once daily., Disp: 90 tablet, Rfl: 3    methotrexate 2.5 MG Tab, Take 6 tablets (15 mg total) by mouth every 7 days., Disp: 72 tablet, Rfl: 1    Past Medical History:   Diagnosis Date    Diabetes mellitus, type 2 diagnosed 2013    DM (diabetes mellitus)     2011  02/25/2014    HTN (hypertension)     Hypogonadism, testicular     Left tibialis posterior tendinitis 11/30/2016    Obesities, morbid      Family History   Problem Relation Age of Onset    Cataracts Mother     Diabetes Mother     Hypertension Mother     Diabetes Sister      Social History     Socioeconomic History    Marital status: Single   Tobacco Use    Smoking status: Some Days     Types: Cigars    Smokeless tobacco: Never   Substance and Sexual Activity    Alcohol use: Yes     Alcohol/week: 3.0 - 4.0 standard drinks of alcohol     Types: 3 - 4 Cans of beer per week     Comment: occasional weekends    Drug use: No    Sexual activity: Not Currently     Partners: Female     Review of " "patient's allergies indicates:   Allergen Reactions    Naproxen Swelling       Objective:   BP (!) 145/70   Pulse (!) 52   Ht 5' 2" (1.575 m)   Wt 133.4 kg (294 lb 1.5 oz)   BMI 53.79 kg/m²   Immunization History   Administered Date(s) Administered    COVID-19 MRNA, LN-S PF (MODERNA HALF 0.25 ML DOSE) 01/03/2022    COVID-19, MRNA, LN-S, PF (MODERNA FULL 0.5 ML DOSE) 03/05/2021, 04/01/2021    Influenza 12/06/2010    Influenza - Quadrivalent - PF *Preferred* (6 months and older) 12/08/2021, 12/06/2022    Pneumococcal Conjugate - 13 Valent 07/01/2020    Pneumococcal Polysaccharide - 23 Valent 07/09/2018    Tdap 08/14/2012       Physical Exam   Constitutional: He is oriented to person, place, and time. No distress.   HENT:   Head: Normocephalic and atraumatic.   Pulmonary/Chest: Effort normal.   Musculoskeletal:         General: Normal range of motion.   Neurological: He is alert and oriented to person, place, and time.   Psychiatric: His behavior is normal. Mood normal.   Nursing note and vitals reviewed.    No synovitis, no dactylitis, no enthesitis  No effusions of large or small joints  100% fist formation  Well preserved ROM    Recent Results (from the past 672 hour(s))   CBC Auto Differential    Collection Time: 03/12/24  9:01 AM   Result Value Ref Range    WBC 6.20 3.90 - 12.70 K/uL    RBC 4.82 4.60 - 6.20 M/uL    Hemoglobin 12.3 (L) 14.0 - 18.0 g/dL    Hematocrit 38.1 (L) 40.0 - 54.0 %    MCV 79 (L) 82 - 98 fL    MCH 25.5 (L) 27.0 - 31.0 pg    MCHC 32.3 32.0 - 36.0 g/dL    RDW 16.3 (H) 11.5 - 14.5 %    Platelets 226 150 - 450 K/uL    MPV 9.1 (L) 9.2 - 12.9 fL    Immature Granulocytes 0.2 0.0 - 0.5 %    Gran # (ANC) 3.5 1.8 - 7.7 K/uL    Immature Grans (Abs) 0.01 0.00 - 0.04 K/uL    Lymph # 2.0 1.0 - 4.8 K/uL    Mono # 0.5 0.3 - 1.0 K/uL    Eos # 0.1 0.0 - 0.5 K/uL    Baso # 0.06 0.00 - 0.20 K/uL    nRBC 0 0 /100 WBC    Gran % 56.3 38.0 - 73.0 %    Lymph % 32.9 18.0 - 48.0 %    Mono % 8.1 4.0 - 15.0 %    " Eosinophil % 1.5 0.0 - 8.0 %    Basophil % 1.0 0.0 - 1.9 %    Differential Method Automated    Comprehensive Metabolic Panel    Collection Time: 03/12/24  9:01 AM   Result Value Ref Range    Sodium 139 136 - 145 mmol/L    Potassium 3.7 3.5 - 5.1 mmol/L    Chloride 104 95 - 110 mmol/L    CO2 28 23 - 29 mmol/L    Glucose 88 70 - 110 mg/dL    BUN 27 (H) 6 - 20 mg/dL    Creatinine 1.4 0.5 - 1.4 mg/dL    Calcium 9.1 8.7 - 10.5 mg/dL    Total Protein 7.7 6.0 - 8.4 g/dL    Albumin 3.7 3.5 - 5.2 g/dL    Total Bilirubin 0.4 0.1 - 1.0 mg/dL    Alkaline Phosphatase 92 55 - 135 U/L    AST 21 10 - 40 U/L    ALT 22 10 - 44 U/L    eGFR 58 (A) >60 mL/min/1.73 m^2    Anion Gap 7 (L) 8 - 16 mmol/L   C-Reactive Protein    Collection Time: 03/12/24  9:01 AM   Result Value Ref Range    CRP 27.8 (H) 0.0 - 8.2 mg/L   Uric Acid    Collection Time: 03/12/24  9:01 AM   Result Value Ref Range    Uric Acid 7.3 (H) 3.4 - 7.0 mg/dL         Lab Results   Component Value Date    TBGOLDPLUS Negative 11/11/2022      Lab Results   Component Value Date    HEPAIGM Negative 07/01/2020    HEPBIGM Negative 07/01/2020    HEPBCAB Non-reactive 11/11/2022    HEPCAB Non-reactive 11/11/2022        Imaging  I have personally reviewed images and reports as below.  I agree with the interpretation.  CT Chest Abdomen Pelvis Without Contrast (XPD)  Narrative: EXAMINATION:  CT CHEST, ABDOMEN AND PELVIS    CLINICAL HISTORY:  Elevated inflammatory markers    TECHNIQUE:  Axial CT images were obtained through the chest,  abdomen and pelvis without IV and with oral contrast .  Coronal and sagittal reconstructions submitted and interpreted.  Total DLP 3411.  Automated exposure control utilized.    COMPARISON:  None.    CT CHEST:  No focal consolidation, pneumothorax or pleural effusion.  No suspicious lung nodule or mass.  A calcified granuloma is in the right lower lobe.  Mild left basilar atelectasis is present.    Heart is enlarged.  Thoracic aorta and coronary arteries  are partially calcified.  Central airways are clear.    There are partially calcified left hilar and mediastinal lymph nodes.    No acute or suspicious osseous findings in the chest.    CT ABDOMEN AND PELVIS:    Calcified granulomas are in the liver and spleen.  Liver is enlarged with a decreased attenuation.  Kidneys, adrenal glands, pancreas and gallbladder are normal.    Stomach, small bowel and appendix are normal.  No free fluid or pneumoperitoneum.    No enlarged lymph nodes in the abdomen or pelvis.  Abdominal aorta is normal in caliber.    Urinary bladder is normal.  Prostate is normal size.    No acute or suspicious osseous findings in the abdomen or pelvis.  Impression: 1. No acute findings in the abdomen or pelvis.  2. Hepatic enlargement with steatosis.  3. Old granulomatous disease.    Electronically signed by: Santiago Squires MD  Date:    12/11/2023  Time:    15:35      Assessment:     1. Rheumatoid arthritis of multiple sites with negative rheumatoid factor    2. Gout without tophus    3. Inflammatory arthritis    4. Calcified lymph nodes    5. Abnormal chest CT    6. Immunocompromised state due to drug therapy    7. Medication monitoring encounter    8. High risk medication use    9. Steatosis of liver          Plan:     Juan C was seen today for rheumatoid arthritis and disease management.    Diagnoses and all orders for this visit:    Rheumatoid arthritis of multiple sites with negative rheumatoid factor  -     methotrexate 2.5 MG Tab; Take 6 tablets (15 mg total) by mouth every 7 days.  -     Quantiferon Gold TB; Standing  -     Hepatitis C Antibody; Standing  -     Hepatitis B Core Antibody, Total; Standing  -     Hepatitis B Surface Antigen; Standing  -     Hepatitis B Surface Ab, Qualitative; Standing    Gout without tophus  -     allopurinoL (ZYLOPRIM) 300 MG tablet; Take 1 tablet (300 mg total) by mouth once daily.    Inflammatory arthritis  -     folic acid (FOLVITE) 1 MG tablet; Take 1 tablet  (1 mg total) by mouth once daily.    Calcified lymph nodes  -     Ambulatory referral/consult to Pulmonology; Future  -     Angiotensin Converting Enzyme; Standing  -     INTERLEUKIN-2 RECEPTOR; Future  -     Quantiferon Gold TB; Standing  -     Hepatitis C Antibody; Standing  -     Hepatitis B Core Antibody, Total; Standing  -     Hepatitis B Surface Antigen; Standing  -     Hepatitis B Surface Ab, Qualitative; Standing    Abnormal chest CT  -     Ambulatory referral/consult to Pulmonology; Future  -     Angiotensin Converting Enzyme; Standing  -     INTERLEUKIN-2 RECEPTOR; Future    Immunocompromised state due to drug therapy  -     Quantiferon Gold TB; Standing  -     Hepatitis C Antibody; Standing  -     Hepatitis B Core Antibody, Total; Standing  -     Hepatitis B Surface Antigen; Standing  -     Hepatitis B Surface Ab, Qualitative; Standing    Medication monitoring encounter    High risk medication use  -     Quantiferon Gold TB; Standing  -     Hepatitis C Antibody; Standing  -     Hepatitis B Core Antibody, Total; Standing  -     Hepatitis B Surface Antigen; Standing  -     Hepatitis B Surface Ab, Qualitative; Standing    Steatosis of liver  -     Ambulatory referral/consult to Hepatology; Future    Seronegative RA  CBC stable  Remaining labs pending  > 75% improvement on Enbrel + MTX - continue same  No active synovitis on exam today  Update TB/Hep labs today  Hold MTX/Enbrel for infxn and surgery  Recent huber anal abscess  I have reviewed external records  Pt reports having held MTX + Enbrel  Advised him to notify us of surgical procedures in future so we can make periop recs on meds.    Gout  C/w allopurinal 300 mg daily pending review of CMP  Consider addition of colcicine 0.6 mg daily  No active gout attack at present and none recently  Calcified huber hilar lymph nodes On CT  Pulm referral  Check ACE, il 2, TB today  Rt knee pain and OA  Getting euflexxa w ortho  Tolerating them will  Still w pain  Mgmt  per ortho  Drug therapy requiring intensive monitoring for toxicity  High Risk Medication Monitoring encounter  No current medication related issues, no evidence of toxicity  I ordered labs for toxicity monitoring, have personally reviewed the findings, and discussed them with the patient.  Pending labs will be sent via the portal  Compromised immune system secondary to autoimmune disease and/or use of immunosuppressive drugs.  Monitor carefully for infections.  Advised patient to get immediate medical care if any infection arises.  Also advised strict adherence age-appropriate vaccinations and cancer screenings with PCP.  Patient advised to hold DMARD and/or biologic therapy for signs of infection or for surgery. If you are unsure what to do please call our office for instruction.[facility] [phone]  Return to clinic:  16 wks w Reg 4 and uric acid day of    Follow up in about 17 weeks (around 7/9/2024).    The patient understands, chooses and consents to this plan and accepts all the risks which include but are not limited to the risks mentioned above.     Disclaimer: This note was prepared using a voice recognition system and is likely to have sound alike errors within the text.

## 2024-03-15 ENCOUNTER — TELEPHONE (OUTPATIENT)
Dept: RHEUMATOLOGY | Facility: CLINIC | Age: 59
End: 2024-03-15
Payer: COMMERCIAL

## 2024-03-15 NOTE — TELEPHONE ENCOUNTER
Good afternoon,     Please call and reach out to patient to get scheduled for eval following abnormal labs and abnormal CT per [facility] hare. Please let me know when pt is scheduled.

## 2024-03-15 NOTE — TELEPHONE ENCOUNTER
----- Message from Krysten Cruz PA-C sent at 3/14/2024  4:18 PM CDT -----  Please let him know one of the labs was abnormal.  That in conjunction w the CT scan, I want him to see pulmonlogy.  I'm not seeing he has been scheduled.  Lets reach out to pulmonology team about getting him scheduled.  Jojo

## 2024-03-18 ENCOUNTER — TELEPHONE (OUTPATIENT)
Dept: PULMONOLOGY | Facility: CLINIC | Age: 59
End: 2024-03-18
Payer: COMMERCIAL

## 2024-03-19 ENCOUNTER — LAB VISIT (OUTPATIENT)
Dept: LAB | Facility: HOSPITAL | Age: 59
End: 2024-03-19
Attending: INTERNAL MEDICINE
Payer: COMMERCIAL

## 2024-03-19 ENCOUNTER — TELEPHONE (OUTPATIENT)
Dept: RHEUMATOLOGY | Facility: CLINIC | Age: 59
End: 2024-03-19
Payer: COMMERCIAL

## 2024-03-19 ENCOUNTER — OFFICE VISIT (OUTPATIENT)
Dept: FAMILY MEDICINE | Facility: CLINIC | Age: 59
End: 2024-03-19
Payer: COMMERCIAL

## 2024-03-19 VITALS
TEMPERATURE: 98 F | RESPIRATION RATE: 18 BRPM | BODY MASS INDEX: 54.48 KG/M2 | SYSTOLIC BLOOD PRESSURE: 136 MMHG | OXYGEN SATURATION: 98 % | HEART RATE: 65 BPM | HEIGHT: 62 IN | DIASTOLIC BLOOD PRESSURE: 70 MMHG | WEIGHT: 296.06 LBS

## 2024-03-19 DIAGNOSIS — E78.2 MIXED HYPERLIPIDEMIA: Primary | ICD-10-CM

## 2024-03-19 DIAGNOSIS — K76.0 FATTY LIVER: ICD-10-CM

## 2024-03-19 DIAGNOSIS — E55.9 VITAMIN D DEFICIENCY: ICD-10-CM

## 2024-03-19 DIAGNOSIS — R93.89 ABNORMAL CT OF THE CHEST: ICD-10-CM

## 2024-03-19 DIAGNOSIS — D56.3 ALPHA THALASSEMIA SILENT CARRIER: ICD-10-CM

## 2024-03-19 DIAGNOSIS — E11.65 TYPE 2 DIABETES MELLITUS WITH HYPERGLYCEMIA, WITHOUT LONG-TERM CURRENT USE OF INSULIN: ICD-10-CM

## 2024-03-19 DIAGNOSIS — D84.9 IMMUNOCOMPROMISED: ICD-10-CM

## 2024-03-19 DIAGNOSIS — M06.09 RHEUMATOID ARTHRITIS OF MULTIPLE SITES WITH NEGATIVE RHEUMATOID FACTOR: ICD-10-CM

## 2024-03-19 DIAGNOSIS — E21.3 HYPERPARATHYROIDISM: ICD-10-CM

## 2024-03-19 DIAGNOSIS — Z00.00 ROUTINE ADULT HEALTH MAINTENANCE: ICD-10-CM

## 2024-03-19 DIAGNOSIS — I10 ESSENTIAL HYPERTENSION: ICD-10-CM

## 2024-03-19 DIAGNOSIS — G47.31 PRIMARY CENTRAL SLEEP APNEA: ICD-10-CM

## 2024-03-19 LAB — COMPLEXED PSA SERPL-MCNC: 0.13 NG/ML (ref 0–4)

## 2024-03-19 PROCEDURE — 84153 ASSAY OF PSA TOTAL: CPT | Performed by: INTERNAL MEDICINE

## 2024-03-19 PROCEDURE — 36415 COLL VENOUS BLD VENIPUNCTURE: CPT | Mod: PO | Performed by: INTERNAL MEDICINE

## 2024-03-19 PROCEDURE — 3078F DIAST BP <80 MM HG: CPT | Mod: CPTII,S$GLB,, | Performed by: INTERNAL MEDICINE

## 2024-03-19 PROCEDURE — 99999 PR PBB SHADOW E&M-EST. PATIENT-LVL IV: CPT | Mod: PBBFAC,,, | Performed by: INTERNAL MEDICINE

## 2024-03-19 PROCEDURE — 3075F SYST BP GE 130 - 139MM HG: CPT | Mod: CPTII,S$GLB,, | Performed by: INTERNAL MEDICINE

## 2024-03-19 PROCEDURE — 3008F BODY MASS INDEX DOCD: CPT | Mod: CPTII,S$GLB,, | Performed by: INTERNAL MEDICINE

## 2024-03-19 PROCEDURE — 3044F HG A1C LEVEL LT 7.0%: CPT | Mod: CPTII,S$GLB,, | Performed by: INTERNAL MEDICINE

## 2024-03-19 PROCEDURE — 99396 PREV VISIT EST AGE 40-64: CPT | Mod: S$GLB,,, | Performed by: INTERNAL MEDICINE

## 2024-03-19 RX ORDER — ERGOCALCIFEROL 1.25 MG/1
50000 CAPSULE ORAL
Qty: 12 CAPSULE | Refills: 0 | Status: SHIPPED | OUTPATIENT
Start: 2024-03-19

## 2024-03-19 RX ORDER — ATORVASTATIN CALCIUM 20 MG/1
20 TABLET, FILM COATED ORAL DAILY
Qty: 90 TABLET | Refills: 3 | Status: SHIPPED | OUTPATIENT
Start: 2024-03-19

## 2024-03-19 NOTE — PROGRESS NOTES
MEDICARE WELLNESS VISIT NOTE    HISTORY OF PRESENT ILLNESS:   Melanie Bajwa presents for her Subsequent Annual Medicare Wellness Visit.   She presents with her son today. She has the following concerns:    Balance:  Has noted herself to be a a bit more \"wobbly\" such that she has difficulty changing direction of gait and turning on the spot without feeling unsteady on her feet. She denies any recent falls at home or injuries. She reports she does not feel lightheaded or dizzy, and there is no room spinning sensation, however reports that her feet feel like they are lacking tactile feedback to walk securely. Denies any muscle pain in the legs or pain with ambulation.    Voices:  She also reports that for the past several months she has heard voices in her basement which she suspects are from one of the teenage girls that live next door along with her friends. She also reports she has noted the garage door to be left slightly elevated to the level someone \"could have snuck in\". She suspects her neighbor of toying with the electronics so as to be able to override the garage door lock. She also suspect she has been charlie to circumvent the house alarm system and security cameras and has been sneaking into her basement. She reports that when she calls out to the voices to ask those present to show themselves, or when she stops walking around the house, the voices cease a well. She has not noted anything missing from or damaged in the home. Her  is hard of hearing and has not heard these voices. Her son reports they have changed the garage opener,changed the code, changed the door locks, inspected the windows, installed a new alarm system as well as external cameras without any evidence of breaking in or tampering. He is concerned about his mother and these voices which cannot be corroborated.   She herself reports she is distraught at the possibility that she may be \"losing [her] mind\" and does not want to be  Subjective:       Patient ID: Juan C Rodriguez is a 58 y.o. male.    Chief Complaint: Follow-up, Hypertension, Hyperlipidemia, and Diabetes    Follow-up  Associated symptoms include arthralgias. Pertinent negatives include no abdominal pain, chest pain, chills, coughing, diaphoresis, fatigue, fever, headaches, joint swelling, myalgias, nausea, neck pain, numbness, rash, sore throat, vomiting or weakness.   Hypertension  Pertinent negatives include no chest pain, headaches, neck pain, palpitations or shortness of breath.   Hyperlipidemia  Pertinent negatives include no chest pain, myalgias or shortness of breath.   Diabetes  Pertinent negatives for hypoglycemia include no confusion, dizziness, headaches, nervousness/anxiousness, pallor, seizures, speech difficulty or tremors. Pertinent negatives for diabetes include no chest pain, no fatigue, no polydipsia, no polyphagia, no polyuria and no weakness.     Past Medical History:   Diagnosis Date    Diabetes mellitus, type 2 diagnosed 2013    DM (diabetes mellitus)     2011  02/25/2014    HTN (hypertension)     Hypogonadism, testicular     Left tibialis posterior tendinitis 11/30/2016    Obesities, morbid      Past Surgical History:   Procedure Laterality Date    COLONOSCOPY N/A 6/29/2018    Procedure: COLONOSCOPY;  Surgeon: Jeremiah Anaya III, MD;  Location: [facility];  Service: Endoscopy;  Laterality: N/A;     Family History   Problem Relation Age of Onset    Cataracts Mother     Diabetes Mother     Hypertension Mother     Diabetes Sister      Social History     Socioeconomic History    Marital status: Single   Tobacco Use    Smoking status: Some Days     Types: Cigars    Smokeless tobacco: Never   Substance and Sexual Activity    Alcohol use: Yes     Alcohol/week: 3.0 - 4.0 standard drinks of alcohol     Types: 3 - 4 Cans of beer per week     Comment: occasional weekends    Drug use: No    Sexual activity: Not Currently     Partners: Female      Review of Systems   Constitutional:  Negative for activity change, appetite change, chills, diaphoresis, fatigue, fever and unexpected weight change.   HENT:  Negative for drooling, ear discharge, ear pain, facial swelling, hearing loss, mouth sores, nosebleeds, postnasal drip, rhinorrhea, sinus pressure, sneezing, sore throat, tinnitus, trouble swallowing and voice change.    Eyes:  Negative for photophobia, redness and visual disturbance.   Respiratory:  Negative for apnea, cough, choking, chest tightness, shortness of breath and wheezing.    Cardiovascular:  Negative for chest pain, palpitations and leg swelling.   Gastrointestinal:  Negative for abdominal distention, abdominal pain, anal bleeding, blood in stool, constipation, diarrhea, nausea, rectal pain and vomiting.   Endocrine: Negative for cold intolerance, heat intolerance, polydipsia, polyphagia and polyuria.   Genitourinary:  Negative for difficulty urinating, dysuria, enuresis, flank pain, frequency, genital sores, hematuria and urgency.   Musculoskeletal:  Positive for arthralgias. Negative for back pain, gait problem, joint swelling, myalgias, neck pain and neck stiffness.   Skin:  Negative for color change, pallor, rash and wound.   Allergic/Immunologic: Negative for food allergies and immunocompromised state.   Neurological:  Negative for dizziness, tremors, seizures, syncope, facial asymmetry, speech difficulty, weakness, light-headedness, numbness and headaches.   Hematological:  Negative for adenopathy. Does not bruise/bleed easily.   Psychiatric/Behavioral:  Negative for agitation, behavioral problems, confusion, decreased concentration, dysphoric mood, hallucinations, self-injury, sleep disturbance and suicidal ideas. The patient is not nervous/anxious and is not hyperactive.        Objective:      Physical Exam  Vitals and nursing note reviewed.   Constitutional:       General: He is not in acute distress.     Appearance: Normal  labelled as crazy. She is also worried her family will try to make her incapacitated from a decision making standpoint.        Patient Care Team:  Buster Craven MD as PCP - General (Family Practice)  Eliecer Maravilla OD as Referring Provider (Optometry)  mR Talbot MD as Referring Provider (Orthopedic Surgery)  Minerva Kay MD as Referring Provider (Cardiovascular Disease)        Patient Active Problem List   Diagnosis   • Allergic rhinitis, cause unspecified   • GERD (gastroesophageal reflux disease)   • LBP (low back pain)   • Class 1 obesity with serious comorbidity and body mass index (BMI) of 31.0 to 31.9 in adult   • Essential hypertension   • Generalized osteoarthrosis, involving multiple site-right hip replaced. Redo done due to cobalt in the initial hardware   • Knee osteoarthritis-   • Osteopenia   • Chest pain-episodes with cardiac catheterization in 2002 with normal coronary anatomy. Normal Cardiolite stress study in 2004 as well   • Lisfranc dislocation   • Pure hypercholesterolemia   • Type 2 diabetes mellitus with diabetic nephropathy (CMS/HCC)   • Malignant neoplasm of upper-outer quadrant of right female breast (CMS/HCC)   • Major depressive disorder, single episode, mild (CMS/HCC)   • Chronic kidney disease (CKD), stage III (moderate) (CMS/HCC)   • Hypercalcemia   • Low serum parathyroid hormone (PTH)   • Cervicalgia   • Atrial fibrillation (CMS/HCC)   • Acute on chronic right-sided congestive heart failure (CMS/HCC)         Past Medical History:   Diagnosis Date   • COVID 10/20/2021   • Diabetes mellitus (CMS/HCC)    • Disorder of bone and cartilage, unspecified 10/18/2011   • Essential (primary) hypertension    • High cholesterol    • Malignant neoplasm of upper-outer quadrant of right female breast (CMS/HCC) 11/14/2016   • Osteoarthrosis and allied disorders          Past Surgical History:   Procedure Laterality Date   • Colonoscopy  12/14/2005   • Foot fracture surgery Left  2015    Lisfranc ORIF   • Joint replacement  2002    Right knee   • Pap smear  10/31/2012   • Revision total hip arthroplasty  2011   • Stereotactic breast biopsy Right 2016    clip #3 placed   • Total hip arthroplasty  10/22/2007         Social History     Tobacco Use   • Smoking status: Former Smoker     Packs/day: 0.50     Years: 20.00     Pack years: 10.00     Types: Cigarettes     Quit date: 1992     Years since quittin.2   • Smokeless tobacco: Never Used   Substance Use Topics   • Alcohol use: Yes     Alcohol/week: 4.0 standard drinks     Types: 4 Standard drinks or equivalent per week     Comment: occasional   • Drug use: No     Drug use:    Drug Use:    No              Family History   Problem Relation Age of Onset   • Heart disease Father         Coronary artery bypass surgery   • Stroke/TIA Father    • Myocardial Infarction Father         Passed away in his mid 80's   • Cancer Brother 37        Hodgkin's lymphoma and prostate cancer (70s)   • Diabetes Mother    • High blood pressure Mother    • Stroke Mother         Passed away at age 84   • Dementia/Alzheimers Mother    • High blood pressure Brother    • Diabetes Brother    • Stroke Brother    • Cancer, Breast Paternal Aunt 50        rad mast and radiation, brain mets   • Cancer, Breast Paternal Aunt 60        d. 80   • Cancer, Breast Other 44        niece, Brother (1) daughter, lobular, living at 57   • Cancer, Breast Other 47        niece, brother (1) daughter, DCIS, BRCA neg reportedly       Current Outpatient Medications   Medication Sig Dispense Refill   • buPROPion XL (WELLBUTRIN XL) 300 MG 24 hr tablet TAKE 1 TABLET EVERY 24     HOURS 90 tablet 2   • dilTIAZem (CARDIZEM CD) 240 MG 24 hr capsule      • TURMERIC CURCUMIN PO      • APPLE CIDER VINEGAR PO      • metoPROLOL tartrate (LOPRESSOR) 100 MG tablet Take 1 tablet by mouth 2 times daily. 180 tablet 3   • apixaBAN (ELIQUIS) 5 MG Tab Take 1 tablet by mouth 2 times  appearance. He is well-developed. He is not diaphoretic.   HENT:      Head: Normocephalic and atraumatic.      Mouth/Throat:      Pharynx: No oropharyngeal exudate.   Eyes:      General: No scleral icterus.     Pupils: Pupils are equal, round, and reactive to light.   Neck:      Thyroid: No thyromegaly.      Vascular: No carotid bruit or JVD.      Trachea: No tracheal deviation.   Cardiovascular:      Rate and Rhythm: Normal rate and regular rhythm.      Heart sounds: Normal heart sounds.   Pulmonary:      Effort: Pulmonary effort is normal. No respiratory distress.      Breath sounds: Normal breath sounds. No wheezing or rales.   Chest:      Chest wall: No tenderness.   Abdominal:      General: Bowel sounds are normal. There is no distension.      Palpations: Abdomen is soft.      Tenderness: There is no abdominal tenderness. There is no guarding or rebound.   Musculoskeletal:         General: No tenderness. Normal range of motion.      Cervical back: Normal range of motion and neck supple.   Lymphadenopathy:      Cervical: No cervical adenopathy.   Skin:     General: Skin is warm and dry.      Coloration: Skin is not pale.      Findings: No erythema or rash.   Neurological:      Mental Status: He is alert and oriented to person, place, and time.   Psychiatric:         Behavior: Behavior normal.         Thought Content: Thought content normal.         Judgment: Judgment normal.         CMP  Sodium   Date Value Ref Range Status   03/12/2024 139 136 - 145 mmol/L Final     Potassium   Date Value Ref Range Status   03/12/2024 3.7 3.5 - 5.1 mmol/L Final     Chloride   Date Value Ref Range Status   03/12/2024 104 95 - 110 mmol/L Final     CO2   Date Value Ref Range Status   03/12/2024 28 23 - 29 mmol/L Final     Glucose   Date Value Ref Range Status   03/12/2024 88 70 - 110 mg/dL Final     BUN   Date Value Ref Range Status   03/12/2024 27 (H) 6 - 20 mg/dL Final     Creatinine   Date Value Ref Range Status   03/12/2024  1.4 0.5 - 1.4 mg/dL Final     Calcium   Date Value Ref Range Status   03/12/2024 9.1 8.7 - 10.5 mg/dL Final     Total Protein   Date Value Ref Range Status   03/12/2024 7.7 6.0 - 8.4 g/dL Final     Albumin   Date Value Ref Range Status   03/12/2024 3.7 3.5 - 5.2 g/dL Final   05/31/2016 4.2 3.6 - 5.1 g/dL Final     Comment:     @ Test Performed By:  [facility] M.D., [facility].,   [address] [place] [id number]  [facility]  [id number]       Total Bilirubin   Date Value Ref Range Status   03/12/2024 0.4 0.1 - 1.0 mg/dL Final     Comment:     For infants and newborns, interpretation of results should be based  on gestational age, weight and in agreement with clinical  observations.    Premature Infant recommended reference ranges:  Up to 24 hours.............<8.0 mg/dL  Up to 48 hours............<12.0 mg/dL  3-5 days..................<15.0 mg/dL  6-29 days.................<15.0 mg/dL       Alkaline Phosphatase   Date Value Ref Range Status   03/12/2024 92 55 - 135 U/L Final     AST   Date Value Ref Range Status   03/12/2024 21 10 - 40 U/L Final     ALT   Date Value Ref Range Status   03/12/2024 22 10 - 44 U/L Final     Anion Gap   Date Value Ref Range Status   03/12/2024 7 (L) 8 - 16 mmol/L Final     eGFR if    Date Value Ref Range Status   07/15/2022 >60 >60 mL/min/1.73 m^2 Final     eGFR if non    Date Value Ref Range Status   07/15/2022 >60 >60 mL/min/1.73 m^2 Final     Comment:     Calculation used to obtain the estimated glomerular filtration  rate (eGFR) is the CKD-EPI equation.        Lab Results   Component Value Date    WBC 6.20 03/12/2024    HGB 12.3 (L) 03/12/2024    HCT 38.1 (L) 03/12/2024    MCV 79 (L) 03/12/2024     03/12/2024     Lab Results   Component Value Date    CHOL 189 04/24/2023     Lab Results   Component Value Date    HDL 70 04/24/2023     Lab Results   Component Value Date    LDLCALC 106.0  daily. 1 tab twice daily. 180 tablet 3   • atorvastatin (LIPITOR) 40 MG tablet Take 1 tablet by mouth daily. 90 tablet 3   • Glucosamine HCl 1500 MG Tab Take 1,500 mg by mouth daily. 90 tablet 1   • losartan (COZAAR) 100 MG tablet TAKE 1 TABLET DAILY 90 tablet 1   • amLODIPine (NORVASC) 10 MG tablet TAKE 1 TABLET DAILY 90 tablet 1   • Multiple Vitamin (Multi Vitamin) Tab 1 (one) time each day at the same time.     • aspirin 325 MG EC tablet Take 1 tablet by mouth daily. STOP 1 WEEK PRIOR TO SURGERY 90 tablet 1   • Omega-3 Fatty Acids (Fish Oil) 1000 MG capsule Take 4 g by mouth daily. STOP 1 WEEK PRIOR TO SURGERY 90 capsule 1   • fexofenadine (ALLEGRA) 180 MG tablet Take 180 mg by mouth daily as needed.     • zolpidem (Ambien) 5 MG tablet Take 1 tablet by mouth nightly as needed for Sleep. 30 tablet 0     No current facility-administered medications for this visit.        The following items on the Medicare Health Risk Assessment were found to be positive  6 a.) How many servings of Fruits and Vegetables do you have each day ( 1 serving = 1 piece of fruit, 1/2 cup fruits or vegetables): 1 per day     6 b.) How many servings of High Fiber / Whole Grain Foods to you have each day ( 1 serving = 1 cup cold cereal, 1/2 cup cooked cereal, 1 slice bread): None     7a.) Have you had a fall in the past year?: Yes     7b.) Do you feel unsteady when standing or walking?: Yes     13.) Do you need help with any of the following activities?: Get to places outside of walking distance (can't drive alone, or take a bus/taxi alone)         Vision and Hearing screens: Not performed    Advance Directive:   The patient has the following documents:  Power of  for Health Care    Cognitive/Functional Status: no evidence of cognitive dysfunction by direct observation    Opioid Review: Melanie is not taking opioid medications.    Recent PHQ 2/9 Score:    PHQ 2:  Date Adult PHQ 2 Score Adult PHQ 2 Interpretation   4/4/2022 1 No further  04/24/2023     Lab Results   Component Value Date    TRIG 65 04/24/2023     Lab Results   Component Value Date    CHOLHDL 37.0 04/24/2023     Lab Results   Component Value Date    TSH 1.819 08/24/2023     Lab Results   Component Value Date    LABA1C 6.1 (H) 03/16/2017    HGBA1C 5.6 02/01/2024     Assessment:       1. Mixed hyperlipidemia    2. Vitamin D deficiency    3. Essential hypertension    4. Alpha thalassemia silent carrier    5. Primary central sleep apnea    6. Type 2 diabetes mellitus with hyperglycemia, without long-term current use of insulin    7. Immunocompromised    8. Rheumatoid arthritis of multiple sites with negative rheumatoid factor    9. Fatty liver    10. Abnormal CT of the chest    11. Hyperparathyroidism    12. Routine adult health maintenance        Plan:   Mixed hyperlipidemia    Vitamin D deficiency  -     ergocalciferol (ERGOCALCIFEROL) 50,000 unit Cap; Take 1 capsule (50,000 Units total) by mouth every 7 days.  Dispense: 12 capsule; Refill: 0    Essential hypertension    Alpha thalassemia silent carrier    Primary central sleep apnea    Type 2 diabetes mellitus with hyperglycemia, without long-term current use of insulin    Immunocompromised    Rheumatoid arthritis of multiple sites with negative rheumatoid factor----------------sees rheumatology-----------------    Fatty liver  -     Ambulatory referral/consult to Hepatology; Future; Expected date: 03/26/2024    Abnormal CT of the chest  -     Ambulatory referral/consult to Pulmonology; Future; Expected date: 03/26/2024    Hyperparathyroidism    Routine adult health maintenance  -     PSA, Screening; Future; Expected date: 03/19/2024    Other orders  -     atorvastatin (LIPITOR) 20 MG tablet; Take 1 tablet (20 mg total) by mouth once daily.  Dispense: 90 tablet; Refill: 3      Stable-----------------continue meds, watch diet,exercise. As above-----------------f/u 3 months-   screening needed       PHQ 9:  Date Adult PHQ 9 Score Adult PHQ 9 Interpretation   7/1/2020 3 Minimal Depression       DEPRESSION ASSESSMENT/PLAN:  Depression screening is negative no further plan needed.     Body mass index is 28.84 kg/m².    BMI ASSESSMENT/PLAN:  Patient is overweight.    Journal food intake daily, 30 minutes of physical activity a day, See patient education in AVS and Caloric restriction         Needed Screening/Treatment:   Immunizations reviewed and patient needs: Pneumococcal 23 and Herpes Zoster  Needed follow up:  None   Depression Screening-2 Questions: Patient was asked \"Over the past 2 weeks, how often have you been bothered by any of the following problems? Little interest or pleasure in doing things? and Feeling down, depressed, or hopeless? Patient scored 1 points.     Ambulation/Fall Risk: Pt. walks independently without restrictions  Patient is unsteady on their feet and has a High Risk for falls.       PHYSICAL EXAM  Vitals:    04/04/22 1255   BP: 114/60   Pulse: 80   Resp: 16   Weight: 65.3 kg (144 lb)   Height: 4' 11.25\" (1.505 m)     Body mass index is 28.84 kg/m².    Constitutional:  Well developed, well nourished, no acute distress, non-toxic appearance   Eyes:  Pupils equal, round, reactive to light, extraocular movements intact and conjunctivae normal   Head: Normocephalic, atraumatic.  ENT:  External ears without erythema or drainage. Ear canals non erythematous bilaterally.  No rhinorrhea noted. Oropharynx moist, no pharyngeal exudates.   Neck: normal range of motion, no tenderness noted, no overt lymphadenopathy   Respiratory:  No tachypnea noted. Good air entry to bilateral lung bases, no wheezes, rales rhonchi or stridor noted.  Cardiovascular:  Regular rate and rhythm, no murmurs, no gallops, no rubs   Gastrointestinal:  Abdomen appears nondistended with normal bowel sounds, soft to palpation, nontender, no overt organomegaly noted, no masses, no rebound, no guarding  noted.  Breast: Exam deferred by patient request.  Genitourinary: Genital exam deferred by patient request today.   Musculoskeletal:  No extremity edema, no deformities or scoliosis.  Skin: Appropriate skin turgor for age, no rashes noted.  Neurologic:  Alert & oriented x 3, cranial nerves 2-12 grossly symmetric and intact, strength and sensation symmetric and intact, no focal deficits noted. No tremors, tics or ataxia.  Psychiatric:  Speech and behavior appropriate, delusions of strangers entering her home suspected based on above mentioned history; no evidence of agitation, visual hallucinations, tangentiality of thought. Anxious affect congruent to mood.    Melanie was seen today for medicare wellness visit and other.    Diagnoses and all orders for this visit:    Medicare annual wellness visit, subsequent  -     ANNUAL WELLNESS VISIT SUBSEQUENT VISIT W PPS    Auditory hallucinations        -     SERVICE TO BEHAVIORAL HEALTH  Patient appears to be having auditory hallucinations tied to the delusion of people entering her garage and basement. Son reports all attempts have been made to secure the home and no evidence of tampering or entry has been found. She is distraught of the possible outcome on self decision making capacity that a diagnosis of hallucinations and delusions may bring. She is agreeable to try counseling with a trusted Psychologist she has worked with in the past. Referral to  with request to be seen by Larissa Tolentino has been provided. She declines any medical therapy at this time.     Unstable balance  -     SERVICE TO PHYSICAL THERAPY for gait/balance training.    Pure hypercholesterolemia  -     atorvastatin (LIPITOR) 40 MG tablet; Take 1 tablet by mouth daily.    Essential hypertension - chronic, treated, controlled.   -     amLODIPine (NORVASC) 10 MG tablet; Take 1 tablet by mouth daily.  -     losartan (COZAAR) 100 MG tablet; Take 1 tablet by mouth daily.    Atrial fibrillation, unspecified  type (CMS/[facility]) - doung well with heart rate, BP and lack of tachycardia on current dosage. Continue current medication.  -     dilTIAZem (CARDIZEM CD) 240 MG 24 hr capsule; Take 1 capsule by mouth daily.    Need for vaccination  -     diphtheria-pertussis, acellular,-tetanus (BOOSTRIX) 5-2.5-18.5 LF-MCG/0.5 Suspension Prefilled Syringe injection; Inject 0.5 mLs into the muscle 1 time.        See orders.   See Patient Instructions section.   Return in about 1 year (around 4/4/2023) for Medicare Wellness Visit, prediabetes follow up in 6 months..

## 2024-03-28 ENCOUNTER — OFFICE VISIT (OUTPATIENT)
Dept: PULMONOLOGY | Facility: CLINIC | Age: 59
End: 2024-03-28
Payer: COMMERCIAL

## 2024-03-28 VITALS
SYSTOLIC BLOOD PRESSURE: 138 MMHG | RESPIRATION RATE: 18 BRPM | HEIGHT: 63 IN | OXYGEN SATURATION: 98 % | HEART RATE: 64 BPM | WEIGHT: 294.13 LBS | DIASTOLIC BLOOD PRESSURE: 72 MMHG | BODY MASS INDEX: 52.12 KG/M2

## 2024-03-28 DIAGNOSIS — F17.200 NEEDS SMOKING CESSATION EDUCATION: ICD-10-CM

## 2024-03-28 DIAGNOSIS — R93.89 ABNORMAL CT OF THE CHEST: ICD-10-CM

## 2024-03-28 PROCEDURE — 99204 OFFICE O/P NEW MOD 45 MIN: CPT | Mod: S$GLB,,, | Performed by: INTERNAL MEDICINE

## 2024-03-28 PROCEDURE — 1159F MED LIST DOCD IN RCRD: CPT | Mod: CPTII,S$GLB,, | Performed by: INTERNAL MEDICINE

## 2024-03-28 PROCEDURE — 3078F DIAST BP <80 MM HG: CPT | Mod: CPTII,S$GLB,, | Performed by: INTERNAL MEDICINE

## 2024-03-28 PROCEDURE — 1160F RVW MEDS BY RX/DR IN RCRD: CPT | Mod: CPTII,S$GLB,, | Performed by: INTERNAL MEDICINE

## 2024-03-28 PROCEDURE — 3075F SYST BP GE 130 - 139MM HG: CPT | Mod: CPTII,S$GLB,, | Performed by: INTERNAL MEDICINE

## 2024-03-28 PROCEDURE — 3044F HG A1C LEVEL LT 7.0%: CPT | Mod: CPTII,S$GLB,, | Performed by: INTERNAL MEDICINE

## 2024-03-28 PROCEDURE — 3008F BODY MASS INDEX DOCD: CPT | Mod: CPTII,S$GLB,, | Performed by: INTERNAL MEDICINE

## 2024-03-28 PROCEDURE — 99999 PR PBB SHADOW E&M-EST. PATIENT-LVL IV: CPT | Mod: PBBFAC,,, | Performed by: INTERNAL MEDICINE

## 2024-03-28 NOTE — PROGRESS NOTES
Subjective:      Patient ID: Juan C Rodriguez is a 58 y.o. male.    Chief Complaint:  Abnormal CT chest    Pulmonary Fibrosis        58-year-old male morbidly obese with hypertension, hyperlipidemia, diabetes as well as rheumatoid arthritis on methotrexate and Enbrel, well controlled.  Recently had a CT chest abdomen and pelvis done for unclear reasons.  The chest the chest CT portion showed a calcified granuloma and some dependent atelectasis he is here today for that reason.  He has no pulmonary complaints.    Past Medical History:   Diagnosis Date    Diabetes mellitus, type 2 diagnosed 2013    DM (diabetes mellitus)     2011  02/25/2014    HTN (hypertension)     Hypogonadism, testicular     Left tibialis posterior tendinitis 11/30/2016    Obesities, morbid      Past Surgical History:   Procedure Laterality Date    COLONOSCOPY N/A 6/29/2018    Procedure: COLONOSCOPY;  Surgeon: Jeremiah Anaya III, MD;  Location: [facility];  Service: Endoscopy;  Laterality: N/A;     Social History     Tobacco Use    Smoking status: Some Days     Types: Cigars    Smokeless tobacco: Never   Substance Use Topics    Alcohol use: Yes     Alcohol/week: 3.0 - 4.0 standard drinks of alcohol     Types: 3 - 4 Cans of beer per week     Comment: occasional weekends    Drug use: No     Family History   Problem Relation Age of Onset    Cataracts Mother     Diabetes Mother     Hypertension Mother     Diabetes Sister        Review of Systems as per HPI otherwise negative    Objective:     Physical Exam   Constitutional: He is oriented to person, place, and time. He appears well-developed. No distress. He is obese.   Cardiovascular: Normal rate and regular rhythm.   Pulmonary/Chest: Normal expansion, symmetric chest wall expansion, effort normal and breath sounds normal.   Musculoskeletal:      Cervical back: Neck supple.   Neurological: He is alert and oriented to person, place, and time.   Psychiatric: He has a normal mood  "and affect.   Nursing note and vitals reviewed.          3/28/2024    10:00 AM 3/19/2024     8:11 AM 3/12/2024     9:12 AM 2/15/2024     8:27 AM 2/12/2024    10:26 AM 2/8/2024    10:42 AM 2/1/2024    10:28 AM   Pulmonary Function Tests   SpO2 98 % 98 %  97 %  97 %    Height 5' 3" (1.6 m) 5' 2" (1.575 m) 5' 2" (1.575 m) 5' 2" (1.575 m) 5' 2" (1.575 m) 5' 2" (1.575 m) 5' 3" (1.6 m)   Weight 133.4 kg (294 lb 1.5 oz) 134.3 kg (296 lb 1.2 oz) 133.4 kg (294 lb 1.5 oz) 140.9 kg (310 lb 10.1 oz) 139 kg (306 lb 7 oz) 140.8 kg (310 lb 6.5 oz) 137.8 kg (303 lb 12.7 oz)   BMI (Calculated) 52.1 54.1 53.8 56.8 56 56.8 53.8        Assessment:     1. Abnormal CT of the chest      I personally reviewed CT chest abdomen and pelvis images.  I agree with the radiologist's interpretation as below    CLINICAL HISTORY:  Elevated inflammatory markers     TECHNIQUE:  Axial CT images were obtained through the chest,  abdomen and pelvis without IV and with oral contrast .  Coronal and sagittal reconstructions submitted and interpreted.  Total DLP 3411.  Automated exposure control utilized.     COMPARISON:  None.     CT CHEST:  No focal consolidation, pneumothorax or pleural effusion.  No suspicious lung nodule or mass.  A calcified granuloma is in the right lower lobe.  Mild left basilar atelectasis is present.     Heart is enlarged.  Thoracic aorta and coronary arteries are partially calcified.  Central airways are clear.     There are partially calcified left hilar and mediastinal lymph nodes.     No acute or suspicious osseous findings in the chest.     CT ABDOMEN AND PELVIS:     Calcified granulomas are in the liver and spleen.  Liver is enlarged with a decreased attenuation.  Kidneys, adrenal glands, pancreas and gallbladder are normal.     Stomach, small bowel and appendix are normal.  No free fluid or pneumoperitoneum.     No enlarged lymph nodes in the abdomen or pelvis.  Abdominal aorta is normal in caliber.     Urinary bladder is " normal.  Prostate is normal size.     No acute or suspicious osseous findings in the abdomen or pelvis.     Impression:     1. No acute findings in the abdomen or pelvis.  2. Hepatic enlargement with steatosis.  3. Old granulomatous disease.      Plan:     CT findings are consistent with atelectasis and hypoinflation due to body habitus as well as a calcified granuloma which is benign  No evidence for lung involvement of rheumatoid arthritis  No need for additional chest imaging  Return to clinic p.r.n.

## 2024-03-28 NOTE — LETTER
March 28, 2024      [facility]  [address]  [place] [address]  Phone: [phone]  Fax: [phone]       Patient: Juan C Rodriguez   YOB: 1965  Date of Visit: 03/28/2024    To Whom It May Concern:    Pamela Rdoriguez  was at [facility] on 03/28/2024. The patient may return to work/school on 03/28/2024 with no restrictions. If you have any questions or concerns, or if I can be of further assistance, please do not hesitate to contact me.    Sincerely,    Ny Crawford LPN

## 2024-04-04 ENCOUNTER — TELEPHONE (OUTPATIENT)
Dept: FAMILY MEDICINE | Facility: CLINIC | Age: 59
End: 2024-04-04
Payer: COMMERCIAL

## 2024-04-04 DIAGNOSIS — E11.65 TYPE 2 DIABETES MELLITUS WITH HYPERGLYCEMIA, WITHOUT LONG-TERM CURRENT USE OF INSULIN: Primary | ICD-10-CM

## 2024-04-04 RX ORDER — TIRZEPATIDE 5 MG/.5ML
5 INJECTION, SOLUTION SUBCUTANEOUS
Qty: 4 PEN | Refills: 4 | Status: SHIPPED | OUTPATIENT
Start: 2024-04-04

## 2024-04-04 NOTE — TELEPHONE ENCOUNTER
----- Message from Craig Sher sent at 4/4/2024  9:17 AM CDT -----  Contact: Juan C  Juan C is needing a call back in regards to upping the dosage of his monjauro. Please give him a call back at [phone]

## 2024-05-15 DIAGNOSIS — E11.9 TYPE 2 DIABETES MELLITUS WITHOUT COMPLICATION: ICD-10-CM

## 2024-05-17 ENCOUNTER — OFFICE VISIT (OUTPATIENT)
Dept: PRIMARY CARE CLINIC | Facility: CLINIC | Age: 59
End: 2024-05-17
Payer: COMMERCIAL

## 2024-05-17 ENCOUNTER — TELEPHONE (OUTPATIENT)
Dept: PRIMARY CARE CLINIC | Facility: CLINIC | Age: 59
End: 2024-05-17

## 2024-05-17 VITALS
SYSTOLIC BLOOD PRESSURE: 132 MMHG | TEMPERATURE: 98 F | DIASTOLIC BLOOD PRESSURE: 74 MMHG | BODY MASS INDEX: 51.61 KG/M2 | HEART RATE: 77 BPM | WEIGHT: 291.25 LBS | OXYGEN SATURATION: 99 % | HEIGHT: 63 IN

## 2024-05-17 DIAGNOSIS — E78.2 MIXED HYPERLIPIDEMIA: Chronic | ICD-10-CM

## 2024-05-17 DIAGNOSIS — E11.65 TYPE 2 DIABETES MELLITUS WITH HYPERGLYCEMIA, WITHOUT LONG-TERM CURRENT USE OF INSULIN: Chronic | ICD-10-CM

## 2024-05-17 DIAGNOSIS — R93.89 ABNORMAL CT OF THE CHEST: ICD-10-CM

## 2024-05-17 DIAGNOSIS — E66.01 CLASS 3 SEVERE OBESITY DUE TO EXCESS CALORIES WITH SERIOUS COMORBIDITY AND BODY MASS INDEX (BMI) OF 50.0 TO 59.9 IN ADULT: ICD-10-CM

## 2024-05-17 DIAGNOSIS — K76.0 FATTY LIVER: Chronic | ICD-10-CM

## 2024-05-17 DIAGNOSIS — I10 ESSENTIAL HYPERTENSION: Chronic | ICD-10-CM

## 2024-05-17 DIAGNOSIS — M06.09 RHEUMATOID ARTHRITIS OF MULTIPLE SITES WITH NEGATIVE RHEUMATOID FACTOR: Chronic | ICD-10-CM

## 2024-05-17 PROCEDURE — 99214 OFFICE O/P EST MOD 30 MIN: CPT | Mod: S$GLB,,, | Performed by: FAMILY MEDICINE

## 2024-05-17 PROCEDURE — 1160F RVW MEDS BY RX/DR IN RCRD: CPT | Mod: CPTII,S$GLB,, | Performed by: FAMILY MEDICINE

## 2024-05-17 PROCEDURE — 3044F HG A1C LEVEL LT 7.0%: CPT | Mod: CPTII,S$GLB,, | Performed by: FAMILY MEDICINE

## 2024-05-17 PROCEDURE — 3075F SYST BP GE 130 - 139MM HG: CPT | Mod: CPTII,S$GLB,, | Performed by: FAMILY MEDICINE

## 2024-05-17 PROCEDURE — 3078F DIAST BP <80 MM HG: CPT | Mod: CPTII,S$GLB,, | Performed by: FAMILY MEDICINE

## 2024-05-17 PROCEDURE — 1159F MED LIST DOCD IN RCRD: CPT | Mod: CPTII,S$GLB,, | Performed by: FAMILY MEDICINE

## 2024-05-17 PROCEDURE — 3008F BODY MASS INDEX DOCD: CPT | Mod: CPTII,S$GLB,, | Performed by: FAMILY MEDICINE

## 2024-05-17 PROCEDURE — 99999 PR PBB SHADOW E&M-EST. PATIENT-LVL IV: CPT | Mod: PBBFAC,,, | Performed by: FAMILY MEDICINE

## 2024-05-17 NOTE — PATIENT INSTRUCTIONS
"        2024© UpToDate, Inc. and its affiliates and/or licensors. All Rights Reserved.  Access reQwip for additional drug information, tools, and databases.  Contributor Disclosures  For additional information see "Tirzepatide: Drug information"    You must carefully read the "Consumer Information Use and Disclaimer" below in order to understand and correctly use this information.  Brand Names: US  Mounjaro;     Zepbound    Brand Names: Maribel  Mounjaro    Warning  This drug has been shown to cause thyroid cancer in some animals. It is not known if this happens in humans. If thyroid cancer happens, it may be deadly if not found and treated early. Call your doctor right away if you have a neck mass, trouble breathing, trouble swallowing, or have hoarseness that will not go away.  Do not use this drug if you have a health problem called Multiple Endocrine Neoplasia syndrome type 2 (MEN 2), or if you or a family member have had thyroid cancer.  Have your blood work checked and thyroid ultrasounds as you have been told by your doctor.    What is this drug used for?  It is used to lower blood sugar in people with type 2 diabetes.  It is used to help with weight loss in certain people.    What do I need to tell my doctor BEFORE I take this drug?  All products:  If you are allergic to this drug; any part of this drug; or any other drugs, foods, or substances. Tell your doctor about the allergy and what signs you had.  If you have ever had pancreatitis.  If you have stomach or bowel problems.  If you are using another drug that has the same drug in it.  If you are using another drug like this one. If you are not sure, ask your doctor or pharmacist.  If you are using this drug for diabetes:  If you have type 1 diabetes. Do not use this drug to treat type 1 diabetes.  Zepbound:  If you have or have ever had depression or thoughts of suicide.  This is not a list of all drugs or health problems that interact with " this drug.  Tell your doctor and pharmacist about all of your drugs (prescription or OTC, natural products, vitamins) and health problems. You must check to make sure that it is safe for you to take this drug with all of your drugs and health problems. Do not start, stop, or change the dose of any drug without checking with your doctor.    What are some things I need to know or do while I take this drug?  All products:  Tell all of your health care providers that you take this drug. This includes your doctors, nurses, pharmacists, and dentists.  Follow the diet and workout plan that your doctor told you about.  Talk with your doctor before you drink alcohol.  Birth control pills may not work as well to prevent pregnancy. If you take birth control pills, you may need to switch to another type of hormone-based birth control like a vaginal ring if your doctor tells you to. If another type of hormone-based birth control is not an option, use some other kind of birth control also, like a condom. Do this for 4 weeks after starting this drug and for 4 weeks each time the dose is raised.  This drug may prevent other drugs taken by mouth from getting into the body. If you take other drugs by mouth, you may need to take them at some other time than this drug. Talk with your doctor.  Do not share with another person even if the needle has been changed. Sharing your tray or pen may pass infections from one person to another. This includes infections you may not know you have.  If you cannot drink liquids by mouth or if you have upset stomach, throwing up, or diarrhea that does not go away, you need to avoid getting dehydrated. Contact your doctor to find out what to do. Dehydration may lead to low blood pressure or to new or worsening kidney problems.  A severe and sometimes deadly pancreas problem (pancreatitis) has happened with other drugs like this one.  If you are using this drug for diabetes:  It may be harder to control  blood sugar during times of stress such as fever, infection, injury, or surgery. A change in physical activity, exercise, or diet may also affect blood sugar.  Check your blood sugar as you have been told by your doctor.  Do not drive if your blood sugar has been low. There is a greater chance of you having a crash.  Wear disease medical alert ID (identification).  Tell your doctor if you are pregnant, plan on getting pregnant, or are breast-feeding. You will need to talk about the benefits and risks to you and the baby.  Zepbound:  If you have high blood sugar (diabetes), you will need to watch your blood sugar closely.  Weight loss during pregnancy may cause harm to the unborn baby. If you get pregnant while taking this drug or if you want to get pregnant, call your doctor right away.  Tell your doctor if you are breast-feeding. You will need to talk about any risks to your baby.    What are some side effects that I need to call my doctor about right away?  WARNING/CAUTION: Even though it may be rare, some people may have very bad and sometimes deadly side effects when taking a drug. Tell your doctor or get medical help right away if you have any of the following signs or symptoms that may be related to a very bad side effect:  All products:  Signs of an allergic reaction, like rash; hives; itching; red, swollen, blistered, or peeling skin with or without fever; wheezing; tightness in the chest or throat; trouble breathing, swallowing, or talking; unusual hoarseness; or swelling of the mouth, face, lips, tongue, or throat.  Signs of kidney problems like unable to pass urine, change in how much urine is passed, blood in the urine, or a big weight gain.  Signs of gallbladder problems like pain in the upper right belly area, right shoulder area, or between the shoulder blades; yellow skin or eyes; fever with chills; bloating; or very upset stomach or throwing up.  Signs of a pancreas problem (pancreatitis) like very  bad stomach pain, very bad back pain, or very bad upset stomach or throwing up.  Dizziness or passing out.  A fast heartbeat.  Change in eyesight.  Low blood sugar can happen. The chance may be raised when this drug is used with other drugs for diabetes. Signs may be dizziness, headache, feeling sleepy or weak, shaking, fast heartbeat, confusion, hunger, or sweating. Call your doctor right away if you have any of these signs. Follow what you have been told to do for low blood sugar. This may include taking glucose tablets, liquid glucose, or some fruit juices.  Zepbound:  New or worse behavior or mood changes like depression or thoughts of suicide.    What are some other side effects of this drug?  All drugs may cause side effects. However, many people have no side effects or only have minor side effects. Call your doctor or get medical help if any of these side effects or any other side effects bother you or do not go away:  All products:  Constipation, diarrhea, stomach pain, upset stomach, throwing up, or decreased appetite.  Heartburn.  Pain, itching, or other irritation where the injection was given.  Zepbound:  Feeling tired or weak.  Hair loss.  These are not all of the side effects that may occur. If you have questions about side effects, call your doctor. Call your doctor for medical advice about side effects.  You may report side effects to your national health agency.    How is this drug best taken?  Use this drug as ordered by your doctor. Read all information given to you. Follow all instructions closely.  All products:  It is given as a shot into the fatty part of the skin on the top of the thigh, belly area, or upper arm.  If you will be giving yourself the shot, your doctor or nurse will teach you how to give the shot.  Keep taking this drug as you have been told by your doctor or other health care provider, even if you feel well.  Take the same day each week.  Move site where you give the shot each  time.  Take with or without food.  Wash your hands before and after use.  Do not use if the solution is leaking or has particles.  This drug is colorless to a faint yellow. Do not use if the solution changes color.  Do not move this drug from the pen to a syringe.  Each pen or vial is for 1 use only. Throw away any part of the used pen after the dose is given.  Throw away needles in a needle/sharp disposal box. Do not reuse needles or other items. When the box is full, follow all local rules for getting rid of it. Talk with a doctor or pharmacist if you have any questions.  Vials:  It is important to have the right syringe to measure your dose. If you do not have the right syringe or you are not sure, talk with your pharmacist.  If you are using this drug for diabetes:  If you are also using insulin, you may inject this drug and the insulin in the same area of the body but not right next to each other.  Do not mix this drug in the same syringe with insulin.    What do I do if I miss a dose?  If it is within 4 days after the missed dose, take the missed dose and go back to your normal day.  If it has been more than 4 days since the missed dose, skip the missed dose and go back to your normal day.  Do not take 2 doses at the same time or extra doses.    How do I store and/or throw out this drug?  Store in a refrigerator. Do not freeze.  Do not use if it has been frozen.  If needed, each pen or vial may be stored at room temperature for up to 21 days. If you store at room temperature, throw away any part not used after 21 days.  Protect from heat.  Store in the original container to protect from light.  Keep all drugs in a safe place. Keep all drugs out of the reach of children and pets.  Throw away unused or  drugs. Do not flush down a toilet or pour down a drain unless you are told to do so. Check with your pharmacist if you have questions about the best way to throw out drugs. There may be drug take-back  programs in your area.    General drug facts  If your symptoms or health problems do not get better or if they become worse, call your doctor.  Do not share your drugs with others and do not take anyone else's drugs.  Some drugs may have another patient information leaflet. If you have any questions about this drug, please talk with your doctor, nurse, pharmacist, or other health care provider.  If you think there has been an overdose, call your poison control center or get medical care right away. Be ready to tell or show what was taken, how much, and when it happened.    Last Reviewed Date  2024-04-17  Consumer Information Use and Disclaimer  This generalized information is a limited summary of diagnosis, treatment, and/or medication information. It is not meant to be comprehensive and should be used as a tool to help the user understand and/or assess potential diagnostic and treatment options. It does NOT include all information about conditions, treatments, medications, side effects, or risks that may apply to a specific patient. It is not intended to be medical advice or a substitute for the medical advice, diagnosis, or treatment of a health care provider based on the health care provider's examination and assessment of a patient's specific and unique circumstances. Patients must speak with a health care provider for complete information about their health, medical questions, and treatment options, including any risks or benefits regarding use of medications. This information does not endorse any treatments or medications as safe, effective, or approved for treating a specific patient. UpToDate, Inc. and its affiliates disclaim any warranty or liability relating to this information or the use thereof. The use of this information is governed by the Terms of Use, available at https://www.wolterskluwer.com/en/know/clinical-effectiveness-terms.    © 2024 UpToDate, Inc. and its affiliates and/or licensors. All  rights reserved.  Use of UpToDate is subject to the Terms of Use.  Topic 486738 Version 21.0

## 2024-05-17 NOTE — TELEPHONE ENCOUNTER
----- Message from Reza Michelle MD sent at 5/17/2024 11:00 AM CDT -----  Thanks-----------  ----- Message -----  From: Joceline Woodward MD  Sent: 5/17/2024   9:54 AM CDT  To: MD Dr. Viviana Austin,    Mr. Rodriguez is doing well with the mounjaro thus far. He is - 19/20 lb and has greatly reduced sugar sweetened beverages and breads. He will follow up with you next month, hopefully see our dietitian in July and the new provider here in August. (I am leaving at the end of next month).     Thank you for all you do---I reviewed with him risks/benefits/se's of the medication again and he will reach out if he has any interval issues.     Lupe Woodward

## 2024-05-17 NOTE — PROGRESS NOTES
Subjective     Patient ID: Juan C Rodriguez is a 58 y.o. male.  LOV 2/24    Chief Complaint: follow up  Pt has lost 19-20 lb since saw me in 2/24.   He feels well and is positive with this    Pt was able to speak with Dr Michelle and is on mounjaro. Has been helpful to him. He has cut out most sugar sweetened beverages.   He feels like is getting enough protein/has protein drinks.     Had pulm f/u appt.     Hx of RA/follow up    Has had diabetic eye exam. No hypoglycemia.     CD stable.    Pt is compliant with bp and chol med.     Pt declines any further karlie testing now.     Previous R buttocks abscess pt states is improving; has seen wound care. Denies any fcnv. States no drainage. He has finished previous abx therapy.     Pt reports doing well overall. Biggest change is less starch and soft drinks added more salads for fiber    No throat pain, hoarseness, palpable neck mass.   Denies compressive symptoms  Pt reports no personal hx of pancreatitis  Pt still has gallbladder; no known hx of stones  And no personal/fam hx of MEN2, medullary thyroid cancer  Reviewed with pt this medication is < 5 years old/on market.  Nausea: none  Abd pain: none   Constipation: mild; relieved with otc meds--has improved from initial denies hemorrhoids or fissues  Gastroparesis: none  GERD: none   Energy: good  Lifestyle changes: as above  Mental Health: pt states good; no acute changes       HPI       Objective     PAST MEDICAL HISTORY:  Past Medical History:   Diagnosis Date    Diabetes mellitus, type 2 diagnosed 2013    DM (diabetes mellitus)     2011  02/25/2014    HTN (hypertension)     Hypogonadism, testicular     Left tibialis posterior tendinitis 11/30/2016    Obesities, morbid          PAST SURGICAL HISTORY:  Past Surgical History:   Procedure Laterality Date    COLONOSCOPY N/A 6/29/2018    Procedure: COLONOSCOPY;  Surgeon: Jeremiah Anaya III, MD;  Location: [facility];  Service: Endoscopy;  Laterality:  N/A;       FAMILY HISTORY:  Family History   Problem Relation Name Age of Onset    Cataracts Mother      Diabetes Mother      Hypertension Mother      Diabetes Sister            SOCIAL HISTORY:  Social History     Social History Narrative    Not on file       MEDICATIONS:  Medications have been reviewed.    ALLERGIES:  Allergies have been reviewed.    Vitals:    05/17/24 0910   BP: 132/74   Pulse: 77   Temp: 97.6 °F (36.4 °C)     Wt Readings from Last 10 Encounters:   05/17/24 132.1 kg (291 lb 3.6 oz)   03/28/24 133.4 kg (294 lb 1.5 oz)   03/19/24 134.3 kg (296 lb 1.2 oz)   03/12/24 133.4 kg (294 lb 1.5 oz)   02/15/24 (!) 140.9 kg (310 lb 10.1 oz)   02/12/24 (!) 139 kg (306 lb 7 oz)   02/08/24 (!) 140.8 kg (310 lb 6.5 oz)   02/01/24 (!) 137.8 kg (303 lb 12.7 oz)   02/01/24 (!) 139.3 kg (307 lb 1.6 oz)   11/24/23 (!) 142.5 kg (314 lb 2.5 oz)       Lab Results   Component Value Date    WBC 6.20 03/12/2024    HGB 12.3 (L) 03/12/2024    HCT 38.1 (L) 03/12/2024     03/12/2024    CHOL 189 04/24/2023    TRIG 65 04/24/2023    HDL 70 04/24/2023    ALT 22 03/12/2024    AST 21 03/12/2024     03/12/2024    K 3.7 03/12/2024     03/12/2024    CREATININE 1.4 03/12/2024    BUN 27 (H) 03/12/2024    CO2 28 03/12/2024    TSH 1.819 08/24/2023    PSA 0.13 03/19/2024    HGBA1C 5.6 02/01/2024       Review of Systems   Constitutional:  Negative for activity change, appetite change, fatigue and unexpected weight change.   HENT:  Negative for mouth dryness.    Eyes:  Negative for visual disturbance.   Respiratory:  Negative for apnea, chest tightness and shortness of breath.    Cardiovascular:  Negative for chest pain.   Gastrointestinal:  Negative for abdominal pain, constipation, diarrhea, nausea and reflux.   Musculoskeletal:  Negative for arthralgias and myalgias.   Integumentary:  Negative for rash.   Allergic/Immunologic: Negative for food allergies.   Neurological:  Negative for dizziness, tremors, seizures,  syncope, weakness, headaches and memory loss.   Psychiatric/Behavioral:  Negative for behavioral problems, self-injury, sleep disturbance and suicidal ideas. The patient is not nervous/anxious.        Physical Exam  Vitals and nursing note reviewed.   Constitutional:       General: He is not in acute distress.  HENT:      Head: Normocephalic and atraumatic.      Mouth/Throat:      Pharynx: Oropharynx is clear.   Eyes:      General: No scleral icterus.     Pupils: Pupils are equal, round, and reactive to light.   Neck:      Comments: No TM  Cardiovascular:      Rate and Rhythm: Normal rate and regular rhythm.      Pulses: Normal pulses.      Heart sounds: Normal heart sounds. No murmur heard.     No friction rub. No gallop.   Pulmonary:      Effort: Pulmonary effort is normal. No respiratory distress.      Breath sounds: Normal breath sounds. No wheezing, rhonchi or rales.   Abdominal:      General: Bowel sounds are normal. There is no distension.      Palpations: Abdomen is soft.      Tenderness: There is no abdominal tenderness.   Musculoskeletal:         General: No swelling.      Cervical back: Normal range of motion and neck supple. No tenderness.   Lymphadenopathy:      Cervical: No cervical adenopathy.   Skin:     General: Skin is warm.      Findings: No erythema or rash.   Neurological:      Mental Status: He is alert and oriented to person, place, and time.   Psychiatric:         Mood and Affect: Mood normal.         Behavior: Behavior normal.              Assessment and Plan     1. Essential hypertension    2. Mixed hyperlipidemia    3. Rheumatoid arthritis of multiple sites with negative rheumatoid factor    4. Class 3 severe obesity due to excess calories with serious comorbidity and body mass index (BMI) of 50.0 to 59.9 in adult    5. Type 2 diabetes mellitus with hyperglycemia, without long-term current use of insulin    6. Fatty liver    7. Abnormal CT of the chest        Essential  hypertension  Comments:  stable    Mixed hyperlipidemia  Comments:  stable    Rheumatoid arthritis of multiple sites with negative rheumatoid factor  Comments:  keep f/u Dr Snyder    Class 3 severe obesity due to excess calories with serious comorbidity and body mass index (BMI) of 50.0 to 59.9 in adult  Comments:  -19/20 lb since 2/24  inbody  reviewed with pt proper nutrition, resistance training as mobility improves-has barbells/streches    Type 2 diabetes mellitus with hyperglycemia, without long-term current use of insulin  Comments:  pt on mounjaro; reviewed with pt risks/benefits/se's  will get through pcp  Pt asked to dw Dr Michelle--could consider  holding metfomrin (hx of CKD but gfr good enough does not exclude use if needed)  Thus far no supply chain issues/availability  On 5 mg--pt giving successfully to self every Sat    Fatty liver  Comments:  keep appt this fall with Dr Murguia    Abnormal CT of the chest  Comments:  appt per pulm/follow recs         Keep primary care appt  Keep nutrition appt  Started glp1/doing well    Pt made aware I will be leaving [facility] in June. She has established pcp so have advised her f/u with them this summer.   Also reviewed potential external community options.     Follow up if symptoms worsen or fail to improve.

## 2024-07-01 DIAGNOSIS — M06.09 RHEUMATOID ARTHRITIS OF MULTIPLE SITES WITH NEGATIVE RHEUMATOID FACTOR: ICD-10-CM

## 2024-07-01 RX ORDER — ETANERCEPT 50 MG/ML
50 SOLUTION SUBCUTANEOUS WEEKLY
Qty: 4 ML | Refills: 11 | Status: ACTIVE | OUTPATIENT
Start: 2024-07-01 | End: 2025-07-01

## 2024-07-30 NOTE — PROGRESS NOTES
"Nutrition Assessment    Visit Type: Insurance initial  Session Time:  1 Hour 30 Minutes  Reason for MNT visit: Pt in for education and nutrition counseling regarding T2DM, HLP, HTN, and Obesity.     Age: 58 y.o.  Wt:   Wt Readings from Last 10 Encounters:   05/17/24 132.1 kg (291 lb 3.6 oz)   03/28/24 133.4 kg (294 lb 1.5 oz)   03/19/24 134.3 kg (296 lb 1.2 oz)   03/12/24 133.4 kg (294 lb 1.5 oz)   02/15/24 (!) 140.9 kg (310 lb 10.1 oz)   02/12/24 (!) 139 kg (306 lb 7 oz)   02/08/24 (!) 140.8 kg (310 lb 6.5 oz)   02/01/24 (!) 137.8 kg (303 lb 12.7 oz)   02/01/24 (!) 139.3 kg (307 lb 1.6 oz)   11/24/23 (!) 142.5 kg (314 lb 2.5 oz)     Ht:   Ht Readings from Last 1 Encounters:   05/17/24 5' 3" (1.6 m)     BMI:   BMI Readings from Last 5 Encounters:   05/17/24 51.59 kg/m²   03/28/24 52.10 kg/m²   03/19/24 54.15 kg/m²   03/12/24 53.79 kg/m²   02/15/24 56.81 kg/m²     Client states:  Is on Mounjaro. Goal is to lose at least 20 more pounds. Does longterm work so is pretty active during the day. Used to do weights at home but work has been more intense in the summer. Is hopeful to start doing weights again once school starts back on August 1st. Has given up fast food and soft drinks. Does miss breads. Still occasionally has high fat/carb meals. Does not drink adequate water.   Discussed:  Getting at least 30 grams of protein at each meal  Eating protein at each meal and snack  Getting at least 30 grams of fiber per day  Adequate hydration  Importance of strength training  Limiting fast, fried, high fat & high carb meals    Medical History  Problem List             Resolved    Gout         Mixed hyperlipidemia         Hypogonadism male         Essential hypertension         Hyperparathyroidism         Alpha thalassemia silent carrier         Left ventricular diastolic dysfunction with preserved systolic function         Osteomalacia         Class 3 severe obesity due to excess calories with serious comorbidity and body " "mass index (BMI) of 50.0 to 59.9 in adult         Primary central sleep apnea         Encounter for long-term (current) use of high-risk medication         Anemia         Diabetes mellitus, type 2         Immunocompromised         Rheumatoid arthritis of multiple sites with negative rheumatoid factor         Fatty liver         Abnormal CT of the chest        Labs   Reviewed and noted  Medications    Prior to Admission medications    Medication Sig Start Date End Date Taking? Authorizing Provider   allopurinoL (ZYLOPRIM) 300 MG tablet Take 1 tablet (300 mg total) by mouth once daily. 3/12/24   Krysten Cruz PA-C   amLODIPine (NORVASC) 10 MG tablet Take 1 tablet by mouth once daily 12/14/23   Reza Michelle MD   atorvastatin (LIPITOR) 20 MG tablet Take 1 tablet (20 mg total) by mouth once daily. 3/19/24   Reza Michelle MD   ergocalciferol (ERGOCALCIFEROL) 50,000 unit Cap Take 1 capsule (50,000 Units total) by mouth every 7 days. 3/19/24   Reza Michelle MD   etanercept (ENBREL SURECLICK) 50 mg/mL (1 mL) Inject 1 mL (50 mg total) into the skin once a week. 7/1/24 7/1/25  Herman Snyder MD   folic acid (FOLVITE) 1 MG tablet Take 1 tablet (1 mg total) by mouth once daily. 3/12/24   Krysten Cruz PA-C   HYDROcodone-acetaminophen (NORCO) 5-325 mg per tablet Take 1 tablet by mouth every 6 hours as needed for pain 2/1/24   Jeremiah Dumont MD   metFORMIN (GLUCOPHAGE) 500 MG tablet Take 1 tablet (500 mg total) by mouth once daily. 11/6/23   Reza Michelle MD   methotrexate 2.5 MG Tab Take 6 tablets (15 mg total) by mouth every 7 days. 3/12/24   Krysten Cruz PA-C   mupirocin (BACTROBAN) 2 % ointment Apply topically 3 (three) times daily. 2/1/24   Reza Michelle MD   needle, disp, 22 G 22 gauge x 1 1/2" Ndle 1 Units by Misc.(Non-Drug; Combo Route) route every 21 days. 1/9/19   Carlos Mahoney IV, MD   olmesartan-hydrochlorothiazide " (BENICAR HCT) 20-12.5 mg per tablet Take 1 tablet by mouth once daily. 11/6/23 10/31/24  Reza Michelle MD   tirzepatide (MOUNJARO) 5 mg/0.5 mL PnIj Inject 5 mg into the skin every 7 days. 4/4/24   Reza Michelle MD   Vitamins, Minerals, and/or Supplements:  not discussed     LIFESTYLE FACTORS  Social History    Marital status:  Single  Social History     Tobacco Use    Smoking status: Some Days     Types: Cigars    Smokeless tobacco: Never   Substance Use Topics    Alcohol use: Yes     Alcohol/week: 3.0 - 4.0 standard drinks of alcohol     Types: 3 - 4 Cans of beer per week     Comment: occasional weekends     Sleep: good  Stress Level: moderate  Physical Activity: lightly active (low intensity, 1-3 days a week)  Types of activity: was working out 3x per week with home weights   Frequency of consumption of fried foods: weekly  Meal preparation/shopping: self // [employer], [place]   Food Allergies or Intolerances:  NKFAI       Beverages  Water: not adequate   Alcohol: budweiser mostly weekends (2 beers)   Coffee: 1 cup creamer + splenda or equal   Soda: none // maybe one every 2-3 weeks    Diet Recall  Breakfast: 645-7 am Erik denney turkey sausage delight sandwiches   AM Snack: none  Lunch: 1130 am red beans & rice // more like a snack honey roasted peanuts // sradines // grits   PM Snack 2 pm snickers or peanuts   Dinner: or 530 - 6 pm or 6-7 pm  pork loin + salad (lettuce, tracie, cuke, banana peppers, vinaigrette)  // steak // chicken (baked)       Diagnosis    Inadequate protein intake related to small portions of protein at meals and snacks as evidenced by diet recall.    Intervention    Estimated Energy Requirements:   BMR x 1.4 = 3120  Adj. BW = 87 kg  Calories: 2100  Protein: 1.5-2.0 g/kg 160 grams   Baseline for fluids: 1mL/kcal 71 ounces     Written Materials Provided  Simple Meal Planning, Healthy Plate, and Fueling Well on the Go Guide  RD contact information    Recommendations:  Aim to  get at least 4 ounces of cooked lean meat or 30 grams of protein at each meal  Add in a source of fiber with each meal  Prioritize protein at each meal AND snack  Goal is at least 5-6 bottles of water per day  Aim to strength train / weights at least 3-4 times per week  If wanting carbohydrates at dinner aim to have double the amount of non-starchy veggies that you do carbohydrates     Monitoring/Evaluation    Monitor the following:  Weight  Sleep  Movement    Communicated with healthcare provider and documented plan for referral to appropriate agency/healthcare provider as needed    Supervising Physician: Herman Mora MD    Patient motivation, anticipated barriers, expected compliance: Patient is motivated and has verbalized understanding and intent to comply.     Comprehension: good     Follow-up: PRN

## 2024-07-31 ENCOUNTER — NUTRITION (OUTPATIENT)
Dept: PRIMARY CARE CLINIC | Facility: CLINIC | Age: 59
End: 2024-07-31
Payer: COMMERCIAL

## 2024-07-31 DIAGNOSIS — E66.01 CLASS 3 SEVERE OBESITY DUE TO EXCESS CALORIES WITH SERIOUS COMORBIDITY AND BODY MASS INDEX (BMI) OF 50.0 TO 59.9 IN ADULT: ICD-10-CM

## 2024-07-31 DIAGNOSIS — E11.65 TYPE 2 DIABETES MELLITUS WITH HYPERGLYCEMIA, WITHOUT LONG-TERM CURRENT USE OF INSULIN: ICD-10-CM

## 2024-07-31 PROCEDURE — 99999 PR PBB SHADOW E&M-EST. PATIENT-LVL II: CPT | Mod: PBBFAC,,, | Performed by: DIETITIAN, REGISTERED

## 2024-07-31 PROCEDURE — 97802 MEDICAL NUTRITION INDIV IN: CPT | Mod: S$GLB,,, | Performed by: DIETITIAN, REGISTERED

## 2024-07-31 NOTE — PATIENT INSTRUCTIONS
Recommendations:  Aim to get at least 4 ounces of cooked lean meat or 30 grams of protein at each meal  Add in a source of fiber with each meal  Prioritize protein at each meal AND snack  Goal is at least 5-6 bottles of water per day  Aim to strength train / weights at least 3-4 times per week  If wanting carbohydrates at dinner aim to have double the amount of non-starchy veggies that you do carbohydrates     High Fiber Foods  Raspberries, 1 cup, 8 grams fiber  Pear, 1 medium, 5.5 grams fiber  Apple with skin, 1 medium, 4.5  grams   Green peas, 1 cup, 9 grams   Broccoli, 1 cup chopped, 5 grams   Kenedy sprouts, 1 cup, 4 grams   Whole wheat pasta, 1 cup 6 grams   Quinoa cooked, 1 cup 5 grams   Popcorn, plain 3 cups, 3.5 grams   Lentils, 1 cup. 15.5 grams   Kenneth seeds, 1 tablespoons ,3 grams   Almonds, 1 ounce, 3.5 grams   Black beans, 1/2 cup, 7.5 grams   Joanne Falco Delightful bread, 2 slices, 5 grams   Carb balance tortillas, 1 soft taco sized, 15 grams     30 grams of protein cheat sheet:  3.5 ounces cooked chicken breast // 172 calories  4 ounces ground turkey breast // 120 calories  5 ounces [place] strip steak // 325 calories  5 ounces shrimp // 170 calories  6 ounces cod // 145 calories  5 ounces tuna fish // 165 calories  5 ounces salmon // 300 calories  8 ounces extra firm tofu // 250 calories  1.5 scoops protein powder // 160 calories  1.5 cups edamame // 280 calories  2 cups black beans // 485 calories  5 eggs // 390 calories  1.25 cups egg whites or egg beaters // 155 calories    Estimated Needs:  Calories: 2100  Protein: 150 grams   Fiber: 30-40 grams         K.I.S.S. (Keep It Simple Silly)  Pick 2-3 non-starchy vegetables  Rotate during the week having them for lunches and dinners.  Pick 1-2 different types of meats  Use different sauces   BBQ  Fertile  Teriyaki  Pesto  Salsa  Seasonings  Slap Ya Mama  Lemon Pepper  Everything but the Bagel  Fabrice's Chipotle Cherry  Pick 2 different starches to have during  the week  For example:  Potatoes  sweet potatoes  Corn  butternut squash  Banza chickpea pasta  brown rice  Keep breakfast simple, have two options and stick with those two options the whole week  For example, making Overnight Oats with a serving of fruit for 3 breakfasts and eat 2 Healthy Egg Muffin Cups with a slice of whole grain toast and 1 serving fruit for the other 4 breakfasts    For lunch/dinner you could do something simple like the following: This would allow you to not waste a lot of ingredients but have a wide variety of flavors that you could mix match all week.  Chicken thighs 3 ways:   BBQ Sauce  Lemon Pepper Seasoning  Ranch Seasoning  Potatoes 2 ways:   cubed and roasted  mashed with low-fat milk, Greek yogurt (instead of butter or sour cream) and low-fat cheese  Brown rice - made in chicken stock, low sodium (gives it extra flavor)  Frozen Broccoli with ranch seasoning, microwaved  Frozen broccoli roasted, sprinkled with lemon juice and 1 tbsp parmesan  Green beans with sliced mushrooms  Green beans sauteed with garlic  15-Minute Meals  Two slices whole wheat bread + frozen turkey burger + microwave broccoli  Chickpea pasta + jar tomato sauce + chicken sausage + side salad kit  Microwave rice + rotisserie chicken + frozen veggie mix + teriyaki sauce + chopped peanuts  Angus grilled chicken rotisserie or blackened grilled chicken tenders + frozen garlic bread + baby carrots + hummus  Stir San Chicken - make with Banza pasta + add in double vegetables   Shrimp + BBQ sauce + canned corn + canned beans + microwave vegetables  Can tuna/salmon/chicken + 2 tablespoons Greek yogurt/avocado oil lara + 1 serving Nut Thins + apple + cheese stick

## 2024-08-07 DIAGNOSIS — E11.9 TYPE 2 DIABETES MELLITUS WITHOUT COMPLICATION: ICD-10-CM

## 2024-08-14 ENCOUNTER — OFFICE VISIT (OUTPATIENT)
Dept: FAMILY MEDICINE | Facility: CLINIC | Age: 59
End: 2024-08-14
Payer: COMMERCIAL

## 2024-08-14 ENCOUNTER — LAB VISIT (OUTPATIENT)
Dept: LAB | Facility: HOSPITAL | Age: 59
End: 2024-08-14
Attending: INTERNAL MEDICINE
Payer: COMMERCIAL

## 2024-08-14 VITALS
WEIGHT: 280.88 LBS | OXYGEN SATURATION: 96 % | SYSTOLIC BLOOD PRESSURE: 130 MMHG | DIASTOLIC BLOOD PRESSURE: 76 MMHG | HEART RATE: 76 BPM | TEMPERATURE: 99 F | BODY MASS INDEX: 49.77 KG/M2 | HEIGHT: 63 IN

## 2024-08-14 DIAGNOSIS — E78.2 MIXED HYPERLIPIDEMIA: ICD-10-CM

## 2024-08-14 DIAGNOSIS — M06.09 RHEUMATOID ARTHRITIS OF MULTIPLE SITES WITH NEGATIVE RHEUMATOID FACTOR: ICD-10-CM

## 2024-08-14 DIAGNOSIS — E11.65 TYPE 2 DIABETES MELLITUS WITH HYPERGLYCEMIA, WITHOUT LONG-TERM CURRENT USE OF INSULIN: ICD-10-CM

## 2024-08-14 DIAGNOSIS — I10 ESSENTIAL HYPERTENSION: ICD-10-CM

## 2024-08-14 DIAGNOSIS — D64.9 ANEMIA, UNSPECIFIED TYPE: ICD-10-CM

## 2024-08-14 DIAGNOSIS — D56.3 ALPHA THALASSEMIA SILENT CARRIER: Primary | ICD-10-CM

## 2024-08-14 DIAGNOSIS — E66.01 CLASS 3 SEVERE OBESITY DUE TO EXCESS CALORIES WITH SERIOUS COMORBIDITY AND BODY MASS INDEX (BMI) OF 50.0 TO 59.9 IN ADULT: ICD-10-CM

## 2024-08-14 DIAGNOSIS — K76.0 FATTY LIVER: ICD-10-CM

## 2024-08-14 DIAGNOSIS — E55.9 VITAMIN D DEFICIENCY: ICD-10-CM

## 2024-08-14 DIAGNOSIS — D84.9 IMMUNOCOMPROMISED: ICD-10-CM

## 2024-08-14 LAB
ANION GAP SERPL CALC-SCNC: 10 MMOL/L (ref 8–16)
BUN SERPL-MCNC: 24 MG/DL (ref 6–20)
CALCIUM SERPL-MCNC: 9.1 MG/DL (ref 8.7–10.5)
CHLORIDE SERPL-SCNC: 105 MMOL/L (ref 95–110)
CHOLEST SERPL-MCNC: 156 MG/DL (ref 120–199)
CHOLEST/HDLC SERPL: 2.8 {RATIO} (ref 2–5)
CO2 SERPL-SCNC: 25 MMOL/L (ref 23–29)
CREAT SERPL-MCNC: 1.5 MG/DL (ref 0.5–1.4)
EST. GFR  (NO RACE VARIABLE): 53 ML/MIN/1.73 M^2
ESTIMATED AVG GLUCOSE: 97 MG/DL (ref 68–131)
GLUCOSE SERPL-MCNC: 84 MG/DL (ref 70–110)
HBA1C MFR BLD: 5 % (ref 4–5.6)
HDLC SERPL-MCNC: 56 MG/DL (ref 40–75)
HDLC SERPL: 35.9 % (ref 20–50)
LDLC SERPL CALC-MCNC: 88.6 MG/DL (ref 63–159)
NONHDLC SERPL-MCNC: 100 MG/DL
POTASSIUM SERPL-SCNC: 3.8 MMOL/L (ref 3.5–5.1)
SODIUM SERPL-SCNC: 140 MMOL/L (ref 136–145)
TRIGL SERPL-MCNC: 57 MG/DL (ref 30–150)

## 2024-08-14 PROCEDURE — 3008F BODY MASS INDEX DOCD: CPT | Mod: CPTII,S$GLB,, | Performed by: INTERNAL MEDICINE

## 2024-08-14 PROCEDURE — 99396 PREV VISIT EST AGE 40-64: CPT | Mod: S$GLB,,, | Performed by: INTERNAL MEDICINE

## 2024-08-14 PROCEDURE — 36415 COLL VENOUS BLD VENIPUNCTURE: CPT | Mod: PO | Performed by: INTERNAL MEDICINE

## 2024-08-14 PROCEDURE — 80048 BASIC METABOLIC PNL TOTAL CA: CPT | Performed by: INTERNAL MEDICINE

## 2024-08-14 PROCEDURE — 1159F MED LIST DOCD IN RCRD: CPT | Mod: CPTII,S$GLB,, | Performed by: INTERNAL MEDICINE

## 2024-08-14 PROCEDURE — 80061 LIPID PANEL: CPT | Performed by: INTERNAL MEDICINE

## 2024-08-14 PROCEDURE — 84443 ASSAY THYROID STIM HORMONE: CPT | Performed by: INTERNAL MEDICINE

## 2024-08-14 PROCEDURE — 99999 PR PBB SHADOW E&M-EST. PATIENT-LVL IV: CPT | Mod: PBBFAC,,, | Performed by: INTERNAL MEDICINE

## 2024-08-14 PROCEDURE — 83036 HEMOGLOBIN GLYCOSYLATED A1C: CPT | Performed by: INTERNAL MEDICINE

## 2024-08-14 PROCEDURE — 3044F HG A1C LEVEL LT 7.0%: CPT | Mod: CPTII,S$GLB,, | Performed by: INTERNAL MEDICINE

## 2024-08-14 PROCEDURE — 3075F SYST BP GE 130 - 139MM HG: CPT | Mod: CPTII,S$GLB,, | Performed by: INTERNAL MEDICINE

## 2024-08-14 PROCEDURE — 3078F DIAST BP <80 MM HG: CPT | Mod: CPTII,S$GLB,, | Performed by: INTERNAL MEDICINE

## 2024-08-14 RX ORDER — ERGOCALCIFEROL 1.25 MG/1
50000 CAPSULE ORAL
Qty: 12 CAPSULE | Refills: 0 | Status: SHIPPED | OUTPATIENT
Start: 2024-08-14

## 2024-08-14 NOTE — PROGRESS NOTES
Subjective:       Patient ID: Juan C Rodriguez is a 58 y.o. male.    Chief Complaint: 3m f/u, Hypertension, Hyperlipidemia, and Diabetes    Hypertension  Pertinent negatives include no chest pain, headaches, neck pain, palpitations or shortness of breath.   Hyperlipidemia  Associated symptoms include myalgias. Pertinent negatives include no chest pain or shortness of breath.   Diabetes  Pertinent negatives for hypoglycemia include no confusion, dizziness, headaches, nervousness/anxiousness, pallor, seizures, speech difficulty or tremors. Pertinent negatives for diabetes include no chest pain, no fatigue, no polydipsia, no polyphagia, no polyuria and no weakness.     Past Medical History:   Diagnosis Date    Diabetes mellitus, type 2 diagnosed 2013    DM (diabetes mellitus)     2011  02/25/2014    HTN (hypertension)     Hypogonadism, testicular     Left tibialis posterior tendinitis 11/30/2016    Obesities, morbid      Past Surgical History:   Procedure Laterality Date    COLONOSCOPY N/A 6/29/2018    Procedure: COLONOSCOPY;  Surgeon: Jeremiha Anaya III, MD;  Location: [facility];  Service: Endoscopy;  Laterality: N/A;     Family History   Problem Relation Name Age of Onset    Cataracts Mother      Diabetes Mother      Hypertension Mother      Diabetes Sister       Social History     Socioeconomic History    Marital status: Single   Tobacco Use    Smoking status: Some Days     Types: Cigars    Smokeless tobacco: Never   Substance and Sexual Activity    Alcohol use: Yes     Alcohol/week: 3.0 - 4.0 standard drinks of alcohol     Types: 3 - 4 Cans of beer per week     Comment: occasional weekends    Drug use: No    Sexual activity: Not Currently     Partners: Female     Social Determinants of Health     Food Insecurity: No Food Insecurity (7/25/2023)    [facility], [facility]    Hunger Vital Sign     Worried About Running Out of Food in the Last Year: Never true     Ran Out of Food in the Last Year: Never true   Transportation Needs: No Transportation Needs (7/25/2023)    [facility], [facility] and [facility]ates    PRAPARE - Transportation     Lack of Transportation (Medical): No     Lack of Transportation (Non-Medical): No   Stress: No Stress Concern Present (8/1/2023)    [facility], [facility]    [employer] [place] [facility] - Occupational Stress Questionnaire     Feeling of Stress : Not at all   Housing Stability: Unknown (8/1/2023)    [facility], [facility] and Affiliates    Housing Stability Vital Sign     Unable to Pay for Housing in the Last Year: No     Unstable Housing in the Last Year: No     Review of Systems   Constitutional:  Negative for activity change, appetite change, chills, diaphoresis, fatigue, fever and unexpected weight change.   HENT:  Negative for drooling, ear discharge, ear pain, facial swelling, hearing loss, mouth sores, nosebleeds, postnasal drip, rhinorrhea, sinus pressure, sneezing, sore throat, tinnitus, trouble swallowing and voice change.    Eyes:  Negative for photophobia, redness and visual disturbance.   Respiratory:  Negative for apnea, cough, choking, chest tightness, shortness of breath and wheezing.    Cardiovascular:  Negative for chest pain and palpitations.   Gastrointestinal:  Negative for abdominal distention, abdominal pain, anal  bleeding, blood in stool, constipation, diarrhea, nausea and vomiting.   Endocrine: Negative for cold intolerance, heat intolerance, polydipsia, polyphagia and polyuria.   Genitourinary:  Negative for difficulty urinating, dysuria, enuresis, flank pain, frequency, genital sores, hematuria and urgency.   Musculoskeletal:  Positive for arthralgias and myalgias. Negative for back pain, gait problem, joint swelling, neck pain and neck stiffness.   Skin:  Negative for color change, pallor, rash and wound.   Allergic/Immunologic: Negative for food allergies and immunocompromised state.   Neurological:  Negative for dizziness, tremors, seizures, syncope, facial asymmetry, speech difficulty, weakness, light-headedness, numbness and headaches.   Hematological:  Negative for adenopathy. Does not bruise/bleed easily.   Psychiatric/Behavioral:  Negative for agitation, behavioral problems, confusion, decreased concentration, dysphoric mood, hallucinations, self-injury, sleep disturbance and suicidal ideas. The patient is not nervous/anxious and is not hyperactive.        Objective:      Physical Exam  Vitals and nursing note reviewed.   Constitutional:       General: He is not in acute distress.     Appearance: Normal appearance. He is well-developed. He is not diaphoretic.   HENT:      Head: Normocephalic and atraumatic.      Mouth/Throat:      Pharynx: No oropharyngeal exudate.   Eyes:      General: No scleral icterus.     Pupils: Pupils are equal, round, and reactive to light.   Neck:      Thyroid: No thyromegaly.      Vascular: No carotid bruit or JVD.      Trachea: No tracheal deviation.   Cardiovascular:      Rate and Rhythm: Normal rate and regular rhythm.      Heart sounds: Normal heart sounds.   Pulmonary:      Effort: Pulmonary effort is normal. No respiratory distress.      Breath sounds: Normal breath sounds. No wheezing or rales.   Chest:      Chest wall: No tenderness.   Abdominal:      General: Bowel sounds are  normal. There is no distension.      Palpations: Abdomen is soft.      Tenderness: There is no abdominal tenderness. There is no guarding or rebound.   Musculoskeletal:         General: No tenderness. Normal range of motion.      Cervical back: Normal range of motion and neck supple.   Lymphadenopathy:      Cervical: No cervical adenopathy.   Skin:     General: Skin is warm and dry.      Coloration: Skin is not pale.      Findings: No erythema or rash.   Neurological:      Mental Status: He is alert and oriented to person, place, and time.      Cranial Nerves: No cranial nerve deficit.      Coordination: Coordination normal.   Psychiatric:         Behavior: Behavior normal.         Thought Content: Thought content normal.         Judgment: Judgment normal.         CMP  Sodium   Date Value Ref Range Status   03/12/2024 139 136 - 145 mmol/L Final     Potassium   Date Value Ref Range Status   03/12/2024 3.7 3.5 - 5.1 mmol/L Final     Chloride   Date Value Ref Range Status   03/12/2024 104 95 - 110 mmol/L Final     CO2   Date Value Ref Range Status   03/12/2024 28 23 - 29 mmol/L Final     Glucose   Date Value Ref Range Status   03/12/2024 88 70 - 110 mg/dL Final     BUN   Date Value Ref Range Status   03/12/2024 27 (H) 6 - 20 mg/dL Final     Creatinine   Date Value Ref Range Status   03/12/2024 1.4 0.5 - 1.4 mg/dL Final     Calcium   Date Value Ref Range Status   03/12/2024 9.1 8.7 - 10.5 mg/dL Final     Total Protein   Date Value Ref Range Status   03/12/2024 7.7 6.0 - 8.4 g/dL Final     Albumin   Date Value Ref Range Status   03/12/2024 3.7 3.5 - 5.2 g/dL Final   05/31/2016 4.2 3.6 - 5.1 g/dL Final     Comment:     @ Test Performed By:  [employer] MarquezKELLY Casper M.D.,   [id number] [place] [id number]  [facility]  [id number]       Total Bilirubin   Date Value Ref Range Status   03/12/2024 0.4 0.1 - 1.0 mg/dL Final     Comment:     For infants and newborns,  interpretation of results should be based  on gestational age, weight and in agreement with clinical  observations.    Premature Infant recommended reference ranges:  Up to 24 hours.............<8.0 mg/dL  Up to 48 hours............<12.0 mg/dL  3-5 days..................<15.0 mg/dL  6-29 days.................<15.0 mg/dL       Alkaline Phosphatase   Date Value Ref Range Status   03/12/2024 92 55 - 135 U/L Final     AST   Date Value Ref Range Status   03/12/2024 21 10 - 40 U/L Final     ALT   Date Value Ref Range Status   03/12/2024 22 10 - 44 U/L Final     Anion Gap   Date Value Ref Range Status   03/12/2024 7 (L) 8 - 16 mmol/L Final     eGFR if    Date Value Ref Range Status   07/15/2022 >60 >60 mL/min/1.73 m^2 Final     eGFR if non    Date Value Ref Range Status   07/15/2022 >60 >60 mL/min/1.73 m^2 Final     Comment:     Calculation used to obtain the estimated glomerular filtration  rate (eGFR) is the CKD-EPI equation.        Lab Results   Component Value Date    WBC 6.20 03/12/2024    HGB 12.3 (L) 03/12/2024    HCT 38.1 (L) 03/12/2024    MCV 79 (L) 03/12/2024     03/12/2024     Lab Results   Component Value Date    CHOL 189 04/24/2023     Lab Results   Component Value Date    HDL 70 04/24/2023     Lab Results   Component Value Date    LDLCALC 106.0 04/24/2023     Lab Results   Component Value Date    TRIG 65 04/24/2023     Lab Results   Component Value Date    CHOLHDL 37.0 04/24/2023     Lab Results   Component Value Date    TSH 1.819 08/24/2023     Lab Results   Component Value Date    LABA1C 6.1 (H) 03/16/2017    HGBA1C 5.6 02/01/2024     Assessment:       1. Alpha thalassemia silent carrier    2. Anemia, unspecified type    3. Class 3 severe obesity due to excess calories with serious comorbidity and body mass index (BMI) of 50.0 to 59.9 in adult    4. Type 2 diabetes mellitus with hyperglycemia, without long-term current use of insulin    5. Essential hypertension    6.  Fatty liver    7. Immunocompromised    8. Mixed hyperlipidemia    9. Rheumatoid arthritis of multiple sites with negative rheumatoid factor    10. Vitamin D deficiency        Plan:   Alpha thalassemia silent carrier    Anemia, unspecified type---------------stable------------------    Class 3 severe obesity due to excess calories with serious comorbidity and body mass index (BMI) of 50.0 to 59.9 in adult    Type 2 diabetes mellitus with hyperglycemia, without long-term current use of insulin  -     Hemoglobin A1C; Future; Expected date: 08/14/2024    Essential hypertension  -     Basic Metabolic Panel; Future; Expected date: 08/14/2024  -     TSH; Future; Expected date: 08/14/2024    Fatty liver    Immunocompromised    Mixed hyperlipidemia  -     Lipid Panel; Future; Expected date: 08/14/2024    Rheumatoid arthritis of multiple sites with negative rheumatoid factor-------------sees rheumatology----------------------------    Vitamin D deficiency  -     ergocalciferol (ERGOCALCIFEROL) 50,000 unit Cap; Take 1 capsule (50,000 Units total) by mouth every 7 days.  Dispense: 12 capsule; Refill: 0      Stable------------------continue meds-------watch diet-----------------------f/u 3 months---------------------

## 2024-08-15 ENCOUNTER — TELEPHONE (OUTPATIENT)
Dept: FAMILY MEDICINE | Facility: CLINIC | Age: 59
End: 2024-08-15
Payer: COMMERCIAL

## 2024-08-15 LAB — TSH SERPL DL<=0.005 MIU/L-ACNC: 1.58 UIU/ML (ref 0.4–4)

## 2024-08-23 DIAGNOSIS — E11.65 TYPE 2 DIABETES MELLITUS WITH HYPERGLYCEMIA, WITHOUT LONG-TERM CURRENT USE OF INSULIN: ICD-10-CM

## 2024-08-23 RX ORDER — TIRZEPATIDE 5 MG/.5ML
5 INJECTION, SOLUTION SUBCUTANEOUS
Qty: 6 ML | Refills: 0 | Status: SHIPPED | OUTPATIENT
Start: 2024-08-23

## 2024-08-23 NOTE — TELEPHONE ENCOUNTER
Refill Decision Note   Juan C Rodriguez  is requesting a refill authorization.  Brief Assessment and Rationale for Refill:  Approve     Medication Therapy Plan:         Comments:     Note composed:9:50 AM 08/23/2024

## 2024-08-23 NOTE — TELEPHONE ENCOUNTER
No care due was identified.  [facility] Embedded Care Due Messages. Reference number: [id number].   8/23/2024 5:31:28 AM CDT

## 2024-08-29 ENCOUNTER — TELEPHONE (OUTPATIENT)
Dept: PHARMACY | Facility: CLINIC | Age: 59
End: 2024-08-29
Payer: COMMERCIAL

## 2024-08-29 NOTE — TELEPHONE ENCOUNTER
[facility]/Population Health Chart Review & Patient Outreach Details For Medication Adherence Project    Reason for Outreach Encounter: 3rd Party payor non-compliance report ([employer], [employer], C, etc)  2.  Patient Outreach Method: Reviewed Patient Chart  3.   Medication in question:   Diabetes Medications               metFORMIN (GLUCOPHAGE) 500 MG tablet Take 1 tablet (500 mg total) by mouth once daily.    tirzepatide (MOUNJARO) 5 mg/0.5 mL PnIj Inject 5 mg into the skin every 7 days.              Hypertension Medications               amLODIPine (NORVASC) 10 MG tablet Take 1 tablet by mouth once daily    olmesartan-hydrochlorothiazide (BENICAR HCT) 20-12.5 mg per tablet Take 1 tablet by mouth once daily.              Hyperlipidemia Medications               atorvastatin (LIPITOR) 20 MG tablet Take 1 tablet (20 mg total) by mouth once daily.               4.  Reviewed and or Updates Made To: Patient Chart  5. Outreach Outcomes and/or actions taken: Patient filled medication and is on track to be adherent

## 2024-09-20 ENCOUNTER — OFFICE VISIT (OUTPATIENT)
Dept: PODIATRY | Facility: CLINIC | Age: 59
End: 2024-09-20
Payer: COMMERCIAL

## 2024-09-20 DIAGNOSIS — Q82.8 POROKERATOSIS: ICD-10-CM

## 2024-09-20 DIAGNOSIS — E11.9 ENCOUNTER FOR COMPREHENSIVE DIABETIC FOOT EXAMINATION, TYPE 2 DIABETES MELLITUS: Primary | ICD-10-CM

## 2024-09-20 DIAGNOSIS — E11.69 TYPE 2 DIABETES MELLITUS WITH OTHER SPECIFIED COMPLICATION, WITHOUT LONG-TERM CURRENT USE OF INSULIN: ICD-10-CM

## 2024-09-20 DIAGNOSIS — L60.3 ONYCHODYSTROPHY: ICD-10-CM

## 2024-09-20 PROCEDURE — 99999 PR PBB SHADOW E&M-EST. PATIENT-LVL III: CPT | Mod: PBBFAC,,, | Performed by: PODIATRIST

## 2024-09-20 NOTE — PROGRESS NOTES
Subjective:       Patient ID: Juan C Rodriguez is a 58 y.o. male.    Chief Complaint: Diabetic Foot Exam (8.14.24 Patient was last seen on Reza Michelle. He complains of 8/10 pain at present to bilateral plantar feet and is a diabetic. )    HPI: Juan C Rodriguez presents to the office today for his annual diabetic foot assessment and risk evaluation.  Patient is a DMII.  Patient reporting pain to bilateral plantar foot.  States having hard callus formation which causes increased pain with ambulation.  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 This patient last saw his/her internal/family medicine physician on 08/14/2024.  Relating 8/10 pain to the plantar foot.    Hemoglobin A1C   Date Value Ref Range Status   08/14/2024 5.0 4.0 - 5.6 % Final     Comment:     ADA Screening Guidelines:  5.7-6.4%  Consistent with prediabetes  >or=6.5%  Consistent with diabetes    High levels of fetal hemoglobin interfere with the HbA1C  assay. Heterozygous hemoglobin variants (HbS, HgC, etc)do  not significantly interfere with this assay.   However, presence of multiple variants may affect accuracy.     02/01/2024 5.6 4.0 - 5.6 % Final     Comment:     ADA Screening Guidelines:  5.7-6.4%  Consistent with prediabetes  >or=6.5%  Consistent with diabetes    High levels of fetal hemoglobin interfere with the HbA1C  assay. Heterozygous hemoglobin variants (HbS, HgC, etc)do  not significantly interfere with this assay.   However, presence of multiple variants may affect accuracy.     08/24/2023 5.8 (H) 4.0 - 5.6 % Final     Comment:     ADA Screening Guidelines:  5.7-6.4%  Consistent with prediabetes  >or=6.5%  Consistent with diabetes    High levels of fetal hemoglobin interfere with the HbA1C  assay. Heterozygous hemoglobin variants (HbS, HgC, etc)do  not significantly interfere with this assay.   However, presence of multiple variants may affect  accuracy.     .    Review of patient's allergies indicates:   Allergen Reactions    Naproxen Swelling       Past Medical History:   Diagnosis Date    Diabetes mellitus, type 2 diagnosed 2013    DM (diabetes mellitus)     2011  02/25/2014    HTN (hypertension)     Hypogonadism, testicular     Left tibialis posterior tendinitis 11/30/2016    Obesities, morbid        Family History   Problem Relation Name Age of Onset    Cataracts Mother      Diabetes Mother      Hypertension Mother      Diabetes Sister         Social History     Socioeconomic History    Marital status: Single   Tobacco Use    Smoking status: Some Days     Types: Cigars    Smokeless tobacco: Never   Substance and Sexual Activity    Alcohol use: Yes     Alcohol/week: 3.0 - 4.0 standard drinks of alcohol     Types: 3 - 4 Cans of beer per week     Comment: occasional weekends    Drug use: No    Sexual activity: Not Currently     Partners: Female     Social Determinants of Health     Food Insecurity: No Food Insecurity (7/25/2023)    [facility] [employer] [facility], [employer] [facility]    Hunger Vital Sign     Worried About Running Out of Food in the Last Year: Never true     Ran Out of Food in the Last Year: Never true   Transportation Needs: No Transportation Needs (7/25/2023)    [facility] [facility], [facility]    PRAPARE - Transportation     Lack of Transportation (Medical): No     Lack of Transportation (Non-Medical): No   Stress: No Stress Concern Present (8/1/2023)    Received from [facility] [employer] [facility], [employer] [facility] and Its [facility] and Affiliates     [facility] [place] of Occupational Health - Occupational Stress Questionnaire     Feeling of Stress : Not at all   Housing Stability: Unknown (8/1/2023)    [facility] and [facility], [facility] and Affiliates    Housing Stability Vital Sign     Unable to Pay for Housing in the Last Year: No     Unstable Housing in the Last Year: No       Past Surgical History:   Procedure Laterality Date    COLONOSCOPY N/A 6/29/2018    Procedure: COLONOSCOPY;  Surgeon: Jeremiah Anaya III, MD;  Location: [facility];  Service: Endoscopy;  Laterality: N/A;       Review of Systems        Objective:   There were no vitals taken for this visit.    Physical Exam  LOWER EXTREMITY PHYSICAL EXAMINATION    ORTHOPEDIC:  Plantar fat pad atrophy noted.  No gross or structural anatomic deformity appreciated.  Intrinsic and extrinsic musculature intact.  Normal position of digits.    VASCULAR:  The right dorsalis pedis pulse 2/4 and the right posterior tibial pulse 1/4.  The left dorsalis pedis pulse 2/4 and posterior tibial pulse on the left is 1/4.  Capillary refill is intact.  Pedal hair growth decreased.     NEUROLOGY:  Protective sensation is intact with [place] Sil[place] monofilament. Proprioception is intact. Intact sensation to light touch.  Vibratory sensation is diminished to the 1st metatarsal phalangeal joint.     DERMATOLOGY:  Skin is supple, moist, intact.  Porokeratotic lesions present to the right foot and left foot.  Total of 2 lesions are noted.  No signs of underlying ulceration.  Centralize white thickened core is noted within the central aspect of the lesion.  There is no radiating erythema or areas of wound development.  Fat pad atrophy noted.  The R1, 2, 5 and left L1,2, 5 are thickened, discolored dystrophic.  There is subungual debris.  Nail plates have area of dark discoloration.  The  remaining nails 3-4 on the right foot and the left foot are elongated but of normal color, thickness, and texture.   There is no signs of ingrowing into the medial or lateral borders.  There is no evidence of wounds or skin breakdown.  No edema or erythema.  No obvious lacerations or fissuring.  Interdigital spaces are clean, dry, intact.  No rashes or scars appreciated.    Assessment:     1. Encounter for comprehensive diabetic foot examination, type 2 diabetes mellitus    2. Type 2 diabetes mellitus with other specified complication, without long-term current use of insulin    3. Porokeratosis    4. Onychodystrophy        Plan:     Encounter for comprehensive diabetic foot examination, type 2 diabetes mellitus    Type 2 diabetes mellitus with other specified complication, without long-term current use of insulin    Porokeratosis    Onychodystrophy      Thorough discussion is had with the patient today, concerning the diagnosis, its etiology, and the treatment algorithm at present.    Diabetic education was discussed and provided. Discussed appropriate foot care. Encourage compliance with diet and A1c.  Thorough discussion is had with the patient concerning the diagnosis, its etiology, and the treatment algorithm at present. Shoe inspection. Foot Education. Patient reminded of the importance of good nutrition and blood sugar control to help prevent podiatric complications of diabetes. Patient instructed on proper foot hygeine. We discussed wearing proper and supportive shoe gear, daily foot inspections, never walking barefooted or sock footed, never putting sharp instruments to feet which can cause major complications associated with infection, ulcers, lacerations.    Hypertrophic skin formation x2, as outlined within the examination portion of this note, is surgically debrided with sharp #10/#15 blade, to alleviate discomfort with weight bearing and ambulation, and to lessen the possibility of skin complications,  e.g., ulceration due to pressure. No ulceration(s) is are noted with/post debridement. The lesion is completed healed and resolved. No evidence of infection.       Dystrophic nail plates, as outlined above (R#1,2,5  ; L#1,2,5 ), are sharply debrided with double action nail nipper, and/or with the assistance of a mechanical rotary mani, with removal of all offending nail and nail border(s), for reduction of pains. Nails are reduced in terms of length, width and girth with removal of subungual debris to facilitate pain free weight bearing and ambulation. The elongated nails as outlined in the objective portion of this note, were trimmed to appropriate length, with a double action nail nipper, for alleviation/reduction of pains as well. Follow up in approx. 3-4 months.

## 2024-09-20 NOTE — LETTER
September 20, 2024      [facility] 2nd Floor  [address]  [place] [address]  Phone: [phone]  Fax: [phone]       Patient: Juan C Rodriguez   YOB: 1965  Date of Visit: 09/20/2024    To Whom It May Concern:    Pamela Rodriguez  was at [facility] on 09/20/2024. The patient may return to work/school on 9.20.24 with no restrictions. If you have any questions or concerns, or if I can be of further assistance, please do not hesitate to contact me.    Sincerely,    Asad Hamilton LPN

## 2024-11-15 ENCOUNTER — OFFICE VISIT (OUTPATIENT)
Dept: FAMILY MEDICINE | Facility: CLINIC | Age: 59
End: 2024-11-15
Payer: COMMERCIAL

## 2024-11-15 ENCOUNTER — LAB VISIT (OUTPATIENT)
Dept: LAB | Facility: HOSPITAL | Age: 59
End: 2024-11-15
Attending: INTERNAL MEDICINE
Payer: COMMERCIAL

## 2024-11-15 VITALS
SYSTOLIC BLOOD PRESSURE: 128 MMHG | WEIGHT: 275.38 LBS | TEMPERATURE: 97 F | OXYGEN SATURATION: 99 % | HEART RATE: 67 BPM | BODY MASS INDEX: 48.79 KG/M2 | DIASTOLIC BLOOD PRESSURE: 74 MMHG | HEIGHT: 63 IN

## 2024-11-15 DIAGNOSIS — D56.3 ALPHA THALASSEMIA SILENT CARRIER: Primary | ICD-10-CM

## 2024-11-15 DIAGNOSIS — M06.09 RHEUMATOID ARTHRITIS OF MULTIPLE SITES WITH NEGATIVE RHEUMATOID FACTOR: ICD-10-CM

## 2024-11-15 DIAGNOSIS — E11.65 TYPE 2 DIABETES MELLITUS WITH HYPERGLYCEMIA, WITHOUT LONG-TERM CURRENT USE OF INSULIN: ICD-10-CM

## 2024-11-15 DIAGNOSIS — E78.2 MIXED HYPERLIPIDEMIA: ICD-10-CM

## 2024-11-15 DIAGNOSIS — I10 ESSENTIAL HYPERTENSION: ICD-10-CM

## 2024-11-15 DIAGNOSIS — N18.2 STAGE 2 CHRONIC KIDNEY DISEASE: ICD-10-CM

## 2024-11-15 DIAGNOSIS — D64.9 ANEMIA, UNSPECIFIED TYPE: ICD-10-CM

## 2024-11-15 DIAGNOSIS — M79.673 PAIN OF FOOT, UNSPECIFIED LATERALITY: ICD-10-CM

## 2024-11-15 LAB
ANION GAP SERPL CALC-SCNC: 10 MMOL/L (ref 8–16)
BUN SERPL-MCNC: 22 MG/DL (ref 6–20)
CALCIUM SERPL-MCNC: 9.4 MG/DL (ref 8.7–10.5)
CHLORIDE SERPL-SCNC: 104 MMOL/L (ref 95–110)
CO2 SERPL-SCNC: 26 MMOL/L (ref 23–29)
CREAT SERPL-MCNC: 1.1 MG/DL (ref 0.5–1.4)
EST. GFR  (NO RACE VARIABLE): >60 ML/MIN/1.73 M^2
ESTIMATED AVG GLUCOSE: 97 MG/DL (ref 68–131)
GLUCOSE SERPL-MCNC: 85 MG/DL (ref 70–110)
HBA1C MFR BLD: 5 % (ref 4–5.6)
POTASSIUM SERPL-SCNC: 3.8 MMOL/L (ref 3.5–5.1)
SODIUM SERPL-SCNC: 140 MMOL/L (ref 136–145)

## 2024-11-15 PROCEDURE — 83036 HEMOGLOBIN GLYCOSYLATED A1C: CPT | Performed by: INTERNAL MEDICINE

## 2024-11-15 PROCEDURE — 99999 PR PBB SHADOW E&M-EST. PATIENT-LVL V: CPT | Mod: PBBFAC,,, | Performed by: INTERNAL MEDICINE

## 2024-11-15 PROCEDURE — 80048 BASIC METABOLIC PNL TOTAL CA: CPT | Performed by: INTERNAL MEDICINE

## 2024-11-15 PROCEDURE — 36415 COLL VENOUS BLD VENIPUNCTURE: CPT | Mod: PO | Performed by: INTERNAL MEDICINE

## 2024-11-15 RX ORDER — AZITHROMYCIN 250 MG/1
TABLET, FILM COATED ORAL
Qty: 6 TABLET | Refills: 0 | Status: SHIPPED | OUTPATIENT
Start: 2024-11-15 | End: 2024-11-20

## 2024-11-15 NOTE — PROGRESS NOTES
Subjective:       Patient ID: Juan C Rodriguez is a 58 y.o. male.    Chief Complaint: Follow-up (3 month), Hypertension, Hyperlipidemia, and Diabetes    Follow-up  Associated symptoms include arthralgias. Pertinent negatives include no abdominal pain, chest pain, chills, coughing, diaphoresis, fatigue, fever, headaches, joint swelling, myalgias, nausea, neck pain, numbness, rash, sore throat, vomiting or weakness.   Hypertension  Pertinent negatives include no chest pain, headaches, neck pain, palpitations or shortness of breath.   Hyperlipidemia  Pertinent negatives include no chest pain, myalgias or shortness of breath.   Diabetes  Pertinent negatives for hypoglycemia include no confusion, dizziness, headaches, nervousness/anxiousness, pallor, seizures, speech difficulty or tremors. Pertinent negatives for diabetes include no chest pain, no fatigue, no polydipsia, no polyphagia, no polyuria and no weakness.     Past Medical History:   Diagnosis Date    Diabetes mellitus, type 2 diagnosed 2013    DM (diabetes mellitus)     2011  02/25/2014    HTN (hypertension)     Hypogonadism, testicular     Left tibialis posterior tendinitis 11/30/2016    Obesities, morbid      Past Surgical History:   Procedure Laterality Date    COLONOSCOPY N/A 6/29/2018    Procedure: COLONOSCOPY;  Surgeon: Jeremiah Anaya III, MD;  Location: [facility];  Service: Endoscopy;  Laterality: N/A;     Family History   Problem Relation Name Age of Onset    Cataracts Mother      Diabetes Mother      Hypertension Mother      Diabetes Sister       Social History     Socioeconomic History    Marital status: Single   Tobacco Use    Smoking status: Some Days     Types: Cigars    Smokeless tobacco: Never   Substance and Sexual Activity    Alcohol use: Yes     Alcohol/week: 3.0 - 4.0 standard drinks of alcohol     Types: 3 - 4 Cans of beer per week     Comment: occasional weekends    Drug use: No    Sexual activity: Not Currently      Partners: Female     Social Drivers of Health     Food Insecurity: No Food Insecurity (7/25/2023)    [facility], [facility]    Hunger Vital Sign     Worried About Running Out of Food in the Last Year: Never true     Ran Out of Food in the Last Year: Never true   Transportation Needs: No Transportation Needs (7/25/2023)    [facility], [facility]    PRAPARE - Transportation     Lack of Transportation (Medical): No     Lack of Transportation (Non-Medical): No   Stress: No Stress Concern Present (8/1/2023)    [facility], [facility]    [employer] - Occupational Stress Questionnaire     Feeling of Stress : Not at all   Housing Stability: Unknown (8/1/2023)    [facility], [facility]    Housing Stability Vital Sign     Unable to Pay for Housing in the Last Year: No     Unstable Housing in the Last Year: No     Review of Systems   Constitutional:  Negative for activity change, appetite change, chills, diaphoresis, fatigue, fever and unexpected weight change.   HENT:  Negative for drooling, ear discharge, ear pain, facial swelling, hearing loss, mouth sores, nosebleeds, postnasal drip, rhinorrhea, sinus pressure, sneezing, sore throat, tinnitus, trouble swallowing and voice change.    Eyes:  Negative for photophobia, redness and visual disturbance.    Respiratory:  Negative for apnea, cough, choking, chest tightness, shortness of breath and wheezing.    Cardiovascular:  Negative for chest pain, palpitations and leg swelling.   Gastrointestinal:  Negative for abdominal distention, abdominal pain, anal bleeding, blood in stool, constipation, diarrhea, nausea, rectal pain and vomiting.   Endocrine: Negative for cold intolerance, heat intolerance, polydipsia, polyphagia and polyuria.   Genitourinary:  Negative for difficulty urinating, dysuria, enuresis, flank pain, frequency, genital sores, hematuria and urgency.   Musculoskeletal:  Positive for arthralgias. Negative for back pain, gait problem, joint swelling, myalgias, neck pain and neck stiffness.   Skin:  Negative for color change, pallor, rash and wound.   Allergic/Immunologic: Negative for food allergies and immunocompromised state.   Neurological:  Negative for dizziness, tremors, seizures, syncope, facial asymmetry, speech difficulty, weakness, light-headedness, numbness and headaches.   Hematological:  Negative for adenopathy. Does not bruise/bleed easily.   Psychiatric/Behavioral:  Negative for agitation, behavioral problems, confusion, decreased concentration, dysphoric mood, hallucinations, self-injury, sleep disturbance and suicidal ideas. The patient is not nervous/anxious and is not hyperactive.        Objective:      Physical Exam  Vitals and nursing note reviewed.   Constitutional:       General: He is not in acute distress.     Appearance: Normal appearance. He is well-developed. He is not diaphoretic.   HENT:      Head: Normocephalic and atraumatic.      Mouth/Throat:      Pharynx: No oropharyngeal exudate.   Eyes:      General: No scleral icterus.     Pupils: Pupils are equal, round, and reactive to light.   Neck:      Thyroid: No thyromegaly.      Vascular: No carotid bruit or JVD.      Trachea: No tracheal deviation.   Cardiovascular:      Rate and Rhythm: Normal rate and regular rhythm.       Heart sounds: Normal heart sounds.   Pulmonary:      Effort: Pulmonary effort is normal. No respiratory distress.      Breath sounds: Normal breath sounds. No wheezing or rales.   Chest:      Chest wall: No tenderness.   Abdominal:      General: Bowel sounds are normal. There is no distension.      Palpations: Abdomen is soft.      Tenderness: There is no abdominal tenderness. There is no guarding or rebound.   Musculoskeletal:         General: No tenderness. Normal range of motion.      Cervical back: Normal range of motion and neck supple.   Lymphadenopathy:      Cervical: No cervical adenopathy.   Skin:     General: Skin is warm and dry.      Coloration: Skin is not pale.      Findings: No erythema or rash.   Neurological:      Mental Status: He is alert and oriented to person, place, and time.      Cranial Nerves: No cranial nerve deficit.      Coordination: Coordination normal.   Psychiatric:         Behavior: Behavior normal.         Thought Content: Thought content normal.         Judgment: Judgment normal.         CMP  Sodium   Date Value Ref Range Status   08/14/2024 140 136 - 145 mmol/L Final     Potassium   Date Value Ref Range Status   08/14/2024 3.8 3.5 - 5.1 mmol/L Final     Chloride   Date Value Ref Range Status   08/14/2024 105 95 - 110 mmol/L Final     CO2   Date Value Ref Range Status   08/14/2024 25 23 - 29 mmol/L Final     Glucose   Date Value Ref Range Status   08/14/2024 84 70 - 110 mg/dL Final     BUN   Date Value Ref Range Status   08/14/2024 24 (H) 6 - 20 mg/dL Final     Creatinine   Date Value Ref Range Status   08/14/2024 1.5 (H) 0.5 - 1.4 mg/dL Final     Calcium   Date Value Ref Range Status   08/14/2024 9.1 8.7 - 10.5 mg/dL Final     Total Protein   Date Value Ref Range Status   03/12/2024 7.7 6.0 - 8.4 g/dL Final     Albumin   Date Value Ref Range Status   03/12/2024 3.7 3.5 - 5.2 g/dL Final   05/31/2016 4.2 3.6 - 5.1 g/dL Final     Comment:     @ Test Performed By:  [id number]  [facility]  [address]el Diaz M.D[address]   [address] [place] [address]  [facility]  [id number]       Total Bilirubin   Date Value Ref Range Status   03/12/2024 0.4 0.1 - 1.0 mg/dL Final     Comment:     For infants and newborns, interpretation of results should be based  on gestational age, weight and in agreement with clinical  observations.    Premature Infant recommended reference ranges:  Up to 24 hours.............<8.0 mg/dL  Up to 48 hours............<12.0 mg/dL  3-5 days..................<15.0 mg/dL  6-29 days.................<15.0 mg/dL       Alkaline Phosphatase   Date Value Ref Range Status   03/12/2024 92 55 - 135 U/L Final     AST   Date Value Ref Range Status   03/12/2024 21 10 - 40 U/L Final     ALT   Date Value Ref Range Status   03/12/2024 22 10 - 44 U/L Final     Anion Gap   Date Value Ref Range Status   08/14/2024 10 8 - 16 mmol/L Final     eGFR if    Date Value Ref Range Status   07/15/2022 >60 >60 mL/min/1.73 m^2 Final     eGFR if non    Date Value Ref Range Status   07/15/2022 >60 >60 mL/min/1.73 m^2 Final     Comment:     Calculation used to obtain the estimated glomerular filtration  rate (eGFR) is the CKD-EPI equation.        Lab Results   Component Value Date    WBC 6.20 03/12/2024    HGB 12.3 (L) 03/12/2024    HCT 38.1 (L) 03/12/2024    MCV 79 (L) 03/12/2024     03/12/2024     Lab Results   Component Value Date    CHOL 156 08/14/2024     Lab Results   Component Value Date    HDL 56 08/14/2024     Lab Results   Component Value Date    LDLCALC 88.6 08/14/2024     Lab Results   Component Value Date    TRIG 57 08/14/2024     Lab Results   Component Value Date    CHOLHDL 35.9 08/14/2024     Lab Results   Component Value Date    TSH 1.578 08/14/2024     Lab Results   Component Value Date    LABA1C 6.1 (H) 03/16/2017    HGBA1C 5.0 08/14/2024     Assessment:       1. Alpha thalassemia silent carrier    2. Anemia, unspecified  type    3. Type 2 diabetes mellitus with hyperglycemia, without long-term current use of insulin    4. Essential hypertension    5. Rheumatoid arthritis of multiple sites with negative rheumatoid factor    6. Mixed hyperlipidemia    7. Stage 2 chronic kidney disease    8. Pain of foot, unspecified laterality        Plan:   Alpha thalassemia silent carrier    Anemia, unspecified type    Type 2 diabetes mellitus with hyperglycemia, without long-term current use of insulin  -     Hemoglobin A1C; Future; Expected date: 11/15/2024    Essential hypertension  -     Basic Metabolic Panel; Future; Expected date: 11/15/2024    Rheumatoid arthritis of multiple sites with negative rheumatoid factor    Mixed hyperlipidemia    Stage 2 chronic kidney disease    Pain of foot, unspecified laterality  -     Ambulatory referral/consult to Podiatry; Future; Expected date: 11/22/2024    Other orders  -     azithromycin (Z-PATTI) 250 MG tablet; Take 2 tablets by mouth on day 1; Take 1 tablet by mouth on days 2-5  Dispense: 6 tablet; Refill: 0      Stable------------continue meds, watch diet,exercise------------------f/u 4 months-------------------

## 2024-11-21 ENCOUNTER — OFFICE VISIT (OUTPATIENT)
Dept: PODIATRY | Facility: CLINIC | Age: 59
End: 2024-11-21
Payer: COMMERCIAL

## 2024-11-21 VITALS — WEIGHT: 275.38 LBS | HEIGHT: 63 IN | BODY MASS INDEX: 48.79 KG/M2

## 2024-11-21 DIAGNOSIS — L84 CORN OR CALLUS: Primary | ICD-10-CM

## 2024-11-21 DIAGNOSIS — M79.672 BILATERAL FOOT PAIN: ICD-10-CM

## 2024-11-21 DIAGNOSIS — M79.671 BILATERAL FOOT PAIN: ICD-10-CM

## 2024-11-21 PROCEDURE — 99999 PR PBB SHADOW E&M-EST. PATIENT-LVL III: CPT | Mod: PBBFAC,,, | Performed by: PODIATRIST

## 2024-11-21 NOTE — PROGRESS NOTES
[facility]  PODIATRIC MEDICINE AND SURGERY      CHIEF COMPLAINT  Chief Complaint   Patient presents with    Foot Pain     C/o pain at the bottom of his feet because of the callous and corns, pt rates pain 6/10, pt is diabetic, pt last seen pcp   Reza Michelle MD 11/15/2024           HPI     SUBJECTIVE: Juan C Rodriguez is a 58 y.o. male who  has a past medical history of Diabetes mellitus, type 2 (diagnosed 2013), DM (diabetes mellitus), HTN (hypertension), Hypogonadism, testicular, Left tibialis posterior tendinitis (11/30/2016), and Obesities, morbid. Juan Cpresents to clinic for high risk diabetic foot exam and care.  Juan C admits numbness, burning, and/or tingling sensations in their feet. He complains about pain to bottom of both feet due to calluses. Symptoms are painful with ambulation. He rates pain 6/10. He has completed treatment for fungus. He has no further complaints.     HgA1c:   Hemoglobin A1C   Date Value Ref Range Status   11/15/2024 5.0 4.0 - 5.6 % Final     Comment:     ADA Screening Guidelines:  5.7-6.4%  Consistent with prediabetes  >or=6.5%  Consistent with diabetes    High levels of fetal hemoglobin interfere with the HbA1C  assay. Heterozygous hemoglobin variants (HbS, HgC, etc)do  not significantly interfere with this assay.   However, presence of multiple variants may affect accuracy.     08/14/2024 5.0 4.0 - 5.6 % Final     Comment:     ADA Screening Guidelines:  5.7-6.4%  Consistent with prediabetes  >or=6.5%  Consistent with diabetes    High levels of fetal hemoglobin interfere with the HbA1C  assay. Heterozygous hemoglobin variants (HbS, HgC, etc)do  not significantly interfere with this assay.   However, presence of multiple variants may affect accuracy.     02/01/2024 5.6 4.0 - 5.6 % Final     Comment:     ADA Screening Guidelines:  5.7-6.4%  Consistent with prediabetes  >or=6.5%  Consistent with diabetes    High levels of fetal hemoglobin  "interfere with the HbA1C  assay. Heterozygous hemoglobin variants (HbS, HgC, etc)do  not significantly interfere with this assay.   However, presence of multiple variants may affect accuracy.           [facility]  Past Medical History:   Diagnosis Date    Diabetes mellitus, type 2 diagnosed     DM (diabetes mellitus)       2014    HTN (hypertension)     Hypogonadism, testicular     Left tibialis posterior tendinitis 2016    Obesities, morbid        MEDS  Current Outpatient Medications on File Prior to Visit   Medication Sig Dispense Refill    allopurinoL (ZYLOPRIM) 300 MG tablet Take 1 tablet (300 mg total) by mouth once daily. 90 tablet 1    amLODIPine (NORVASC) 10 MG tablet Take 1 tablet by mouth once daily 90 tablet 2    atorvastatin (LIPITOR) 20 MG tablet Take 1 tablet (20 mg total) by mouth once daily. 90 tablet 3    ergocalciferol (ERGOCALCIFEROL) 50,000 unit Cap Take 1 capsule (50,000 Units total) by mouth every 7 days. 12 capsule 0    etanercept (ENBREL SURECLICK) 50 mg/mL (1 mL) Inject 1 mL (50 mg total) into the skin once a week. 4 mL 11    folic acid (FOLVITE) 1 MG tablet Take 1 tablet (1 mg total) by mouth once daily. 90 tablet 3    HYDROcodone-acetaminophen (NORCO) 5-325 mg per tablet Take 1 tablet by mouth every 6 hours as needed for pain 15 tablet 0    metFORMIN (GLUCOPHAGE) 500 MG tablet Take 1 tablet (500 mg total) by mouth once daily. 90 tablet 1    methotrexate 2.5 MG Tab Take 6 tablets (15 mg total) by mouth every 7 days. 72 tablet 1    mupirocin (BACTROBAN) 2 % ointment Apply topically 3 (three) times daily. 30 g 0    needle, disp, 22 G 22 gauge x 1 1/2" Ndle 1 Units by Misc.(Non-Drug; Combo Route) route every 21 days. 100 each 99    tirzepatide (MOUNJARO) 5 mg/0.5 mL PnIj Inject 5 mg into the skin every 7 days. 6 mL 0    [] azithromycin (Z-PATTI) 250 MG tablet Take 2 tablets by mouth on day 1; Take 1 tablet by mouth on days 2-5 6 tablet 0    " "olmesartan-hydrochlorothiazide (BENICAR HCT) 20-12.5 mg per tablet Take 1 tablet by mouth once daily. 90 tablet 3     No current facility-administered medications on file prior to visit.       [facility]     Past Surgical History:   Procedure Laterality Date    COLONOSCOPY N/A 6/29/2018    Procedure: COLONOSCOPY;  Surgeon: Jeremiah Anaya III, MD;  Location: [facility];  Service: Endoscopy;  Laterality: N/A;        ALL  Review of patient's allergies indicates:   Allergen Reactions    Naproxen Swelling       SOC     Social History     Tobacco Use    Smoking status: Some Days     Types: Cigars    Smokeless tobacco: Never   Substance Use Topics    Alcohol use: Yes     Alcohol/week: 3.0 - 4.0 standard drinks of alcohol     Types: 3 - 4 Cans of beer per week     Comment: occasional weekends    Drug use: No         Family HX      Family History   Problem Relation Name Age of Onset    Cataracts Mother      Diabetes Mother      Hypertension Mother      Diabetes Sister              REVIEW OF SYSTEMS  General: Denies any fever or chills  Chest: Denies shortness of breath, wheezing, coughing, or sputum production  Heart: Denies chest pain.  As noted above and per history of current illness above, otherwise negative in the remainder of the 14 systems.     PHYSICAL EXAM  Vitals:    11/21/24 0828   Weight: 124.9 kg (275 lb 5.7 oz)   Height: 5' 3" (1.6 m)   PainSc:   6       GEN:  This patient is well-developed, well-nourished and appears stated age, well-oriented to person, place and time, and cooperative and pleasant on today's visit.      LOWER EXTREMITY PHYSICAL EXAMINATION   VASCULAR  DP pedal pulse 1/4 RIGHT, LEFT1/4   PT pedal pulse 2/4 RIGHT, LEFT2/4  Capillary refill time immediate to the toes.   Feet are warm to the touch. Skin temperature warm to warm from proximally to distally   There are varicosities, telangiectasias noted to bilateral foot and ankle regions.   There are no ecchymoses noted to bilateral foot and ankle " regions.   There is moderate/severe gross lower extremity edema.    DERMATOLOGIC  Skin moist with healthy texture and turgor.  There are no open ulcerations, lacerations, or fissures to bilateral foot and ankle regions. There are no signs of infection as there is no erythema, no proximal-extending lymphangiitis, no fluctuance, or crepitus noted on palpation to bilateral foot and ankle regions.   There is no interdigital maceration.   There are hyperkeratotic lesions noted to feet diffusely and with scales. There is HPK sub 5th bilateral, right plantar medial hallux.    NEUROLOGIC  Protective sensation intact at 10/10 sites upon examination with [place] Weinsten 5.07 g monofilament.  Propioception intact at 1st MTPJ b/l.  Achilles and patellar deep tendon reflexes intact  Babinski reflex absent    ORTHOPEDIC/BIOMECHANICAL  No symptomatic structural abnormalities noted. Muscle strength is 5/5 for foot inverters, everters, plantarflexors, and dorsiflexors. Muscle tone is normal.  Inspection/palpation of bone, joints and muscles unremarkable.      ASSESSMENT  Encounter Diagnoses   Name Primary?    Bilateral foot pain     Corn or callus Yes         Plan:    Corn or callus    Bilateral foot pain  -     Ambulatory referral/consult to Podiatry        -Discuss presenting problems, etiology, pathologic processes and management options with patient today.   -I counseled the patient on their conditions, their implications and medical management. An in depth discussion on diabetic management, risk prevention, amputation prevention verbally and provided educational literature in written format.     -With patient's permission via verbal consent, the involved area was cleansed with an alcohol swab. Trimming of hyperkeratotic lesions deep to epidermal layer x 3  was performed with a #15 blade without incident. Patient relates relief following the procedure. Patient will continue to monitor the areas daily, inspect feet, wear  protective shoe gear when ambulatory, moisturizer to maintain skin integrity. Discussed non covered foot care.       Disclaimer: This note was partially prepared using a voice recognition system and is likely to have sound alike errors within the text.        Future Appointments   Date Time Provider Department Center   3/18/2025  9:00 AM [facility] [address]       Report Electronically Signed By:     Sunni Conte DPM   Podiatry  [facility]   11/21/2024

## 2024-11-21 NOTE — LETTER
November 21, 2024      [facility] Podiatry 2nd Floor  [address]  [place] [address]  Phone: [phone]  Fax: [phone]       Patient: Juan C Rodriguez   YOB: 1965  Date of Visit: 11/21/2024    To Whom It May Concern:    Pamela Rodriguez  was at [facility] on 11/21/2024. The patient may return to work on 11/22/2024 with no restrictions. If you have any questions or concerns, or if I can be of further assistance, please do not hesitate to contact me.    Sincerely,      Jasmin Owens MA

## 2024-12-18 ENCOUNTER — TELEPHONE (OUTPATIENT)
Dept: PHARMACY | Facility: CLINIC | Age: 59
End: 2024-12-18
Payer: COMMERCIAL

## 2024-12-18 NOTE — TELEPHONE ENCOUNTER
[facility]Population Health Chart Review & Patient Outreach Details For Medication Adherence Project    Reason for Outreach Encounter: 3rd Party payor non-compliance report ([employer], BC[employer], C, etc)  2.  Patient Outreach Method: Reviewed Patient Chart  3.   Medication in question: atorvastatin   LAST FILLED: 11/11/24 for 90 day supply  Hyperlipidemia Medications               atorvastatin (LIPITOR) 20 MG tablet Take 1 tablet (20 mg total) by mouth once daily.               4.  Reviewed and or Updates Made To: Patient Chart  5. Outreach Outcomes and/or actions taken: Patient filled medication and is on track to be adherent

## 2024-12-29 NOTE — TELEPHONE ENCOUNTER
Care Due:                  Date            Visit Type   Department     Provider  --------------------------------------------------------------------------------                                EP -                              PRIMARY      [facility]  Last Visit: 11-      CARE ([facility])   ECHO Michelle                              EP -                              PRIMARY      [facility]  Next Visit: 03-      CARE ([facility])   ECHO Michelle                                                            Last  Test          Frequency    Reason                     Performed    Due Date  --------------------------------------------------------------------------------    CMP.........  12 months..  atorvastatin.............  03- 03-    [facility] Embedded Care Due Messages. Reference number: [id number].   12/29/2024 11:00:35 AM CST

## 2024-12-30 RX ORDER — OLMESARTAN MEDOXOMIL AND HYDROCHLOROTHIAZIDE 20/12.5 20; 12.5 MG/1; MG/1
1 TABLET ORAL DAILY
Qty: 90 TABLET | Refills: 3 | Status: SHIPPED | OUTPATIENT
Start: 2024-12-30

## 2024-12-30 NOTE — TELEPHONE ENCOUNTER
Provider Staff:  Action required for this patient    Requires labs      Please see care gap opportunities below in Care Due Message.    Thanks!  [facility]     Appointments      Date Provider   Last Visit   11/15/2024 Reza Michelle MD   Next Visit   3/18/2025 Reza Michelle MD     Refill Decision Note   Juan C Rodriguez  is requesting a refill authorization.  Brief Assessment and Rationale for Refill:  Approve     Medication Therapy Plan:         Pharmacist review requested: Yes   Comments:     Note composed:1:41 PM 12/30/2024

## 2024-12-30 NOTE — TELEPHONE ENCOUNTER
Refill Routing Note   Medication(s) are not appropriate for processing by [facility] for the following reason(s):        Drug-disease interaction    [facility] action(s):  Defer     Requires labs : Yes      Medication Therapy Plan: Drug-Disease: olmesartan-hydrochlorothiazide and Gout    Pharmacist review requested: Yes     Appointments  past 12m or future 3m with PCP    Date Provider   Last Visit   11/15/2024 Reza Michelle MD   Next Visit   3/18/2025 Reza Michelle MD   ED visits in past 90 days: 0        Note composed:1:36 PM 12/30/2024

## 2025-01-24 ENCOUNTER — TELEPHONE (OUTPATIENT)
Dept: PHARMACY | Facility: CLINIC | Age: 60
End: 2025-01-24
Payer: COMMERCIAL

## 2025-01-24 NOTE — TELEPHONE ENCOUNTER
[facility]/Population Health Chart Review & Patient Outreach Details For Medication Adherence Project    Reason for Outreach Encounter: 3rd Party payor non-compliance report ([employer], [employer]BS, C, etc)  2.  Patient Outreach Method: Reviewed Patient Chart  3.   Medication in question: benicar hct    LAST FILLED: 12/30/24 for 90 day supply  Hypertension Medications               amLODIPine (NORVASC) 10 MG tablet Take 1 tablet by mouth once daily    olmesartan-hydrochlorothiazide (BENICAR HCT) 20-12.5 mg per tablet Take 1 tablet by mouth once daily.              4.  Reviewed and or Updates Made To: Patient Chart  5. Outreach Outcomes and/or actions taken: Patient filled medication and is on track to be adherent

## 2025-02-12 DIAGNOSIS — I10 ESSENTIAL HYPERTENSION: ICD-10-CM

## 2025-02-12 RX ORDER — AMLODIPINE BESYLATE 10 MG/1
TABLET ORAL
Qty: 90 TABLET | Refills: 3 | Status: SHIPPED | OUTPATIENT
Start: 2025-02-12

## 2025-02-12 NOTE — TELEPHONE ENCOUNTER
Care Due:                  Date            Visit Type   Department     Provider  --------------------------------------------------------------------------------                                EP -                              PRIMARY      [facility]  Last Visit: 11-      CARE ([facility])   MEDICINE       Reza Michelle                              EP -                              PRIMARY      [facility]  Next Visit: 03-      CARE ([facility])   ECHO Michelle                                                            Last  Test          Frequency    Reason                     Performed    Due Date  --------------------------------------------------------------------------------    Vitamin D...  12 months..  ergocalciferol...........  Not Found    Overdue    [employer] Embedded Care Due Messages. Reference number: [id number].   2/12/2025 4:03:58 PM CST

## 2025-02-13 NOTE — TELEPHONE ENCOUNTER
Provider Staff:  Action required for this patient    Requires labs      Please see care gap opportunities below in Care Due Message.    Thanks!  [facility]     Appointments      Date Provider   Last Visit   11/15/2024 Reza Michelle MD   Next Visit   3/18/2025 Reza Michelle MD     Refill Decision Note   Juan C Rodriguez  is requesting a refill authorization.  Brief Assessment and Rationale for Refill:  Approve     Medication Therapy Plan:         Comments:     Note composed:8:06 PM 02/12/2025

## 2025-02-26 ENCOUNTER — TELEPHONE (OUTPATIENT)
Dept: PHARMACY | Facility: CLINIC | Age: 60
End: 2025-02-26
Payer: COMMERCIAL

## 2025-02-27 NOTE — TELEPHONE ENCOUNTER
[facility]/Population Health Chart Review & Patient Outreach Details For Medication Adherence Project    Reason for Outreach Encounter: 3rd Party payor non-compliance report ([employer], BCBS, UHC, etc)  2.  Patient Outreach Method: Reviewed patient chart   3.   Medication in question:    Hypertension Medications              amLODIPine (NORVASC) 10 MG tablet Take 1 tablet by mouth once daily    olmesartan-hydrochlorothiazide (BENICAR HCT) 20-12.5 mg per tablet Take 1 tablet by mouth once daily.                 Olmesartan/hctz  last filled  12/30 for 90 day supply      4.  Reviewed and or Updates Made To: Patient Chart  5. Outreach Outcomes and/or actions taken: Patient filled medication and is on track to be adherent  Additional Notes:

## 2025-03-07 ENCOUNTER — TELEPHONE (OUTPATIENT)
Dept: INTERNAL MEDICINE | Facility: CLINIC | Age: 60
End: 2025-03-07
Payer: COMMERCIAL

## 2025-03-07 NOTE — TELEPHONE ENCOUNTER
----- Message from Aruna sent at 3/6/2025  2:57 PM CST -----  Contact: Juan C  Type:  Patient Requesting a call back Who Called:Juan C What is the call back request regarding?:pt states he has to be schedule with Racheal Dodge the patient rather a call back or a response via [facility] Call Back Number:[phone] Information:Please call for additional information

## 2025-03-12 ENCOUNTER — OFFICE VISIT (OUTPATIENT)
Dept: PRIMARY CARE CLINIC | Facility: CLINIC | Age: 60
End: 2025-03-12
Payer: COMMERCIAL

## 2025-03-12 VITALS
BODY MASS INDEX: 48.32 KG/M2 | OXYGEN SATURATION: 98 % | HEART RATE: 68 BPM | SYSTOLIC BLOOD PRESSURE: 132 MMHG | DIASTOLIC BLOOD PRESSURE: 82 MMHG | WEIGHT: 272.69 LBS | RESPIRATION RATE: 18 BRPM | HEIGHT: 63 IN

## 2025-03-12 DIAGNOSIS — E78.2 MIXED HYPERLIPIDEMIA: Chronic | ICD-10-CM

## 2025-03-12 DIAGNOSIS — E88.810 METABOLIC SYNDROME: Primary | ICD-10-CM

## 2025-03-12 DIAGNOSIS — E11.22 TYPE 2 DIABETES MELLITUS WITH STAGE 2 CHRONIC KIDNEY DISEASE, WITHOUT LONG-TERM CURRENT USE OF INSULIN: Chronic | ICD-10-CM

## 2025-03-12 DIAGNOSIS — E55.9 VITAMIN D DEFICIENCY: Chronic | ICD-10-CM

## 2025-03-12 DIAGNOSIS — M06.09 RHEUMATOID ARTHRITIS OF MULTIPLE SITES WITH NEGATIVE RHEUMATOID FACTOR: Chronic | ICD-10-CM

## 2025-03-12 DIAGNOSIS — E66.01 CLASS 3 SEVERE OBESITY DUE TO EXCESS CALORIES WITH SERIOUS COMORBIDITY AND BODY MASS INDEX (BMI) OF 45.0 TO 49.9 IN ADULT: Chronic | ICD-10-CM

## 2025-03-12 DIAGNOSIS — N18.2 STAGE 2 CHRONIC KIDNEY DISEASE: Chronic | ICD-10-CM

## 2025-03-12 DIAGNOSIS — K59.03 DRUG-INDUCED CONSTIPATION: ICD-10-CM

## 2025-03-12 DIAGNOSIS — E66.813 CLASS 3 SEVERE OBESITY DUE TO EXCESS CALORIES WITH SERIOUS COMORBIDITY AND BODY MASS INDEX (BMI) OF 45.0 TO 49.9 IN ADULT: Chronic | ICD-10-CM

## 2025-03-12 DIAGNOSIS — I10 ESSENTIAL HYPERTENSION: Chronic | ICD-10-CM

## 2025-03-12 DIAGNOSIS — N18.2 TYPE 2 DIABETES MELLITUS WITH STAGE 2 CHRONIC KIDNEY DISEASE, WITHOUT LONG-TERM CURRENT USE OF INSULIN: Chronic | ICD-10-CM

## 2025-03-12 PROCEDURE — 3072F LOW RISK FOR RETINOPATHY: CPT | Mod: CPTII,S$GLB,, | Performed by: NURSE PRACTITIONER

## 2025-03-12 PROCEDURE — 99401 PREV MED CNSL INDIV APPRX 15: CPT | Mod: S$GLB,,, | Performed by: NURSE PRACTITIONER

## 2025-03-12 PROCEDURE — 1160F RVW MEDS BY RX/DR IN RCRD: CPT | Mod: CPTII,S$GLB,, | Performed by: NURSE PRACTITIONER

## 2025-03-12 PROCEDURE — 99214 OFFICE O/P EST MOD 30 MIN: CPT | Mod: 25,S$GLB,, | Performed by: NURSE PRACTITIONER

## 2025-03-12 PROCEDURE — 3075F SYST BP GE 130 - 139MM HG: CPT | Mod: CPTII,S$GLB,, | Performed by: NURSE PRACTITIONER

## 2025-03-12 PROCEDURE — 99999 PR PBB SHADOW E&M-EST. PATIENT-LVL V: CPT | Mod: PBBFAC,,, | Performed by: NURSE PRACTITIONER

## 2025-03-12 PROCEDURE — 3079F DIAST BP 80-89 MM HG: CPT | Mod: CPTII,S$GLB,, | Performed by: NURSE PRACTITIONER

## 2025-03-12 PROCEDURE — 1159F MED LIST DOCD IN RCRD: CPT | Mod: CPTII,S$GLB,, | Performed by: NURSE PRACTITIONER

## 2025-03-12 PROCEDURE — 3008F BODY MASS INDEX DOCD: CPT | Mod: CPTII,S$GLB,, | Performed by: NURSE PRACTITIONER

## 2025-03-12 RX ORDER — POLYETHYLENE GLYCOL 3350 17 G/17G
17 POWDER, FOR SOLUTION ORAL DAILY
Qty: 510 G | Refills: 1 | Status: SHIPPED | OUTPATIENT
Start: 2025-03-12

## 2025-03-12 NOTE — LETTER
March 12, 2025      [facility]  [address]  [place] [address]  Phone: [phone]  Fax: [phone]       Patient: Juan C Rodriguez   YOB: 1965  Date of Visit: 03/12/2025    To Whom It May Concern:    Pamela Rodirguez  was at [facility] on 03/12/2025. The patient may return to work/school on 03//13/2025 with no restrictions. If you have any questions or concerns, or if I can be of further assistance, please do not hesitate to contact me.    Sincerely,    Nimisha Hyman MA

## 2025-03-12 NOTE — PATIENT INSTRUCTIONS
Protein goal: 123 g/day (41 g/meal)     Protein sources: meat (chicken breast, boneless and skinless chicken thighs, pork tenderloin, 93/7 or 90/10 ground beef/turkey/chicken, flank/filet sirloin, eye of round steak, center but acosta, boneless think-cut port chop, beef jerky, turkey, fish such as salmon, catfish, tuna, oysters, crab, shrimp, crawfish), low-fat or no-fat dairy (cow/soy milk, cheese, greek yogurt), eggs/egg whites/beaters, nuts/seeds, beans, Quinoa, blue-green algae (powder), nutritional yeast, Alexandru bread, keto bread, tofu/tempeh, protein powders, protein shakes, protein bars.       When looking for a lean protein, look for protein > fat on the nutrition label. If fat  > protein, it is considered a high fat meal and should be limited during times of weight loss.       Protein is approximated  1 cup chopped chicken breast: 43 g; 1 chicken thigh (without skin): 28 g; 1 chicken drumstick: 23 g  3 oz pork tenderloin: 22 g; 4 oz 90/10 ground beef: 23 g; 3 oz top sirloin steak: 23 g  3.5 oz roasted turkey breast (bone removed): 29 g, 3.5 oz roasted turkey le g  1/2 fillet of salmon: 39 g, 1 fillet tilapia: 23 g, 3 oz Yellowfin Tuna: 25 g  3.5 oz shrimp: 24 g, 5 oz crawfish tails: 25 g  1 cup Cow/Soy milk: 8 g  1 container (5.3 oz) zero sugar greek yogurt: 12 g  1/2 cup cottage cheese: 13 g, 1 oz cheddar cheese: 7 g; 1 oz american cheese: 5 g; 1 oz provolone cheese: 7 g; 1 oz gouda cheese: 7 g; 1 oz swiss cheese: 8 g  1 eg g  1 oz peanuts: 7 g, 1 oz almonds: 6 g; 1 oz pistachios: 6 g; 1 oz cashews: 5 g; 1 oz walnuts: 4 g; 1 oz hazelnuts: 4 g  2 tbsp homero seeds: 5 g, 1/4 cup sunflower seeds: 6 g; 3 Tbsp hemp seeds: 9.5 g  1 cup del castillo beans (raw): 41 g; 1 cup baked beans: 14 g; 1 cup chickpeas: 39 g; 1 cup kidney beans (raw): 43 g; 1 cup large boiled lima beans: 15 g; 1 cup sprouted navy beans (raw): 6 g; 1 cup soybeans (raw): 68 g  1 cup cooked Quinoa: 8 g  1 tsp blue-green algae (powder): 1 g; 2  Tbsp nutritional yeast: 5 g  1 slice (34 g) Alexandru bread: 5 g  1/2 cup tofu: 10 g; 1 cup tempeh: 31 g      30 grams of protein cheat sheet:  3.5 oz cooked chicken breast: 172 calories  4 oz ground turkey breast: 120 calories   5 oz [place] strip steak: 325 calories  5 oz shrimp: 170 calories  6 oz cod: 145 calories  5 oz tuna fish: 165 calories  5 oz salmon: 300 calories  8 oz extra firm tofu: 250 calories   1.5 scoops protein powder: 160 calories  1.5 cups edamame: 280 calories  2 cups black beans: 485 calories  5 eggs: 390 calories  1.25 cups egg whites/egg beaters: 155 calories      Protein powders:   https://www.[employer]/best-protein-powders/  Ghost  Optimum Nutrition  Muscle Milk  Garden of Life  Perez  Naked    Protein shakes:   https://www.eatthis.com/protein-shakes/  Core Power Shakes (26g or 42g protein)  [facility] Nutrition Plan (30g protein)  Ensure Max Protein (30g Protein)  Premier Protein Shakes (30g protein)  Boost High Protein (20g protein)  Muscle Milk Genuine Protein Shakes (25g protein)  Quest Protein Shakes (30g protein)  Orgain Protein Shakes (20g - 26g protein)    Protein bars:  https://www.LineRate Systems/healthiest-protein-bars/  RX Bars  Fit Crunch  THINK  Quest  One  Barebells

## 2025-03-12 NOTE — PROGRESS NOTES
Patient ID: [id number]     Chief Complaint: Follow-up    HPI:     Juan C Rodriguez is a 59 y.o. male with diagnosis of Metabolic Syndrome, HTN, HLD, DM2, Vitamin D Deficiency, Obesity, CKD2, RA, Anemia. Patient referred by PCP. Patient's PCP is Dr. Michelle. Patient seen today for Obesity. Patient last seen on 05/17/2024 per Dr. Woodward, notes reviewed.     Weight history   Patient states always had increased weight, even as a child. Patient prepares his meals at home. On Mounjaro for DM2, lost over 30 lbs since starting medication.     Previous Obesity Treatment:   Diet/Exercise - unsuccessful   Mounjaro - helps with weight loss     BMI:  48.3 (03/12/2025)  48.8 (11/21/2024)  49.8 (08/14/2024)    Weight:  Wt Readings from Last 6 Encounters:   03/12/25 123.7 kg (272 lb 11.3 oz)   11/21/24 124.9 kg (275 lb 5.7 oz)   11/15/24 124.9 kg (275 lb 5.7 oz)   08/14/24 127.4 kg (280 lb 13.9 oz)   05/17/24 132.1 kg (291 lb 3.6 oz)   03/28/24 133.4 kg (294 lb 1.5 oz)     Ideal/Adjusted Body Weight:  Ideal: 125 lbs  Adjusted: 184 lbs    Neck Circumference:   16.5 in    Waist Circumference:  53 in    Percent Body Fat:   46.1% (03/12/2025)  https://www.calculator.net/body-fat-calculator.html    InBody:   Deferred until next appt   Date:   SMM:   Body Fat Mass:  Visceral Fat Level:  Basal Metabolic Rate:     Nutrition history (24 hour food recall)  Breakfast: none   Lunch: none   Dinner: salmon, baked sweet potato, green beans  Snack: peanuts, gingersnap cookies, cheddar cheese  Water: 2 16.9 oz bottles/day  Sugar Sweetened beverages: juice   Etoh: beer on weekends   Satiety cues: sometimes feels full after consuming meals, denies having to consume large amounts to feel full   Cravings: sweets  Food noise: yes  Dietician: 07/31/2024    Eating Disorders:   Denies     Current physical activity:   work  Barriers: pain to both feet    Stressors/Mental Health   Denies, is a stress eater  Over the last two weeks how often have  you been bothered by little interest or pleasure in doing things: 0  Over the last two weeks how often have you been bothered by feeling down, depressed or hopeless: 0  PHQ-2 Total Score: 0    Sleep habits   Sleeps ok, falls asleep fast but has difficulty staying asleep, was supposed to have sleep study but states he never went to appointment     CVD screening  The 10-year ASCVD risk score (Moe HUMPHRIES, et al., 2019) is: 37.5%    Values used to calculate the score:      Age: 59 years      Sex: Male      Is Non- : Yes      Diabetic: Yes      Tobacco smoker: Yes      Systolic Blood Pressure: 132 mmHg      Is BP treated: Yes      HDL Cholesterol: 56 mg/dL      Total Cholesterol: 156 mg/dL      I spent 16 minutes counseling patient on diet, physical activity and reviewing labs.     Review of patient's allergies indicates:   Allergen Reactions    Naproxen Swelling     Current Outpatient Medications   Medication Instructions    allopurinoL (ZYLOPRIM) 300 mg, Oral, Daily    amLODIPine (NORVASC) 10 MG tablet Take 1 tablet by mouth once daily    atorvastatin (LIPITOR) 20 mg, Oral, Daily    EnbreL SureClick 50 mg, Subcutaneous, Weekly    ergocalciferol (ERGOCALCIFEROL) 50,000 Units, Oral, Every 7 days    folic acid (FOLVITE) 1 mg, Oral, Daily    HYDROcodone-acetaminophen (NORCO) 5-325 mg per tablet Take 1 tablet by mouth every 6 hours as needed for pain    metFORMIN (GLUCOPHAGE) 500 mg, Oral, Daily    methotrexate 15 mg, Oral, Every 7 days    mupirocin (BACTROBAN) 2 % ointment Topical (Top), 3 times daily    needle (disp) 22 G 1 Units, Misc.(Non-Drug; Combo Route), Every 21 days    olmesartan-hydrochlorothiazide (BENICAR HCT) 20-12.5 mg per tablet 1 tablet, Oral, Daily    polyethylene glycol (GLYCOLAX) 17 g, Oral, Daily    tirzepatide 7.5 mg, Subcutaneous, Every 7 days     Past Surgical History:   Procedure Laterality Date    COLONOSCOPY N/A 6/29/2018    Procedure: COLONOSCOPY;  Surgeon: Jeremiah Anaya  "III, MD;  Location: [facility];  Service: Endoscopy;  Laterality: N/A;     family history includes Cataracts in his mother; Diabetes in his mother and sister; Hypertension in his mother.        Subjective:     Review of Systems   Constitutional: Negative.    HENT: Negative.     Eyes: Negative.    Respiratory: Negative.     Cardiovascular: Negative.    Gastrointestinal: Negative.    Endocrine: Negative.    Genitourinary: Negative.    Musculoskeletal: Negative.    Skin: Negative.    Allergic/Immunologic: Negative.    Neurological: Negative.    Hematological: Negative.    Psychiatric/Behavioral: Negative.       Objective:     Visit Vitals  /82 (BP Location: Left arm, Patient Position: Sitting)   Pulse 68   Resp 18   Ht 5' 3" (1.6 m)   Wt 123.7 kg (272 lb 11.3 oz)   SpO2 98%   BMI 48.31 kg/m²       Physical Exam  Vitals reviewed.   Constitutional:       Appearance: Normal appearance. He is obese.   HENT:      Head: Normocephalic and atraumatic.   Eyes:      Extraocular Movements: Extraocular movements intact.      Conjunctiva/sclera: Conjunctivae normal.      Pupils: Pupils are equal, round, and reactive to light.   Cardiovascular:      Rate and Rhythm: Normal rate and regular rhythm.      Heart sounds: Normal heart sounds.   Pulmonary:      Effort: Pulmonary effort is normal.      Breath sounds: Normal breath sounds.   Abdominal:      General: Bowel sounds are normal.   Musculoskeletal:         General: Normal range of motion.      Cervical back: Normal range of motion.   Skin:     General: Skin is warm and dry.   Neurological:      Mental Status: He is alert and oriented to person, place, and time.   Psychiatric:         Mood and Affect: Mood normal.         Behavior: Behavior normal.       Labs Reviewed:     Hematology:  Lab Results   Component Value Date    WBC 6.20 03/12/2024    HGB 12.3 (L) 03/12/2024    HCT 38.1 (L) 03/12/2024     03/12/2024     Chemistry:  Lab Results   Component Value Date     " 11/15/2024    K 3.8 11/15/2024    BUN 22 (H) 11/15/2024    CREATININE 1.1 11/15/2024    EGFRNORACEVR >60.0 11/15/2024    CALCIUM 9.4 11/15/2024    ALKPHOS 92 03/12/2024    ALBUMIN 3.7 03/12/2024    ALBUMIN 4.2 05/31/2016    BILIDIR 0.2 03/04/2021    AST 21 03/12/2024    ALT 22 03/12/2024    PHOS 3.3 11/29/2021    AHLBCIBM98QU 28 (L) 11/11/2022      Lab Results   Component Value Date    HGBA1C 5.0 11/15/2024      Lipid Panel:  Lab Results   Component Value Date    CHOL 156 08/14/2024    HDL 56 08/14/2024    TRIG 57 08/14/2024    TOTALCHOLEST 2.8 08/14/2024      Thyroid:  Lab Results   Component Value Date    TSH 1.578 08/14/2024    T4FREE 0.8 07/08/2016    T3FREE 2.7 07/08/2016      Urine:  Lab Results   Component Value Date    APPEARANCEUA Clear 11/29/2021    PROTEINUA Negative 11/29/2021    LEUKOCYTESUR Negative 11/29/2021    RBCUA 1 11/29/2021    WBCUA 0 11/29/2021    BACTERIA Occasional 11/29/2021    CREATRANDUR 47.0 05/09/2023    PROTEINURINE 7 11/29/2021        Assessment:       ICD-10-CM ICD-9-CM   1. Metabolic syndrome  E88.810 277.7   2. Type 2 diabetes mellitus with stage 2 chronic kidney disease, without long-term current use of insulin  E11.22 250.40    N18.2 585.2   3. Essential hypertension  I10 401.9   4. Mixed hyperlipidemia  E78.2 272.2   5. Vitamin D deficiency  E55.9 268.9   6. Class 3 severe obesity due to excess calories with serious comorbidity and body mass index (BMI) of 45.0 to 49.9 in adult  E66.813 278.01    Z68.42 V85.42    E66.01    7. Stage 2 chronic kidney disease  N18.2 585.2   8. Rheumatoid arthritis of multiple sites with negative rheumatoid factor  M06.09 714.0   9. Drug-induced constipation  K59.03 564.09     E980.5        Plan:     1. Metabolic syndrome (Primary)  The main goals of treating metabolic syndrome are to lower your risk of heart disease and to prevent type 2 diabetes if it hasnt already developed. If you already have type 2 diabetes, treatment can lower your risk of  heart disease by controlling all your risk factors. Per NIH (2020 Mar), a GLP?1 receptor agonist has been used to treat metabolic syndrome (MS) because of its anti?diabetic and anti?obesity effects. Results showed that a GLP-1 receptor agonist treatment reduced body weight gain and food intake in a dose?dependent manner. Marked hyperglycemia and glucose tolerance were significantly improved and it was also shown to reduce plasma triglyceride concentration and liver fat accumulation.   Weight loss and exercise can help reverse insulin resistance.   Aerobic activity for 20-30 minutes 5 days a week.  Recommend Low carbohydrate or Mediterranean diet.  Quitting smoking and avoiding secondhand smoke  Limiting alcohol  Increase Mounjaro to 7.5 mg SC weekly    2. Type 2 diabetes mellitus with stage 2 chronic kidney disease, without long-term current use of insulin  Increase Mounjaro 7.5 mg SC weekly  Encouraged home CBG monitoring.  Hypoglycemic episodes: denies  Body mass index is 48.31 kg/m².  Hemoglobin A1C   Date Value Ref Range Status   11/15/2024 5.0 4.0 - 5.6 % Final     Comment:     ADA Screening Guidelines:  5.7-6.4%  Consistent with prediabetes  >or=6.5%  Consistent with diabetes    High levels of fetal hemoglobin interfere with the HbA1C  assay. Heterozygous hemoglobin variants (HbS, HgC, etc)do  not significantly interfere with this assay.   However, presence of multiple variants may affect accuracy.       Results for orders placed or performed in visit on 05/09/23   Microalbumin/Creatinine Ratio, Urine   Result Value Ref Range    Microalbumin, Urine 8.0 ug/mL    Creatinine, Urine 47.0 23.0 - 375.0 mg/dL    Microalb/Creat Ratio 17.0 0.0 - 30.0 ug/mg    Narrative    Specimen Source->Urine   On Atorvastatin  On Olmesartan  Weight Loss Encouraged  ADA Diet  - tirzepatide 7.5 mg/0.5 mL PnIj; Inject 7.5 mg into the skin every 7 days.  Dispense: 2 mL; Refill: 1    3. Essential hypertension  Vitals:    03/12/25 0856    BP: 132/82   Pulse: 68   Resp: 18      Results for orders placed or performed in visit on 05/09/23   Microalbumin/Creatinine Ratio, Urine   Result Value Ref Range    Microalbumin, Urine 8.0 ug/mL    Creatinine, Urine 47.0 23.0 - 375.0 mg/dL    Microalb/Creat Ratio 17.0 0.0 - 30.0 ug/mg    Narrative    Specimen Source->Urine     Continue Olmesartan-HCTZ, Amlodipine  DASH diet  Encouraged home blood pressure monitoring    4. Mixed hyperlipidemia  Continue Atorvastatin  Weight loss encouraged  Low fat/high fiber diet  Increase physical activity  Tobacco cessation encouraged  Cholesterol   Date Value Ref Range Status   08/14/2024 156 120 - 199 mg/dL Final     Comment:     The National Cholesterol Education Program (NCEP) has set the  following guidelines (reference ranges) for Cholesterol:  Optimal.....................<200 mg/dL  Borderline High.............200-239 mg/dL  High........................> or = 240 mg/dL       HDL   Date Value Ref Range Status   08/14/2024 56 40 - 75 mg/dL Final     Comment:     The National Cholesterol Education Program (NCEP) has set the  following guidelines (reference values) for HDL Cholesterol:  Low...............<40 mg/dL  Optimal...........>60 mg/dL       Triglycerides   Date Value Ref Range Status   08/14/2024 57 30 - 150 mg/dL Final     Comment:     The National Cholesterol Education Program (NCEP) has set the  following guidelines (reference values) for triglycerides:  Normal......................<150 mg/dL  Borderline High.............150-199 mg/dL  High........................200-499 mg/dL       5. Vitamin D deficiency  Vitamin D level: 28  Continue Vitamin D supplement     6. Class 3 severe obesity due to excess calories with serious comorbidity and body mass index (BMI) of 45.0 to 49.9 in adult  Body mass index is 48.31 kg/m².  Wt Readings from Last 1 Encounters:   03/12/25 123.7 kg (272 lb 11.3 oz)   Waist Circumference: 53 in  TSH   Date Value Ref Range Status   08/14/2024  1.578 0.400 - 4.000 uIU/mL Final     Vit D, 25-Hydroxy   Date Value Ref Range Status   11/11/2022 28 (L) 30 - 96 ng/mL Final     Comment:     Vitamin D deficiency.........<10 ng/mL                              Vitamin D insufficiency......10-29 ng/mL       Vitamin D sufficiency........> or equal to 30 ng/mL  Vitamin D toxicity............>100 ng/mL        A modest (5-10%) weight loss improves health and quality of life.     Obesity Complication % Weight Loss for Therapeutic Benefit   Diabetes Prevention 5% to 10%   Diabetes Remission 20%   Hypertension 5% to 15%   CVD Risk Reduction >10%   MASLD/MASH 5% to 10%/ >10%   Sleep Apnea 10%   Osteoarthritis 5% to 10%   Urinary Stress Incontinence  5% to 10%   GERD Women: 5% to 10%, Men 10%   PCOS 5% to 10%         Goal: to weigh 250 lbs  Short-term: lose 10 lbs in 12 weeks  Long-term: lose 40 lbs in 1 year  *Keep in mind that, on average, you may lose 1-2 lbs per week*    Willingness to change:     Patient qualifies for anti-obesity medications due to BMI 48 with multiple comorbidities. Anti-obesity medications are an adjunct to nutritional, physical activity, and behavioral therapies. Medication alone cannot treat obesity. I recommend starting medication to significantly improve patient's risk factor/s.   Medication:   Increase Mounjaro to 7.5 mg SC weekly    Nutrition: Calories per day.      Protein: goal is 123 g/day. Protein has a slower rate of digestion = greater satiety (fullness).        Aim for <25g of added sugar per day.      Recommend to increase fiber intake. USDA guidelines: Men 50+ y/o - 28 g/day.  www.fullplateliving.org      Eat Fit Shopping List.      Prepare meals in advance.      Eat breakfast within 2 hours of waking up.       Track what you eat. Research shows that people who track their food intake are more successful with weight management. This creates awareness of what you are eating/drinking and can help you see where to make changes.       Get  to know your plate.  www.myplate.gov      Stay hydrated.     Activity: 2-3 days of strength training. This can be body weight, light weight or elastic bands exercises. [employer] and [employer] are great sources for free workout plans/videos.     Start slowly with an activity you enjoy and make it a habit.     Ask a family member or friend to get active with you.     Aim for 5,000 - 10,000 steps per day     Schedule time to make physical activity a part of your daily routine.     Exercise prescription:     Frequency: 3 days/week    Intensity: low  Low: walking, swimming, cycling at a leisurely pace, yoga, christopher chi, light weight training, stretching, slow dancing, leisurely sports like table tennis, and light yard work     Type: Walk Track at work or [employer] videos    Time: 20 minutes    Enjoyment: (make it fun)    Sleep: 7-8 hours of sleep a night    *Recognize your progress and remember that each day is a new day*  *Stay on track, even when you feel like you're not making progress*  *Sit Less, Stand Often, Move More*  *You don't have to be perfect; be persistent*    Avoiding Weight Regain:  - continued with modified food intake (changed eating habits)  - eat breakfast every day  - weigh yourself at least once a week  - watch less than 10 hours of TV per week  - continue with increased physical activity  - physical activity, on average, about 1 hour per day    7. Stage 2 chronic kidney disease  BUN/Cr: 22/1.1  GFR: >60  Maintain good B/P and CBG control    8. Rheumatoid arthritis of multiple sites with negative rheumatoid factor  Followed by Rheumatology  Stable      Follow up in about 6 weeks (around 4/23/2025). In addition to their scheduled follow up, the patient has also been instructed to follow up on as needed basis.     JESSICA Kimball

## 2025-03-18 ENCOUNTER — LAB VISIT (OUTPATIENT)
Dept: LAB | Facility: HOSPITAL | Age: 60
End: 2025-03-18
Attending: INTERNAL MEDICINE
Payer: COMMERCIAL

## 2025-03-18 ENCOUNTER — OFFICE VISIT (OUTPATIENT)
Dept: FAMILY MEDICINE | Facility: CLINIC | Age: 60
End: 2025-03-18
Payer: COMMERCIAL

## 2025-03-18 VITALS
SYSTOLIC BLOOD PRESSURE: 130 MMHG | HEART RATE: 60 BPM | HEIGHT: 63 IN | WEIGHT: 269.19 LBS | DIASTOLIC BLOOD PRESSURE: 78 MMHG | RESPIRATION RATE: 18 BRPM | OXYGEN SATURATION: 99 % | BODY MASS INDEX: 47.7 KG/M2 | TEMPERATURE: 99 F

## 2025-03-18 DIAGNOSIS — N18.2 STAGE 2 CHRONIC KIDNEY DISEASE: Primary | Chronic | ICD-10-CM

## 2025-03-18 DIAGNOSIS — M06.09 RHEUMATOID ARTHRITIS OF MULTIPLE SITES WITH NEGATIVE RHEUMATOID FACTOR: Chronic | ICD-10-CM

## 2025-03-18 DIAGNOSIS — E78.2 MIXED HYPERLIPIDEMIA: Chronic | ICD-10-CM

## 2025-03-18 DIAGNOSIS — Z00.00 ROUTINE ADULT HEALTH MAINTENANCE: ICD-10-CM

## 2025-03-18 DIAGNOSIS — D56.3 ALPHA THALASSEMIA SILENT CARRIER: ICD-10-CM

## 2025-03-18 DIAGNOSIS — N18.2 TYPE 2 DIABETES MELLITUS WITH STAGE 2 CHRONIC KIDNEY DISEASE, WITHOUT LONG-TERM CURRENT USE OF INSULIN: Chronic | ICD-10-CM

## 2025-03-18 DIAGNOSIS — E11.22 TYPE 2 DIABETES MELLITUS WITH STAGE 2 CHRONIC KIDNEY DISEASE, WITHOUT LONG-TERM CURRENT USE OF INSULIN: Chronic | ICD-10-CM

## 2025-03-18 DIAGNOSIS — E55.9 VITAMIN D DEFICIENCY: ICD-10-CM

## 2025-03-18 DIAGNOSIS — I10 ESSENTIAL HYPERTENSION: Chronic | ICD-10-CM

## 2025-03-18 LAB
ALBUMIN SERPL BCP-MCNC: 3.5 G/DL (ref 3.5–5.2)
ALP SERPL-CCNC: 103 U/L (ref 40–150)
ALT SERPL W/O P-5'-P-CCNC: 13 U/L (ref 10–44)
ANION GAP SERPL CALC-SCNC: 11 MMOL/L (ref 8–16)
AST SERPL-CCNC: 21 U/L (ref 10–40)
BASOPHILS # BLD AUTO: 0.09 K/UL (ref 0–0.2)
BASOPHILS NFR BLD: 1.2 % (ref 0–1.9)
BILIRUB SERPL-MCNC: 0.3 MG/DL (ref 0.1–1)
BUN SERPL-MCNC: 20 MG/DL (ref 8–23)
CALCIUM SERPL-MCNC: 8.7 MG/DL (ref 8.7–10.5)
CHLORIDE SERPL-SCNC: 104 MMOL/L (ref 95–110)
CO2 SERPL-SCNC: 25 MMOL/L (ref 23–29)
COMPLEXED PSA SERPL-MCNC: 0.12 NG/ML (ref 0–4)
CREAT SERPL-MCNC: 0.9 MG/DL (ref 0.5–1.4)
DIFFERENTIAL METHOD BLD: ABNORMAL
EOSINOPHIL # BLD AUTO: 0.1 K/UL (ref 0–0.5)
EOSINOPHIL NFR BLD: 1.2 % (ref 0–8)
ERYTHROCYTE [DISTWIDTH] IN BLOOD BY AUTOMATED COUNT: 16.3 % (ref 11.5–14.5)
EST. GFR  (NO RACE VARIABLE): >60 ML/MIN/1.73 M^2
ESTIMATED AVG GLUCOSE: 97 MG/DL (ref 68–131)
GLUCOSE SERPL-MCNC: 75 MG/DL (ref 70–110)
HBA1C MFR BLD: 5 % (ref 4–5.6)
HCT VFR BLD AUTO: 41.4 % (ref 40–54)
HGB BLD-MCNC: 12.8 G/DL (ref 14–18)
IMM GRANULOCYTES # BLD AUTO: 0.03 K/UL (ref 0–0.04)
IMM GRANULOCYTES NFR BLD AUTO: 0.4 % (ref 0–0.5)
LYMPHOCYTES # BLD AUTO: 2.4 K/UL (ref 1–4.8)
LYMPHOCYTES NFR BLD: 32.5 % (ref 18–48)
MCH RBC QN AUTO: 25.3 PG (ref 27–31)
MCHC RBC AUTO-ENTMCNC: 30.9 G/DL (ref 32–36)
MCV RBC AUTO: 82 FL (ref 82–98)
MONOCYTES # BLD AUTO: 0.6 K/UL (ref 0.3–1)
MONOCYTES NFR BLD: 7.5 % (ref 4–15)
NEUTROPHILS # BLD AUTO: 4.2 K/UL (ref 1.8–7.7)
NEUTROPHILS NFR BLD: 57.2 % (ref 38–73)
NRBC BLD-RTO: 0 /100 WBC
PLATELET # BLD AUTO: 166 K/UL (ref 150–450)
PMV BLD AUTO: 12 FL (ref 9.2–12.9)
POTASSIUM SERPL-SCNC: 3.9 MMOL/L (ref 3.5–5.1)
PROT SERPL-MCNC: 7.3 G/DL (ref 6–8.4)
RBC # BLD AUTO: 5.05 M/UL (ref 4.6–6.2)
SODIUM SERPL-SCNC: 140 MMOL/L (ref 136–145)
WBC # BLD AUTO: 7.29 K/UL (ref 3.9–12.7)

## 2025-03-18 PROCEDURE — 83036 HEMOGLOBIN GLYCOSYLATED A1C: CPT | Performed by: INTERNAL MEDICINE

## 2025-03-18 PROCEDURE — 85025 COMPLETE CBC W/AUTO DIFF WBC: CPT | Performed by: INTERNAL MEDICINE

## 2025-03-18 PROCEDURE — 99999 PR PBB SHADOW E&M-EST. PATIENT-LVL V: CPT | Mod: PBBFAC,,, | Performed by: INTERNAL MEDICINE

## 2025-03-18 PROCEDURE — 3072F LOW RISK FOR RETINOPATHY: CPT | Mod: CPTII,S$GLB,, | Performed by: INTERNAL MEDICINE

## 2025-03-18 PROCEDURE — 36415 COLL VENOUS BLD VENIPUNCTURE: CPT | Mod: PO | Performed by: INTERNAL MEDICINE

## 2025-03-18 PROCEDURE — 84153 ASSAY OF PSA TOTAL: CPT | Performed by: INTERNAL MEDICINE

## 2025-03-18 PROCEDURE — 3008F BODY MASS INDEX DOCD: CPT | Mod: CPTII,S$GLB,, | Performed by: INTERNAL MEDICINE

## 2025-03-18 PROCEDURE — 99396 PREV VISIT EST AGE 40-64: CPT | Mod: S$GLB,,, | Performed by: INTERNAL MEDICINE

## 2025-03-18 PROCEDURE — 3075F SYST BP GE 130 - 139MM HG: CPT | Mod: CPTII,S$GLB,, | Performed by: INTERNAL MEDICINE

## 2025-03-18 PROCEDURE — 3078F DIAST BP <80 MM HG: CPT | Mod: CPTII,S$GLB,, | Performed by: INTERNAL MEDICINE

## 2025-03-18 PROCEDURE — 1159F MED LIST DOCD IN RCRD: CPT | Mod: CPTII,S$GLB,, | Performed by: INTERNAL MEDICINE

## 2025-03-18 PROCEDURE — 80053 COMPREHEN METABOLIC PANEL: CPT | Performed by: INTERNAL MEDICINE

## 2025-03-18 RX ORDER — ERGOCALCIFEROL 1.25 MG/1
50000 CAPSULE ORAL
Qty: 12 CAPSULE | Refills: 3 | Status: SHIPPED | OUTPATIENT
Start: 2025-03-18

## 2025-03-18 NOTE — PROGRESS NOTES
Subjective:       Patient ID: Juan C Rodriguez is a 59 y.o. male.    Chief Complaint: Follow-up, Hypertension, Hyperlipidemia, and Diabetes    Follow-up  Associated symptoms include arthralgias. Pertinent negatives include no abdominal pain, chest pain, chills, coughing, diaphoresis, fatigue, fever, headaches, joint swelling, myalgias, nausea, neck pain, numbness, rash, sore throat, vomiting or weakness.   Hypertension  Pertinent negatives include no chest pain, headaches, neck pain, palpitations or shortness of breath.   Hyperlipidemia  Pertinent negatives include no chest pain, myalgias or shortness of breath.   Diabetes  Pertinent negatives for hypoglycemia include no confusion, dizziness, headaches, nervousness/anxiousness, pallor, seizures, speech difficulty or tremors. Pertinent negatives for diabetes include no chest pain, no fatigue, no polydipsia, no polyphagia, no polyuria and no weakness.     Past Medical History:   Diagnosis Date    Diabetes mellitus, type 2 diagnosed 2013    DM (diabetes mellitus)     2011  02/25/2014    HTN (hypertension)     Hypogonadism, testicular     Left tibialis posterior tendinitis 11/30/2016    Obesities, morbid      Past Surgical History:   Procedure Laterality Date    COLONOSCOPY N/A 6/29/2018    Procedure: COLONOSCOPY;  Surgeon: Jeremiah Anaya III, MD;  Location: [facility];  Service: Endoscopy;  Laterality: N/A;     Family History   Problem Relation Name Age of Onset    Cataracts Mother      Diabetes Mother      Hypertension Mother      Diabetes Sister       Social History[1]  Review of Systems   Constitutional:  Negative for activity change, appetite change, chills, diaphoresis, fatigue, fever and unexpected weight change.   HENT:  Negative for drooling, ear discharge, ear pain, facial swelling, hearing loss, mouth sores, nosebleeds, postnasal drip, rhinorrhea, sinus pressure, sneezing, sore throat, tinnitus, trouble swallowing and voice change.     Eyes:  Negative for photophobia, redness and visual disturbance.   Respiratory:  Negative for apnea, cough, choking, chest tightness, shortness of breath and wheezing.    Cardiovascular:  Negative for chest pain, palpitations and leg swelling.   Gastrointestinal:  Negative for abdominal distention, abdominal pain, anal bleeding, blood in stool, constipation, diarrhea, nausea, rectal pain and vomiting.   Endocrine: Negative for cold intolerance, heat intolerance, polydipsia, polyphagia and polyuria.   Genitourinary:  Negative for difficulty urinating, dysuria, enuresis, flank pain, frequency, genital sores, hematuria and urgency.   Musculoskeletal:  Positive for arthralgias. Negative for back pain, gait problem, joint swelling, myalgias, neck pain and neck stiffness.   Skin:  Negative for color change, pallor, rash and wound.   Allergic/Immunologic: Negative for food allergies and immunocompromised state.   Neurological:  Negative for dizziness, tremors, seizures, syncope, facial asymmetry, speech difficulty, weakness, light-headedness, numbness and headaches.   Hematological:  Negative for adenopathy. Does not bruise/bleed easily.   Psychiatric/Behavioral:  Negative for agitation, behavioral problems, confusion, decreased concentration, dysphoric mood, hallucinations, self-injury, sleep disturbance and suicidal ideas. The patient is not nervous/anxious and is not hyperactive.        Objective:      Physical Exam  Vitals and nursing note reviewed.   Constitutional:       General: He is not in acute distress.     Appearance: Normal appearance. He is well-developed. He is not diaphoretic.   HENT:      Head: Normocephalic and atraumatic.      Mouth/Throat:      Pharynx: No oropharyngeal exudate.   Eyes:      General: No scleral icterus.     Pupils: Pupils are equal, round, and reactive to light.   Neck:      Thyroid: No thyromegaly.      Vascular: No carotid bruit or JVD.      Trachea: No tracheal deviation.    Cardiovascular:      Rate and Rhythm: Normal rate and regular rhythm.      Heart sounds: Normal heart sounds.   Pulmonary:      Effort: Pulmonary effort is normal. No respiratory distress.      Breath sounds: Normal breath sounds. No wheezing or rales.   Chest:      Chest wall: No tenderness.   Abdominal:      General: Bowel sounds are normal. There is no distension.      Palpations: Abdomen is soft.      Tenderness: There is no abdominal tenderness. There is no guarding or rebound.   Musculoskeletal:         General: No tenderness. Normal range of motion.      Cervical back: Normal range of motion and neck supple.   Lymphadenopathy:      Cervical: No cervical adenopathy.   Skin:     General: Skin is warm and dry.      Coloration: Skin is not pale.      Findings: No erythema or rash.   Neurological:      Mental Status: He is alert and oriented to person, place, and time.      Cranial Nerves: No cranial nerve deficit.      Coordination: Coordination normal.   Psychiatric:         Behavior: Behavior normal.         Thought Content: Thought content normal.         Judgment: Judgment normal.         CMP  Sodium   Date Value Ref Range Status   11/15/2024 140 136 - 145 mmol/L Final     Potassium   Date Value Ref Range Status   11/15/2024 3.8 3.5 - 5.1 mmol/L Final     Chloride   Date Value Ref Range Status   11/15/2024 104 95 - 110 mmol/L Final     CO2   Date Value Ref Range Status   11/15/2024 26 23 - 29 mmol/L Final     Glucose   Date Value Ref Range Status   11/15/2024 85 70 - 110 mg/dL Final     BUN   Date Value Ref Range Status   11/15/2024 22 (H) 6 - 20 mg/dL Final     Creatinine   Date Value Ref Range Status   11/15/2024 1.1 0.5 - 1.4 mg/dL Final     Calcium   Date Value Ref Range Status   11/15/2024 9.4 8.7 - 10.5 mg/dL Final     Total Protein   Date Value Ref Range Status   03/12/2024 7.7 6.0 - 8.4 g/dL Final     Albumin   Date Value Ref Range Status   03/12/2024 3.7 3.5 - 5.2 g/dL Final   05/31/2016 4.2 3.6 -  5.1 g/dL Final     Comment:     @ Test Performed By:  [facility] CHRISSY Chinchilla M.D..,   [address] [place] [address]  [facility]  [id number]       Total Bilirubin   Date Value Ref Range Status   03/12/2024 0.4 0.1 - 1.0 mg/dL Final     Comment:     For infants and newborns, interpretation of results should be based  on gestational age, weight and in agreement with clinical  observations.    Premature Infant recommended reference ranges:  Up to 24 hours.............<8.0 mg/dL  Up to 48 hours............<12.0 mg/dL  3-5 days..................<15.0 mg/dL  6-29 days.................<15.0 mg/dL       Alkaline Phosphatase   Date Value Ref Range Status   03/12/2024 92 55 - 135 U/L Final     AST   Date Value Ref Range Status   03/12/2024 21 10 - 40 U/L Final     ALT   Date Value Ref Range Status   03/12/2024 22 10 - 44 U/L Final     Anion Gap   Date Value Ref Range Status   11/15/2024 10 8 - 16 mmol/L Final     eGFR if    Date Value Ref Range Status   07/15/2022 >60 >60 mL/min/1.73 m^2 Final     eGFR if non    Date Value Ref Range Status   07/15/2022 >60 >60 mL/min/1.73 m^2 Final     Comment:     Calculation used to obtain the estimated glomerular filtration  rate (eGFR) is the CKD-EPI equation.        Lab Results   Component Value Date    WBC 6.20 03/12/2024    HGB 12.3 (L) 03/12/2024    HCT 38.1 (L) 03/12/2024    MCV 79 (L) 03/12/2024     03/12/2024     Lab Results   Component Value Date    CHOL 156 08/14/2024     Lab Results   Component Value Date    HDL 56 08/14/2024     Lab Results   Component Value Date    LDLCALC 88.6 08/14/2024     Lab Results   Component Value Date    TRIG 57 08/14/2024     Lab Results   Component Value Date    CHOLHDL 35.9 08/14/2024     Lab Results   Component Value Date    TSH 1.578 08/14/2024     Lab Results   Component Value Date    LABA1C 6.1 (H) 03/16/2017    HGBA1C 5.0 11/15/2024      Assessment:       1. Stage 2 chronic kidney disease    2. Rheumatoid arthritis of multiple sites with negative rheumatoid factor    3. Mixed hyperlipidemia    4. Essential hypertension    5. Type 2 diabetes mellitus with stage 2 chronic kidney disease, without long-term current use of insulin    6. Alpha thalassemia silent carrier    7. Routine adult health maintenance        Plan:   Stage 2 chronic kidney disease    Rheumatoid arthritis of multiple sites with negative rheumatoid factor  -     Ambulatory referral/consult to Rheumatology; Future; Expected date: 03/25/2025    Mixed hyperlipidemia    Essential hypertension    Type 2 diabetes mellitus with stage 2 chronic kidney disease, without long-term current use of insulin  -     Hemoglobin A1C; Future; Expected date: 03/18/2025    Alpha thalassemia silent carrier    Routine adult health maintenance  -     Comprehensive Metabolic Panel; Future; Expected date: 03/18/2025  -     CBC Auto Differential; Future; Expected date: 03/18/2025  -     Hemoglobin A1C; Future; Expected date: 03/18/2025  -     PSA, Screening; Future; Expected date: 03/18/2025      Stable---------------continue meds--------------as above-----------------------f/u 4 months--------------       [1]   Social History  Socioeconomic History    Marital status: Single   Tobacco Use    Smoking status: Some Days     Types: Cigars    Smokeless tobacco: Never   Substance and Sexual Activity    Alcohol use: Yes     Alcohol/week: 3.0 - 4.0 standard drinks of alcohol     Types: 3 - 4 Cans of beer per week     Comment: occasional weekends    Drug use: No    Sexual activity: Not Currently     Partners: Female   Social History Narrative    None     Social Drivers of Health     Food Insecurity: No Food Insecurity (7/25/2023)    Received from [facility] Subsidiaries and Affiliates    Hunger Vital Sign     Worried About Running Out of Food in the Last Year: Never true      Ran Out of Food in the Last Year: Never true   Transportation Needs: No Transportation Needs (7/25/2023)    [facility]    PRAPARE - Transportation     Lack of Transportation (Medical): No     Lack of Transportation (Non-Medical): No   Stress: No Stress Concern Present (8/1/2023)    [facility]    [place] [employer] - Occupational Stress Questionnaire     Feeling of Stress : Not at all   Housing Stability: Unknown (8/1/2023)    [facility]    Housing Stability Vital Sign     Unable to Pay for Housing in the Last Year: No     Unstable Housing in the Last Year: No

## 2025-03-19 ENCOUNTER — TELEPHONE (OUTPATIENT)
Dept: FAMILY MEDICINE | Facility: CLINIC | Age: 60
End: 2025-03-19
Payer: COMMERCIAL

## 2025-03-19 ENCOUNTER — RESULTS FOLLOW-UP (OUTPATIENT)
Dept: FAMILY MEDICINE | Facility: CLINIC | Age: 60
End: 2025-03-19

## 2025-03-19 DIAGNOSIS — M19.90 ARTHRITIS: Primary | ICD-10-CM

## 2025-03-19 NOTE — TELEPHONE ENCOUNTER
Can you put in a rheumatology referral again? He declined to schedule yesterday but changed his mind and would like to go to the [facility] on [address]. Please place external referral.

## 2025-03-19 NOTE — TELEPHONE ENCOUNTER
----- Message from Renetta sent at 3/19/2025 10:13 AM CDT -----  Contact: GREY SHIN [[id number]]  .Type:  Patient Requesting ReferralWho Called:GREY SHIN [[id number]]Does the patient already have the specialty appointment scheduled?:noIf yes, what is the date of that appointment?:Referral to What Specialty:rheumatologyReason for Referral:no availability at [facility] the patient want the referral with a specific physician?:yesIs the specialist an [facility] or [facility] Request[facility] a Response?:yesWould the patient rather a call back or a response via [facility]? [facility] Call Back Number:.[phone] (home) [phone] (work)Additional Information:

## 2025-04-23 ENCOUNTER — OFFICE VISIT (OUTPATIENT)
Dept: PRIMARY CARE CLINIC | Facility: CLINIC | Age: 60
End: 2025-04-23
Payer: COMMERCIAL

## 2025-04-23 VITALS
WEIGHT: 271.5 LBS | HEIGHT: 61 IN | OXYGEN SATURATION: 99 % | DIASTOLIC BLOOD PRESSURE: 78 MMHG | SYSTOLIC BLOOD PRESSURE: 132 MMHG | BODY MASS INDEX: 51.26 KG/M2 | HEART RATE: 61 BPM | RESPIRATION RATE: 18 BRPM

## 2025-04-23 DIAGNOSIS — M06.09 RHEUMATOID ARTHRITIS OF MULTIPLE SITES WITH NEGATIVE RHEUMATOID FACTOR: Chronic | ICD-10-CM

## 2025-04-23 DIAGNOSIS — E11.22 TYPE 2 DIABETES MELLITUS WITH STAGE 2 CHRONIC KIDNEY DISEASE, WITHOUT LONG-TERM CURRENT USE OF INSULIN: Chronic | ICD-10-CM

## 2025-04-23 DIAGNOSIS — E66.813 CLASS 3 SEVERE OBESITY DUE TO EXCESS CALORIES WITH SERIOUS COMORBIDITY AND BODY MASS INDEX (BMI) OF 45.0 TO 49.9 IN ADULT: Chronic | ICD-10-CM

## 2025-04-23 DIAGNOSIS — N18.2 TYPE 2 DIABETES MELLITUS WITH STAGE 2 CHRONIC KIDNEY DISEASE, WITHOUT LONG-TERM CURRENT USE OF INSULIN: Chronic | ICD-10-CM

## 2025-04-23 DIAGNOSIS — E88.810 METABOLIC SYNDROME: Primary | ICD-10-CM

## 2025-04-23 DIAGNOSIS — I10 ESSENTIAL HYPERTENSION: Chronic | ICD-10-CM

## 2025-04-23 DIAGNOSIS — N18.2 STAGE 2 CHRONIC KIDNEY DISEASE: Chronic | ICD-10-CM

## 2025-04-23 DIAGNOSIS — E55.9 VITAMIN D DEFICIENCY: ICD-10-CM

## 2025-04-23 DIAGNOSIS — E78.2 MIXED HYPERLIPIDEMIA: Chronic | ICD-10-CM

## 2025-04-23 DIAGNOSIS — E66.01 CLASS 3 SEVERE OBESITY DUE TO EXCESS CALORIES WITH SERIOUS COMORBIDITY AND BODY MASS INDEX (BMI) OF 45.0 TO 49.9 IN ADULT: Chronic | ICD-10-CM

## 2025-04-23 PROCEDURE — 99999 PR PBB SHADOW E&M-EST. PATIENT-LVL V: CPT | Mod: PBBFAC,,, | Performed by: NURSE PRACTITIONER

## 2025-04-23 NOTE — PROGRESS NOTES
Patient ID: [id number]     Chief Complaint: Follow-up    HPI:     Juan C Rodriguez is a 59 y.o. male with diagnosis of Metabolic Syndrome, HTN, HLD, DM2, Vitamin D Deficiency, Obesity, CKD2, RA, Anemia. Patient referred by PCP. Patient's PCP is Dr. Michelle. Patient seen today for Obesity. Patient last seen on 03/12/2025.     Weight history   Patient states always had increased weight, even as a child. Patient prepares his meals at home. On Mounjaro for DM2, lost over 30 lbs since starting medication.   04/23/2025: at previous appt, tirzepatide increased to 7.5 mg weekly. Tolerating well. Helps some with appetite. Aiming for protein goal, not keeping track. Has not increased physical activity.     Previous Obesity Treatment:   Diet/Exercise - unsuccessful   Mounjaro - helps with weight loss     BMI:  51.3 (04/23/2025)  48.3 (03/12/2025)  48.8 (11/21/2024)  49.8 (08/14/2024)    Weight:  Wt Readings from Last 6 Encounters:   04/23/25 123.2 kg (271 lb 8 oz)   03/18/25 122.1 kg (269 lb 2.9 oz)   03/12/25 123.7 kg (272 lb 11.3 oz)   11/21/24 124.9 kg (275 lb 5.7 oz)   11/15/24 124.9 kg (275 lb 5.7 oz)   08/14/24 127.4 kg (280 lb 13.9 oz)     Ideal/Adjusted Body Weight:  Ideal: 125 lbs  Adjusted: 184 lbs    Neck Circumference:   16.5 in    Waist Circumference:  53 in    Percent Body Fat:   56.3% (04/23/2025)  https://www.calculator.net/body-fat-calculator.html    InBody:   Date: 04/23/2025  SMM: 64.6 lbs  Body Fat Mass: 152.9 lbs  Visceral Fat Level: 24  Basal Metabolic Rate: 1,532 kcal    Nutrition history (24 hour food recall)  Breakfast: none   Lunch: sausage, ritz crackers  Dinner: pork loin, salad  Snack: fruit gushers  Water: 2 16.9 oz bottles/day  Sugar Sweetened beverages: juice   Etoh: beer on weekends   Satiety cues: sometimes feels full after consuming meals, denies having to consume large amounts to feel full   Cravings: sweets  Food noise: yes  Dietician: 07/31/2024    Eating Disorders:   Denies      Current physical activity:   work  Barriers: pain to both feet    Stressors/Mental Health   Denies, is a stress eater  Over the last two weeks how often have you been bothered by little interest or pleasure in doing things: 0  Over the last two weeks how often have you been bothered by feeling down, depressed or hopeless: 0  PHQ-2 Total Score: 0    Sleep habits   Sleeps ok, falls asleep fast but has difficulty staying asleep, was supposed to have sleep study but states he never went to appointment     CVD screening  The 10-year ASCVD risk score (Moe HUMPHRIES, et al., 2019) is: 37.5%    Values used to calculate the score:      Age: 59 years      Sex: Male      Is Non- : Yes      Diabetic: Yes      Tobacco smoker: Yes      Systolic Blood Pressure: 132 mmHg      Is BP treated: Yes      HDL Cholesterol: 56 mg/dL      Total Cholesterol: 156 mg/dL      I spent 9 minutes counseling patient on diet, physical activity and reviewing labs.     Review of patient's allergies indicates:   Allergen Reactions    Naproxen Swelling     Current Outpatient Medications   Medication Instructions    allopurinoL (ZYLOPRIM) 300 mg, Oral, Daily    amLODIPine (NORVASC) 10 MG tablet Take 1 tablet by mouth once daily    atorvastatin (LIPITOR) 20 mg, Oral, Daily    EnbreL SureClick 50 mg, Subcutaneous, Weekly    ergocalciferol (ERGOCALCIFEROL) 50,000 Units, Oral, Every 7 days    folic acid (FOLVITE) 1 mg, Oral, Daily    HYDROcodone-acetaminophen (NORCO) 5-325 mg per tablet Take 1 tablet by mouth every 6 hours as needed for pain    methotrexate 15 mg, Oral, Every 7 days    mupirocin (BACTROBAN) 2 % ointment Topical (Top), 3 times daily    needle (disp) 22 G 1 Units, Misc.(Non-Drug; Combo Route), Every 21 days    olmesartan-hydrochlorothiazide (BENICAR HCT) 20-12.5 mg per tablet 1 tablet, Oral, Daily    polyethylene glycol (GLYCOLAX) 17 g, Oral, Daily    tirzepatide 7.5 mg, Subcutaneous, Every 7 days     Past Surgical  "History:   Procedure Laterality Date    COLONOSCOPY N/A 6/29/2018    Procedure: COLONOSCOPY;  Surgeon: Jeremiah Anaya III, MD;  Location: [facility];  Service: Endoscopy;  Laterality: N/A;     family history includes Cataracts in his mother; Diabetes in his mother and sister; Hypertension in his mother.      Subjective:     Review of Systems   Constitutional: Negative.    HENT: Negative.     Eyes: Negative.    Respiratory: Negative.     Cardiovascular: Negative.    Gastrointestinal: Negative.    Endocrine: Negative.    Genitourinary: Negative.    Musculoskeletal: Negative.    Skin: Negative.    Allergic/Immunologic: Negative.    Neurological: Negative.    Hematological: Negative.    Psychiatric/Behavioral: Negative.       Objective:     Visit Vitals  /78 (BP Location: Left arm, Patient Position: Sitting)   Pulse 61   Resp 18   Ht 5' 1" (1.549 m)   Wt 123.2 kg (271 lb 8 oz)   SpO2 99%   BMI 51.30 kg/m²       Physical Exam  Vitals reviewed.   Constitutional:       Appearance: Normal appearance. He is obese.   HENT:      Head: Normocephalic and atraumatic.   Eyes:      Extraocular Movements: Extraocular movements intact.      Conjunctiva/sclera: Conjunctivae normal.      Pupils: Pupils are equal, round, and reactive to light.   Cardiovascular:      Rate and Rhythm: Normal rate and regular rhythm.      Heart sounds: Normal heart sounds.   Pulmonary:      Effort: Pulmonary effort is normal.      Breath sounds: Normal breath sounds.   Abdominal:      General: Bowel sounds are normal.   Musculoskeletal:         General: Normal range of motion.      Cervical back: Normal range of motion.   Skin:     General: Skin is warm and dry.   Neurological:      Mental Status: He is alert and oriented to person, place, and time.   Psychiatric:         Mood and Affect: Mood normal.         Behavior: Behavior normal.       Labs Reviewed:     Hematology:  Lab Results   Component Value Date    WBC 7.29 03/18/2025    HGB 12.8 (L) " 03/18/2025    HCT 41.4 03/18/2025     03/18/2025     Chemistry:  Lab Results   Component Value Date     03/18/2025    K 3.9 03/18/2025    BUN 20 03/18/2025    CREATININE 0.9 03/18/2025    EGFRNORACEVR >60.0 03/18/2025    CALCIUM 8.7 03/18/2025    ALKPHOS 103 03/18/2025    ALBUMIN 3.5 03/18/2025    ALBUMIN 4.2 05/31/2016    BILIDIR 0.2 03/04/2021    AST 21 03/18/2025    ALT 13 03/18/2025    PHOS 3.3 11/29/2021    [id number] 28 (L) 11/11/2022      Lab Results   Component Value Date    HGBA1C 5.0 03/18/2025      Lipid Panel:  Lab Results   Component Value Date    CHOL 156 08/14/2024    HDL 56 08/14/2024    TRIG 57 08/14/2024    TOTALCHOLEST 2.8 08/14/2024      Thyroid:  Lab Results   Component Value Date    TSH 1.578 08/14/2024    T4FREE 0.8 07/08/2016    T3FREE 2.7 07/08/2016      Urine:  Lab Results   Component Value Date    APPEARANCEUA Clear 11/29/2021    PROTEINUA Negative 11/29/2021    LEUKOCYTESUR Negative 11/29/2021    RBCUA 1 11/29/2021    WBCUA 0 11/29/2021    BACTERIA Occasional 11/29/2021    CREATRANDUR 47.0 05/09/2023    PROTEINURINE 7 11/29/2021        Assessment:       ICD-10-CM ICD-9-CM   1. Metabolic syndrome  E88.810 277.7   2. Type 2 diabetes mellitus with stage 2 chronic kidney disease, without long-term current use of insulin  E11.22 250.40    N18.2 585.2   3. Essential hypertension  I10 401.9   4. Mixed hyperlipidemia  E78.2 272.2   5. Vitamin D deficiency  E55.9 268.9   6. Class 3 severe obesity due to excess calories with serious comorbidity and body mass index (BMI) of 45.0 to 49.9 in adult  E66.813 278.01    Z68.42 V85.42    E66.01    7. Stage 2 chronic kidney disease  N18.2 585.2   8. Rheumatoid arthritis of multiple sites with negative rheumatoid factor  M06.09 714.0        Plan:     1. Metabolic syndrome (Primary)  The main goals of treating metabolic syndrome are to lower your risk of heart disease and to prevent type 2 diabetes if it hasnt already developed. If you already  have type 2 diabetes, treatment can lower your risk of heart disease by controlling all your risk factors. Per NIH (2020 Mar), a GLP?1 receptor agonist has been used to treat metabolic syndrome (MS) because of its anti?diabetic and anti?obesity effects. Results showed that a GLP-1 receptor agonist treatment reduced body weight gain and food intake in a dose?dependent manner. Marked hyperglycemia and glucose tolerance were significantly improved and it was also shown to reduce plasma triglyceride concentration and liver fat accumulation.   Weight loss and exercise can help reverse insulin resistance.   Aerobic activity for 20-30 minutes 5 days a week.  Recommend Low carbohydrate or Mediterranean diet.  Quitting smoking and avoiding secondhand smoke  Limiting alcohol  Continue Mounjaro to 7.5 mg SC weekly    2. Type 2 diabetes mellitus with stage 2 chronic kidney disease, without long-term current use of insulin  Continue Mounjaro 7.5 mg SC weekly  Encouraged home CBG monitoring.  Hypoglycemic episodes: denies  Body mass index is 51.3 kg/m².  Hemoglobin A1C   Date Value Ref Range Status   03/18/2025 5.0 4.0 - 5.6 % Final     Comment:     ADA Screening Guidelines:  5.7-6.4%  Consistent with prediabetes  >or=6.5%  Consistent with diabetes    High levels of fetal hemoglobin interfere with the HbA1C  assay. Heterozygous hemoglobin variants (HbS, HgC, etc)do  not significantly interfere with this assay.   However, presence of multiple variants may affect accuracy.       Results for orders placed or performed in visit on 05/09/23   Microalbumin/Creatinine Ratio, Urine   Result Value Ref Range    Microalbumin, Urine 8.0 ug/mL    Creatinine, Urine 47.0 23.0 - 375.0 mg/dL    Microalb/Creat Ratio 17.0 0.0 - 30.0 ug/mg    Narrative    Specimen Source->Urine   On Atorvastatin  On Olmesartan  Weight Loss Encouraged  ADA Diet    3. Essential hypertension  Vitals:    04/23/25 0856   BP: 132/78   Pulse: 61   Resp: 18     Results for  orders placed or performed in visit on 05/09/23   Microalbumin/Creatinine Ratio, Urine   Result Value Ref Range    Microalbumin, Urine 8.0 ug/mL    Creatinine, Urine 47.0 23.0 - 375.0 mg/dL    Microalb/Creat Ratio 17.0 0.0 - 30.0 ug/mg    Narrative    Specimen Source->Urine   Continue Olmesartan-HCTZ, Amlodipine  DASH diet  Encouraged home blood pressure monitoring    4. Mixed hyperlipidemia  Continue Atorvastatin  Weight loss encouraged  Low fat/high fiber diet  Increase physical activity  Tobacco cessation encouraged  Cholesterol   Date Value Ref Range Status   08/14/2024 156 120 - 199 mg/dL Final     Comment:     The National Cholesterol Education Program (NCEP) has set the  following guidelines (reference ranges) for Cholesterol:  Optimal.....................<200 mg/dL  Borderline High.............200-239 mg/dL  High........................> or = 240 mg/dL       HDL   Date Value Ref Range Status   08/14/2024 56 40 - 75 mg/dL Final     Comment:     The National Cholesterol Education Program (NCEP) has set the  following guidelines (reference values) for HDL Cholesterol:  Low...............<40 mg/dL  Optimal...........>60 mg/dL       Triglycerides   Date Value Ref Range Status   08/14/2024 57 30 - 150 mg/dL Final     Comment:     The National Cholesterol Education Program (NCEP) has set the  following guidelines (reference values) for triglycerides:  Normal......................<150 mg/dL  Borderline High.............150-199 mg/dL  High........................200-499 mg/dL       5. Vitamin D deficiency  Vitamin D level: 28  Continue Vitamin D supplement     6. Class 3 severe obesity due to excess calories with serious comorbidity and body mass index (BMI) of 45.0 to 49.9 in adult  Body mass index is 51.3 kg/m².  Wt Readings from Last 1 Encounters:   04/23/25 123.2 kg (271 lb 8 oz)   Waist Circumference: 53 in  TSH   Date Value Ref Range Status   08/14/2024 1.578 0.400 - 4.000 uIU/mL Final     Vit D, 25-Hydroxy    Date Value Ref Range Status   11/11/2022 28 (L) 30 - 96 ng/mL Final     Comment:     Vitamin D deficiency.........<10 ng/mL                              Vitamin D insufficiency......10-29 ng/mL       Vitamin D sufficiency........> or equal to 30 ng/mL  Vitamin D toxicity............>100 ng/mL        A modest (5-10%) weight loss improves health and quality of life.     Obesity Complication % Weight Loss for Therapeutic Benefit   Diabetes Prevention 5% to 10%   Diabetes Remission 20%   Hypertension 5% to 15%   CVD Risk Reduction >10%   MASLD/MASH 5% to 10%/ >10%   Sleep Apnea 10%   Osteoarthritis 5% to 10%   Urinary Stress Incontinence  5% to 10%   GERD Women: 5% to 10%, Men 10%   PCOS 5% to 10%         Goal: to weigh 250 lbs  Short-term: lose 10 lbs in 12 weeks  Long-term: lose 40 lbs in 1 year  *Keep in mind that, on average, you may lose 1-2 lbs per week*    Willingness to change:     Patient qualifies for anti-obesity medications due to BMI 48 with multiple comorbidities. Anti-obesity medications are an adjunct to nutritional, physical activity, and behavioral therapies. Medication alone cannot treat obesity. I recommend starting medication to significantly improve patient's risk factor/s.   Medication:   Continue Mounjaro to 7.5 mg SC weekly    Nutrition: Calories per day.      Protein: goal is 123 g/day. Protein has a slower rate of digestion = greater satiety (fullness).        Aim for <25g of added sugar per day.      Recommend to increase fiber intake. USDA guidelines: Men 50+ y/o - 28 g/day.  www.fullplateliving.org      Eat Fit Shopping List.      Prepare meals in advance.      Eat breakfast within 2 hours of waking up.       Track what you eat. Research shows that people who track their food intake are more successful with weight management. This creates awareness of what you are eating/drinking and can help you see where to make changes.       Get to know your plate.  www.myplate.gov      Stay hydrated.      Activity: 2-3 days of strength training. This can be body weight, light weight or elastic bands exercises. [employer] and [employer] are great sources for free workout plans/videos.     Start slowly with an activity you enjoy and make it a habit.     Ask a family member or friend to get active with you.     Aim for 5,000 - 10,000 steps per day     Schedule time to make physical activity a part of your daily routine.     Exercise prescription:     Frequency: 3 days/week    Intensity: low  Low: walking, swimming, cycling at a leisurely pace, yoga, christopher chi, light weight training, stretching, slow dancing, leisurely sports like table tennis, and light yard work     Type: Walk Track at work or [employer] videos    Time: 20 minutes    Enjoyment: (make it fun)    Sleep: 7-8 hours of sleep a night    *Recognize your progress and remember that each day is a new day*  *Stay on track, even when you feel like you're not making progress*  *Sit Less, Stand Often, Move More*  *You don't have to be perfect; be persistent*    Avoiding Weight Regain:  - continued with modified food intake (changed eating habits)  - eat breakfast every day  - weigh yourself at least once a week  - watch less than 10 hours of TV per week  - continue with increased physical activity  - physical activity, on average, about 1 hour per day    7. Stage 2 chronic kidney disease  BUN/Cr: 20/0.9  GFR: >60  Maintain good B/P and CBG control    8. Rheumatoid arthritis of multiple sites with negative rheumatoid factor  Followed by Rheumatology  Stable      Follow up in about 4 weeks (around 5/21/2025) for Virtual Visit. In addition to their scheduled follow up, the patient has also been instructed to follow up on as needed basis.     JESSICA Kimball

## 2025-04-23 NOTE — PATIENT INSTRUCTIONS
Exercise: Chair Exercises for Seniors on [employer]  [email or web address]

## 2025-04-24 ENCOUNTER — TELEPHONE (OUTPATIENT)
Dept: PHARMACY | Facility: CLINIC | Age: 60
End: 2025-04-24
Payer: COMMERCIAL

## 2025-04-24 NOTE — TELEPHONE ENCOUNTER
[facility]/Population Health Chart Review & Patient Outreach Details For Medication Adherence Project    Reason for Outreach Encounter: 3rd Party payor non-compliance report ([employer], BC[employer], C, etc)  2.  Patient Outreach Method: Reviewed Patient Chart  3.   Medication in question: benicar-hctz   LAST FILLED: 4/4/25 for 90 day supply  Hypertension Medications              amLODIPine (NORVASC) 10 MG tablet Take 1 tablet by mouth once daily    olmesartan-hydrochlorothiazide (BENICAR HCT) 20-12.5 mg per tablet Take 1 tablet by mouth once daily.              4.  Reviewed and or Updates Made To: Patient Chart  5. Outreach Outcomes and/or actions taken: Patient filled medication and is on track to be adherent

## 2025-04-24 NOTE — TELEPHONE ENCOUNTER
[facility]/Population Health Chart Review & Patient Outreach Details For Medication Adherence Project    Reason for Outreach Encounter: 3rd Party payor non-compliance report ([employer], BCBS, [employer], etc)  2.  Patient Outreach Method: Reviewed patient chart   3.   Medication in question:  atorvastatin   LAST FILLED: 11/11/24 for 90 day supply  Hyperlipidemia Medications              atorvastatin (LIPITOR) 20 MG tablet Take 1 tablet (20 mg total) by mouth once daily.               4.  Reviewed and or Updates Made To: Patient Chart  5. Outreach Outcomes and/or actions taken: Add patient to call list: no [employer] message portal; mailbox full   Additional Notes:

## 2025-05-05 ENCOUNTER — TELEPHONE (OUTPATIENT)
Dept: OPHTHALMOLOGY | Facility: CLINIC | Age: 60
End: 2025-05-05
Payer: COMMERCIAL

## 2025-05-05 NOTE — TELEPHONE ENCOUNTER
Called patient in regards to scheduling overdue eye exam. Unable to leave vm. Booked patient for next available appointment and mailed out appointment slip.

## 2025-05-05 NOTE — TELEPHONE ENCOUNTER
Spoke with pt, wilian baer scheduled appt for him  Confirmed time/date    ----- Message from MarcusvincentBlackbaymarine sent at 5/5/2025  2:56 PM CDT -----  Contact: GREY SHIN [[id number]]  .Type:  Patient Returning CallWho Called:GREY SHIN [[id number]]Who Left Message for Patient:[place] the patient know what this is regarding?:missed call Would the patient rather a call back or a response via [facility]? Call Best Call Back Number:[phone] Information:

## 2025-05-21 ENCOUNTER — OFFICE VISIT (OUTPATIENT)
Dept: PRIMARY CARE CLINIC | Facility: CLINIC | Age: 60
End: 2025-05-21
Payer: COMMERCIAL

## 2025-05-21 VITALS
DIASTOLIC BLOOD PRESSURE: 76 MMHG | RESPIRATION RATE: 18 BRPM | HEIGHT: 61 IN | BODY MASS INDEX: 51.65 KG/M2 | OXYGEN SATURATION: 99 % | SYSTOLIC BLOOD PRESSURE: 136 MMHG | WEIGHT: 273.56 LBS | HEART RATE: 88 BPM

## 2025-05-21 DIAGNOSIS — Z71.3 DIETARY COUNSELING AND SURVEILLANCE: ICD-10-CM

## 2025-05-21 DIAGNOSIS — I10 ESSENTIAL HYPERTENSION: Chronic | ICD-10-CM

## 2025-05-21 DIAGNOSIS — N18.2 STAGE 2 CHRONIC KIDNEY DISEASE: Chronic | ICD-10-CM

## 2025-05-21 DIAGNOSIS — E78.2 MIXED HYPERLIPIDEMIA: Chronic | ICD-10-CM

## 2025-05-21 DIAGNOSIS — E11.22 TYPE 2 DIABETES MELLITUS WITH STAGE 2 CHRONIC KIDNEY DISEASE, WITHOUT LONG-TERM CURRENT USE OF INSULIN: Chronic | ICD-10-CM

## 2025-05-21 DIAGNOSIS — E66.01 CLASS 3 SEVERE OBESITY DUE TO EXCESS CALORIES WITH SERIOUS COMORBIDITY AND BODY MASS INDEX (BMI) OF 50.0 TO 59.9 IN ADULT: Chronic | ICD-10-CM

## 2025-05-21 DIAGNOSIS — E88.810 METABOLIC SYNDROME: Primary | ICD-10-CM

## 2025-05-21 DIAGNOSIS — N18.2 TYPE 2 DIABETES MELLITUS WITH STAGE 2 CHRONIC KIDNEY DISEASE, WITHOUT LONG-TERM CURRENT USE OF INSULIN: Chronic | ICD-10-CM

## 2025-05-21 DIAGNOSIS — Z71.82 EXERCISE COUNSELING: ICD-10-CM

## 2025-05-21 DIAGNOSIS — E66.813 CLASS 3 SEVERE OBESITY DUE TO EXCESS CALORIES WITH SERIOUS COMORBIDITY AND BODY MASS INDEX (BMI) OF 50.0 TO 59.9 IN ADULT: Chronic | ICD-10-CM

## 2025-05-21 PROCEDURE — 3072F LOW RISK FOR RETINOPATHY: CPT | Mod: CPTII,S$GLB,, | Performed by: NURSE PRACTITIONER

## 2025-05-21 PROCEDURE — 3044F HG A1C LEVEL LT 7.0%: CPT | Mod: CPTII,S$GLB,, | Performed by: NURSE PRACTITIONER

## 2025-05-21 PROCEDURE — 3075F SYST BP GE 130 - 139MM HG: CPT | Mod: CPTII,S$GLB,, | Performed by: NURSE PRACTITIONER

## 2025-05-21 PROCEDURE — 3078F DIAST BP <80 MM HG: CPT | Mod: CPTII,S$GLB,, | Performed by: NURSE PRACTITIONER

## 2025-05-21 PROCEDURE — 1159F MED LIST DOCD IN RCRD: CPT | Mod: CPTII,S$GLB,, | Performed by: NURSE PRACTITIONER

## 2025-05-21 PROCEDURE — 99401 PREV MED CNSL INDIV APPRX 15: CPT | Mod: S$GLB,,, | Performed by: NURSE PRACTITIONER

## 2025-05-21 PROCEDURE — 99214 OFFICE O/P EST MOD 30 MIN: CPT | Mod: 25,S$GLB,, | Performed by: NURSE PRACTITIONER

## 2025-05-21 PROCEDURE — 3008F BODY MASS INDEX DOCD: CPT | Mod: CPTII,S$GLB,, | Performed by: NURSE PRACTITIONER

## 2025-05-21 PROCEDURE — 99999 PR PBB SHADOW E&M-EST. PATIENT-LVL V: CPT | Mod: PBBFAC,,, | Performed by: NURSE PRACTITIONER

## 2025-05-21 PROCEDURE — 1160F RVW MEDS BY RX/DR IN RCRD: CPT | Mod: CPTII,S$GLB,, | Performed by: NURSE PRACTITIONER

## 2025-05-21 RX ORDER — BUPROPION HYDROCHLORIDE 100 MG/1
100 TABLET ORAL DAILY
Qty: 30 TABLET | Refills: 0 | Status: SHIPPED | OUTPATIENT
Start: 2025-05-21

## 2025-05-21 NOTE — PROGRESS NOTES
Patient ID: [id number]     Chief Complaint: Follow-up    HPI:     Juan C Rodriguez is a 59 y.o. male with diagnosis of Metabolic Syndrome, HTN, HLD, DM2, Vitamin D Deficiency, Obesity, CKD2, RA, Anemia. Patient referred by PCP. Patient's PCP is Dr. Michelle. Patient seen today for Obesity. Patient last seen on 04/23/2025.     Weight history   Patient states always had increased weight, even as a child. Patient prepares his meals at home. On Mounjaro for DM2, lost over 30 lbs since starting medication.   04/23/2025: at previous appt, tirzepatide increased to 7.5 mg weekly. Tolerating well. Helps some with appetite. Aiming for protein goal, not keeping track. Has not increased physical activity.   05/21/2025: at previous appt, Mounjaro 7.5 mg weekly continued. Tolerating well. Doesn't feel medication is helping with appetite. Not aiming for protein goal but is eating at home more and having protein with every meal. Hasn't increased physical activity. Walk a lot at work.     Previous Obesity Treatment:   Diet/Exercise - unsuccessful   Mounjaro - helps with weight loss     BMI:  51.3 (04/23/2025)  48.3 (03/12/2025)  48.8 (11/21/2024)  49.8 (08/14/2024)    Weight:  Wt Readings from Last 6 Encounters:   05/21/25 124.1 kg (273 lb 9.5 oz)   04/23/25 123.2 kg (271 lb 8 oz)   03/18/25 122.1 kg (269 lb 2.9 oz)   03/12/25 123.7 kg (272 lb 11.3 oz)   11/21/24 124.9 kg (275 lb 5.7 oz)   11/15/24 124.9 kg (275 lb 5.7 oz)     Ideal/Adjusted Body Weight:  Ideal: 125 lbs  Adjusted: 184 lbs    Neck Circumference:   16.5 in    Waist Circumference:  53 in    Percent Body Fat:   56.3% (04/23/2025)  https://www.calculator.net/body-fat-calculator.html    InBody:   Date: 04/23/2025  SMM: 64.6 lbs  Body Fat Mass: 152.9 lbs  Visceral Fat Level: 24  Basal Metabolic Rate: 1,532 kcal    Nutrition history (24 hour food recall)  Breakfast: none   Lunch: sausage, ritz crackers  Dinner: chicken, salad, pork loin  Snack: ice cream  Water:  2 16.9 oz bottles/day  Sugar Sweetened beverages: juice   Etoh: beer on weekends   Satiety cues: sometimes feels full after consuming meals, denies having to consume large amounts to feel full   Cravings: sweets  Food noise: yes  Dietician: 07/31/2024    Eating Disorders:   Denies     Current physical activity:   work  Barriers: pain to both feet    Stressors/Mental Health   Denies, is a stress eater  Over the last two weeks how often have you been bothered by little interest or pleasure in doing things: 0  Over the last two weeks how often have you been bothered by feeling down, depressed or hopeless: 0  PHQ-2 Total Score: 0    Sleep habits   Sleeps ok, falls asleep fast but has difficulty staying asleep, was supposed to have sleep study but states he never went to appointment; averages 7 hours/night     CVD screening  The 10-year ASCVD risk score (Moe HUMPHRIES, et al., 2019) is: 39.2%    Values used to calculate the score:      Age: 59 years      Sex: Male      Is Non- : Yes      Diabetic: Yes      Tobacco smoker: Yes      Systolic Blood Pressure: 136 mmHg      Is BP treated: Yes      HDL Cholesterol: 56 mg/dL      Total Cholesterol: 156 mg/dL      I spent 10 minutes counseling patient on diet, physical activity and reviewing labs.     Review of patient's allergies indicates:   Allergen Reactions    Naproxen Swelling     Current Outpatient Medications   Medication Instructions    allopurinoL (ZYLOPRIM) 300 mg, Oral, Daily    amLODIPine (NORVASC) 10 MG tablet Take 1 tablet by mouth once daily    atorvastatin (LIPITOR) 20 mg, Oral, Daily    buPROPion (WELLBUTRIN) 100 mg, Oral, Daily    EnbreL SureClick 50 mg, Subcutaneous, Weekly    ergocalciferol (ERGOCALCIFEROL) 50,000 Units, Oral, Every 7 days    folic acid (FOLVITE) 1 mg, Oral, Daily    HYDROcodone-acetaminophen (NORCO) 5-325 mg per tablet Take 1 tablet by mouth every 6 hours as needed for pain    methotrexate 15 mg, Oral, Every 7 days     "mupirocin (BACTROBAN) 2 % ointment Topical (Top), 3 times daily    needle (disp) 22 G 1 Units, Misc.(Non-Drug; Combo Route), Every 21 days    olmesartan-hydrochlorothiazide (BENICAR HCT) 20-12.5 mg per tablet 1 tablet, Oral, Daily    polyethylene glycol (GLYCOLAX) 17 g, Oral, Daily    tirzepatide 7.5 mg, Subcutaneous, Every 7 days     Past Surgical History:   Procedure Laterality Date    COLONOSCOPY N/A 6/29/2018    Procedure: COLONOSCOPY;  Surgeon: Jeremiah Anaya III, MD;  Location: [facility];  Service: Endoscopy;  Laterality: N/A;     family history includes Cataracts in his mother; Diabetes in his mother and sister; Hypertension in his mother.      Subjective:     Review of Systems   Constitutional: Negative.    HENT: Negative.     Eyes: Negative.    Respiratory: Negative.     Cardiovascular: Negative.    Gastrointestinal: Negative.    Endocrine: Negative.    Genitourinary: Negative.    Musculoskeletal: Negative.    Skin: Negative.    Allergic/Immunologic: Negative.    Neurological: Negative.    Hematological: Negative.    Psychiatric/Behavioral: Negative.       Objective:     Visit Vitals  /76 (BP Location: Left arm, Patient Position: Sitting)   Pulse 88   Resp 18   Ht 5' 1" (1.549 m)   Wt 124.1 kg (273 lb 9.5 oz)   SpO2 99%   BMI 51.69 kg/m²       Physical Exam  Vitals reviewed.   Constitutional:       Appearance: Normal appearance. He is obese.   HENT:      Head: Normocephalic and atraumatic.   Eyes:      Extraocular Movements: Extraocular movements intact.      Conjunctiva/sclera: Conjunctivae normal.      Pupils: Pupils are equal, round, and reactive to light.   Cardiovascular:      Rate and Rhythm: Normal rate and regular rhythm.      Heart sounds: Normal heart sounds.   Pulmonary:      Effort: Pulmonary effort is normal.      Breath sounds: Normal breath sounds.   Abdominal:      General: Bowel sounds are normal.   Musculoskeletal:         General: Normal range of motion.      Cervical back: Normal " range of motion.   Skin:     General: Skin is warm and dry.   Neurological:      Mental Status: He is alert and oriented to person, place, and time.   Psychiatric:         Mood and Affect: Mood normal.         Behavior: Behavior normal.       Labs Reviewed:     Hematology:  Lab Results   Component Value Date    WBC 7.29 03/18/2025    HGB 12.8 (L) 03/18/2025    HCT 41.4 03/18/2025     03/18/2025     Chemistry:  Lab Results   Component Value Date     03/18/2025    K 3.9 03/18/2025    BUN 20 03/18/2025    CREATININE 0.9 03/18/2025    EGFRNORACEVR >60.0 03/18/2025    CALCIUM 8.7 03/18/2025    ALKPHOS 103 03/18/2025    ALBUMIN 3.5 03/18/2025    ALBUMIN 4.2 05/31/2016    BILIDIR 0.2 03/04/2021    AST 21 03/18/2025    ALT 13 03/18/2025    PHOS 3.3 11/29/2021    YXHODHRY32SW 28 (L) 11/11/2022      Lab Results   Component Value Date    HGBA1C 5.0 03/18/2025      Lipid Panel:  Lab Results   Component Value Date    CHOL 156 08/14/2024    HDL 56 08/14/2024    TRIG 57 08/14/2024    TOTALCHOLEST 2.8 08/14/2024      Thyroid:  Lab Results   Component Value Date    TSH 1.578 08/14/2024    T4FREE 0.8 07/08/2016    T3FREE 2.7 07/08/2016      Urine:  Lab Results   Component Value Date    APPEARANCEUA Clear 11/29/2021    PROTEINUA Negative 11/29/2021    LEUKOCYTESUR Negative 11/29/2021    RBCUA 1 11/29/2021    WBCUA 0 11/29/2021    BACTERIA Occasional 11/29/2021    CREATRANDUR 47.0 05/09/2023    PROTEINURINE 7 11/29/2021        Assessment:       ICD-10-CM ICD-9-CM   1. Metabolic syndrome  E88.810 277.7   2. Type 2 diabetes mellitus with stage 2 chronic kidney disease, without long-term current use of insulin  E11.22 250.40    N18.2 585.2   3. Essential hypertension  I10 401.9   4. Mixed hyperlipidemia  E78.2 272.2   5. Class 3 severe obesity due to excess calories with serious comorbidity and body mass index (BMI) of 50.0 to 59.9 in adult  E66.813 278.01    Z68.43 V85.43    E66.01    6. Stage 2 chronic kidney disease  N18.2  585.2   7. Dietary counseling and surveillance  Z71.3 V65.3   8. Exercise counseling  Z71.82 V65.41       Plan:     1. Metabolic syndrome (Primary)  The main goals of treating metabolic syndrome are to lower your risk of heart disease and to prevent type 2 diabetes if it hasnt already developed. If you already have type 2 diabetes, treatment can lower your risk of heart disease by controlling all your risk factors. Per NIH (2020 Mar), a GLP?1 receptor agonist has been used to treat metabolic syndrome (MS) because of its anti?diabetic and anti?obesity effects. Results showed that a GLP-1 receptor agonist treatment reduced body weight gain and food intake in a dose?dependent manner. Marked hyperglycemia and glucose tolerance were significantly improved and it was also shown to reduce plasma triglyceride concentration and liver fat accumulation.   Weight loss and exercise can help reverse insulin resistance.   Aerobic activity for 20-30 minutes 5 days a week.  Recommend Low carbohydrate or Mediterranean diet.  Quitting smoking and avoiding secondhand smoke  Limiting alcohol  Continue Mounjaro to 7.5 mg SC weekly    2. Type 2 diabetes mellitus with stage 2 chronic kidney disease, without long-term current use of insulin  Continue Mounjaro 7.5 mg SC weekly  Encouraged home CBG monitoring.  Hypoglycemic episodes: denies  Body mass index is 51.69 kg/m².  Hemoglobin A1C   Date Value Ref Range Status   03/18/2025 5.0 4.0 - 5.6 % Final     Comment:     ADA Screening Guidelines:  5.7-6.4%  Consistent with prediabetes  >or=6.5%  Consistent with diabetes    High levels of fetal hemoglobin interfere with the HbA1C  assay. Heterozygous hemoglobin variants (HbS, HgC, etc)do  not significantly interfere with this assay.   However, presence of multiple variants may affect accuracy.       Results for orders placed or performed in visit on 05/09/23   Microalbumin/Creatinine Ratio, Urine   Result Value Ref Range    Microalbumin, Urine  8.0 ug/mL    Creatinine, Urine 47.0 23.0 - 375.0 mg/dL    Microalb/Creat Ratio 17.0 0.0 - 30.0 ug/mg    Narrative    Specimen Source->Urine   On Atorvastatin  On Olmesartan  Weight Loss Encouraged  ADA Diet    3. Essential hypertension  Vitals:    05/21/25 1017   BP: 136/76   Pulse: 88   Resp: 18     Results for orders placed or performed in visit on 05/09/23   Microalbumin/Creatinine Ratio, Urine   Result Value Ref Range    Microalbumin, Urine 8.0 ug/mL    Creatinine, Urine 47.0 23.0 - 375.0 mg/dL    Microalb/Creat Ratio 17.0 0.0 - 30.0 ug/mg    Narrative    Specimen Source->Urine   Continue Olmesartan-HCTZ, Amlodipine  DASH diet  Encouraged home blood pressure monitoring    4. Mixed hyperlipidemia  Continue Atorvastatin  Weight loss encouraged  Low fat/high fiber diet  Increase physical activity  Tobacco cessation encouraged  Cholesterol   Date Value Ref Range Status   08/14/2024 156 120 - 199 mg/dL Final     Comment:     The National Cholesterol Education Program (NCEP) has set the  following guidelines (reference ranges) for Cholesterol:  Optimal.....................<200 mg/dL  Borderline High.............200-239 mg/dL  High........................> or = 240 mg/dL       HDL   Date Value Ref Range Status   08/14/2024 56 40 - 75 mg/dL Final     Comment:     The National Cholesterol Education Program (NCEP) has set the  following guidelines (reference values) for HDL Cholesterol:  Low...............<40 mg/dL  Optimal...........>60 mg/dL       Triglycerides   Date Value Ref Range Status   08/14/2024 57 30 - 150 mg/dL Final     Comment:     The National Cholesterol Education Program (NCEP) has set the  following guidelines (reference values) for triglycerides:  Normal......................<150 mg/dL  Borderline High.............150-199 mg/dL  High........................200-499 mg/dL       5. Class 3 severe obesity due to excess calories with serious comorbidity and body mass index (BMI) of 45.0 to 49.9 in  adult  Body mass index is 51.69 kg/m².  Wt Readings from Last 1 Encounters:   05/21/25 124.1 kg (273 lb 9.5 oz)   Waist Circumference: 53 in  TSH   Date Value Ref Range Status   08/14/2024 1.578 0.400 - 4.000 uIU/mL Final     Vit D, 25-Hydroxy   Date Value Ref Range Status   11/11/2022 28 (L) 30 - 96 ng/mL Final     Comment:     Vitamin D deficiency.........<10 ng/mL                              Vitamin D insufficiency......10-29 ng/mL       Vitamin D sufficiency........> or equal to 30 ng/mL  Vitamin D toxicity............>100 ng/mL        A modest (5-10%) weight loss improves health and quality of life.     Obesity Complication % Weight Loss for Therapeutic Benefit   Diabetes Prevention 5% to 10%   Diabetes Remission 20%   Hypertension 5% to 15%   CVD Risk Reduction >10%   MASLD/MASH 5% to 10%/ >10%   Sleep Apnea 10%   Osteoarthritis 5% to 10%   Urinary Stress Incontinence  5% to 10%   GERD Women: 5% to 10%, Men 10%   PCOS 5% to 10%         Goal: to weigh 250 lbs  Short-term: lose 10 lbs in 12 weeks  Long-term: lose 40 lbs in 1 year  *Keep in mind that, on average, you may lose 1-2 lbs per week*    Willingness to change:     Patient qualifies for anti-obesity medications due to BMI 48 with multiple comorbidities. Anti-obesity medications are an adjunct to nutritional, physical activity, and behavioral therapies. Medication alone cannot treat obesity. I recommend starting medication to significantly improve patient's risk factor/s.   Medication:   Continue Mounjaro to 7.5 mg SC weekly  Start Wellbutrin 100 mg PO daily     Nutrition: Calories per day.      Protein: goal is 123 g/day. Protein has a slower rate of digestion = greater satiety (fullness).        Aim for <25g of added sugar per day.      Recommend to increase fiber intake. USDA guidelines: Men 50+ y/o - 28 g/day.  www.fullplateliving.org      Eat Fit Shopping List.      Prepare meals in advance.      Eat breakfast within 2 hours of waking up.       Track  what you eat. Research shows that people who track their food intake are more successful with weight management. This creates awareness of what you are eating/drinking and can help you see where to make changes.       Get to know your plate.  www.myplate.gov      Stay hydrated.     Activity: 2-3 days of strength training. This can be body weight, light weight or elastic bands exercises. [employer] and [employer] are great sources for free workout plans/videos.     Start slowly with an activity you enjoy and make it a habit.     Ask a family member or friend to get active with you.     Aim for 5,000 - 10,000 steps per day     Schedule time to make physical activity a part of your daily routine.     Exercise prescription:     Frequency: 3 days/week    Intensity: low  Low: walking, swimming, cycling at a leisurely pace, yoga, christopher chi, light weight training, stretching, slow dancing, leisurely sports like table tennis, and light yard work     Type: Walk Track at work or [employer] videos    Time: 20 minutes    Enjoyment: (make it fun)    Sleep: 7-8 hours of sleep a night    *Recognize your progress and remember that each day is a new day*  *Stay on track, even when you feel like you're not making progress*  *Sit Less, Stand Often, Move More*  *You don't have to be perfect; be persistent*    Avoiding Weight Regain:  - continued with modified food intake (changed eating habits)  - eat breakfast every day  - weigh yourself at least once a week  - watch less than 10 hours of TV per week  - continue with increased physical activity  - physical activity, on average, about 1 hour per day    6. Stage 2 chronic kidney disease  BUN/Cr: 20/0.9  GFR: >60  Maintain good B/P and CBG control    7. Dietary counseling and surveillance  Protein: goal is 123 g/day.    Aim for <25g of added sugar per day.  Recommend to increase fiber intake. USDA guidelines: Men 50+ y/o - 28 g/day.    8. Exercise counseling  Exercise prescription:     Frequency:  3 days/week    Intensity: low  Low: walking, swimming, cycling at a leisurely pace, yoga, christopher chi, light weight training, stretching, slow dancing, leisurely sports like table tennis, and light yard work     Type: Walk Track at work or YouTube videos    Time: 20 minutes    Enjoyment: (make it fun)      Follow up in about 4 weeks (around 6/18/2025). In addition to their scheduled follow up, the patient has also been instructed to follow up on as needed basis.     JESSICA Kimball

## 2025-05-21 NOTE — LETTER
May 21, 2025      [facility]  [address]  [place] [address]  Phone: [phone]  Fax: [phone]       Patient: Juan C Rodriguez   YOB: 1965  Date of Visit: 05/21/2025    To Whom It May Concern:    Pamela Rodriguez  was at [facility] on 05/21/2025. The patient may return to work/school on 05/22/2025 with no restrictions. If you have any questions or concerns, or if I can be of further assistance, please do not hesitate to contact me.    Sincerely,    Nimisha Hyman MA

## 2025-05-24 ENCOUNTER — TELEPHONE (OUTPATIENT)
Dept: PHARMACY | Facility: CLINIC | Age: 60
End: 2025-05-24
Payer: COMMERCIAL

## 2025-05-25 NOTE — TELEPHONE ENCOUNTER
[facility]/Population [facility] Chart Review & Patient Outreach Details For Medication Adherence Project    Reason for Outreach Encounter: 3rd Party payor non-compliance report ([employer], [employer], C, etc)  2.  Patient Outreach Method: Reviewed patient chart  and [employer] message  3.   Medication in question:    Hyperlipidemia Medications              atorvastatin (LIPITOR) 20 MG tablet Take 1 tablet (20 mg total) by mouth once daily.                  atorvastatin  last filled  11/11 for 90 day supply      4.  Reviewed and or Updates Made To: Patient Chart  5. Outreach Outcomes and/or actions taken: Sent inquiry to patient: Waiting for response  Additional Notes:

## 2025-06-09 ENCOUNTER — TELEPHONE (OUTPATIENT)
Dept: PHARMACY | Facility: CLINIC | Age: 60
End: 2025-06-09
Payer: COMMERCIAL

## 2025-06-09 NOTE — TELEPHONE ENCOUNTER
[facility]/Population [facility] Chart Review & Patient Outreach Details For Medication Adherence Project    Reason for Outreach Encounter: 3rd Party payor non-compliance report ([employer], [employer], C, etc)  2.  Patient Outreach Method: [employer] message  3.   Medication in question: atorvastatin    LAST FILLED: 11/11/25 for 90 day supply  Hyperlipidemia Medications              atorvastatin (LIPITOR) 20 MG tablet Take 1 tablet (20 mg total) by mouth once daily.               4.  Reviewed and or Updates Made To: Patient Chart  5. Outreach Outcomes and/or actions taken: Sent inquiry to patient: Waiting for response.

## 2025-07-02 NOTE — PROGRESS NOTES
Patient ID: [id number]     Chief Complaint: Follow-up    HPI:     Juan C Rodriguez is a 59 y.o. male with diagnosis of Metabolic Syndrome, HTN, HLD, DM2, Vitamin D Deficiency, Obesity, CKD2, RA, Anemia. Patient referred by PCP. Patient's PCP is Dr. Michelle. Patient seen today for Obesity. Patient last seen on 05/21/2025.     Weight history   Patient states always had increased weight, even as a child. Patient prepares his meals at home. On Mounjaro for DM2, lost over 30 lbs since starting medication.   04/23/2025: at previous appt, tirzepatide increased to 7.5 mg weekly. Tolerating well. Helps some with appetite. Aiming for protein goal, not keeping track. Has not increased physical activity.   05/21/2025: at previous appt, Mounjaro 7.5 mg weekly continued. Tolerating well. Doesn't feel medication is helping with appetite. Not aiming for protein goal but is eating at home more and having protein with every meal. Hasn't increased physical activity. Walk a lot at work.   07/07/2025: at previous appt, patient started on Wellbutrin 100 mg daily and continued on Mounjaro 7.5 mg weekly. Patient stopped taking Wellbutrin d/t side started hurting. Aiming for protein goal. Seen a podiatrist, feet pain improving, has increased physical activity since.     Previous Obesity Treatment:   Diet/Exercise - unsuccessful   Mounjaro - helps with weight loss     BMI:  52.0 (07/07/2025)  51.3 (04/23/2025)  48.3 (03/12/2025)    Weight:  Wt Readings from Last 6 Encounters:   07/07/25 124.9 kg (275 lb 5.7 oz)   05/21/25 124.1 kg (273 lb 9.5 oz)   04/23/25 123.2 kg (271 lb 8 oz)   03/18/25 122.1 kg (269 lb 2.9 oz)   03/12/25 123.7 kg (272 lb 11.3 oz)   11/21/24 124.9 kg (275 lb 5.7 oz)     Ideal/Adjusted Body Weight:  Ideal: 125 lbs  Adjusted: 184 lbs    Neck Circumference:   16.5 in    Waist Circumference:  53 in    Percent Body Fat:   56.3% (04/23/2025)  https://www.calculator.net/body-fat-calculator.html    InBody:   Date:  04/23/2025  SMM: 64.6 lbs  Body Fat Mass: 152.9 lbs  Visceral Fat Level: 24  Basal Metabolic Rate: 1,532 kcal    Nutrition history (24 hour food recall)  Breakfast: none   Lunch: sandwich, chips  Dinner: steak, potato  Snack: ice cream  Water: 2 16.9 oz bottles/day  Sugar Sweetened beverages: juice   Etoh: beer on weekends   Satiety cues: sometimes feels full after consuming meals, denies having to consume large amounts to feel full   Cravings: sweets  Food noise: yes  Dietician: 07/31/2024    Eating Disorders:   Denies     Current physical activity:   work  Barriers: pain to both feet    Stressors/Mental Health   Denies, is a stress eater  Over the last two weeks how often have you been bothered by little interest or pleasure in doing things: 0  Over the last two weeks how often have you been bothered by feeling down, depressed or hopeless: 0  PHQ-2 Total Score: 0    Sleep habits   Sleeps ok, falls asleep fast but has difficulty staying asleep, was supposed to have sleep study but states he never went to appointment; averages 7 hours/night     CVD screening  The 10-year ASCVD risk score (Moe HUMPHRIES, et al., 2019) is: 38.4%    Values used to calculate the score:      Age: 59 years      Sex: Male      Is Non- : Yes      Diabetic: Yes      Tobacco smoker: Yes      Systolic Blood Pressure: 134 mmHg      Is BP treated: Yes      HDL Cholesterol: 56 mg/dL      Total Cholesterol: 156 mg/dL      I spent 5 minutes counseling patient on nutrition/diet and exercise/physical activity as well as risk factor reductions interventions. Educational handout provided on AVS.       Review of patient's allergies indicates:   Allergen Reactions    Naproxen Swelling     Current Outpatient Medications   Medication Instructions    allopurinoL (ZYLOPRIM) 300 mg, Oral, Daily    amLODIPine (NORVASC) 10 MG tablet Take 1 tablet by mouth once daily    atorvastatin (LIPITOR) 20 mg, Oral, Daily    buPROPion (WELLBUTRIN) 100  "mg, Oral, Daily    EnbreL SureClick 50 mg, Subcutaneous, Weekly    ergocalciferol (ERGOCALCIFEROL) 50,000 Units, Oral, Every 7 days    folic acid (FOLVITE) 1 mg, Oral, Daily    HYDROcodone-acetaminophen (NORCO) 5-325 mg per tablet Take 1 tablet by mouth every 6 hours as needed for pain    methotrexate 15 mg, Oral, Every 7 days    MOUNJARO 10 mg, Subcutaneous, Every 7 days    mupirocin (BACTROBAN) 2 % ointment Topical (Top), 3 times daily    needle (disp) 22 G 1 Units, Misc.(Non-Drug; Combo Route), Every 21 days    olmesartan-hydrochlorothiazide (BENICAR HCT) 20-12.5 mg per tablet 1 tablet, Oral, Daily    polyethylene glycol (GLYCOLAX) 17 g, Oral, Daily     Past Surgical History:   Procedure Laterality Date    COLONOSCOPY N/A 6/29/2018    Procedure: COLONOSCOPY;  Surgeon: Jeremiah Anaya III, MD;  Location: [facility];  Service: Endoscopy;  Laterality: N/A;     family history includes Cataracts in his mother; Diabetes in his mother and sister; Hypertension in his mother.      Subjective:     Review of Systems   Constitutional: Negative.    HENT: Negative.     Eyes: Negative.    Respiratory: Negative.     Cardiovascular: Negative.    Gastrointestinal: Negative.    Endocrine: Negative.    Genitourinary: Negative.    Musculoskeletal: Negative.    Skin: Negative.    Allergic/Immunologic: Negative.    Neurological: Negative.    Hematological: Negative.    Psychiatric/Behavioral: Negative.       Objective:     Visit Vitals  /78 (BP Location: Left arm, Patient Position: Sitting)   Pulse 61   Resp 18   Ht 5' 1" (1.549 m)   Wt 124.9 kg (275 lb 5.7 oz)   SpO2 97%   BMI 52.03 kg/m²       Physical Exam  Vitals reviewed.   Constitutional:       Appearance: Normal appearance. He is obese.   HENT:      Head: Normocephalic and atraumatic.   Eyes:      Extraocular Movements: Extraocular movements intact.      Conjunctiva/sclera: Conjunctivae normal.      Pupils: Pupils are equal, round, and reactive to light.   Cardiovascular: "      Rate and Rhythm: Normal rate and regular rhythm.      Heart sounds: Normal heart sounds.   Pulmonary:      Effort: Pulmonary effort is normal.      Breath sounds: Normal breath sounds.   Abdominal:      General: Bowel sounds are normal.   Musculoskeletal:         General: Normal range of motion.      Cervical back: Normal range of motion.   Skin:     General: Skin is warm and dry.   Neurological:      Mental Status: He is alert and oriented to person, place, and time.   Psychiatric:         Mood and Affect: Mood normal.         Behavior: Behavior normal.       Labs Reviewed:     Hematology:  Lab Results   Component Value Date    WBC 7.29 03/18/2025    HGB 12.8 (L) 03/18/2025    HCT 41.4 03/18/2025     03/18/2025     Chemistry:  Lab Results   Component Value Date     03/18/2025    K 3.9 03/18/2025    BUN 20 03/18/2025    CREATININE 0.9 03/18/2025    EGFRNORACEVR >60.0 03/18/2025    CALCIUM 8.7 03/18/2025    ALKPHOS 103 03/18/2025    ALBUMIN 3.5 03/18/2025    ALBUMIN 4.2 05/31/2016    BILIDIR 0.2 03/04/2021    AST 21 03/18/2025    ALT 13 03/18/2025    PHOS 3.3 11/29/2021    [id number] 28 (L) 11/11/2022      Lab Results   Component Value Date    HGBA1C 5.0 03/18/2025      Lipid Panel:  Lab Results   Component Value Date    CHOL 156 08/14/2024    HDL 56 08/14/2024    TRIG 57 08/14/2024    TOTALCHOLEST 2.8 08/14/2024      Thyroid:  Lab Results   Component Value Date    TSH 1.578 08/14/2024    T4FREE 0.8 07/08/2016    T3FREE 2.7 07/08/2016      Urine:  Lab Results   Component Value Date    APPEARANCEUA Clear 11/29/2021    PROTEINUA Negative 11/29/2021    LEUKOCYTESUR Negative 11/29/2021    RBCUA 1 11/29/2021    WBCUA 0 11/29/2021    BACTERIA Occasional 11/29/2021    CREATRANDUR 47.0 05/09/2023    PROTEINURINE 7 11/29/2021        Assessment:       ICD-10-CM ICD-9-CM   1. Metabolic syndrome  E88.810 277.7   2. Type 2 diabetes mellitus with stage 2 chronic kidney disease, without long-term current use of  insulin  E11.22 250.40    N18.2 585.2   3. Essential hypertension  I10 401.9   4. Mixed hyperlipidemia  E78.2 272.2   5. Class 3 severe obesity due to excess calories with serious comorbidity and body mass index (BMI) of 50.0 to 59.9 in adult  E66.813 278.01    Z68.43 V85.43    E66.01    6. Stage 2 chronic kidney disease  N18.2 585.2         Plan:     1. Metabolic syndrome (Primary)  The main goals of treating metabolic syndrome are to lower your risk of heart disease and to prevent type 2 diabetes if it hasnt already developed. If you already have type 2 diabetes, treatment can lower your risk of heart disease by controlling all your risk factors. Per NIH (2020 Mar), a GLP?1 receptor agonist has been used to treat metabolic syndrome (MS) because of its anti?diabetic and anti?obesity effects. Results showed that a GLP-1 receptor agonist treatment reduced body weight gain and food intake in a dose?dependent manner. Marked hyperglycemia and glucose tolerance were significantly improved and it was also shown to reduce plasma triglyceride concentration and liver fat accumulation.   Weight loss and exercise can help reverse insulin resistance.   Aerobic activity for 20-30 minutes 5 days a week.  Recommend Low carbohydrate or Mediterranean diet.  Quitting smoking and avoiding secondhand smoke  Limiting alcohol  Increase Mounjaro to 10 mg SC weekly    2. Type 2 diabetes mellitus with stage 2 chronic kidney disease, without long-term current use of insulin  Increase Mounjaro to 10 mg SC weekly  Encouraged home CBG monitoring.  Hypoglycemic episodes: denies  Body mass index is 52.03 kg/m².  Hemoglobin A1C   Date Value Ref Range Status   03/18/2025 5.0 4.0 - 5.6 % Final     Comment:     ADA Screening Guidelines:  5.7-6.4%  Consistent with prediabetes  >or=6.5%  Consistent with diabetes    High levels of fetal hemoglobin interfere with the HbA1C  assay. Heterozygous hemoglobin variants (HbS, HgC, etc)do  not significantly  interfere with this assay.   However, presence of multiple variants may affect accuracy.       Results for orders placed or performed in visit on 05/09/23   Microalbumin/Creatinine Ratio, Urine   Result Value Ref Range    Microalbumin, Urine 8.0 ug/mL    Creatinine, Urine 47.0 23.0 - 375.0 mg/dL    Microalb/Creat Ratio 17.0 0.0 - 30.0 ug/mg    Narrative    Specimen Source->Urine   On Atorvastatin  On Olmesartan  Weight Loss Encouraged  ADA Diet    3. Essential hypertension  Vitals:    07/07/25 1014   BP: 134/78   Pulse: 61   Resp: 18     Results for orders placed or performed in visit on 05/09/23   Microalbumin/Creatinine Ratio, Urine   Result Value Ref Range    Microalbumin, Urine 8.0 ug/mL    Creatinine, Urine 47.0 23.0 - 375.0 mg/dL    Microalb/Creat Ratio 17.0 0.0 - 30.0 ug/mg    Narrative    Specimen Source->Urine   Continue Olmesartan-HCTZ, Amlodipine  DASH diet  Encouraged home blood pressure monitoring    4. Mixed hyperlipidemia  Continue Atorvastatin  Weight loss encouraged  Low fat/high fiber diet  Increase physical activity  Tobacco cessation encouraged  Cholesterol   Date Value Ref Range Status   08/14/2024 156 120 - 199 mg/dL Final     Comment:     The National Cholesterol Education Program (NCEP) has set the  following guidelines (reference ranges) for Cholesterol:  Optimal.....................<200 mg/dL  Borderline High.............200-239 mg/dL  High........................> or = 240 mg/dL       HDL   Date Value Ref Range Status   08/14/2024 56 40 - 75 mg/dL Final     Comment:     The National Cholesterol Education Program (NCEP) has set the  following guidelines (reference values) for HDL Cholesterol:  Low...............<40 mg/dL  Optimal...........>60 mg/dL       Triglycerides   Date Value Ref Range Status   08/14/2024 57 30 - 150 mg/dL Final     Comment:     The National Cholesterol Education Program (NCEP) has set the  following guidelines (reference values) for  triglycerides:  Normal......................<150 mg/dL  Borderline High.............150-199 mg/dL  High........................200-499 mg/dL       5. Class 3 severe obesity due to excess calories with serious comorbidity and body mass index (BMI) of 45.0 to 49.9 in adult  Body mass index is 52.03 kg/m².  Wt Readings from Last 1 Encounters:   07/07/25 124.9 kg (275 lb 5.7 oz)   Waist Circumference: 53 in  TSH   Date Value Ref Range Status   08/14/2024 1.578 0.400 - 4.000 uIU/mL Final     Vit D, 25-Hydroxy   Date Value Ref Range Status   11/11/2022 28 (L) 30 - 96 ng/mL Final     Comment:     Vitamin D deficiency.........<10 ng/mL                              Vitamin D insufficiency......10-29 ng/mL       Vitamin D sufficiency........> or equal to 30 ng/mL  Vitamin D toxicity............>100 ng/mL        A modest (5-10%) weight loss improves health and quality of life.     Obesity Complication % Weight Loss for Therapeutic Benefit   Diabetes Prevention 5% to 10%   Diabetes Remission 20%   Hypertension 5% to 15%   CVD Risk Reduction >10%   MASLD/MASH 5% to 10%/ >10%   Sleep Apnea 10%   Osteoarthritis 5% to 10%   Urinary Stress Incontinence  5% to 10%   GERD Women: 5% to 10%, Men 10%   PCOS 5% to 10%         Goal: to weigh 250 lbs  Short-term: lose 10 lbs in 12 weeks  Long-term: lose 40 lbs in 1 year  *Keep in mind that, on average, you may lose 1-2 lbs per week*    Willingness to change:     Patient qualifies for anti-obesity medications due to BMI 48 with multiple comorbidities. Anti-obesity medications are an adjunct to nutritional, physical activity, and behavioral therapies. Medication alone cannot treat obesity. I recommend starting medication to significantly improve patient's risk factor/s.   Medication:   Increase Mounjaro to 10 mg SC weekly    Nutrition: Calories per day.      Protein: goal is 123 g/day. Protein has a slower rate of digestion = greater satiety (fullness).        Aim for <25g of added sugar per  day.      Recommend to increase fiber intake. USDA guidelines: Men 50+ y/o - 28 g/day.  www.fullplateliving.org      Eat Fit Shopping List.      Prepare meals in advance.      Eat breakfast within 2 hours of waking up.       Track what you eat. Research shows that people who track their food intake are more successful with weight management. This creates awareness of what you are eating/drinking and can help you see where to make changes.       Get to know your plate.  www.AdNear.gov      Stay hydrated.     Activity: 2-3 days of strength training. This can be body weight, light weight or elastic bands exercises. [employer] and [employer] are great sources for free workout plans/videos.     Start slowly with an activity you enjoy and make it a habit.     Ask a family member or friend to get active with you.     Aim for 5,000 - 10,000 steps per day     Schedule time to make physical activity a part of your daily routine.     Exercise prescription:     Frequency: 3 days/week    Intensity: low  Low: walking, swimming, cycling at a leisurely pace, yoga, christopher chi, light weight training, stretching, slow dancing, leisurely sports like table tennis, and light yard work     Type: Walk Track at work or [employer] videos    Time: 20 minutes    Enjoyment: (make it fun)    Sleep: 7-8 hours of sleep a night    *Recognize your progress and remember that each day is a new day*  *Stay on track, even when you feel like you're not making progress*  *Sit Less, Stand Often, Move More*  *You don't have to be perfect; be persistent*    Avoiding Weight Regain:  - continued with modified food intake (changed eating habits)  - eat breakfast every day  - weigh yourself at least once a week  - watch less than 10 hours of TV per week  - continue with increased physical activity  - physical activity, on average, about 1 hour per day    6. Stage 2 chronic kidney disease  BUN/Cr: 20/0.9  GFR: >60  Maintain good B/P and CBG control      Follow up in about  6 weeks (around 8/18/2025). In addition to their scheduled follow up, the patient has also been instructed to follow up on as needed basis.     Racheal Schmidt, APRILP

## 2025-07-07 ENCOUNTER — OFFICE VISIT (OUTPATIENT)
Dept: PRIMARY CARE CLINIC | Facility: CLINIC | Age: 60
End: 2025-07-07
Payer: COMMERCIAL

## 2025-07-07 VITALS
DIASTOLIC BLOOD PRESSURE: 78 MMHG | OXYGEN SATURATION: 97 % | BODY MASS INDEX: 51.99 KG/M2 | HEART RATE: 61 BPM | HEIGHT: 61 IN | SYSTOLIC BLOOD PRESSURE: 134 MMHG | RESPIRATION RATE: 18 BRPM | WEIGHT: 275.38 LBS

## 2025-07-07 DIAGNOSIS — I10 ESSENTIAL HYPERTENSION: Chronic | ICD-10-CM

## 2025-07-07 DIAGNOSIS — N18.2 STAGE 2 CHRONIC KIDNEY DISEASE: Chronic | ICD-10-CM

## 2025-07-07 DIAGNOSIS — N18.2 TYPE 2 DIABETES MELLITUS WITH STAGE 2 CHRONIC KIDNEY DISEASE, WITHOUT LONG-TERM CURRENT USE OF INSULIN: Chronic | ICD-10-CM

## 2025-07-07 DIAGNOSIS — E66.813 CLASS 3 SEVERE OBESITY DUE TO EXCESS CALORIES WITH SERIOUS COMORBIDITY AND BODY MASS INDEX (BMI) OF 50.0 TO 59.9 IN ADULT: Chronic | ICD-10-CM

## 2025-07-07 DIAGNOSIS — E78.2 MIXED HYPERLIPIDEMIA: Chronic | ICD-10-CM

## 2025-07-07 DIAGNOSIS — E88.810 METABOLIC SYNDROME: Primary | ICD-10-CM

## 2025-07-07 DIAGNOSIS — M06.09 RHEUMATOID ARTHRITIS OF MULTIPLE SITES WITH NEGATIVE RHEUMATOID FACTOR: ICD-10-CM

## 2025-07-07 DIAGNOSIS — E66.01 CLASS 3 SEVERE OBESITY DUE TO EXCESS CALORIES WITH SERIOUS COMORBIDITY AND BODY MASS INDEX (BMI) OF 50.0 TO 59.9 IN ADULT: Chronic | ICD-10-CM

## 2025-07-07 DIAGNOSIS — E11.22 TYPE 2 DIABETES MELLITUS WITH STAGE 2 CHRONIC KIDNEY DISEASE, WITHOUT LONG-TERM CURRENT USE OF INSULIN: Chronic | ICD-10-CM

## 2025-07-07 PROCEDURE — 1160F RVW MEDS BY RX/DR IN RCRD: CPT | Mod: CPTII,S$GLB,, | Performed by: NURSE PRACTITIONER

## 2025-07-07 PROCEDURE — 3044F HG A1C LEVEL LT 7.0%: CPT | Mod: CPTII,S$GLB,, | Performed by: NURSE PRACTITIONER

## 2025-07-07 PROCEDURE — 3008F BODY MASS INDEX DOCD: CPT | Mod: CPTII,S$GLB,, | Performed by: NURSE PRACTITIONER

## 2025-07-07 PROCEDURE — 99999 PR PBB SHADOW E&M-EST. PATIENT-LVL V: CPT | Mod: PBBFAC,,, | Performed by: NURSE PRACTITIONER

## 2025-07-07 PROCEDURE — 3072F LOW RISK FOR RETINOPATHY: CPT | Mod: CPTII,S$GLB,, | Performed by: NURSE PRACTITIONER

## 2025-07-07 PROCEDURE — G2211 COMPLEX E/M VISIT ADD ON: HCPCS | Mod: S$GLB,,, | Performed by: NURSE PRACTITIONER

## 2025-07-07 PROCEDURE — 3078F DIAST BP <80 MM HG: CPT | Mod: CPTII,S$GLB,, | Performed by: NURSE PRACTITIONER

## 2025-07-07 PROCEDURE — 1159F MED LIST DOCD IN RCRD: CPT | Mod: CPTII,S$GLB,, | Performed by: NURSE PRACTITIONER

## 2025-07-07 PROCEDURE — 99214 OFFICE O/P EST MOD 30 MIN: CPT | Mod: S$GLB,,, | Performed by: NURSE PRACTITIONER

## 2025-07-07 PROCEDURE — 3075F SYST BP GE 130 - 139MM HG: CPT | Mod: CPTII,S$GLB,, | Performed by: NURSE PRACTITIONER

## 2025-07-07 RX ORDER — TIRZEPATIDE 10 MG/.5ML
10 INJECTION, SOLUTION SUBCUTANEOUS
Qty: 2 ML | Refills: 1 | Status: SHIPPED | OUTPATIENT
Start: 2025-07-07

## 2025-07-07 RX ORDER — ETANERCEPT 50 MG/ML
50 SOLUTION SUBCUTANEOUS WEEKLY
Qty: 4 ML | Refills: 11 | OUTPATIENT
Start: 2025-07-07 | End: 2026-07-07

## 2025-07-18 ENCOUNTER — LAB VISIT (OUTPATIENT)
Dept: LAB | Facility: HOSPITAL | Age: 60
End: 2025-07-18
Attending: INTERNAL MEDICINE
Payer: COMMERCIAL

## 2025-07-18 ENCOUNTER — OFFICE VISIT (OUTPATIENT)
Dept: FAMILY MEDICINE | Facility: CLINIC | Age: 60
End: 2025-07-18
Payer: COMMERCIAL

## 2025-07-18 VITALS
HEART RATE: 60 BPM | OXYGEN SATURATION: 98 % | WEIGHT: 276.38 LBS | SYSTOLIC BLOOD PRESSURE: 130 MMHG | TEMPERATURE: 99 F | DIASTOLIC BLOOD PRESSURE: 72 MMHG | BODY MASS INDEX: 52.18 KG/M2 | HEIGHT: 61 IN

## 2025-07-18 DIAGNOSIS — N18.2 TYPE 2 DIABETES MELLITUS WITH STAGE 2 CHRONIC KIDNEY DISEASE, WITHOUT LONG-TERM CURRENT USE OF INSULIN: Primary | Chronic | ICD-10-CM

## 2025-07-18 DIAGNOSIS — Z00.00 ROUTINE ADULT HEALTH MAINTENANCE: ICD-10-CM

## 2025-07-18 DIAGNOSIS — E11.22 TYPE 2 DIABETES MELLITUS WITH STAGE 2 CHRONIC KIDNEY DISEASE, WITHOUT LONG-TERM CURRENT USE OF INSULIN: Primary | Chronic | ICD-10-CM

## 2025-07-18 DIAGNOSIS — D64.9 ANEMIA, UNSPECIFIED TYPE: ICD-10-CM

## 2025-07-18 DIAGNOSIS — D56.3 ALPHA THALASSEMIA SILENT CARRIER: ICD-10-CM

## 2025-07-18 DIAGNOSIS — E78.2 MIXED HYPERLIPIDEMIA: Chronic | ICD-10-CM

## 2025-07-18 LAB
CHOLEST SERPL-MCNC: 178 MG/DL (ref 120–199)
CHOLEST/HDLC SERPL: 2.9 {RATIO} (ref 2–5)
HDLC SERPL-MCNC: 61 MG/DL (ref 40–75)
HDLC SERPL: 34.3 % (ref 20–50)
LDLC SERPL CALC-MCNC: 105.6 MG/DL (ref 63–159)
NONHDLC SERPL-MCNC: 117 MG/DL
TRIGL SERPL-MCNC: 57 MG/DL (ref 30–150)
TSH SERPL-ACNC: 1.97 UIU/ML (ref 0.4–4)

## 2025-07-18 PROCEDURE — 80061 LIPID PANEL: CPT

## 2025-07-18 PROCEDURE — 84443 ASSAY THYROID STIM HORMONE: CPT

## 2025-07-18 PROCEDURE — 99999 PR PBB SHADOW E&M-EST. PATIENT-LVL IV: CPT | Mod: PBBFAC,,, | Performed by: INTERNAL MEDICINE

## 2025-07-18 PROCEDURE — 36415 COLL VENOUS BLD VENIPUNCTURE: CPT | Mod: PO

## 2025-07-18 RX ORDER — ATORVASTATIN CALCIUM 20 MG/1
20 TABLET, FILM COATED ORAL DAILY
Qty: 90 TABLET | Refills: 3 | Status: SHIPPED | OUTPATIENT
Start: 2025-07-18

## 2025-07-18 NOTE — LETTER
July 18, 2025      [facility]  [address] [place] [address]  Phone: [phone]  Fax: [phone]       Patient: Juan C Rodriguez   YOB: 1965  Date of Visit: 07/18/2025    To Whom It May Concern:    Pamela Rodriguez  was at [facility] on 07/18/2025. The patient may return to work/school on 07/21/25 with no restrictions. If you have any questions or concerns, or if I can be of further assistance, please do not hesitate to contact me.    Sincerely,    Leanne Olivo, SARAHN     
never used

## 2025-07-18 NOTE — PROGRESS NOTES
Subjective:       Patient ID: Juan C Rodriguez is a 59 y.o. male.    Chief Complaint: Follow-up (4 month), Hypertension, Hyperlipidemia, and Diabetes    Follow-up  Associated symptoms include arthralgias. Pertinent negatives include no abdominal pain, chest pain, chills, coughing, diaphoresis, fatigue, fever, headaches, joint swelling, myalgias, nausea, neck pain, numbness, rash, sore throat, vomiting or weakness.   Hypertension  Pertinent negatives include no chest pain, headaches, neck pain, palpitations or shortness of breath.   Hyperlipidemia  Pertinent negatives include no chest pain, myalgias or shortness of breath.   Diabetes  Pertinent negatives for hypoglycemia include no confusion, dizziness, headaches, nervousness/anxiousness, pallor, seizures, speech difficulty or tremors. Pertinent negatives for diabetes include no chest pain, no fatigue, no polydipsia, no polyphagia, no polyuria and no weakness.     Past Medical History:   Diagnosis Date    Diabetes mellitus, type 2 diagnosed 2013    DM (diabetes mellitus)     2011  02/25/2014    HTN (hypertension)     Hypogonadism, testicular     Left tibialis posterior tendinitis 11/30/2016    Obesities, morbid      Past Surgical History:   Procedure Laterality Date    COLONOSCOPY N/A 6/29/2018    Procedure: COLONOSCOPY;  Surgeon: Jeremiah Anaya III, MD;  Location: [facility];  Service: Endoscopy;  Laterality: N/A;     Family History   Problem Relation Name Age of Onset    Cataracts Mother      Diabetes Mother      Hypertension Mother      Diabetes Sister       Social History[1]  Review of Systems   Constitutional:  Negative for activity change, appetite change, chills, diaphoresis, fatigue, fever and unexpected weight change.   HENT:  Negative for drooling, ear discharge, ear pain, facial swelling, hearing loss, mouth sores, nosebleeds, postnasal drip, rhinorrhea, sinus pressure, sneezing, sore throat, tinnitus, trouble swallowing and voice  change.    Eyes:  Negative for photophobia, redness and visual disturbance.   Respiratory:  Negative for apnea, cough, choking, chest tightness, shortness of breath and wheezing.    Cardiovascular:  Negative for chest pain, palpitations and leg swelling.   Gastrointestinal:  Negative for abdominal distention, abdominal pain, anal bleeding, blood in stool, constipation, diarrhea, nausea, rectal pain and vomiting.   Endocrine: Negative for cold intolerance, heat intolerance, polydipsia, polyphagia and polyuria.   Genitourinary:  Negative for difficulty urinating, dysuria, enuresis, flank pain, frequency, genital sores, hematuria and urgency.   Musculoskeletal:  Positive for arthralgias. Negative for back pain, gait problem, joint swelling, myalgias, neck pain and neck stiffness.   Skin:  Negative for color change, pallor, rash and wound.   Allergic/Immunologic: Negative for food allergies and immunocompromised state.   Neurological:  Negative for dizziness, tremors, seizures, syncope, facial asymmetry, speech difficulty, weakness, light-headedness, numbness and headaches.   Hematological:  Negative for adenopathy. Does not bruise/bleed easily.   Psychiatric/Behavioral:  Negative for agitation, behavioral problems, confusion, decreased concentration, dysphoric mood, hallucinations, self-injury, sleep disturbance and suicidal ideas. The patient is not nervous/anxious and is not hyperactive.        Objective:      Physical Exam  Vitals and nursing note reviewed.   Constitutional:       General: He is not in acute distress.     Appearance: Normal appearance. He is well-developed. He is not diaphoretic.   HENT:      Head: Normocephalic and atraumatic.      Mouth/Throat:      Pharynx: No oropharyngeal exudate.   Eyes:      General: No scleral icterus.     Pupils: Pupils are equal, round, and reactive to light.   Neck:      Thyroid: No thyromegaly.      Vascular: No carotid bruit or JVD.      Trachea: No tracheal deviation.    Cardiovascular:      Rate and Rhythm: Normal rate and regular rhythm.      Heart sounds: Normal heart sounds.   Pulmonary:      Effort: Pulmonary effort is normal. No respiratory distress.      Breath sounds: Normal breath sounds. No wheezing or rales.   Chest:      Chest wall: No tenderness.   Abdominal:      General: Bowel sounds are normal. There is no distension.      Palpations: Abdomen is soft.      Tenderness: There is no abdominal tenderness. There is no guarding or rebound.   Musculoskeletal:         General: No tenderness. Normal range of motion.      Cervical back: Normal range of motion and neck supple.   Lymphadenopathy:      Cervical: No cervical adenopathy.   Skin:     General: Skin is warm and dry.      Coloration: Skin is not pale.      Findings: No erythema or rash.   Neurological:      Mental Status: He is alert and oriented to person, place, and time.      Cranial Nerves: No cranial nerve deficit.      Coordination: Coordination normal.   Psychiatric:         Behavior: Behavior normal.         Thought Content: Thought content normal.         Judgment: Judgment normal.         CMP  Sodium   Date Value Ref Range Status   03/18/2025 140 136 - 145 mmol/L Final     Potassium   Date Value Ref Range Status   03/18/2025 3.9 3.5 - 5.1 mmol/L Final     Chloride   Date Value Ref Range Status   03/18/2025 104 95 - 110 mmol/L Final     CO2   Date Value Ref Range Status   03/18/2025 25 23 - 29 mmol/L Final     Glucose   Date Value Ref Range Status   03/18/2025 75 70 - 110 mg/dL Final     BUN   Date Value Ref Range Status   03/18/2025 20 8 - 23 mg/dL Final     Creatinine   Date Value Ref Range Status   03/18/2025 0.9 0.5 - 1.4 mg/dL Final     Calcium   Date Value Ref Range Status   03/18/2025 8.7 8.7 - 10.5 mg/dL Final     Total Protein   Date Value Ref Range Status   03/18/2025 7.3 6.0 - 8.4 g/dL Final     Albumin   Date Value Ref Range Status   03/18/2025 3.5 3.5 - 5.2 g/dL Final   05/31/2016 4.2 3.6 - 5.1  g/dL Final     Comment:     @ Test Performed By:  [facility] M.D., [address]   [id number] [place] [address]  [facility]  [id number]       Total Bilirubin   Date Value Ref Range Status   03/18/2025 0.3 0.1 - 1.0 mg/dL Final     Comment:     For infants and newborns, interpretation of results should be based  on gestational age, weight and in agreement with clinical  observations.    Premature Infant recommended reference ranges:  Up to 24 hours.............<8.0 mg/dL  Up to 48 hours............<12.0 mg/dL  3-5 days..................<15.0 mg/dL  6-29 days.................<15.0 mg/dL       Alkaline Phosphatase   Date Value Ref Range Status   03/18/2025 103 40 - 150 U/L Final     AST   Date Value Ref Range Status   03/18/2025 21 10 - 40 U/L Final     ALT   Date Value Ref Range Status   03/18/2025 13 10 - 44 U/L Final     Anion Gap   Date Value Ref Range Status   03/18/2025 11 8 - 16 mmol/L Final     eGFR if    Date Value Ref Range Status   07/15/2022 >60 >60 mL/min/1.73 m^2 Final     eGFR if non    Date Value Ref Range Status   07/15/2022 >60 >60 mL/min/1.73 m^2 Final     Comment:     Calculation used to obtain the estimated glomerular filtration  rate (eGFR) is the CKD-EPI equation.        Lab Results   Component Value Date    WBC 7.29 03/18/2025    HGB 12.8 (L) 03/18/2025    HCT 41.4 03/18/2025    MCV 82 03/18/2025     03/18/2025     Lab Results   Component Value Date    CHOL 156 08/14/2024     Lab Results   Component Value Date    HDL 56 08/14/2024     Lab Results   Component Value Date    LDLCALC 88.6 08/14/2024     Lab Results   Component Value Date    TRIG 57 08/14/2024     Lab Results   Component Value Date    CHOLHDL 35.9 08/14/2024     Lab Results   Component Value Date    TSH 1.578 08/14/2024     Lab Results   Component Value Date    LABA1C 6.1 (H) 03/16/2017    HGBA1C 5.0 03/18/2025     Assessment:       1.  Type 2 diabetes mellitus with stage 2 chronic kidney disease, without long-term current use of insulin    2. Mixed hyperlipidemia    3. Anemia, unspecified type    4. Alpha thalassemia silent carrier    5. Routine adult health maintenance        Plan:   Type 2 diabetes mellitus with stage 2 chronic kidney disease, without long-term current use of insulin    Mixed hyperlipidemia    Anemia, unspecified type    Alpha thalassemia silent carrier    Routine adult health maintenance  -     Lipid Panel; Future; Expected date: 07/18/2025  -     TSH; Future; Expected date: 07/18/2025    Other orders  -     atorvastatin (LIPITOR) 20 MG tablet; Take 1 tablet (20 mg total) by mouth once daily.  Dispense: 90 tablet; Refill: 3      Stable-------continue meds, watch diet,exercise------------sees rheumatology-----------------f/u 4 months--------       [1]   Social History  Socioeconomic History    Marital status: Single   Tobacco Use    Smoking status: Some Days     Types: Cigars    Smokeless tobacco: Never   Substance and Sexual Activity    Alcohol use: Yes     Alcohol/week: 3.0 - 4.0 standard drinks of alcohol     Types: 3 - 4 Cans of beer per week     Comment: occasional weekends    Drug use: No    Sexual activity: Not Currently     Partners: Female   Social History Narrative    None     Social Drivers of Health     Food Insecurity: No Food Insecurity (7/25/2023)    Received from [facility] and Its Subsidiaries and Affiliates    Hunger Vital Sign     Worried About Running Out of Food in the Last Year: Never true     Ran Out of Food in the Last Year: Never true   Transportation Needs: No Transportation Needs (7/25/2023)    Received from [facility] and Its Subsidiaries and Affiliates    PRAPARE - Transportation     Lack of Transportation (Medical): No     Lack of Transportation (Non-Medical): No   Stress: No Stress Concern Present (8/1/2023)    Received  from [facility] and Its Subsidiaries and Affiliates    [employer] - Occupational Stress Questionnaire     Feeling of Stress : Not at all   Housing Stability: Unknown (8/1/2023)    Received from [facility] and Its Subsidiaries and Affiliates    Housing Stability Vital Sign     Unable to Pay for Housing in the Last Year: No     Unstable Housing in the Last Year: No

## 2025-08-11 ENCOUNTER — TELEPHONE (OUTPATIENT)
Dept: PHARMACY | Facility: CLINIC | Age: 60
End: 2025-08-11
Payer: COMMERCIAL

## 2025-08-22 ENCOUNTER — OFFICE VISIT (OUTPATIENT)
Dept: PRIMARY CARE CLINIC | Facility: CLINIC | Age: 60
End: 2025-08-22
Payer: COMMERCIAL

## 2025-08-22 VITALS
HEART RATE: 60 BPM | HEIGHT: 61 IN | RESPIRATION RATE: 18 BRPM | BODY MASS INDEX: 50.86 KG/M2 | OXYGEN SATURATION: 97 % | DIASTOLIC BLOOD PRESSURE: 74 MMHG | SYSTOLIC BLOOD PRESSURE: 132 MMHG | WEIGHT: 269.38 LBS

## 2025-08-22 DIAGNOSIS — I10 ESSENTIAL HYPERTENSION: Chronic | ICD-10-CM

## 2025-08-22 DIAGNOSIS — N18.2 STAGE 2 CHRONIC KIDNEY DISEASE: Chronic | ICD-10-CM

## 2025-08-22 DIAGNOSIS — N18.2 TYPE 2 DIABETES MELLITUS WITH STAGE 2 CHRONIC KIDNEY DISEASE, WITHOUT LONG-TERM CURRENT USE OF INSULIN: Primary | Chronic | ICD-10-CM

## 2025-08-22 DIAGNOSIS — K76.0 FATTY LIVER: ICD-10-CM

## 2025-08-22 DIAGNOSIS — E66.813 CLASS 3 SEVERE OBESITY DUE TO EXCESS CALORIES WITH SERIOUS COMORBIDITY AND BODY MASS INDEX (BMI) OF 50.0 TO 59.9 IN ADULT: Chronic | ICD-10-CM

## 2025-08-22 DIAGNOSIS — E88.810 METABOLIC SYNDROME: ICD-10-CM

## 2025-08-22 DIAGNOSIS — E11.22 TYPE 2 DIABETES MELLITUS WITH STAGE 2 CHRONIC KIDNEY DISEASE, WITHOUT LONG-TERM CURRENT USE OF INSULIN: Primary | Chronic | ICD-10-CM

## 2025-08-22 DIAGNOSIS — E78.2 MIXED HYPERLIPIDEMIA: Chronic | ICD-10-CM

## 2025-08-22 PROCEDURE — 99999 PR PBB SHADOW E&M-EST. PATIENT-LVL V: CPT | Mod: PBBFAC,,, | Performed by: NURSE PRACTITIONER

## 2025-08-22 RX ORDER — TIRZEPATIDE 10 MG/.5ML
10 INJECTION, SOLUTION SUBCUTANEOUS
Qty: 2 ML | Refills: 1 | Status: SHIPPED | OUTPATIENT
Start: 2025-08-22